# Patient Record
Sex: FEMALE | Race: WHITE | NOT HISPANIC OR LATINO | Employment: UNEMPLOYED | URBAN - METROPOLITAN AREA
[De-identification: names, ages, dates, MRNs, and addresses within clinical notes are randomized per-mention and may not be internally consistent; named-entity substitution may affect disease eponyms.]

---

## 2018-03-19 DIAGNOSIS — M79.672 LEFT FOOT PAIN: Primary | ICD-10-CM

## 2018-03-21 ENCOUNTER — HOSPITAL ENCOUNTER (OUTPATIENT)
Dept: RADIOLOGY | Facility: HOSPITAL | Age: 33
Discharge: HOME | End: 2018-03-21
Attending: ORTHOPAEDIC SURGERY
Payer: COMMERCIAL

## 2018-03-21 ENCOUNTER — OFFICE VISIT (OUTPATIENT)
Dept: ORTHOPEDICS | Facility: CLINIC | Age: 33
End: 2018-03-21
Attending: ORTHOPAEDIC SURGERY
Payer: COMMERCIAL

## 2018-03-21 VITALS
HEIGHT: 68 IN | HEART RATE: 114 BPM | WEIGHT: 210 LBS | BODY MASS INDEX: 31.83 KG/M2 | DIASTOLIC BLOOD PRESSURE: 87 MMHG | SYSTOLIC BLOOD PRESSURE: 138 MMHG

## 2018-03-21 DIAGNOSIS — M79.672 LEFT FOOT PAIN: ICD-10-CM

## 2018-03-21 DIAGNOSIS — M25.571 RIGHT ANKLE PAIN, UNSPECIFIED CHRONICITY: Primary | ICD-10-CM

## 2018-03-21 DIAGNOSIS — S92.354A CLOSED NONDISPLACED FRACTURE OF FIFTH METATARSAL BONE OF RIGHT FOOT, INITIAL ENCOUNTER: ICD-10-CM

## 2018-03-21 DIAGNOSIS — M25.571 RIGHT ANKLE PAIN, UNSPECIFIED CHRONICITY: ICD-10-CM

## 2018-03-21 DIAGNOSIS — M79.672 LEFT FOOT PAIN: Primary | ICD-10-CM

## 2018-03-21 PROCEDURE — 99203 OFFICE O/P NEW LOW 30 MIN: CPT | Mod: S$GLB,,, | Performed by: ORTHOPAEDIC SURGERY

## 2018-03-21 PROCEDURE — 73630 X-RAY EXAM OF FOOT: CPT | Mod: TC,PN,RT

## 2018-03-21 PROCEDURE — 73610 X-RAY EXAM OF ANKLE: CPT | Mod: 26,RT,, | Performed by: RADIOLOGY

## 2018-03-21 PROCEDURE — 99999 PR PBB SHADOW E&M-EST. PATIENT-LVL III: CPT | Mod: PBBFAC,,, | Performed by: ORTHOPAEDIC SURGERY

## 2018-03-21 PROCEDURE — 73630 X-RAY EXAM OF FOOT: CPT | Mod: 26,RT,, | Performed by: RADIOLOGY

## 2018-03-21 PROCEDURE — 73610 X-RAY EXAM OF ANKLE: CPT | Mod: TC,PN,RT

## 2018-03-21 PROCEDURE — 73630 X-RAY EXAM OF FOOT: CPT | Mod: TC,PN,LT

## 2018-03-23 NOTE — PROGRESS NOTES
"History reviewed. No pertinent past medical history.    History reviewed. No pertinent surgical history.    No current outpatient prescriptions on file.     No current facility-administered medications for this visit.        Review of patient's allergies indicates:   Allergen Reactions    Percocet [oxycodone-acetaminophen]        History reviewed. No pertinent family history.    Social History     Social History    Marital status:      Spouse name: N/A    Number of children: N/A    Years of education: N/A     Occupational History    Not on file.     Social History Main Topics    Smoking status: Never Smoker    Smokeless tobacco: Never Used    Alcohol use Not on file    Drug use: Unknown    Sexual activity: Not on file     Other Topics Concern    Not on file     Social History Narrative    No narrative on file       Chief Complaint:   Chief Complaint   Patient presents with    Right Foot - Injury    Right Ankle - Injury       Consulting Physician: Self, Aaareferral    History of present illness:    This is a 32 y.o. female who complains of right foot pain since rolling foot 3-17-18. Pain 7/10 and worse with use. Splinted.    Review of Systems:    Constitution: Denies chills, fever, and sweats.  HENT: Denies headaches or blurry vision.  Cardiovascular: Denies chest pain or irregular heart beat.  Respiratory: Denies cough or shortness of breath.  Gastrointestinal: Denies abdominal pain, nausea, or vomiting.  Musculoskeletal:  Denies muscle cramps.  Neurological: Denies dizziness or focal weakness.  Psychiatric/Behavioral: Normal mental status.  Hematologic/Lymphatic: Denies bleeding problem or easy bruising/bleeding.  Skin: Denies rash or suspicious lesions.    Examination:    Vital Signs:    Vitals:    03/21/18 1005   BP: 138/87   Pulse: (!) 114   Weight: 95.3 kg (210 lb)   Height: 5' 8" (1.727 m)   PainSc:   7   PainLoc: Foot       Body mass index is 31.93 kg/m².    This a well-developed, well " nourished patient in no acute distress.    Alert and oriented x 3 and cooperative to examination.       Physical Exam: Right Foot Exam     Gait:   weight bearing    Skin  Rash:   None  Scars:   None    Inspection   Deformity:   None  Erythema:   None  Bruising:   mild  Swelling:   mild  Masses:  None    Range of Motion  Not tested due to fracture    Muscle Strength  Not tested due to fracture    Other   Tenderness:  Diffuse 5th  Instability:  Not tested due to fracture  Sensation:   Normal    Vascular Exam   Capillary refill:     Normal          Imaging: X-rays ordered and reviewed today personally of the right foot and ankle reveal a non-displaced 5th MT avulsion fracture.        Assessment: Left foot pain    Closed nondisplaced fracture of fifth metatarsal bone of right foot, initial encounter        Plan:  Will boot and allow WB as tolerated. Back in 2 weeks with XR. Discussed possible non-healing.      DISCLAIMER: This note may have been dictated using voice recognition software and may contain grammatical errors.     NOTE: Consult report sent to referring provider via [employer].

## 2018-03-28 ENCOUNTER — TELEPHONE (OUTPATIENT)
Dept: ORTHOPEDICS | Facility: CLINIC | Age: 33
End: 2018-03-28

## 2018-03-28 NOTE — TELEPHONE ENCOUNTER
Spoke with pt and rescheduled appt to 4/4/2018  ----- Message from Flor Smith sent at 3/28/2018  9:45 AM CDT -----   Pt  Is calling to  Speak to the  Nurse about  Rescheduling ai ,   Wants to  Be fitted  In asap [phone]

## 2018-04-02 DIAGNOSIS — M79.671 RIGHT FOOT PAIN: Primary | ICD-10-CM

## 2018-04-04 ENCOUNTER — OFFICE VISIT (OUTPATIENT)
Dept: ORTHOPEDICS | Facility: CLINIC | Age: 33
End: 2018-04-04
Payer: COMMERCIAL

## 2018-04-04 ENCOUNTER — HOSPITAL ENCOUNTER (OUTPATIENT)
Dept: RADIOLOGY | Facility: HOSPITAL | Age: 33
Discharge: HOME | End: 2018-04-04
Attending: ORTHOPAEDIC SURGERY
Payer: COMMERCIAL

## 2018-04-04 VITALS
WEIGHT: 210.13 LBS | SYSTOLIC BLOOD PRESSURE: 121 MMHG | DIASTOLIC BLOOD PRESSURE: 75 MMHG | BODY MASS INDEX: 31.85 KG/M2 | HEIGHT: 68 IN | HEART RATE: 123 BPM

## 2018-04-04 DIAGNOSIS — M79.671 RIGHT FOOT PAIN: ICD-10-CM

## 2018-04-04 DIAGNOSIS — S92.354A CLOSED NONDISPLACED FRACTURE OF FIFTH METATARSAL BONE OF RIGHT FOOT, INITIAL ENCOUNTER: Primary | ICD-10-CM

## 2018-04-04 PROCEDURE — 73630 X-RAY EXAM OF FOOT: CPT | Mod: TC,PN,RT

## 2018-04-04 PROCEDURE — 99213 OFFICE O/P EST LOW 20 MIN: CPT | Mod: S$GLB,,, | Performed by: ORTHOPAEDIC SURGERY

## 2018-04-04 PROCEDURE — 73630 X-RAY EXAM OF FOOT: CPT | Mod: 26,RT,, | Performed by: RADIOLOGY

## 2018-04-04 PROCEDURE — 99999 PR PBB SHADOW E&M-EST. PATIENT-LVL III: CPT | Mod: PBBFAC,,, | Performed by: ORTHOPAEDIC SURGERY

## 2018-04-11 NOTE — PROGRESS NOTES
"History reviewed. No pertinent past medical history.    History reviewed. No pertinent surgical history.    No current outpatient prescriptions on file.     No current facility-administered medications for this visit.        Review of patient's allergies indicates:   Allergen Reactions    Percocet [oxycodone-acetaminophen]        History reviewed. No pertinent family history.    Social History     Social History    Marital status:      Spouse name: N/A    Number of children: N/A    Years of education: N/A     Occupational History    Not on file.     Social History Main Topics    Smoking status: Never Smoker    Smokeless tobacco: Never Used    Alcohol use Not on file    Drug use: Unknown    Sexual activity: Not on file     Other Topics Concern    Not on file     Social History Narrative    No narrative on file       Chief Complaint:   Chief Complaint   Patient presents with    Right Foot - Injury       Consulting Physician: No ref. provider found    History of present illness:    This is a 32 y.o. female who complains of right foot pain since rolling foot 3-17-18. Pain 4/10.    Review of Systems:    Constitution: Denies chills, fever, and sweats.  HENT: Denies headaches or blurry vision.  Cardiovascular: Denies chest pain or irregular heart beat.  Respiratory: Denies cough or shortness of breath.  Gastrointestinal: Denies abdominal pain, nausea, or vomiting.  Musculoskeletal:  Denies muscle cramps.  Neurological: Denies dizziness or focal weakness.  Psychiatric/Behavioral: Normal mental status.  Hematologic/Lymphatic: Denies bleeding problem or easy bruising/bleeding.  Skin: Denies rash or suspicious lesions.    Examination:    Vital Signs:    Vitals:    04/04/18 1559   BP: 121/75   Pulse: (!) 123   Weight: 95.3 kg (210 lb 1.6 oz)   Height: 5' 8" (1.727 m)   PainLoc: Foot       Body mass index is 31.95 kg/m².    This a well-developed, well nourished patient in no acute distress.    Alert and " oriented x 3 and cooperative to examination.       Physical Exam: Right Foot Exam     Gait:   weight bearing    Skin  Rash:   None  Scars:   None    Inspection   Deformity:   None  Erythema:   None  Bruising:   none  Swelling:   none  Masses:  None    Range of Motion  Normal    Muscle Strength  Normal    Other   Tenderness:  mild 5th  Instability:  None  Sensation:   Normal    Vascular Exam   Capillary refill:     Normal          Imaging: X-rays ordered and reviewed today personally of the right foot and ankle reveal a non-displaced 5th MT avulsion fracture.        Assessment: Closed nondisplaced fracture of fifth metatarsal bone of right foot, initial encounter        Plan:  Will continue boot and allow WB as tolerated. Back in 4 weeks with XR.      DISCLAIMER: This note may have been dictated using voice recognition software and may contain grammatical errors.     NOTE: Consult report sent to referring provider via [employer].

## 2018-05-03 DIAGNOSIS — M79.671 RIGHT FOOT PAIN: Primary | ICD-10-CM

## 2018-05-14 ENCOUNTER — HOSPITAL ENCOUNTER (OUTPATIENT)
Dept: RADIOLOGY | Facility: HOSPITAL | Age: 33
Discharge: HOME | End: 2018-05-14
Attending: ORTHOPAEDIC SURGERY
Payer: COMMERCIAL

## 2018-05-14 ENCOUNTER — OFFICE VISIT (OUTPATIENT)
Dept: ORTHOPEDICS | Facility: CLINIC | Age: 33
End: 2018-05-14
Attending: ORTHOPAEDIC SURGERY
Payer: COMMERCIAL

## 2018-05-14 VITALS
HEART RATE: 103 BPM | DIASTOLIC BLOOD PRESSURE: 85 MMHG | WEIGHT: 210.13 LBS | BODY MASS INDEX: 31.85 KG/M2 | HEIGHT: 68 IN | SYSTOLIC BLOOD PRESSURE: 118 MMHG

## 2018-05-14 DIAGNOSIS — M79.671 RIGHT FOOT PAIN: ICD-10-CM

## 2018-05-14 DIAGNOSIS — S92.354A CLOSED NONDISPLACED FRACTURE OF FIFTH METATARSAL BONE OF RIGHT FOOT, INITIAL ENCOUNTER: Primary | ICD-10-CM

## 2018-05-14 PROCEDURE — 99999 PR PBB SHADOW E&M-EST. PATIENT-LVL III: CPT | Mod: 25,PBBFAC,, | Performed by: ORTHOPAEDIC SURGERY

## 2018-05-14 PROCEDURE — 73630 X-RAY EXAM OF FOOT: CPT | Mod: TC,FY,RT

## 2018-05-14 PROCEDURE — 3008F BODY MASS INDEX DOCD: CPT | Mod: S$GLB,ICN,, | Performed by: ORTHOPAEDIC SURGERY

## 2018-05-14 PROCEDURE — 73630 X-RAY EXAM OF FOOT: CPT | Mod: 26,RT,, | Performed by: RADIOLOGY

## 2018-05-14 PROCEDURE — 99213 OFFICE O/P EST LOW 20 MIN: CPT | Mod: S$GLB,ICN,, | Performed by: ORTHOPAEDIC SURGERY

## 2018-05-15 NOTE — PROGRESS NOTES
"History reviewed. No pertinent past medical history.    History reviewed. No pertinent surgical history.    No current outpatient prescriptions on file.     No current facility-administered medications for this visit.        Review of patient's allergies indicates:   Allergen Reactions    Percocet [oxycodone-acetaminophen]        History reviewed. No pertinent family history.    Social History     Social History    Marital status:      Spouse name: N/A    Number of children: N/A    Years of education: N/A     Occupational History    Not on file.     Social History Main Topics    Smoking status: Never Smoker    Smokeless tobacco: Never Used    Alcohol use Not on file    Drug use: Unknown    Sexual activity: Not on file     Other Topics Concern    Not on file     Social History Narrative    No narrative on file       Chief Complaint:   Chief Complaint   Patient presents with    Right Foot - Injury       Consulting Physician: Josemanuel So MD    History of present illness:    This is a 33 y.o. female who complains of right foot pain since rolling foot 3-17-18. Pain 0-6/10.    Review of Systems:    Constitution: Denies chills, fever, and sweats.  HENT: Denies headaches or blurry vision.  Cardiovascular: Denies chest pain or irregular heart beat.  Respiratory: Denies cough or shortness of breath.  Gastrointestinal: Denies abdominal pain, nausea, or vomiting.  Musculoskeletal:  Denies muscle cramps.  Neurological: Denies dizziness or focal weakness.  Psychiatric/Behavioral: Normal mental status.  Hematologic/Lymphatic: Denies bleeding problem or easy bruising/bleeding.  Skin: Denies rash or suspicious lesions.    Examination:    Vital Signs:    Vitals:    05/14/18 1300   BP: 118/85   Pulse: 103   Weight: 95.3 kg (210 lb 1.6 oz)   Height: 5' 8" (1.727 m)   PainSc: 0-No pain   PainLoc: Foot       Body mass index is 31.95 kg/m².    This a well-developed, well nourished patient in no acute " distress.    Alert and oriented x 3 and cooperative to examination.       Physical Exam: Right Foot Exam     Gait:   normal    Skin  Rash:   None  Scars:   None    Inspection   Deformity:   None  Erythema:   None  Bruising:   none  Swelling:   none  Masses:  None    Range of Motion  Normal    Muscle Strength  Normal    Other   Tenderness:  mild 5th  Instability:  None  Sensation:   Normal    Vascular Exam   Capillary refill:     Normal          Imaging: X-rays ordered and reviewed today personally of the right foot and ankle reveal a non-displaced 5th MT avulsion fracture.        Assessment: Closed nondisplaced fracture of fifth metatarsal bone of right foot, initial encounter        Plan:  Will discontinue boot and allow WB as tolerated. Back as needed. No impact activities for another month.      DISCLAIMER: This note may have been dictated using voice recognition software and may contain grammatical errors.     NOTE: Consult report sent to referring provider via [employer] EMR.

## 2018-06-20 DIAGNOSIS — M79.671 RIGHT FOOT PAIN: Primary | ICD-10-CM

## 2018-06-26 ENCOUNTER — TELEPHONE (OUTPATIENT)
Dept: ORTHOPEDICS | Facility: CLINIC | Age: 33
End: 2018-06-26

## 2018-06-26 ENCOUNTER — OFFICE VISIT (OUTPATIENT)
Dept: ORTHOPEDICS | Facility: CLINIC | Age: 33
End: 2018-06-26
Payer: COMMERCIAL

## 2018-06-26 ENCOUNTER — HOSPITAL ENCOUNTER (OUTPATIENT)
Dept: RADIOLOGY | Facility: HOSPITAL | Age: 33
Discharge: HOME | End: 2018-06-26
Attending: ORTHOPAEDIC SURGERY
Payer: COMMERCIAL

## 2018-06-26 VITALS
BODY MASS INDEX: 31.85 KG/M2 | DIASTOLIC BLOOD PRESSURE: 77 MMHG | SYSTOLIC BLOOD PRESSURE: 116 MMHG | WEIGHT: 210.13 LBS | HEIGHT: 68 IN | HEART RATE: 83 BPM

## 2018-06-26 DIAGNOSIS — S92.354A CLOSED NONDISPLACED FRACTURE OF FIFTH METATARSAL BONE OF RIGHT FOOT, INITIAL ENCOUNTER: Primary | ICD-10-CM

## 2018-06-26 DIAGNOSIS — M79.671 RIGHT FOOT PAIN: ICD-10-CM

## 2018-06-26 PROCEDURE — 99213 OFFICE O/P EST LOW 20 MIN: CPT | Mod: S$GLB,,, | Performed by: ORTHOPAEDIC SURGERY

## 2018-06-26 PROCEDURE — 73630 X-RAY EXAM OF FOOT: CPT | Mod: TC,PN,RT

## 2018-06-26 PROCEDURE — 3008F BODY MASS INDEX DOCD: CPT | Mod: CPTII,S$GLB,, | Performed by: ORTHOPAEDIC SURGERY

## 2018-06-26 PROCEDURE — 99999 PR PBB SHADOW E&M-EST. PATIENT-LVL III: CPT | Mod: PBBFAC,,, | Performed by: ORTHOPAEDIC SURGERY

## 2018-06-26 PROCEDURE — 73630 X-RAY EXAM OF FOOT: CPT | Mod: 26,RT,, | Performed by: RADIOLOGY

## 2018-06-26 NOTE — TELEPHONE ENCOUNTER
----- Message from Cinthya Rendon sent at 6/26/2018  9:09 AM CDT -----  Contact: self  Patient has a 3.15 appt today and wants to know if Dr So can see her son at the same time     Son's Name is Eileen Bahena MRN [id number]    Please call to advise [phone]

## 2018-06-26 NOTE — TELEPHONE ENCOUNTER
Advised pt that we were able to schedule her son's appt today at 3:15. Pt verbalized understanding

## 2018-06-28 ENCOUNTER — PATIENT MESSAGE (OUTPATIENT)
Dept: BARIATRICS | Facility: CLINIC | Age: 33
End: 2018-06-28

## 2018-06-28 ENCOUNTER — TELEPHONE (OUTPATIENT)
Dept: BARIATRICS | Facility: CLINIC | Age: 33
End: 2018-06-28

## 2018-06-29 ENCOUNTER — OFFICE VISIT (OUTPATIENT)
Dept: BARIATRICS | Facility: CLINIC | Age: 33
End: 2018-06-29
Payer: COMMERCIAL

## 2018-06-29 VITALS
RESPIRATION RATE: 16 BRPM | HEART RATE: 104 BPM | BODY MASS INDEX: 34.87 KG/M2 | WEIGHT: 217 LBS | TEMPERATURE: 99 F | SYSTOLIC BLOOD PRESSURE: 123 MMHG | HEIGHT: 66 IN | DIASTOLIC BLOOD PRESSURE: 77 MMHG

## 2018-06-29 DIAGNOSIS — E11.9 TYPE 2 DIABETES MELLITUS WITHOUT COMPLICATION, WITH LONG-TERM CURRENT USE OF INSULIN: ICD-10-CM

## 2018-06-29 DIAGNOSIS — Z79.4 TYPE 2 DIABETES MELLITUS WITHOUT COMPLICATION, WITH LONG-TERM CURRENT USE OF INSULIN: ICD-10-CM

## 2018-06-29 DIAGNOSIS — R29.818 SUSPECTED SLEEP APNEA: ICD-10-CM

## 2018-06-29 DIAGNOSIS — E66.01 MORBID OBESITY: Primary | ICD-10-CM

## 2018-06-29 PROCEDURE — 99204 OFFICE O/P NEW MOD 45 MIN: CPT | Mod: S$GLB,,, | Performed by: SURGERY

## 2018-06-29 PROCEDURE — 99999 PR PBB SHADOW E&M-EST. PATIENT-LVL IV: CPT | Mod: PBBFAC,,, | Performed by: SURGERY

## 2018-06-29 PROCEDURE — 3008F BODY MASS INDEX DOCD: CPT | Mod: CPTII,S$GLB,, | Performed by: SURGERY

## 2018-06-29 RX ORDER — PANTOPRAZOLE SODIUM 20 MG/1
20 TABLET, DELAYED RELEASE ORAL DAILY PRN
COMMUNITY
End: 2021-06-28 | Stop reason: SDUPTHER

## 2018-06-29 RX ORDER — METFORMIN HYDROCHLORIDE 500 MG/1
1000 TABLET, EXTENDED RELEASE ORAL
COMMUNITY
End: 2018-09-26 | Stop reason: ALTCHOICE

## 2018-06-29 RX ORDER — BUPROPION HYDROCHLORIDE 300 MG/1
300 TABLET ORAL DAILY
COMMUNITY
End: 2019-03-07 | Stop reason: CLARIF

## 2018-06-30 NOTE — PROGRESS NOTES
Past Medical History:   Diagnosis Date    Depression     Diabetes mellitus     GERD (gastroesophageal reflux disease)     Suspected sleep apnea        Past Surgical History:   Procedure Laterality Date    CHOLECYSTECTOMY      TUBAL LIGATION      open       Current Outpatient Prescriptions   Medication Sig    buPROPion (WELLBUTRIN XL) 300 MG 24 hr tablet Take 300 mg by mouth once daily.    liraglutide (VICTOZA 3-PHOENIX SUBQ) Inject 1 g/day into the skin.    metFORMIN (GLUCOPHAGE-XR) 500 MG 24 hr tablet Take 500 mg by mouth daily with breakfast.    pantoprazole (PROTONIX) 20 MG tablet Take 20 mg by mouth once daily.     No current facility-administered medications for this visit.        Review of patient's allergies indicates:   Allergen Reactions    Percocet [oxycodone-acetaminophen]        Family History   Problem Relation Age of Onset    Diabetes Mother     Heart disease Mother     Obesity Mother     Sleep apnea Mother     No Known Problems Son     Heart disease Maternal Grandmother     Cancer Maternal Grandmother     Diabetes Maternal Grandmother     Hypertension Maternal Grandmother     Obesity Maternal Grandfather     No Known Problems Son     No Known Problems Son     No Known Problems Son        Social History     Social History    Marital status:      Spouse name: N/A    Number of children: N/A    Years of education: N/A     Occupational History    Not on file.     Social History Main Topics    Smoking status: Never Smoker    Smokeless tobacco: Never Used    Alcohol use No    Drug use: No    Sexual activity: Not on file     Other Topics Concern    Not on file     Social History Narrative    Lives at home with 4 kids and     She buys and cooks all the food       Chief Complaint:   Chief Complaint   Patient presents with    Right Foot - Pain       Consulting Physician: No ref. provider found    History of present illness:    This is a 33 y.o. female who complains of  "right foot pain since rolling foot 3-17-18. Pain 4/10.    Review of Systems:    Constitution: Denies chills, fever, and sweats.  HENT: Denies headaches or blurry vision.  Cardiovascular: Denies chest pain or irregular heart beat.  Respiratory: Denies cough or shortness of breath.  Gastrointestinal: Denies abdominal pain, nausea, or vomiting.  Musculoskeletal:  Denies muscle cramps.  Neurological: Denies dizziness or focal weakness.  Psychiatric/Behavioral: Normal mental status.  Hematologic/Lymphatic: Denies bleeding problem or easy bruising/bleeding.  Skin: Denies rash or suspicious lesions.    Examination:    Vital Signs:    Vitals:    06/26/18 1502   BP: 116/77   Pulse: 83   Weight: 95.3 kg (210 lb 1.6 oz)   Height: 5' 8" (1.727 m)   PainSc:   4   PainLoc: Foot       Body mass index is 31.95 kg/m².    This a well-developed, well nourished patient in no acute distress.    Alert and oriented x 3 and cooperative to examination.       Physical Exam: Right Foot Exam     Gait:   normal    Skin  Rash:   None  Scars:   None    Inspection   Deformity:   None  Erythema:   None  Bruising:   none  Swelling:   none  Masses:  None    Range of Motion  Normal    Muscle Strength  Normal    Other   Tenderness:  mild 5th  Instability:  None  Sensation:   Normal    Vascular Exam   Capillary refill:     Normal          Imaging: X-rays ordered and reviewed today personally of the right foot and ankle reveal a non-displaced 5th MT avulsion fracture.        Assessment: Closed nondisplaced fracture of fifth metatarsal bone of right foot, initial encounter        Plan:  Will allow WB as tolerated. Some midfoot pain. Will send to PT. Back as needed.     DISCLAIMER: This note may have been dictated using voice recognition software and may contain grammatical errors.     NOTE: Consult report sent to referring provider via [employer] E[employer].  "

## 2018-07-23 ENCOUNTER — PATIENT MESSAGE (OUTPATIENT)
Dept: BARIATRICS | Facility: CLINIC | Age: 33
End: 2018-07-23

## 2018-07-26 ENCOUNTER — OFFICE VISIT (OUTPATIENT)
Dept: SURGERY | Facility: CLINIC | Age: 33
End: 2018-07-26
Payer: COMMERCIAL

## 2018-07-26 VITALS
WEIGHT: 217.5 LBS | SYSTOLIC BLOOD PRESSURE: 124 MMHG | HEIGHT: 66 IN | DIASTOLIC BLOOD PRESSURE: 90 MMHG | HEART RATE: 94 BPM | RESPIRATION RATE: 16 BRPM | BODY MASS INDEX: 34.96 KG/M2 | TEMPERATURE: 98 F

## 2018-07-26 DIAGNOSIS — E66.01 MORBID OBESITY: Primary | ICD-10-CM

## 2018-07-26 DIAGNOSIS — Z79.4 TYPE 2 DIABETES MELLITUS WITHOUT COMPLICATION, WITH LONG-TERM CURRENT USE OF INSULIN: ICD-10-CM

## 2018-07-26 DIAGNOSIS — E11.9 TYPE 2 DIABETES MELLITUS WITHOUT COMPLICATION, WITH LONG-TERM CURRENT USE OF INSULIN: ICD-10-CM

## 2018-07-26 PROCEDURE — 99213 OFFICE O/P EST LOW 20 MIN: CPT | Mod: S$GLB,ICN,, | Performed by: SURGERY

## 2018-07-26 PROCEDURE — 99999 PR PBB SHADOW E&M-EST. PATIENT-LVL III: CPT | Mod: PBBFAC,,, | Performed by: SURGERY

## 2018-07-26 PROCEDURE — 3008F BODY MASS INDEX DOCD: CPT | Mod: S$GLB,ICN,, | Performed by: SURGERY

## 2018-07-26 NOTE — PROGRESS NOTES
Medically Supervised Weight Loss Documentation    Date of Visit: 07/26/2018    Patient: Allyson Bahena    Current Weight: 217 Current BMI: Body mass index is 35.11 kg/m².  Weight Change:0    Last Weight: 217    Beginning Weight: 217      Vitals:   Vitals:    07/26/18 1002   BP: (!) 124/90   Pulse: 94   Resp: 16   Temp: 98.1 °F (36.7 °C)       Comorbidities:   Past Medical History:   Diagnosis Date    Depression     Diabetes mellitus     GERD (gastroesophageal reflux disease)     Suspected sleep apnea        Medications:  Current Outpatient Prescriptions on File Prior to Visit   Medication Sig Dispense Refill    buPROPion (WELLBUTRIN XL) 300 MG 24 hr tablet Take 300 mg by mouth once daily.      liraglutide (VICTOZA 3-PHOENIX SUBQ) Inject 1 g/day into the skin.      metFORMIN (GLUCOPHAGE-XR) 500 MG 24 hr tablet Take 500 mg by mouth daily with breakfast.      pantoprazole (PROTONIX) 20 MG tablet Take 20 mg by mouth once daily.       No current facility-administered medications on file prior to visit.          Body comp:  Fat Percent:  1754 %  Fat Mass:  102 lb  FFM:  112.5 lb  TBW: 82.8 lb  BMR: 1754 kcal       Diet Education Discussed:    Breakfast:  Protein shake  Lunch:  Turkey chicken  Dinner:  Protein shake , went out to eat a few times    Exercise/Activity Discussed:    Moving more and walking more    Behavior or Diet Goals for this patient:    Doing well with diet, seems like portions are her biggest problem. She should continue following low carb dieting as the surgery will provide portion control.    Needs an exercise routine and should find a why to fit this into her lifestyle.    MSD 1/3  Labs  EKG  UGI   dietary consult  psych consult   Seminar     I will obtain the following clearances prior to surgery: none      : I met with the patient for 15 minutes and counseled her for over 50% of that time

## 2018-07-30 ENCOUNTER — CLINICAL SUPPORT (OUTPATIENT)
Dept: BARIATRICS | Facility: CLINIC | Age: 33
End: 2018-07-30
Payer: COMMERCIAL

## 2018-07-30 ENCOUNTER — TELEPHONE (OUTPATIENT)
Dept: BARIATRICS | Facility: CLINIC | Age: 33
End: 2018-07-30

## 2018-07-30 VITALS — HEIGHT: 66 IN | WEIGHT: 217.63 LBS | BODY MASS INDEX: 34.98 KG/M2

## 2018-07-30 DIAGNOSIS — E66.01 MORBID OBESITY: ICD-10-CM

## 2018-07-30 DIAGNOSIS — Z71.3 DIETARY COUNSELING: ICD-10-CM

## 2018-07-30 DIAGNOSIS — E11.9 TYPE 2 DIABETES MELLITUS WITHOUT COMPLICATION, WITH LONG-TERM CURRENT USE OF INSULIN: ICD-10-CM

## 2018-07-30 DIAGNOSIS — Z79.4 TYPE 2 DIABETES MELLITUS WITHOUT COMPLICATION, WITH LONG-TERM CURRENT USE OF INSULIN: ICD-10-CM

## 2018-07-30 PROCEDURE — 97802 MEDICAL NUTRITION INDIV IN: CPT | Mod: S$GLB,,, | Performed by: DIETITIAN, REGISTERED

## 2018-07-30 PROCEDURE — 99999 PR PBB SHADOW E&M-EST. PATIENT-LVL II: CPT | Mod: PBBFAC,,, | Performed by: DIETITIAN, REGISTERED

## 2018-07-30 NOTE — TELEPHONE ENCOUNTER
----- Message from Merari Jane sent at 7/30/2018  9:48 AM CDT -----  Contact: pt  Pt calling supposed to be at the hospital to do tests ans she thought it was supposed to be tomorrow,please call her back at..[phone] (home)

## 2018-07-30 NOTE — PROGRESS NOTES
"NUTRITIONAL CONSULT    Referring Physician: Dr. Tinajero   Reason for MNT Referral: Initial assessment for sleeve gastrectomy work-up    PAST MEDICAL HISTORY:   33 y.o. female presents with a BMI of There is no height or weight on file to calculate BMI..    Past attempts at weight loss include: Unsupervised: calorie counting, high protein, low carbohydrates;  Supervised:  Physician weight loss center;  Diet pills: phentermine;  Exercise attempts: walking or running, weight training, group classes     Weight history:   At current weight:  3 years  Obese for 5 years.  More than 35 pounds overweight for 10 years.  Started dieting at 27 years old.  Maximum weight reached: 235 pounds  Most weight lost was 20 pounds through diet pills and working out for 3 months.      Past Medical History:   Diagnosis Date    Depression     Diabetes mellitus     GERD (gastroesophageal reflux disease)     Suspected sleep apnea        CLINICAL DATA:  33 y.o.-year-old White female.  Height: 5'6"  Weight: 217 lbs  IBW: 144 lbs  BMI: 35.12  The patient's goal weight (50-70 % EBW): 160-177 lbs  Personal goal weight: 150 lbs    Goal for Bariatric Surgery: to improve health, to improve quality of life, to lose weight and to prevent future medical conditions    NUTRITION & HEALTH HISTORY:  Greatest challenge: dining out frequency, starchy CHO, portion control and high-fat diet    Current diet recall: Breakfast: protein shake - premier; Lunch: turkey sandwhich; Dinner: Hamburger    Current Diet:  Meal pattern: 3 meals   Protein supplements: premier daily   Snackin  / day  Vegetables: Likes only likes green beans . Eats once per week.  Fruits: Likes a variety. Eats daily.  Beverages: water, soda and diet soda  Dining out: Weekly. Mostly fast food, restaurants and take-out.  Cooking at home: Daily. Mostly baked, grilled, smothered and fried meat, fish, starchy CHO and vegetables.    Exercise:  Past exercise: Fair    Current exercise: just " joined cross fit   Restrictions to exercise:  Recently broke foot -  And now healed     Vitamins / Minerals / Herbs:   biotin    Food Allergies:    none    Social:  No work.  Lives with   Grocery shopping and food prep  self  Patient believes the household will be supportive after surgery.  Alcohol: Socially.  Smoking: None.    ASSESSMENT:  · Patient reports attempts at weight loss, only to regain lost weight.  · Patient demonstrated knowledge of healthy eating behaviors and exercise patterns; admits to not eating healthy and not exercising at this point.  · Patient states willingness to change lifestyle and make behavior modifications.  · Expect fair  compliance after surgery at this time.    Insurance requires medically supervised diet prior to consideration for bariatric surgery.    BARIATRIC DIET DISCUSSION:  Discussed diet after surgery and related to patients food record.  Reviewed diet progression before and after surgery.  Reinforced that surgery is not a magic bullet and importance of low fat foods and no snacking.  Stressed importance of exercise and its role in achieving weight loss goals.    RECOMMENDATIONS:  Patient is a potential candidate for bariatric surgery.        PLAN:  Resume work-up for surgery.  Continue to review Bariatric Nutrition Guidebook at home and call with any questions.  Work on Bariatric Nutrition Checklist.  Work on expanding variety of vegetables.  Work on gradually cutting back on starchy CHO in the diet.  5-6 meals per day.  Start including protein supplements in the diet plan daily.  Reduce frequency of dining out.  More grocery shopping and meal preparation at home.  Increase exercise.  Start shopping for bariatric vitamins & minerals.  Return to clinic.    SESSION TIME:  60 minutes

## 2018-08-09 ENCOUNTER — TELEPHONE (OUTPATIENT)
Dept: BARIATRICS | Facility: CLINIC | Age: 33
End: 2018-08-09

## 2018-08-09 ENCOUNTER — PATIENT MESSAGE (OUTPATIENT)
Dept: BARIATRICS | Facility: CLINIC | Age: 33
End: 2018-08-09

## 2018-08-16 ENCOUNTER — TELEPHONE (OUTPATIENT)
Dept: BARIATRICS | Facility: CLINIC | Age: 33
End: 2018-08-16

## 2018-08-16 NOTE — TELEPHONE ENCOUNTER
spoke to patient about keeping her appt in [facility]. pt understood. She also is going to speak to doctor about testing positive for cancer gene at her f/u

## 2018-08-20 ENCOUNTER — HOSPITAL ENCOUNTER (OUTPATIENT)
Dept: RADIOLOGY | Facility: HOSPITAL | Age: 33
Discharge: HOME | End: 2018-08-20
Attending: SURGERY
Payer: COMMERCIAL

## 2018-08-20 DIAGNOSIS — E66.01 MORBID OBESITY: ICD-10-CM

## 2018-08-20 PROCEDURE — 74246 X-RAY XM UPR GI TRC 2CNTRST: CPT | Mod: 26,,, | Performed by: RADIOLOGY

## 2018-08-20 PROCEDURE — 74246 X-RAY XM UPR GI TRC 2CNTRST: CPT | Mod: TC,FY

## 2018-08-21 ENCOUNTER — PATIENT MESSAGE (OUTPATIENT)
Dept: SURGERY | Facility: CLINIC | Age: 33
End: 2018-08-21

## 2018-08-23 ENCOUNTER — OFFICE VISIT (OUTPATIENT)
Dept: SURGERY | Facility: CLINIC | Age: 33
End: 2018-08-23
Payer: COMMERCIAL

## 2018-08-23 ENCOUNTER — PATIENT MESSAGE (OUTPATIENT)
Dept: BARIATRICS | Facility: CLINIC | Age: 33
End: 2018-08-23

## 2018-08-23 VITALS
BODY MASS INDEX: 34.96 KG/M2 | RESPIRATION RATE: 16 BRPM | TEMPERATURE: 98 F | SYSTOLIC BLOOD PRESSURE: 142 MMHG | WEIGHT: 217.5 LBS | HEIGHT: 66 IN | HEART RATE: 88 BPM | DIASTOLIC BLOOD PRESSURE: 85 MMHG

## 2018-08-23 DIAGNOSIS — E66.01 MORBID OBESITY: Primary | ICD-10-CM

## 2018-08-23 DIAGNOSIS — E11.9 TYPE 2 DIABETES MELLITUS WITHOUT COMPLICATION, WITH LONG-TERM CURRENT USE OF INSULIN: ICD-10-CM

## 2018-08-23 DIAGNOSIS — R29.818 SUSPECTED SLEEP APNEA: ICD-10-CM

## 2018-08-23 DIAGNOSIS — Z79.4 TYPE 2 DIABETES MELLITUS WITHOUT COMPLICATION, WITH LONG-TERM CURRENT USE OF INSULIN: ICD-10-CM

## 2018-08-23 PROCEDURE — 99999 PR PBB SHADOW E&M-EST. PATIENT-LVL III: CPT | Mod: PBBFAC,,, | Performed by: SURGERY

## 2018-08-23 PROCEDURE — 3008F BODY MASS INDEX DOCD: CPT | Mod: S$GLB,ICN,, | Performed by: SURGERY

## 2018-08-23 PROCEDURE — 3044F HG A1C LEVEL LT 7.0%: CPT | Mod: S$GLB,ICN,, | Performed by: SURGERY

## 2018-08-23 PROCEDURE — 99213 OFFICE O/P EST LOW 20 MIN: CPT | Mod: S$GLB,ICN,, | Performed by: SURGERY

## 2018-08-23 NOTE — PROGRESS NOTES
Medically Supervised Weight Loss Documentation    Date of Visit: 08/23/2018    Patient: Allyson Bahena    Current Weight: 217  Current BMI: Body mass index is 35.11 kg/m².  Weight Change: 0    Last Weight: 217    Beginning Weight:  217       Vitals:   Vitals:    08/23/18 1046   BP: (!) 142/85   Pulse: 88   Resp: 16   Temp: 98.1 °F (36.7 °C)       Comorbidities:   Past Medical History:   Diagnosis Date    Depression     Diabetes mellitus     GERD (gastroesophageal reflux disease)     Suspected sleep apnea        Medications:  Current Outpatient Medications on File Prior to Visit   Medication Sig Dispense Refill    buPROPion (WELLBUTRIN XL) 300 MG 24 hr tablet Take 300 mg by mouth once daily.      pantoprazole (PROTONIX) 20 MG tablet Take 20 mg by mouth once daily.      liraglutide (VICTOZA 3-PHOENIX SUBQ) Inject 1 g/day into the skin.      metFORMIN (GLUCOPHAGE-XR) 500 MG 24 hr tablet Take 500 mg by mouth daily with breakfast.       No current facility-administered medications on file prior to visit.          Body comp:  Fat Percent:  48.2 %  Fat Mass:  105 lb  FFM:  112.5 lb  TBW: 82.5 lb  BMR: 1764 kcal      Diet Education Discussed:    Breakfast:  shake  Lunch:  Rolled up turkey, shake  Dinner:  Chicken, pork chops, cucumbers  Snack: almonds    Exercise/Activity Discussed:    crossfit    Behavior or Diet Goals for this patient:    She is doing well with the diet.  I want her to work on portion control.  Keep doing crossfit. She is planning on hysterectomy and possible breast surgery coming up. We talked about avoiding carbs during this stressful event.    MSD 2/3  Labs- high triglycerides  EKG- pending  UGI - normal  dietary consult-done  psych consult - pending  Seminar     I will obtain the following clearances prior to surgery: none       : I met with the patient for 15 minutes and counseled her for over 50% of that time

## 2018-08-29 NOTE — PROGRESS NOTES
"Subjective:      Patient ID: Allyson Bahena is a 33 y.o. female.    Chief Complaint: Breast Cancer Screening      HPI: (PF, EPF - 1-3) (Detailed, Comp, - 4) new patient presents for breast cancer high risk screening, known BRCA2 mutation reported by [facility] 2018, [id number]. She was the initial person tested in this family by her GYN. She reports a possible "nodule" right breast with tenderness which "has been there for a while". Denies nipple discharge, skin changes. She reports her GYN has recommended removal of her ovaries and uterus secondary to heavy menstrual cycles. Her mother has not had genetic testing. She has 4 sons and one brother.     Outside mmg and right US at [facility] dated 17 with no abnormality reported       Menarche at 13   first at 19  LMP 2018  Nonsmoker  etoh rare      Review of Systems  Objective:   Physical Exam   Pulmonary/Chest: Right breast exhibits no inverted nipple, no mass, no nipple discharge and no skin change. Left breast exhibits no inverted nipple, no mass, no nipple discharge, no skin change and no tenderness.   Lymphadenopathy:     She has no cervical adenopathy.     She has no axillary adenopathy.        Right: No supraclavicular adenopathy present.        Left: No supraclavicular adenopathy present.     Assessment:       1. BRCA2 gene mutation positive    2. At high risk for breast cancer    3. Encounter for nonprocreative genetic counseling    4. Breast cancer screening, high risk patient    5. Family history of breast cancer    6. BRCA2 gene mutation positive in female        Plan:       Discussed hereditary breast cancer and cancer risks associated with BRCA2 mutations. Discussed medical management of high breast cancer risk according to current NCCN guidelines including increased screening with mmg and MRI along with CBE verses prophylactic mastectomy with or without reconstruction. She will be scheduled for mmg and breast " MRI. She will be referred to surgical oncology and plastic surgery to discuss prophylactic mastectomy. Advised BSO however age is recommended 40-45. She will discuss with her GYN, semiannual pelvic exam, CA-125 and transvaginal US can be done until BSO. Discussed if she has bilateral prophylactic mastectomy and then removal of her ovaries, she can take HRT .     Also recommended testing of her mother, brother, and sons once they are of adult ages.     Time in counseling today 40 min, total time 50 min    She was seen by Dr Cantu today and  Will be scheduled to see plastic surgery     mmg and MRI also will be scheduled

## 2018-08-30 ENCOUNTER — OFFICE VISIT (OUTPATIENT)
Dept: SURGERY | Facility: CLINIC | Age: 33
End: 2018-08-30
Payer: COMMERCIAL

## 2018-08-30 ENCOUNTER — OFFICE VISIT (OUTPATIENT)
Dept: SURGERY | Facility: CLINIC | Age: 33
End: 2018-08-30
Attending: SURGERY
Payer: COMMERCIAL

## 2018-08-30 VITALS
WEIGHT: 220.56 LBS | BODY MASS INDEX: 34.62 KG/M2 | SYSTOLIC BLOOD PRESSURE: 130 MMHG | HEART RATE: 116 BPM | TEMPERATURE: 98 F | DIASTOLIC BLOOD PRESSURE: 84 MMHG | HEIGHT: 67 IN

## 2018-08-30 VITALS
HEIGHT: 67 IN | WEIGHT: 220.44 LBS | DIASTOLIC BLOOD PRESSURE: 84 MMHG | SYSTOLIC BLOOD PRESSURE: 130 MMHG | BODY MASS INDEX: 34.6 KG/M2 | HEART RATE: 116 BPM

## 2018-08-30 DIAGNOSIS — Z15.01 BRCA2 GENE MUTATION POSITIVE: Primary | ICD-10-CM

## 2018-08-30 DIAGNOSIS — Z80.3 FAMILY HISTORY OF BREAST CANCER: ICD-10-CM

## 2018-08-30 DIAGNOSIS — Z91.89 AT HIGH RISK FOR BREAST CANCER: ICD-10-CM

## 2018-08-30 DIAGNOSIS — Z15.09 BRCA2 GENE MUTATION POSITIVE IN FEMALE: ICD-10-CM

## 2018-08-30 DIAGNOSIS — Z15.02 BRCA2 GENE MUTATION POSITIVE IN FEMALE: ICD-10-CM

## 2018-08-30 DIAGNOSIS — Z12.39 BREAST CANCER SCREENING, HIGH RISK PATIENT: ICD-10-CM

## 2018-08-30 DIAGNOSIS — Z71.83 ENCOUNTER FOR NONPROCREATIVE GENETIC COUNSELING: ICD-10-CM

## 2018-08-30 DIAGNOSIS — Z15.09 BRCA2 GENE MUTATION POSITIVE: Primary | ICD-10-CM

## 2018-08-30 DIAGNOSIS — Z15.01 BRCA2 GENE MUTATION POSITIVE IN FEMALE: ICD-10-CM

## 2018-08-30 PROCEDURE — 3008F BODY MASS INDEX DOCD: CPT | Mod: CPTII,S$GLB,, | Performed by: NURSE PRACTITIONER

## 2018-08-30 PROCEDURE — 99213 OFFICE O/P EST LOW 20 MIN: CPT | Mod: S$GLB,,, | Performed by: NURSE PRACTITIONER

## 2018-08-30 PROCEDURE — 99213 OFFICE O/P EST LOW 20 MIN: CPT | Mod: S$GLB,,, | Performed by: SURGERY

## 2018-08-30 PROCEDURE — 3008F BODY MASS INDEX DOCD: CPT | Mod: CPTII,S$GLB,, | Performed by: SURGERY

## 2018-08-30 NOTE — PROGRESS NOTES
HIGH RISK    Allyson Bahena is a 33 y.o. premenopausal female referred for evaluation of increased risk of breast cancer based on BRCA2, family history on mother's side.  BRCA testing was performed.    Other breast cancer risk factors include family hx on mother's side.     Patient is .  Age of first live birth was 19.      Social History:   Smoking:  denies  Drinking:  denies    The following portions of the patient's history were reviewed and updated as appropriate: She  has a past medical history of Depression, Diabetes mellitus, Genetic testing (2018), GERD (gastroesophageal reflux disease), and Suspected sleep apnea.  She does not have any pertinent problems on file.  She  has a past surgical history that includes Tubal ligation and Cholecystectomy.  Her family history includes Breast cancer (age of onset: 32) in her other; Breast cancer (age of onset: 48) in her other; Breast cancer (age of onset: 49) in her maternal grandmother; Breast cancer (age of onset: 50) in her other; Breast cancer (age of onset: 53) in her other; Cancer in her maternal grandmother; Cancer (age of onset: 34) in her maternal aunt; Diabetes in her maternal grandmother and mother; Heart disease in her maternal grandmother and mother; Hypertension in her maternal grandmother; No Known Problems in her son, son, son, and son; Obesity in her maternal grandfather and mother; Sleep apnea in her mother.  She  reports that  has never smoked. she has never used smokeless tobacco. She reports that she does not drink alcohol or use drugs.  She has a current medication list which includes the following prescription(s): bupropion, liraglutide, metformin, and pantoprazole.  She is allergic to percocet [oxycodone-acetaminophen]..    Review of Systems  Pertinent items are noted in HPI.      Objective:       Vitals    Vitals:    18 1042   BP: 130/84   Pulse: (!) 116      General appearance:  alert, appears stated age and  cooperative   HEENT NC/AT, PERRL, EOMs intact, oropharynx clear.   Neck Supple without adenopathy.   Lungs:  nonlabored breathing   Lymph Nodes:  Cervical, supraclavicular, and axillary nodes normal.   Right Breast:  normal without suspicious masses, skin or nipple changes or axillary nodes   Left Breast:  normal without suspicious masses, skin or nipple changes or axillary nodes       Imaging  Mammograms:  5/2017 MMG in [place] negative     Assessment:     This is a 33 y.o. female with an increased risk of breast and ovarian cancer based on BRCA2.      Given her BRCA mutation status, her lifetime risk of breast cancer is estimated to be 60-80% and her risk of ovarian cancer 15-30%.   We had a discussion regarding risk reduction strategies including active surveillance or prophylactic mastectomy for breast cancer risk and oral contraceptives or risk reducing salpingo-oophorectomy  to reduce risk of ovarian cancer.      Specifically with respect to breast cancer, we discussed timing of these potential interventions, as well as the procedure of bilateral prophylactic mastectomy and reconstructive options which in her case given her body habitus would likely include CARMEN vs implant.       Plan:   1.  Schedule screening baseline breast MRI  2.  Refer patient for a Plastic Surgery Consultation  3.  Refer patient for a Gynecologic Oncology Consultation

## 2018-09-05 ENCOUNTER — TELEPHONE (OUTPATIENT)
Dept: BARIATRICS | Facility: CLINIC | Age: 33
End: 2018-09-05

## 2018-09-05 ENCOUNTER — PATIENT MESSAGE (OUTPATIENT)
Dept: BARIATRICS | Facility: CLINIC | Age: 33
End: 2018-09-05

## 2018-09-10 ENCOUNTER — TELEPHONE (OUTPATIENT)
Dept: GYNECOLOGIC ONCOLOGY | Facility: CLINIC | Age: 33
End: 2018-09-10

## 2018-09-10 ENCOUNTER — PATIENT MESSAGE (OUTPATIENT)
Dept: SURGERY | Facility: CLINIC | Age: 33
End: 2018-09-10

## 2018-09-17 ENCOUNTER — TELEPHONE (OUTPATIENT)
Dept: GYNECOLOGIC ONCOLOGY | Facility: CLINIC | Age: 33
End: 2018-09-17

## 2018-09-20 ENCOUNTER — SURGICAL CONSULT (OUTPATIENT)
Dept: PLASTIC SURGERY | Facility: CLINIC | Age: 33
End: 2018-09-20
Payer: COMMERCIAL

## 2018-09-20 ENCOUNTER — HOSPITAL ENCOUNTER (OUTPATIENT)
Dept: RADIOLOGY | Facility: OTHER | Age: 33
Discharge: HOME | End: 2018-09-20
Attending: NURSE PRACTITIONER
Payer: COMMERCIAL

## 2018-09-20 VITALS
BODY MASS INDEX: 34.98 KG/M2 | SYSTOLIC BLOOD PRESSURE: 139 MMHG | HEART RATE: 121 BPM | WEIGHT: 222.88 LBS | HEIGHT: 67 IN | DIASTOLIC BLOOD PRESSURE: 93 MMHG

## 2018-09-20 DIAGNOSIS — Z15.01 BRCA2 GENE MUTATION POSITIVE: Primary | ICD-10-CM

## 2018-09-20 DIAGNOSIS — Z91.89 AT HIGH RISK FOR BREAST CANCER: ICD-10-CM

## 2018-09-20 DIAGNOSIS — Z15.01 BRCA2 GENE MUTATION POSITIVE: ICD-10-CM

## 2018-09-20 DIAGNOSIS — E11.9 TYPE 2 DIABETES MELLITUS WITHOUT COMPLICATION, WITH LONG-TERM CURRENT USE OF INSULIN: ICD-10-CM

## 2018-09-20 DIAGNOSIS — Z15.09 BRCA2 GENE MUTATION POSITIVE: ICD-10-CM

## 2018-09-20 DIAGNOSIS — Z15.09 BRCA2 GENE MUTATION POSITIVE: Primary | ICD-10-CM

## 2018-09-20 DIAGNOSIS — Z79.4 TYPE 2 DIABETES MELLITUS WITHOUT COMPLICATION, WITH LONG-TERM CURRENT USE OF INSULIN: ICD-10-CM

## 2018-09-20 PROCEDURE — 77059 MRI BREAST BILATERAL W W/O CONTRAST: CPT | Mod: 26,,, | Performed by: RADIOLOGY

## 2018-09-20 PROCEDURE — 77063 BREAST TOMOSYNTHESIS BI: CPT | Mod: 26,,, | Performed by: RADIOLOGY

## 2018-09-20 PROCEDURE — 3008F BODY MASS INDEX DOCD: CPT | Mod: CPTII,S$GLB,, | Performed by: PLASTIC SURGERY

## 2018-09-20 PROCEDURE — 77067 SCR MAMMO BI INCL CAD: CPT | Mod: TC

## 2018-09-20 PROCEDURE — 77059 MRI BREAST BILATERAL W W/O CONTRAST: CPT | Mod: TC

## 2018-09-20 PROCEDURE — A9577 INJ MULTIHANCE: HCPCS | Performed by: NURSE PRACTITIONER

## 2018-09-20 PROCEDURE — 77067 SCR MAMMO BI INCL CAD: CPT | Mod: 26,,, | Performed by: RADIOLOGY

## 2018-09-20 PROCEDURE — 99204 OFFICE O/P NEW MOD 45 MIN: CPT | Mod: S$GLB,,, | Performed by: PLASTIC SURGERY

## 2018-09-20 PROCEDURE — 25500020 PHARM REV CODE 255: Performed by: NURSE PRACTITIONER

## 2018-09-20 PROCEDURE — 3044F HG A1C LEVEL LT 7.0%: CPT | Mod: CPTII,S$GLB,, | Performed by: PLASTIC SURGERY

## 2018-09-20 RX ADMIN — GADOBENATE DIMEGLUMINE 20 ML: 529 INJECTION, SOLUTION INTRAVENOUS at 01:09

## 2018-09-20 NOTE — PROGRESS NOTES
REFERRAL FOR BREAST RECONSTRUCTION    CHIEF COMPLAINT  BRCA2+     Referring Provider: No ref. provider found  PCP: Primary Doctor No    HPI  Allyson Bahena is a 33 y.o. female with high risk for breast cancer, found positive for BRCA2 gene mutation, and has had several family members positive for breast cancer on her maternal side.  She has had 4 children by vaginal delivery and has had tubal ligation.  Her medical history is significant for hypertension and diabetes, treated with one medication.  She is otherwise healthy, non-smoker, BMI 35.  She is a G cup breast and would like to be somewhat smaller but remain proportionate.  She has undergone one prior breast biopsy as well as lap tubal ligation and lap cholecystectomy.    Oncologic Hx  Diagnosis: BRCA2    Previous Surgery:  Right breast biopsy (benign)    Second opinions none    PMH  Patient Active Problem List   Diagnosis    Morbid obesity    Obesity, Class II, BMI 35-39.9, with comorbidity    Diabetes mellitus    Suspected sleep apnea    BRCA2 gene mutation positive       PSH  Past Surgical History:   Procedure Laterality Date    CHOLECYSTECTOMY      TUBAL LIGATION      open       FH  Family History   Problem Relation Age of Onset    Diabetes Mother     Heart disease Mother     Obesity Mother     Sleep apnea Mother     No Known Problems Son     Heart disease Maternal Grandmother     Cancer Maternal Grandmother     Diabetes Maternal Grandmother     Hypertension Maternal Grandmother     Breast cancer Maternal Grandmother 49    Obesity Maternal Grandfather     No Known Problems Son     No Known Problems Son     No Known Problems Son     Breast cancer Other 32    Breast cancer Other 48    Breast cancer Other 53    Breast cancer Other 50    Cancer Maternal Aunt 34        colon cancer     Ovarian cancer Neg Hx        MEDICATIONS  Outpatient Medications Marked as Taking for the 9/20/18 encounter (Surgical Consult) with Kobe CHOWDHURY  "MD Dae   Medication Sig Dispense Refill    buPROPion (WELLBUTRIN XL) 300 MG 24 hr tablet Take 300 mg by mouth once daily.      metFORMIN (GLUCOPHAGE-XR) 500 MG 24 hr tablet Take 1,000 mg by mouth daily with breakfast.       pantoprazole (PROTONIX) 20 MG tablet Take 20 mg by mouth once daily.         ALLERGIES  Review of patient's allergies indicates:   Allergen Reactions    Percocet [oxycodone-acetaminophen]        SOCIAL HISTORY  Tobacco:   Social History     Tobacco Use   Smoking Status Never Smoker   Smokeless Tobacco Never Used     EtOH:   Social History     Substance and Sexual Activity   Alcohol Use No       ROS  Review of Systems - General ROS: negative for - chills, fatigue, fever, hot flashes, malaise or night sweats  Psychological ROS: negative for - mood swings or sleep disturbances  Hematological and Lymphatic ROS: negative for - bleeding problems, blood clots, blood transfusions, bruising or fatigue  Endocrine ROS: negative for - hair pattern changes, hot flashes, malaise/lethargy, palpitations, polydipsia/polyuria or temperature intolerance  Breast ROS: positive for findings described above, negative for - nipple changes or nipple discharge  Respiratory ROS: no cough, shortness of breath, or wheezing  Cardiovascular ROS: no chest pain or dyspnea on exertion  Gastrointestinal ROS: no abdominal pain, change in bowel habits, or black or bloody stools  Genito-Urinary ROS: no dysuria, trouble voiding, or hematuria  Musculoskeletal ROS: negative for - gait disturbance, joint pain, joint stiffness, joint swelling or muscle pain  Neurological ROS: no TIA or stroke symptoms  Dermatological ROS: negative for acne, dry skin, eczema and hair changes    PHYSICAL EXAM  BP (!) 139/93   Pulse (!) 121   Ht 5' 7" (1.702 m)   Wt 101.1 kg (222 lb 14.2 oz)   BMI 34.91 kg/m²     Constitutional: She is oriented to person, place, and time. She appears well-developed and well-nourished.   HENT: Normocephalic and " atraumatic.   Neck: Normal range of motion. Neck supple. No JVD present.   Cardiovascular: Normal rate, regular rhythm and normal heart sounds.    Pulmonary/Chest: Effort normal. No respiratory distress.   Musculoskeletal: Normal range of motion. She exhibits no edema or deformity.   Neurological: She is alert and oriented to person, place, and time. No sensory deficit. She exhibits normal muscle tone.   Skin: Skin is warm. No rash noted. No erythema.   Psychiatric: She has a normal mood and affect. Her behavior is normal.     SN-IMF: 22cm    Right breast  SN-N: 29.5 cm  IMF-N: 11 cm  Base width: 14 cm  Height: 11cm  Ptosis: grade 2  Masses absent  Scars: none  Adenopathy: axillary, supraclavicular absent    Left breast  SN-N: 29.5 cm  IMF-N: 11 cm  Base width: 14 cm  Height: 11cm  Ptosis: grade 2  Masses absent  Scars: none  Adenopathy: axillary, supraclavicular absent    Back  - fat pad 2 cm  - latissimus functional    Abdomen/Trunk/Thigh/Buttock  Abdomen: soft, nontender, nondistended, hernias absent, mild diastasis  Fat excess in hypogastrium present, laxity fair  Buttock/Thigh: moderate    ASSESSMENT  Encounter Diagnoses   Name Primary?    BRCA2 gene mutation positive Yes    Obesity, Class II, BMI 35-39.9, with comorbidity     Type 2 diabetes mellitus without complication, with long-term current use of insulin        Options for breast reconstruction were discussed as detailed below, including the option for no reconstruction, implant-based reconstruction, and autologous tissue reconstruction.  The expected outcomes, risks, benefits, and alternatives of each option were discussed.  I additionally discussed the options for timing of reconstruction including immediate, delayed and delayed-immediate.     Reconstruction at the time of mastectomy reconstruction has a predictably higher rate of some perioperative complications than if performed after healing from a mastectomy. These include skin necrosis,  infection, failure to clear the surgical margins discovered after mastectomy, unanticipated up-staging. These may be more critical management issues with device-based reconstruction, although flap loss may also be a factor.    She presents additional risk factors for immediate breast reconstruction:  1. Obesity  2. Breast hypertrophy  3. Poorly controlled DM    Options for breast reconstruction reviewed:  Two-stage expander and long-term implant vs direct-to-implant  Risks and limitations of this choice were discussed and written for these procedures.  A particular focus was placed upon possible limited aesthetic results in both shape and feel with this option.  Future need for surgery was also reviewed related to deflation and rupture of implant, distortion, capsular contracture and poor aesthetic outcome including visible wrinkling.    A staged technique was recommended. The first stage is performed immediately after completion of the mastectomy provided the sentinel lymph node biopsy is negative. An adjustable breast implant (expander) is placed deep to the pectoralis major muscle, and sometimes its lower portion covered with a human acellular dermal graft. After uneventful healing the expander is inflated over several months, and subsequently replaced with a long term implant.    Latissimus dorsi flap with or without implant  Details, risks and limitations of this choice were discussed.  Specific focus was placed upon the asymmetric scar across her entire back with possible puckering at each end, and possible long-term seroma, dehiscence, and back skin flap necrosis. Permanent functional restrictions were reviewed, as well as the potential for prolonged early disability of at least two months. A longer recovery of at least 3-6 months before unrestricted activity might be achieved were also discussed. The frequent need for an implant under the latissimus muscle to provide sufficient breast mound projection was  reviewed, along with potential problems this might create.    CARMEN, TRAM flap, Other  flaps (SGAP, PAP, Lateral thigh)  Details, risks and limitations of the use of free autologous tissue from the abdomen, thigh, and buttock were discussed.  Potential flap complications including fat necrosis, partial or complete flap loss, seroma, infection, bleeding, and need for further surgery.  For abdominal-based flaps, the risks of a functional deficit created by sacrifice of some or all of the rectus muscle, potential abdominal bulge or hernia that may not be correctable, abdominal wall numbness, umbilical necrosis and skin flap necrosis with resultant need for additional surgery were discussed.  Recovery time of at least two months and possible prolonged recovery of 3-6 months were likewise emphasized.    REVISION  Secondary revision surgery including autogenous fat grafting and nipple areolar reconstruction was reviewed.    Autogenous fat grafts might be necessary to improve skin thickness and enhance symmetric chest wall and breast mound symmetry and contour at a subsequent reconstructive procedure. Possible adverse outcomes, risks, and complications of fat grafting discussed include but are not limited to: Fat necrosis with a lumps, bleeding, infection, insufficient or excessive change in the size or shape, unevenness in the area grafted or donor sites, poor wound healing, inability to achieve desired cosmetic outcome, need for further interventions or surgery,iImplant puncture, venous thromboembolism    --------------------------------    After the above discussion of her options based on the history and physical examination, as well as her preferences, she is a reasonable candidate for implant-based or autologous immediate reconstruction with nipple sparing mastectomy.  She is predictably higher risk for complications due to obesity and diabetes although a reasonable candidate.  For implant based  reconstruction, I discussed the option for breast reduction/mastopexy PRIOR to mastectomy in attempt to properly position the nipple and skin envelope for an implant reconstruction.      PLAN  - Reasonable candidate for implant-based vs autologous recon  - Would consider staged approach for implant  - Patient will let us know her preference  - Photos ordered  ?  This encounter length was 45 minutes for  Encounter Diagnoses   Name Primary?    BRCA2 gene mutation positive Yes    Obesity, Class II, BMI 35-39.9, with comorbidity     Type 2 diabetes mellitus without complication, with long-term current use of insulin      Over 50% of the encounter length was spent in face to face counseling about the relevant issues pertaining to the diagnoses, management choices, and prognosis.    Electronically signed by:  Kobe Pearl  9/20/2018  1:19 PM

## 2018-09-20 NOTE — Clinical Note
Velia Gallardo-She's reasonable for all options- we might consider breast reduction/mastopexy first if she wants implants.  She'd be reasonable for flaps.  She will let us know.

## 2018-09-21 ENCOUNTER — PATIENT MESSAGE (OUTPATIENT)
Dept: PLASTIC SURGERY | Facility: CLINIC | Age: 33
End: 2018-09-21

## 2018-09-25 ENCOUNTER — TELEPHONE (OUTPATIENT)
Dept: BARIATRICS | Facility: CLINIC | Age: 33
End: 2018-09-25

## 2018-09-25 NOTE — TELEPHONE ENCOUNTER
----- Message from Chuy Hudson sent at 9/25/2018  1:48 PM CDT -----  Type: Needs Medical Advice    Who Called:  Patient    Best Call Back Number: [phone]  Additional Information: Patient calling.  States that she needs to reschedule her appointment from 1:00 on 9/26 to sometime late Wednesday or anytime Friday

## 2018-09-26 ENCOUNTER — OFFICE VISIT (OUTPATIENT)
Dept: BARIATRICS | Facility: CLINIC | Age: 33
End: 2018-09-26
Payer: COMMERCIAL

## 2018-09-26 ENCOUNTER — PATIENT MESSAGE (OUTPATIENT)
Dept: BARIATRICS | Facility: CLINIC | Age: 33
End: 2018-09-26

## 2018-09-26 VITALS
RESPIRATION RATE: 16 BRPM | SYSTOLIC BLOOD PRESSURE: 136 MMHG | BODY MASS INDEX: 35.52 KG/M2 | TEMPERATURE: 99 F | HEIGHT: 66 IN | WEIGHT: 221 LBS | DIASTOLIC BLOOD PRESSURE: 85 MMHG | HEART RATE: 82 BPM

## 2018-09-26 DIAGNOSIS — E66.01 MORBID OBESITY: Primary | ICD-10-CM

## 2018-09-26 DIAGNOSIS — E11.9 TYPE 2 DIABETES MELLITUS WITHOUT COMPLICATION, WITH LONG-TERM CURRENT USE OF INSULIN: ICD-10-CM

## 2018-09-26 DIAGNOSIS — Z79.4 TYPE 2 DIABETES MELLITUS WITHOUT COMPLICATION, WITH LONG-TERM CURRENT USE OF INSULIN: ICD-10-CM

## 2018-09-26 PROCEDURE — 3008F BODY MASS INDEX DOCD: CPT | Mod: CPTII,S$GLB,, | Performed by: SURGERY

## 2018-09-26 PROCEDURE — 3044F HG A1C LEVEL LT 7.0%: CPT | Mod: CPTII,S$GLB,, | Performed by: SURGERY

## 2018-09-26 PROCEDURE — 99213 OFFICE O/P EST LOW 20 MIN: CPT | Mod: S$GLB,,, | Performed by: SURGERY

## 2018-09-26 PROCEDURE — 99999 PR PBB SHADOW E&M-EST. PATIENT-LVL III: CPT | Mod: PBBFAC,,, | Performed by: SURGERY

## 2018-09-26 RX ORDER — METFORMIN HYDROCHLORIDE 500 MG/1
TABLET ORAL
Refills: 2 | COMMUNITY
Start: 2018-08-31 | End: 2019-03-07 | Stop reason: CLARIF

## 2018-09-26 RX ORDER — LISINOPRIL 10 MG/1
TABLET ORAL
Refills: 2 | COMMUNITY
Start: 2018-08-31 | End: 2019-03-07 | Stop reason: CLARIF

## 2018-09-26 NOTE — PROGRESS NOTES
Medically Supervised Weight Loss Documentation    Date of Visit: 09/26/2018    Patient: Allyson Bahena    Current Weight:  221  Current BMI: Body mass index is 35.67 kg/m².  Weight Change:  +4 pounds    Last Weight: 217    Beginning Weight: 217      Vitals:   Vitals:    09/26/18 1305   BP: 136/85   Pulse: 82   Resp: 16   Temp: 98.8 °F (37.1 °C)       Comorbidities:   Past Medical History:   Diagnosis Date    Depression     Diabetes mellitus     Genetic testing 06/20/2018    BRCA2 positive, reported by [facility] from outside provider    GERD (gastroesophageal reflux disease)     Suspected sleep apnea        Medications:  Current Outpatient Medications on File Prior to Visit   Medication Sig Dispense Refill    buPROPion (WELLBUTRIN XL) 300 MG 24 hr tablet Take 300 mg by mouth once daily.      lisinopril 10 MG tablet   2    metFORMIN (GLUCOPHAGE) 500 MG tablet   2    pantoprazole (PROTONIX) 20 MG tablet Take 20 mg by mouth once daily.      [DISCONTINUED] metFORMIN (GLUCOPHAGE-XR) 500 MG 24 hr tablet Take 1,000 mg by mouth daily with breakfast.        No current facility-administered medications on file prior to visit.          Body comp:  Fat Percent:  47.4 %  Fat Mass:  104.8 lb  FFM:  116.2 lb  TBW: 85 lb  TBW %:  38.5 %  BMR: 1678 kcal      Diet Education Discussed:    Fell off diet this month  She has been very stressed about what to do about her BRCA mutation    Exercise/Activity Discussed:    Stopped doing crossfit    Behavior or Diet Goals for this patient:    She fell off the diet plan, eating whatever she wants as she is very stressed out.  We talked about avoiding stress eating and getting back on track dieting.  She should also get back to cross fit although I think she is avoiding exercising because of her upcoming decision about mastectomy.  I will contact her surgeons as she is still interested in bariatric surgery but confused about when and if it should be done.    MSD 3/3  Labs- high  triglycerides, elevated hba1c and blood sugars  EKG- pending  UGI - normal  dietary consult-done  psych consult - pending  Seminar     I will obtain the following clearances prior to surgery: none    : I met with the patient for 15 minutes and counseled her for over 50% of that time

## 2018-10-04 ENCOUNTER — PATIENT MESSAGE (OUTPATIENT)
Dept: SURGERY | Facility: CLINIC | Age: 33
End: 2018-10-04

## 2018-11-01 ENCOUNTER — PATIENT MESSAGE (OUTPATIENT)
Dept: SURGERY | Facility: CLINIC | Age: 33
End: 2018-11-01

## 2018-11-01 ENCOUNTER — TELEPHONE (OUTPATIENT)
Dept: BARIATRICS | Facility: CLINIC | Age: 33
End: 2018-11-01

## 2018-11-02 DIAGNOSIS — G47.33 OBSTRUCTIVE SLEEP APNEA (ADULT) (PEDIATRIC): Primary | ICD-10-CM

## 2018-11-14 ENCOUNTER — PATIENT MESSAGE (OUTPATIENT)
Dept: BARIATRICS | Facility: CLINIC | Age: 33
End: 2018-11-14

## 2018-11-16 ENCOUNTER — TELEPHONE (OUTPATIENT)
Dept: BARIATRICS | Facility: CLINIC | Age: 33
End: 2018-11-16

## 2018-11-16 NOTE — TELEPHONE ENCOUNTER
spoke with pt about getting her psych appt. Pt will get psych done after she gets her breast sx out the way. I told patient I understood to call when ready.

## 2019-01-04 ENCOUNTER — TELEPHONE (OUTPATIENT)
Dept: SURGERY | Facility: CLINIC | Age: 34
End: 2019-01-04

## 2019-01-04 ENCOUNTER — HOSPITAL ENCOUNTER (OUTPATIENT)
Dept: RADIOLOGY | Facility: HOSPITAL | Age: 34
Discharge: HOME | End: 2019-01-04
Attending: NURSE PRACTITIONER
Payer: COMMERCIAL

## 2019-01-04 DIAGNOSIS — M54.50 LOW BACK PAIN: ICD-10-CM

## 2019-01-04 PROCEDURE — 72110 XR LUMBAR SPINE COMPLETE 5 VIEW: ICD-10-PCS | Mod: 26,,, | Performed by: RADIOLOGY

## 2019-01-04 PROCEDURE — 72110 X-RAY EXAM L-2 SPINE 4/>VWS: CPT | Mod: 26,,, | Performed by: RADIOLOGY

## 2019-01-04 PROCEDURE — 72110 X-RAY EXAM L-2 SPINE 4/>VWS: CPT | Mod: TC,FY

## 2019-01-04 NOTE — TELEPHONE ENCOUNTER
Spoke with Sharmaine from [facility]'s [place], pt presents with new right breast mass, pt BRCA 2+, has previously seen NP at [facility], requesting urgent appt as patient's  is leaving the country and she is unable to go to an appointment while he is gone because she has 4 small children, pt denies redness/warmth/pain/swelling to breast, appt scheduled and confirmed, all questions answered at this time

## 2019-01-04 NOTE — TELEPHONE ENCOUNTER
----- Message from Shanell Montiel sent at 1/4/2019  1:00 PM CST -----  Contact: [facility]'s [place]   Needs Advice    Reason for call: Caller states the patient has a new mass in her right breast, caller needs information on how to proceed and would like to speak to a nurse in this office         Communication Preference: Sue, [phone]    Additional Information: please contact the caller to consult her on this matter

## 2019-01-07 ENCOUNTER — TELEPHONE (OUTPATIENT)
Dept: SURGERY | Facility: CLINIC | Age: 34
End: 2019-01-07

## 2019-01-07 NOTE — TELEPHONE ENCOUNTER
----- Message from Jessica Jacobson sent at 1/7/2019  1:03 PM CST -----  Contact: Regina @ [facility]- Dr. Sarah Tapia has questions in regards to monitoring patients that are higher risk.  [phone]

## 2019-01-07 NOTE — TELEPHONE ENCOUNTER
Returned call to Regina with Dr. Branch's office regarding high risk for breast cancer screenings, advised alternating q 6 mo annual mmg and annual breast MRI with CBE is the plan of care for high risk patients, all questions answered at this time

## 2019-01-07 NOTE — PROGRESS NOTES
Patient ID: Allyson Bahena is a 33 y.o. female.    Chief Complaint: Breast Mass (Rigth Breast)        HPI:  Established patient of the Department of Breast Surgery, previously seen by SHYLA Pinon, and Dr. Paulina Cantu, and new to me, presents today for new right breast lump.      Breast History:    Known BRCA2 mutation reported by [facility] 6/20/18, [id number]  Has met with breast surgeon Dr. Cantu and plastic surgeon Dr. Pearl  Pt desires to proceed with prophylactic mastectomy but is unsure as to when, as she is apprehensive, will have it later this year most likely  Has not yet met with a GYN-ONC    Personal history of breast cancer:  no  Personal history of breast biopsy:  no prior biopsy  Personal history of breast surgery:  no    Interval Breast History    About 2 weeks ago pt noticed lump in lateral region of right breast.  Sometimes she can feel it really well and sometimes she cannot.  Does not feel it has gotten larger since first noticing.  Currently feels a little smaller than a quarter to patient.  Examined by NP at OB/GYN clinic regarding this.  Pain at site of lump comes and goes, mild.  Pain also goes to right under arm.  Breast pain at present only with movement.  Has tried ibuprofen without relief.    Patient denies enlarged lymph nodes, breast-related skin changes, nipple discharge and nipple retraction.  Patient denies palpable breast mass or breast pain other than abovementioned.    Breast Imaging    Last bilateral screening mammogram dated 9/20/18 report was reviewed, with report reading:  The breasts have scattered areas of fibroglandular density. There is no evidence of suspicious masses, microcalcifications or architectural distortion.   Impression:  No mammographic evidence of malignancy.   BI-RADS Category 1: Negative   Recommendation:  Routine screening mammogram in 1 year is recommended.   The patient's estimated lifetime risk of breast cancer (to age 85)  based on Tyrer-Cuzick - 7 risk assessment model is: Tyrer-Cuzick: 23.31 %.    Last bilat breast MRI 18 report reviewed with report reading:   Impression:  No suspicious finding in either breast.   BI-RADS Category:   Overall: 1 - Negative   Recommendation:  Annual screening mammogram schedule, next due 2019.   In addition to annual mammogram beginning at age 30, consider annual screening with bilateral breast MRI with contrast in patients with a lifetime risk of breast cancer greater than 20%.   The patient's estimated lifetime risk of breast cancer (to age 85) based on Tyrer-Cuzick - 7 risk assessment model is: Tyrer-Cuzick: 23.31 %.     GYN History:  Age of menarche:  12 y/o  Uterus and ovaries:  intact  LMP:  2-3 weeks ago, s/p tubal ligation  HRT usage:  never  , first live birth at 18 y/o  Personal history of ovarian cancer:  no    Other Oncologic History:  Personal history of other cancer not abovementioned:  no  Personal history of genetic testing:  yes, BRCA2+  Personal history of thoracic radiation between 12 and 33 y/o:  no    Social History:  Tobacco use:  denies  Alcohol use:  rarely    Family Oncologic History:    Ashkenazi Druze ancestry:  no    Family History   Problem Relation Age of Onset    Breast cancer Maternal Grandmother 49        bilateral, second at 59    Cancer Maternal Grandfather         melanoma    Breast cancer Other 32    Breast cancer Other 48    Breast cancer Other 53    Breast cancer Other 50    Cancer Maternal Aunt 34        colon cancer     Breast cancer Maternal Cousin         mom's maternal 1st cousin    Breast cancer Maternal Cousin         mom's maternal 1st cousin    Ovarian cancer Neg Hx      History of genetic testing in relatives:  no      Patient's Breast Cancer Risk Assessment Scores:  Taking into account the above information, the patient's breast cancer risk assessment scores were calculated today as follows:  Tyrer-Cuzick lifetime risk:   64.6%        Review of Systems - See HPI.  Objective:   Physical Exam   Pulmonary/Chest: She exhibits no mass, no edema and no deformity. Right breast exhibits skin change. Right breast exhibits no inverted nipple, no mass and no nipple discharge. Left breast exhibits mass. Left breast exhibits no inverted nipple, no nipple discharge and no skin change. Breasts are symmetrical. There is no breast swelling.   - Right breast with pt-palpated lump at 11:00, 9 CMFN, no mass appreciated by my exam, pt states she cannot feel it today either, no associated skin change on exam, marked for imaging.  Nearby bruise at 10:00, healing, small, pt states is secondary to recent prior CBE.    - Left breast with lump at 5:00, 1.5cm in size, 6 CMFN, marked for imaging.    - Breathing non-labored.       Lymphadenopathy:     She has no cervical adenopathy.     She has no axillary adenopathy.        Right: No supraclavicular adenopathy present.        Left: No supraclavicular adenopathy present.     Assessment:       1. Breast lump    2. Breast pain    3. Axillary pain, right    4. BRCA2 gene mutation positive    5. Family history of breast cancer          Plan:   Given last bilat mmg was 9/2018, ordered GRID breast US today with areas of palpable concern or pt concern marked for imaging (bilat lumps).  Areas (bilat lumps, right breast bruise, right axillary pain) also noted on order, and informed Radiology that they may add mmg if indicated.  If imaging normal, pt to RTC in 3 months with CBE.    Pt would like me to contact Dr. Parks (GYN ONC) to facilitate getting an appt scheduled for a discussion regarding BRCA2 mutation.  Will CC her on this chart and message her as well.    Advised pt that sibling and children each have 50% chance of having same genetic mutation.  Pt states will speak w/ brother regarding genetic counseling.    Pt would like to set up an appt with Dr. Merino (Med Onc) to discuss the possibility of  chemoprevention until she is ready for prophylactic mastectomy.  Will CC her on this chart and message nurse navigator Ashley to get appt scheduled.    Advised pt to notify me if bruising occurs secondary to today's CBE or otherwise or if current right breast bruise does not resolve.  Pt states she recently had blood work (at outside facility) that included checking her blood levels and is not yet aware of results.  Advised pt to f/u with PCP regarding any easy bruising.    Encouraged breast awareness, including monthly breast self-exams.  Advised patient to RTC with any interval changes or concerns.  Questions were encouraged and answered to patient's satisfaction, and patient verbalized understanding of information and agreement with the plan.

## 2019-01-09 ENCOUNTER — HOSPITAL ENCOUNTER (OUTPATIENT)
Dept: RADIOLOGY | Facility: HOSPITAL | Age: 34
Discharge: HOME | End: 2019-01-09
Attending: NURSE PRACTITIONER
Payer: COMMERCIAL

## 2019-01-09 ENCOUNTER — DOCUMENTATION ONLY (OUTPATIENT)
Dept: SURGERY | Facility: CLINIC | Age: 34
End: 2019-01-09

## 2019-01-09 ENCOUNTER — OFFICE VISIT (OUTPATIENT)
Dept: SURGERY | Facility: CLINIC | Age: 34
End: 2019-01-09
Payer: COMMERCIAL

## 2019-01-09 VITALS
SYSTOLIC BLOOD PRESSURE: 127 MMHG | WEIGHT: 222.44 LBS | HEART RATE: 83 BPM | HEIGHT: 68 IN | BODY MASS INDEX: 33.71 KG/M2 | TEMPERATURE: 98 F | DIASTOLIC BLOOD PRESSURE: 78 MMHG

## 2019-01-09 DIAGNOSIS — M79.621 AXILLARY PAIN, RIGHT: ICD-10-CM

## 2019-01-09 DIAGNOSIS — Z15.01 BRCA2 GENE MUTATION POSITIVE: ICD-10-CM

## 2019-01-09 DIAGNOSIS — N63.0 BREAST LUMP: ICD-10-CM

## 2019-01-09 DIAGNOSIS — N64.4 BREAST PAIN: ICD-10-CM

## 2019-01-09 DIAGNOSIS — Z15.09 BRCA2 GENE MUTATION POSITIVE: ICD-10-CM

## 2019-01-09 DIAGNOSIS — Z80.3 FAMILY HISTORY OF BREAST CANCER: ICD-10-CM

## 2019-01-09 DIAGNOSIS — N63.0 BREAST LUMP: Primary | ICD-10-CM

## 2019-01-09 PROCEDURE — 3008F BODY MASS INDEX DOCD: CPT | Mod: CPTII,S$GLB,, | Performed by: NURSE PRACTITIONER

## 2019-01-09 PROCEDURE — 99999 PR PBB SHADOW E&M-EST. PATIENT-LVL III: ICD-10-PCS | Mod: PBBFAC,,, | Performed by: NURSE PRACTITIONER

## 2019-01-09 PROCEDURE — 77065 MAMMO DIGITAL DIAGNOSTIC RIGHT WITH TOMOSYNTHESIS_CAD: ICD-10-PCS | Mod: 26,,, | Performed by: RADIOLOGY

## 2019-01-09 PROCEDURE — 77065 DX MAMMO INCL CAD UNI: CPT | Mod: TC,PO

## 2019-01-09 PROCEDURE — 77061 MAMMO DIGITAL DIAGNOSTIC RIGHT WITH TOMOSYNTHESIS_CAD: ICD-10-PCS | Mod: 26,,, | Performed by: RADIOLOGY

## 2019-01-09 PROCEDURE — 3008F PR BODY MASS INDEX (BMI) DOCUMENTED: ICD-10-PCS | Mod: CPTII,S$GLB,, | Performed by: NURSE PRACTITIONER

## 2019-01-09 PROCEDURE — 76642 ULTRASOUND BREAST LIMITED: CPT | Mod: TC,50,PO

## 2019-01-09 PROCEDURE — 76642 US BREAST BILATERAL LIMITED: ICD-10-PCS | Mod: 26,50,, | Performed by: RADIOLOGY

## 2019-01-09 PROCEDURE — 99213 PR OFFICE/OUTPT VISIT, EST, LEVL III, 20-29 MIN: ICD-10-PCS | Mod: S$GLB,,, | Performed by: NURSE PRACTITIONER

## 2019-01-09 PROCEDURE — 99213 OFFICE O/P EST LOW 20 MIN: CPT | Mod: S$GLB,,, | Performed by: NURSE PRACTITIONER

## 2019-01-09 PROCEDURE — 99999 PR PBB SHADOW E&M-EST. PATIENT-LVL III: CPT | Mod: PBBFAC,,, | Performed by: NURSE PRACTITIONER

## 2019-01-09 PROCEDURE — 77065 DX MAMMO INCL CAD UNI: CPT | Mod: 26,,, | Performed by: RADIOLOGY

## 2019-01-09 PROCEDURE — 77061 BREAST TOMOSYNTHESIS UNI: CPT | Mod: 26,,, | Performed by: RADIOLOGY

## 2019-01-09 PROCEDURE — 76642 ULTRASOUND BREAST LIMITED: CPT | Mod: 26,50,, | Performed by: RADIOLOGY

## 2019-01-09 RX ORDER — KETOCONAZOLE 20 MG/ML
SHAMPOO, SUSPENSION TOPICAL
Refills: 1 | COMMUNITY
Start: 2018-11-12 | End: 2019-03-07 | Stop reason: CLARIF

## 2019-01-09 RX ORDER — FLUOCINONIDE TOPICAL SOLUTION USP, 0.05% 0.5 MG/ML
SOLUTION TOPICAL
Refills: 0 | COMMUNITY
Start: 2018-11-12 | End: 2019-01-16

## 2019-01-09 NOTE — PROGRESS NOTES
Ana, please schedule the patient for follow-up:    CBE with me in 3 months  Also needs appt with Dr. Yamile Parks and Dr. Merino (messaged them and Ashley regarding this)    Pt has been notified of imaging results.    Thanks,  Donavan

## 2019-01-10 ENCOUNTER — TELEPHONE (OUTPATIENT)
Dept: SURGERY | Facility: CLINIC | Age: 34
End: 2019-01-10

## 2019-01-10 NOTE — TELEPHONE ENCOUNTER
Patient returned my call, scheduled her with Dr. Merino for Wednesday 1/16 at 8am. Reviewed the location of the Cancer Center, verbalized understanding of all information

## 2019-01-10 NOTE — TELEPHONE ENCOUNTER
Called patient to set up appointment with Dr. Merino. Left voicemail with my callback number requesting return call at earliest convenience

## 2019-01-16 ENCOUNTER — OFFICE VISIT (OUTPATIENT)
Dept: HEMATOLOGY/ONCOLOGY | Facility: CLINIC | Age: 34
End: 2019-01-16
Payer: COMMERCIAL

## 2019-01-16 ENCOUNTER — TELEPHONE (OUTPATIENT)
Dept: GYNECOLOGIC ONCOLOGY | Facility: CLINIC | Age: 34
End: 2019-01-16

## 2019-01-16 VITALS
SYSTOLIC BLOOD PRESSURE: 143 MMHG | DIASTOLIC BLOOD PRESSURE: 72 MMHG | RESPIRATION RATE: 20 BRPM | OXYGEN SATURATION: 100 % | HEIGHT: 68 IN | WEIGHT: 225.31 LBS | HEART RATE: 114 BPM | TEMPERATURE: 98 F | BODY MASS INDEX: 34.15 KG/M2

## 2019-01-16 DIAGNOSIS — Z15.09 BRCA2 GENE MUTATION POSITIVE: Primary | ICD-10-CM

## 2019-01-16 DIAGNOSIS — R29.818 SUSPECTED SLEEP APNEA: ICD-10-CM

## 2019-01-16 DIAGNOSIS — Z15.01 BRCA2 GENE MUTATION POSITIVE: Primary | ICD-10-CM

## 2019-01-16 DIAGNOSIS — R10.84 GENERALIZED ABDOMINAL PAIN: ICD-10-CM

## 2019-01-16 DIAGNOSIS — R10.9 RIGHT SIDED ABDOMINAL PAIN: ICD-10-CM

## 2019-01-16 PROCEDURE — 99245 OFF/OP CONSLTJ NEW/EST HI 55: CPT | Mod: S$GLB,,, | Performed by: INTERNAL MEDICINE

## 2019-01-16 PROCEDURE — 99999 PR PBB SHADOW E&M-EST. PATIENT-LVL IV: CPT | Mod: PBBFAC,,, | Performed by: INTERNAL MEDICINE

## 2019-01-16 PROCEDURE — 99999 PR PBB SHADOW E&M-EST. PATIENT-LVL IV: ICD-10-PCS | Mod: PBBFAC,,, | Performed by: INTERNAL MEDICINE

## 2019-01-16 PROCEDURE — 99245 PR OFFICE CONSULTATION,LEVEL V: ICD-10-PCS | Mod: S$GLB,,, | Performed by: INTERNAL MEDICINE

## 2019-01-16 NOTE — LETTER
January 16, 2019      Donavan Raymundo, DNP  [address]  [facility] [id number]           [facility]  [address]  [facility] [address]  Phone: [phone]          Patient: Allyson Bahena   MR Number: [id number]   YOB: 1985   Date of Visit: 1/16/2019       Dear Donavan Raymundo:    Thank you for referring Allyson Bahena to me for evaluation. Attached you will find relevant portions of my assessment and plan of care.    If you have questions, please do not hesitate to call me. I look forward to following Allyson Bahena along with you.    Sincerely,    Fransisca Merino MD    Enclosure  CC:  No Recipients    If you would like to receive this communication electronically, please contact [email or web address] or [phone] to request more information on [employer] access.    For providers and/or their staff who would like to refer a patient to [facility], please contact us through our one-stop-shop provider referral line, [facility], at [phone].    If you feel you have received this communication in error or would no longer like to receive these types of communications, please e-mail [email or web address]

## 2019-01-16 NOTE — Clinical Note
- needs her Dr. Parks appt moved to 9:30 or so - same day as this appt needs CT scan- please call to verify times work as has kids and comes from out of town- dermatology appt same day if possible- RTC 6 months

## 2019-01-16 NOTE — TELEPHONE ENCOUNTER
----- Message from Benita Hennessy MA sent at 1/16/2019  2:21 PM CST -----  Hey,    Can you check and see if this pt can be moved to 9:30 on the same day     ----- Message -----  From: Melissa Batista  Sent: 1/16/2019   2:06 PM  To: Charly Golden    Good afternoon, this patient saw Dr. Merino today and told her that she needed her apt time changed on 2/4 with Dr. Parks to about 930am. Can you assist me with this

## 2019-01-16 NOTE — PROGRESS NOTES
Subjective:       Patient ID: Allyson Bahena is a 33 y.o. female.    Chief Complaint: No chief complaint on file.    HPI     Presents for opinion regarding recently diagnosed BRCA2 mutation.  Her Ob Gyn locally had her tested based on her family history.  She has already met with Dr. Cantu regarding prophylactic mastectomy and has an upcoming appointment with Dr. Parks in Gyn Onc to discuss prophylactic BROOKLYN/BSO.    She wants to further discuss mutation, appropriate surveillance.    PMH:  Active Ambulatory Problems     Diagnosis Date Noted    Morbid obesity 2018    Obesity, Class II, BMI 35-39.9, with comorbidity 2018    Diabetes mellitus 2018    Suspected sleep apnea 2018    BRCA2 gene mutation positive 2018     Resolved Ambulatory Problems     Diagnosis Date Noted    No Resolved Ambulatory Problems     Past Medical History:   Diagnosis Date    BRCA2 positive     Depression     Diabetes mellitus     Genetic testing 2018    GERD (gastroesophageal reflux disease)     Suspected sleep apnea      Cholecystectomy for gallstones  Tubal ligation    FH:  Maternal grandmother - breast cancer, diagnosed at age 50 with 1st breast cancer,  from 2nd breast cancer that metastasized at age 61  Maternal aunt- diagnosed with colon cancer at age 35 yo- coelctomy, chemotherapy x 5 years,  from metastatic disease in liver  Maternal grand aunts all had breast cancer (4)  3 cousins with breast cancer  No male breast cancer  No prostate cancer  Maternal grandfather- melanoma  No pancreatic cancer  No ovarian cancer  Maternal aunt - lung cancer  Cousin- throat cancer    Mom- living at age 50, no health issues  Dad- unknown  1 brother- healthy    SH:  , 4 boys- ages 4 to almost 14  Stay at home mom   [place]  No tobacco  Very rare social EtOH    Gyn Hx:  , age 1st live birth was 19  Prior ocps before TL  Brief breastfeeding    Review of Systems    Constitutional: Negative for activity change, appetite change, chills, fatigue, fever and unexpected weight change.   HENT: Negative for congestion, dental problem, ear pain, hearing loss, postnasal drip, rhinorrhea, sinus pressure, sore throat, tinnitus and trouble swallowing.    Eyes: Negative for visual disturbance.   Respiratory: Negative for cough, chest tightness, shortness of breath and wheezing.    Cardiovascular: Negative for chest pain, palpitations and leg swelling.   Gastrointestinal: Positive for abdominal pain (right sided pain). Negative for abdominal distention, blood in stool, constipation, diarrhea, nausea and vomiting.   Genitourinary: Negative for decreased urine volume, difficulty urinating, dysuria, frequency, menstrual problem (reports heavy) and urgency.   Musculoskeletal: Negative for arthralgias, back pain, gait problem, joint swelling and neck pain.   Skin: Negative for pallor and rash.   Neurological: Negative for dizziness, weakness, light-headedness, numbness and headaches.   Hematological: Negative for adenopathy. Does not bruise/bleed easily.   Psychiatric/Behavioral: Negative for dysphoric mood and sleep disturbance. The patient is not nervous/anxious.        Objective:      Physical Exam   Constitutional: She is oriented to person, place, and time. She appears well-developed and well-nourished. No distress.   Presents alone   HENT:   Head: Normocephalic and atraumatic.   Right Ear: External ear normal.   Left Ear: External ear normal.   Nose: Nose normal.   Mouth/Throat: Oropharynx is clear and moist. No oropharyngeal exudate.   Eyes: Conjunctivae and EOM are normal. Pupils are equal, round, and reactive to light. Right eye exhibits no discharge. Left eye exhibits no discharge. No scleral icterus. Right pupil is round and reactive. Left pupil is round and reactive.   Neck: Normal range of motion. Neck supple. No JVD present. No tracheal deviation present. No thyromegaly present.    Cardiovascular: Normal rate, regular rhythm, normal heart sounds and intact distal pulses. Exam reveals no gallop and no friction rub.   No murmur heard.  Pulmonary/Chest: Effort normal and breath sounds normal. No respiratory distress. She has no wheezes. She has no rales. She exhibits no tenderness.   Abdominal: Soft. Bowel sounds are normal. She exhibits no distension and no mass. There is no hepatosplenomegaly. There is no tenderness. There is no rebound and no guarding.   No organomegaly   Musculoskeletal: Normal range of motion. She exhibits no edema, tenderness or deformity.   Lymphadenopathy:        Head (right side): No submandibular adenopathy present.        Head (left side): No submandibular adenopathy present.     She has no cervical adenopathy.        Right cervical: No superficial cervical, no deep cervical and no posterior cervical adenopathy present.       Left cervical: No superficial cervical, no deep cervical and no posterior cervical adenopathy present.        Right: No inguinal and no supraclavicular adenopathy present.        Left: No inguinal and no supraclavicular adenopathy present.   Neurological: She is alert and oriented to person, place, and time. She has normal strength and normal reflexes. No cranial nerve deficit or sensory deficit. Coordination normal.   Skin: Skin is warm and dry. No bruising, no lesion, no petechiae and no rash noted. She is not diaphoretic. No erythema. No pallor.   Psychiatric: She has a normal mood and affect. Her behavior is normal. Judgment and thought content normal. Her mood appears not anxious. She does not exhibit a depressed mood.   Nursing note and vitals reviewed.   Labs- reviewed   Assessment:       1. BRCA2 gene mutation positive    2. Right sided abdominal pain    3. Suspected sleep apnea        Plan:       1. This is a 33 y.o. female with an increased risk of breast and ovarian cancer based on + BRCA2 test result.    She is aware of cancer risks  and these were reviewed again with her today- lifetime risk of breast cancer is estimated to be 60-80%, lifetime risk of ovarian cancer 15-30%.     We also discussed risk of pancreatic cancer and melanoma.    We reviewed risk reduction options  For breast- surveillance with MRI alternating with mammogram versus prophylactic mastectomy  She has had appropriate imaging to date  For ovarian- she has an upcoming appointment for Dr. Parks. She is inclined to proceed with prophylactic BROOKLYN/BSO. Surveillance would include pelvic ultrasound and CA-125 and limitations here discussed.  For melanoma - we discussed whole body skin exam and will refer to dermatology  For pancreatic cancer- we discussed that there is not surveiillance proven to impact although we can offer pancreatic imaging q2 years  Will do now with pain concerns and pre diabetic concerns      We reviewed NCCN Guidelines for risk management for women with BRCA mutations and sheet provided  Breast cancer screening  Learning to be aware of changes in breasts beginning at age 18  Clinical breast exam every 6-12 months beginning at age 25  Annual breast MRI with contrast (or mammogram if MRI is unavailable) beginning at age 25   Annual breast MRI with contrast and mammogram at ages 30-75  Consider 3D mammography if available  Screening after age 75 should be considered on an individual basis  Consider participation in an imaging or screening clinical trial  Breast cancer risk reduction  Discussion of risk-reducing mastectomy  Consider medication to reduce breast cancer risk  Ovarian cancer risk management    Risk-reducing removal of ovaries and fallopian tubes between age 35 and 40 and upon completion of child bearing.  Delaying risk-reducing removal of ovaries and fallopian tubes until age 40-45 is reasonable for BRCA2 mutation carriers, because the average age of ovarian cancer onset is 8-10 years later than in BRCA1 mutation carriers.  Routine ovarian cancer  screening using transvaginal ultrasound and a CA-125 blood test is of uncertain benefit but may be performed at the doctors discretion starting at age 30-35.  Removal of Fallopian tubes only (salpingectomy) is not standard of care for ovarian cancer risk reduction.  There are ongoing clinical trials studying salpingectomy in women at high risk of ovarian cancer.  Pancreatic cancer and melanoma screening    No specific guidelines exist for pancreatic cancer and melanoma.  Screening for these cancer should be individualized based on cancers seen in the family.  Reproductive options    For patients of reproductive age, advise about options for prenatal diagnosis and assisted reproduction including pre-implantation genetic diagnosis.   Risk to relatives    Advise about possible inherited cancer risk to relatives, options for risk assessment, and management.   Recommend genetic counseling and consideration of genetic testing for at-risk relatives.         2. CT scan as above  3. Will need a sleep study  Distress Screening Results: Psychosocial Distress screening score of Distress Score: 5 noted and reviewed. No intervention indicated.

## 2019-01-17 ENCOUNTER — TELEPHONE (OUTPATIENT)
Dept: HEMATOLOGY/ONCOLOGY | Facility: CLINIC | Age: 34
End: 2019-01-17

## 2019-01-17 NOTE — TELEPHONE ENCOUNTER
Called and spoke with patient and confirmed her apts on 2/4 with Dr. Parks and donna. Also assisted with rescheduling her ct scan to the [place] office on wednesday 1/23

## 2019-01-17 NOTE — TELEPHONE ENCOUNTER
----- Message from Jocelyn Rose MA sent at 1/17/2019 10:12 AM CST -----  Pt has been scheduled. Thanks for you help!  ----- Message -----  From: Melissa Batista  Sent: 1/17/2019   9:37 AM  To: Jocelyn Rose MA    Yes ma'am, she is trying to come all on one day since she doesn't live around here  ----- Message -----  From: Jocelyn Rose MA  Sent: 1/17/2019   9:19 AM  To: Melissa Batista     Good morning Melissa,    Unfortunately Dr. Irizarry is only in clinic on Wednesdays and Fridays. There are other providers here that are in clinic on Mondays if she is ok with seeing someone else.   ----- Message -----  From: Melissa Batista  Sent: 1/17/2019   7:51 AM  To: Juan C Gallardo S Staff    Good morning, Dr. Merino is referring this patient to be seen in derm. For melanoma -discussed whole body skin exam. Is there anyway this patent can get an appointment on 2/4 along with her to her appointments ?

## 2019-01-24 ENCOUNTER — HOSPITAL ENCOUNTER (OUTPATIENT)
Dept: RADIOLOGY | Facility: HOSPITAL | Age: 34
Discharge: HOME | End: 2019-01-24
Attending: INTERNAL MEDICINE
Payer: COMMERCIAL

## 2019-01-24 DIAGNOSIS — R10.84 GENERALIZED ABDOMINAL PAIN: ICD-10-CM

## 2019-01-24 PROCEDURE — 74177 CT ABD & PELVIS W/CONTRAST: CPT | Mod: TC

## 2019-01-24 PROCEDURE — 74177 CT ABDOMEN PELVIS WITH CONTRAST: ICD-10-PCS | Mod: 26,,, | Performed by: RADIOLOGY

## 2019-01-24 PROCEDURE — 74177 CT ABD & PELVIS W/CONTRAST: CPT | Mod: 26,,, | Performed by: RADIOLOGY

## 2019-01-24 PROCEDURE — 25500020 PHARM REV CODE 255: Performed by: INTERNAL MEDICINE

## 2019-01-24 RX ADMIN — IOHEXOL 15 ML: 300 INJECTION, SOLUTION INTRAVENOUS at 07:01

## 2019-01-24 RX ADMIN — IOHEXOL 100 ML: 350 INJECTION, SOLUTION INTRAVENOUS at 11:01

## 2019-01-24 RX ADMIN — IOHEXOL 15 ML: 300 INJECTION, SOLUTION INTRAVENOUS at 08:01

## 2019-01-31 ENCOUNTER — TELEPHONE (OUTPATIENT)
Dept: SURGERY | Facility: CLINIC | Age: 34
End: 2019-01-31

## 2019-01-31 NOTE — TELEPHONE ENCOUNTER
----- Message from Donavan Raymundo DNP sent at 1/16/2019  1:49 PM CST -----  Please schedule pt for f/u CBE with me in March or April 2019.    Thanks,  Donavan

## 2019-01-31 NOTE — TELEPHONE ENCOUNTER
Contacted the patient regarding the message below to schedule appointment with Donavan Raymundo NP for breast exam.  The patient stated that she will need to check her husbands scheduled and contact our office back to schedule the appointment.  My name and number to the breast center given to the patient to contact our office back.  Donavan Raymundo NP notified of this matter.

## 2019-02-03 ENCOUNTER — PATIENT MESSAGE (OUTPATIENT)
Dept: SURGERY | Facility: CLINIC | Age: 34
End: 2019-02-03

## 2019-02-04 ENCOUNTER — PATIENT MESSAGE (OUTPATIENT)
Dept: DERMATOLOGY | Facility: CLINIC | Age: 34
End: 2019-02-04

## 2019-02-12 ENCOUNTER — PATIENT MESSAGE (OUTPATIENT)
Dept: HEMATOLOGY/ONCOLOGY | Facility: CLINIC | Age: 34
End: 2019-02-12

## 2019-02-13 ENCOUNTER — TELEPHONE (OUTPATIENT)
Dept: GYNECOLOGIC ONCOLOGY | Facility: CLINIC | Age: 34
End: 2019-02-13

## 2019-02-28 DIAGNOSIS — N92.1 MENOMETRORRHAGIA: ICD-10-CM

## 2019-02-28 DIAGNOSIS — Z01.818 PREOP TESTING: Primary | ICD-10-CM

## 2019-02-28 RX ORDER — SODIUM CHLORIDE, SODIUM LACTATE, POTASSIUM CHLORIDE, CALCIUM CHLORIDE 600; 310; 30; 20 MG/100ML; MG/100ML; MG/100ML; MG/100ML
INJECTION, SOLUTION INTRAVENOUS CONTINUOUS
Status: CANCELLED | OUTPATIENT
Start: 2019-02-28

## 2019-02-28 RX ORDER — CEFAZOLIN SODIUM 2 G/50ML
2 SOLUTION INTRAVENOUS
Status: CANCELLED | OUTPATIENT
Start: 2019-02-28

## 2019-02-28 RX ORDER — PHENAZOPYRIDINE HYDROCHLORIDE 100 MG/1
200 TABLET, FILM COATED ORAL
Status: CANCELLED | OUTPATIENT
Start: 2019-02-28 | End: 2019-02-28

## 2019-03-07 ENCOUNTER — ANESTHESIA EVENT (OUTPATIENT)
Dept: SURGERY | Facility: HOSPITAL | Age: 34
End: 2019-03-07
Payer: COMMERCIAL

## 2019-03-07 ENCOUNTER — HOSPITAL ENCOUNTER (OUTPATIENT)
Dept: PREADMISSION TESTING | Facility: HOSPITAL | Age: 34
Discharge: HOME | End: 2019-03-07
Attending: OBSTETRICS & GYNECOLOGY
Payer: COMMERCIAL

## 2019-03-07 VITALS — HEIGHT: 68 IN | BODY MASS INDEX: 34.25 KG/M2 | WEIGHT: 226 LBS

## 2019-03-07 PROCEDURE — 99900103 DSU ONLY-NO CHARGE-INITIAL HR (STAT)

## 2019-03-07 RX ORDER — METFORMIN HYDROCHLORIDE 1000 MG/1
1000 TABLET ORAL 2 TIMES DAILY
COMMUNITY
End: 2020-10-02 | Stop reason: CLARIF

## 2019-03-07 RX ORDER — DEXTROAMPHETAMINE SACCHARATE, AMPHETAMINE ASPARTATE MONOHYDRATE, DEXTROAMPHETAMINE SULFATE AND AMPHETAMINE SULFATE 2.5; 2.5; 2.5; 2.5 MG/1; MG/1; MG/1; MG/1
10 CAPSULE, EXTENDED RELEASE ORAL EVERY MORNING
COMMUNITY
End: 2019-07-02 | Stop reason: DRUGHIGH

## 2019-03-07 NOTE — ANESTHESIA PREPROCEDURE EVALUATION
03/07/2019  Allyson Bahena is a 33 y.o., female.    Pre-op Assessment    I have reviewed the Patient Summary Reports.    I have reviewed the Nursing Notes.   I have reviewed the Medications.     Review of Systems  Anesthesia Hx:  No problems with previous Anesthesia  Neg history of prior surgery. Denies Family Hx of Anesthesia complications.   Denies Personal Hx of Anesthesia complications.   Social:  Non-Smoker    Hematology/Oncology:  Hematology Normal   Oncology Normal     EENT/Dental:  EENT/Dental Normal            History of difficult intubation   Cardiovascular:  Cardiovascular Normal     Pulmonary:  Pulmonary Normal    Renal/:  Renal/ Normal     Hepatic/GI:   GERD, well controlled    Musculoskeletal:  Musculoskeletal Normal    Neurological:  Neurology Normal    Endocrine:   Diabetes, well controlled, type 2    Dermatological:  Skin Normal    Psych:   depression          Physical Exam  General:  Obesity    Airway/Jaw/Neck:  Airway Findings: Mouth Opening: < 3 cm Tongue: Large  Mallampati: III  TM Distance: < 4 cm        Eyes/Ears/Nose:  EYES/EARS/NOSE FINDINGS: Normal   Dental:  DENTAL FINDINGS: Normal   Chest/Lungs:  Chest/Lungs Clear    Heart/Vascular:  Heart Findings: Normal Heart murmur: negative Vascular Findings: Normal    Abdomen:  Abdomen Findings: Normal    Musculoskeletal:  Musculoskeletal Findings: Normal   Skin:  Skin Findings: Normal         Anesthesia Plan  Type of Anesthesia, risks & benefits discussed:  Anesthesia Type:  general  Patient's Preference:   Intra-op Monitoring Plan: standard ASA monitors  Intra-op Monitoring Plan Comments:   Post Op Pain Control Plan:   Post Op Pain Control Plan Comments:   Induction:   IV  Beta Blocker:  Patient is not currently on a Beta-Blocker (No further documentation required).       Informed Consent: Patient understands risks and agrees  with Anesthesia plan.  Questions answered. Anesthesia consent signed with patient.  ASA Score: 2     Day of Surgery Review of History & Physical:    H&P update referred to the provider.

## 2019-03-08 ENCOUNTER — PATIENT MESSAGE (OUTPATIENT)
Dept: HEMATOLOGY/ONCOLOGY | Facility: CLINIC | Age: 34
End: 2019-03-08

## 2019-03-09 ENCOUNTER — HOSPITAL ENCOUNTER (OUTPATIENT)
Dept: RADIOLOGY | Facility: HOSPITAL | Age: 34
Discharge: HOME | End: 2019-03-09
Attending: OBSTETRICS & GYNECOLOGY
Payer: COMMERCIAL

## 2019-03-09 ENCOUNTER — HOSPITAL ENCOUNTER (OUTPATIENT)
Dept: CARDIOLOGY | Facility: HOSPITAL | Age: 34
Discharge: HOME | End: 2019-03-09
Attending: OBSTETRICS & GYNECOLOGY
Payer: COMMERCIAL

## 2019-03-09 DIAGNOSIS — Z01.818 PREOP TESTING: ICD-10-CM

## 2019-03-09 PROCEDURE — 71046 XR CHEST PA AND LATERAL PRE-OP: ICD-10-PCS | Mod: 26,,, | Performed by: RADIOLOGY

## 2019-03-09 PROCEDURE — 93005 ELECTROCARDIOGRAM TRACING: CPT

## 2019-03-09 PROCEDURE — 93010 EKG 12-LEAD: ICD-10-PCS | Mod: ,,, | Performed by: INTERNAL MEDICINE

## 2019-03-09 PROCEDURE — 71046 X-RAY EXAM CHEST 2 VIEWS: CPT | Mod: TC,FY

## 2019-03-09 PROCEDURE — 71046 X-RAY EXAM CHEST 2 VIEWS: CPT | Mod: 26,,, | Performed by: RADIOLOGY

## 2019-03-09 PROCEDURE — 93010 ELECTROCARDIOGRAM REPORT: CPT | Mod: ,,, | Performed by: INTERNAL MEDICINE

## 2019-03-11 ENCOUNTER — ANESTHESIA (OUTPATIENT)
Dept: SURGERY | Facility: HOSPITAL | Age: 34
End: 2019-03-11
Payer: COMMERCIAL

## 2019-03-11 ENCOUNTER — HOSPITAL ENCOUNTER (OUTPATIENT)
Facility: HOSPITAL | Age: 34
Discharge: HOME | End: 2019-03-12
Attending: OBSTETRICS & GYNECOLOGY | Admitting: OBSTETRICS & GYNECOLOGY
Payer: COMMERCIAL

## 2019-03-11 DIAGNOSIS — N92.1 MENOMETRORRHAGIA: Primary | ICD-10-CM

## 2019-03-11 DIAGNOSIS — M79.604 BILATERAL LOWER EXTREMITY PAIN: ICD-10-CM

## 2019-03-11 DIAGNOSIS — Z15.01 BRCA2 GENE MUTATION POSITIVE: ICD-10-CM

## 2019-03-11 DIAGNOSIS — M79.605 BILATERAL LOWER EXTREMITY PAIN: ICD-10-CM

## 2019-03-11 DIAGNOSIS — Z15.09 BRCA2 GENE MUTATION POSITIVE: ICD-10-CM

## 2019-03-11 DIAGNOSIS — E66.01 MORBID OBESITY: ICD-10-CM

## 2019-03-11 DIAGNOSIS — E11.9 TYPE 2 DIABETES MELLITUS WITHOUT COMPLICATION, WITHOUT LONG-TERM CURRENT USE OF INSULIN: ICD-10-CM

## 2019-03-11 LAB
POCT GLUCOSE: 109 MG/DL (ref 70–110)
POCT GLUCOSE: 117 MG/DL (ref 70–110)
POCT GLUCOSE: 98 MG/DL (ref 70–110)

## 2019-03-11 PROCEDURE — 36000711: Performed by: OBSTETRICS & GYNECOLOGY

## 2019-03-11 PROCEDURE — 71000033 HC RECOVERY, INTIAL HOUR: Performed by: OBSTETRICS & GYNECOLOGY

## 2019-03-11 PROCEDURE — 37000008 HC ANESTHESIA 1ST 15 MINUTES: Performed by: OBSTETRICS & GYNECOLOGY

## 2019-03-11 PROCEDURE — 27201423 OPTIME MED/SURG SUP & DEVICES STERILE SUPPLY: Performed by: OBSTETRICS & GYNECOLOGY

## 2019-03-11 PROCEDURE — 25000003 PHARM REV CODE 250: Performed by: OBSTETRICS & GYNECOLOGY

## 2019-03-11 PROCEDURE — D9220A PRA ANESTHESIA: Mod: CRNA,,, | Performed by: NURSE ANESTHETIST, CERTIFIED REGISTERED

## 2019-03-11 PROCEDURE — 25000003 PHARM REV CODE 250: Performed by: NURSE ANESTHETIST, CERTIFIED REGISTERED

## 2019-03-11 PROCEDURE — D9220A PRA ANESTHESIA: ICD-10-PCS | Mod: CRNA,,, | Performed by: NURSE ANESTHETIST, CERTIFIED REGISTERED

## 2019-03-11 PROCEDURE — 63600175 PHARM REV CODE 636 W HCPCS: Performed by: OBSTETRICS & GYNECOLOGY

## 2019-03-11 PROCEDURE — D9220A PRA ANESTHESIA: ICD-10-PCS | Mod: ANES,,, | Performed by: ANESTHESIOLOGY

## 2019-03-11 PROCEDURE — C1752 CATH,HEMODIALYSIS,SHORT-TERM: HCPCS | Performed by: OBSTETRICS & GYNECOLOGY

## 2019-03-11 PROCEDURE — 36000710: Performed by: OBSTETRICS & GYNECOLOGY

## 2019-03-11 PROCEDURE — 63600175 PHARM REV CODE 636 W HCPCS: Performed by: ANESTHESIOLOGY

## 2019-03-11 PROCEDURE — 25000003 PHARM REV CODE 250

## 2019-03-11 PROCEDURE — 88307 TISSUE EXAM BY PATHOLOGIST: CPT | Mod: 26,,, | Performed by: PATHOLOGY

## 2019-03-11 PROCEDURE — 88307 TISSUE SPECIMEN TO PATHOLOGY - SURGERY: ICD-10-PCS | Mod: 26,,, | Performed by: PATHOLOGY

## 2019-03-11 PROCEDURE — D9220A PRA ANESTHESIA: Mod: ANES,,, | Performed by: ANESTHESIOLOGY

## 2019-03-11 PROCEDURE — 37000009 HC ANESTHESIA EA ADD 15 MINS: Performed by: OBSTETRICS & GYNECOLOGY

## 2019-03-11 PROCEDURE — S0028 INJECTION, FAMOTIDINE, 20 MG: HCPCS

## 2019-03-11 PROCEDURE — 94799 UNLISTED PULMONARY SVC/PX: CPT

## 2019-03-11 PROCEDURE — 88307 TISSUE EXAM BY PATHOLOGIST: CPT | Performed by: PATHOLOGY

## 2019-03-11 PROCEDURE — 63600175 PHARM REV CODE 636 W HCPCS: Performed by: NURSE ANESTHETIST, CERTIFIED REGISTERED

## 2019-03-11 PROCEDURE — 25000003 PHARM REV CODE 250: Performed by: ANESTHESIOLOGY

## 2019-03-11 RX ORDER — ONDANSETRON 4 MG/1
8 TABLET, ORALLY DISINTEGRATING ORAL EVERY 8 HOURS PRN
Status: DISCONTINUED | OUTPATIENT
Start: 2019-03-11 | End: 2019-03-12 | Stop reason: HOSPADM

## 2019-03-11 RX ORDER — HYDROMORPHONE HYDROCHLORIDE 2 MG/ML
1 INJECTION, SOLUTION INTRAMUSCULAR; INTRAVENOUS; SUBCUTANEOUS EVERY 4 HOURS PRN
Status: DISCONTINUED | OUTPATIENT
Start: 2019-03-11 | End: 2019-03-12 | Stop reason: HOSPADM

## 2019-03-11 RX ORDER — SODIUM CHLORIDE, SODIUM LACTATE, POTASSIUM CHLORIDE, CALCIUM CHLORIDE 600; 310; 30; 20 MG/100ML; MG/100ML; MG/100ML; MG/100ML
INJECTION, SOLUTION INTRAVENOUS CONTINUOUS
Status: DISCONTINUED | OUTPATIENT
Start: 2019-03-11 | End: 2019-03-11

## 2019-03-11 RX ORDER — CEFAZOLIN SODIUM 2 G/50ML
2 SOLUTION INTRAVENOUS
Status: COMPLETED | OUTPATIENT
Start: 2019-03-11 | End: 2019-03-11

## 2019-03-11 RX ORDER — HYDROCODONE BITARTRATE AND ACETAMINOPHEN 7.5; 325 MG/1; MG/1
1 TABLET ORAL EVERY 4 HOURS PRN
Status: DISCONTINUED | OUTPATIENT
Start: 2019-03-11 | End: 2019-03-12 | Stop reason: HOSPADM

## 2019-03-11 RX ORDER — IBUPROFEN 600 MG/1
600 TABLET ORAL EVERY 8 HOURS
Status: DISCONTINUED | OUTPATIENT
Start: 2019-03-12 | End: 2019-03-12 | Stop reason: HOSPADM

## 2019-03-11 RX ORDER — LIDOCAINE HYDROCHLORIDE 10 MG/ML
1 INJECTION, SOLUTION EPIDURAL; INFILTRATION; INTRACAUDAL; PERINEURAL ONCE
Status: DISCONTINUED | OUTPATIENT
Start: 2019-03-11 | End: 2019-03-11 | Stop reason: HOSPADM

## 2019-03-11 RX ORDER — DIPHENHYDRAMINE HCL 25 MG
25 CAPSULE ORAL EVERY 4 HOURS PRN
Status: DISCONTINUED | OUTPATIENT
Start: 2019-03-11 | End: 2019-03-12 | Stop reason: HOSPADM

## 2019-03-11 RX ORDER — HYDROCODONE BITARTRATE AND ACETAMINOPHEN 10; 325 MG/1; MG/1
1 TABLET ORAL 4 TIMES DAILY PRN
Status: DISCONTINUED | OUTPATIENT
Start: 2019-03-11 | End: 2019-03-12 | Stop reason: HOSPADM

## 2019-03-11 RX ORDER — MIDAZOLAM HYDROCHLORIDE 1 MG/ML
INJECTION, SOLUTION INTRAMUSCULAR; INTRAVENOUS
Status: DISCONTINUED | OUTPATIENT
Start: 2019-03-11 | End: 2019-03-11

## 2019-03-11 RX ORDER — CEFAZOLIN SODIUM 1 G/3ML
2 INJECTION, POWDER, FOR SOLUTION INTRAMUSCULAR; INTRAVENOUS
Status: DISCONTINUED | OUTPATIENT
Start: 2019-03-11 | End: 2019-03-11

## 2019-03-11 RX ORDER — GLYCOPYRROLATE 0.2 MG/ML
0.2 INJECTION INTRAMUSCULAR; INTRAVENOUS ONCE
Status: COMPLETED | OUTPATIENT
Start: 2019-03-11 | End: 2019-03-11

## 2019-03-11 RX ORDER — SODIUM CHLORIDE, SODIUM LACTATE, POTASSIUM CHLORIDE, CALCIUM CHLORIDE 600; 310; 30; 20 MG/100ML; MG/100ML; MG/100ML; MG/100ML
125 INJECTION, SOLUTION INTRAVENOUS CONTINUOUS
Status: DISCONTINUED | OUTPATIENT
Start: 2019-03-11 | End: 2019-03-11

## 2019-03-11 RX ORDER — METFORMIN HYDROCHLORIDE 500 MG/1
1000 TABLET ORAL 2 TIMES DAILY
Status: DISCONTINUED | OUTPATIENT
Start: 2019-03-11 | End: 2019-03-12 | Stop reason: HOSPADM

## 2019-03-11 RX ORDER — PHENAZOPYRIDINE HYDROCHLORIDE 100 MG/1
200 TABLET, FILM COATED ORAL
Status: COMPLETED | OUTPATIENT
Start: 2019-03-11 | End: 2019-03-11

## 2019-03-11 RX ORDER — SIMETHICONE 80 MG
80 TABLET,CHEWABLE ORAL EVERY 4 HOURS PRN
Status: DISCONTINUED | OUTPATIENT
Start: 2019-03-11 | End: 2019-03-12 | Stop reason: HOSPADM

## 2019-03-11 RX ORDER — PANTOPRAZOLE SODIUM 40 MG/1
40 TABLET, DELAYED RELEASE ORAL DAILY PRN
Status: DISCONTINUED | OUTPATIENT
Start: 2019-03-11 | End: 2019-03-12 | Stop reason: HOSPADM

## 2019-03-11 RX ORDER — MEPERIDINE HYDROCHLORIDE 50 MG/ML
INJECTION INTRAMUSCULAR; INTRAVENOUS; SUBCUTANEOUS
Status: DISCONTINUED | OUTPATIENT
Start: 2019-03-11 | End: 2019-03-11

## 2019-03-11 RX ORDER — MORPHINE SULFATE 2 MG/ML
2 INJECTION, SOLUTION INTRAMUSCULAR; INTRAVENOUS EVERY 5 MIN PRN
Status: DISCONTINUED | OUTPATIENT
Start: 2019-03-11 | End: 2019-03-11 | Stop reason: HOSPADM

## 2019-03-11 RX ORDER — PROPOFOL 10 MG/ML
VIAL (ML) INTRAVENOUS
Status: DISCONTINUED | OUTPATIENT
Start: 2019-03-11 | End: 2019-03-11

## 2019-03-11 RX ORDER — BUPIVACAINE HYDROCHLORIDE AND EPINEPHRINE 5; 5 MG/ML; UG/ML
INJECTION, SOLUTION EPIDURAL; INTRACAUDAL; PERINEURAL
Status: DISCONTINUED | OUTPATIENT
Start: 2019-03-11 | End: 2019-03-11 | Stop reason: HOSPADM

## 2019-03-11 RX ORDER — GLYCOPYRROLATE 0.2 MG/ML
INJECTION INTRAMUSCULAR; INTRAVENOUS
Status: COMPLETED
Start: 2019-03-11 | End: 2019-03-11

## 2019-03-11 RX ORDER — FAMOTIDINE 10 MG/ML
INJECTION INTRAVENOUS
Status: COMPLETED
Start: 2019-03-11 | End: 2019-03-11

## 2019-03-11 RX ORDER — ONDANSETRON 2 MG/ML
4 INJECTION INTRAMUSCULAR; INTRAVENOUS DAILY PRN
Status: DISCONTINUED | OUTPATIENT
Start: 2019-03-11 | End: 2019-03-11 | Stop reason: HOSPADM

## 2019-03-11 RX ORDER — KETOROLAC TROMETHAMINE 30 MG/ML
30 INJECTION, SOLUTION INTRAMUSCULAR; INTRAVENOUS EVERY 6 HOURS
Status: COMPLETED | OUTPATIENT
Start: 2019-03-11 | End: 2019-03-12

## 2019-03-11 RX ORDER — FAMOTIDINE 10 MG/ML
20 INJECTION INTRAVENOUS ONCE
Status: COMPLETED | OUTPATIENT
Start: 2019-03-11 | End: 2019-03-11

## 2019-03-11 RX ORDER — SODIUM CHLORIDE 0.9 % (FLUSH) 0.9 %
3 SYRINGE (ML) INJECTION EVERY 8 HOURS
Status: DISCONTINUED | OUTPATIENT
Start: 2019-03-11 | End: 2019-03-12 | Stop reason: HOSPADM

## 2019-03-11 RX ORDER — CEFAZOLIN SODIUM 2 G/50ML
2 SOLUTION INTRAVENOUS
Status: COMPLETED | OUTPATIENT
Start: 2019-03-11 | End: 2019-03-12

## 2019-03-11 RX ORDER — SUCCINYLCHOLINE CHLORIDE 20 MG/ML
INJECTION INTRAMUSCULAR; INTRAVENOUS
Status: DISCONTINUED | OUTPATIENT
Start: 2019-03-11 | End: 2019-03-11

## 2019-03-11 RX ORDER — CISATRACURIUM BESYLATE 2 MG/ML
INJECTION, SOLUTION INTRAVENOUS
Status: DISCONTINUED | OUTPATIENT
Start: 2019-03-11 | End: 2019-03-11

## 2019-03-11 RX ORDER — MORPHINE SULFATE 10 MG/ML
4 INJECTION INTRAMUSCULAR; INTRAVENOUS; SUBCUTANEOUS EVERY 4 HOURS PRN
Status: DISCONTINUED | OUTPATIENT
Start: 2019-03-11 | End: 2019-03-12 | Stop reason: HOSPADM

## 2019-03-11 RX ORDER — ACETAMINOPHEN 325 MG/1
650 TABLET ORAL EVERY 4 HOURS PRN
Status: DISCONTINUED | OUTPATIENT
Start: 2019-03-11 | End: 2019-03-12 | Stop reason: HOSPADM

## 2019-03-11 RX ORDER — DIPHENHYDRAMINE HYDROCHLORIDE 50 MG/ML
12.5 INJECTION INTRAMUSCULAR; INTRAVENOUS
Status: DISCONTINUED | OUTPATIENT
Start: 2019-03-11 | End: 2019-03-11 | Stop reason: HOSPADM

## 2019-03-11 RX ORDER — MORPHINE SULFATE 10 MG/ML
8 INJECTION INTRAMUSCULAR; INTRAVENOUS; SUBCUTANEOUS EVERY 4 HOURS PRN
Status: DISCONTINUED | OUTPATIENT
Start: 2019-03-11 | End: 2019-03-12 | Stop reason: HOSPADM

## 2019-03-11 RX ADMIN — GLYCOPYRROLATE 0.2 MG: 0.2 INJECTION INTRAMUSCULAR; INTRAVENOUS at 07:03

## 2019-03-11 RX ADMIN — MEPERIDINE HYDROCHLORIDE 50 MG: 50 INJECTION INTRAMUSCULAR; INTRAVENOUS; SUBCUTANEOUS at 07:03

## 2019-03-11 RX ADMIN — MORPHINE SULFATE 8 MG: 10 INJECTION, SOLUTION INTRAMUSCULAR; INTRAVENOUS at 10:03

## 2019-03-11 RX ADMIN — PROPOFOL 200 MG: 10 INJECTION, EMULSION INTRAVENOUS at 07:03

## 2019-03-11 RX ADMIN — KETOROLAC TROMETHAMINE 30 MG: 30 INJECTION, SOLUTION INTRAMUSCULAR at 11:03

## 2019-03-11 RX ADMIN — MORPHINE SULFATE 8 MG: 10 INJECTION, SOLUTION INTRAMUSCULAR; INTRAVENOUS at 02:03

## 2019-03-11 RX ADMIN — PROMETHAZINE HYDROCHLORIDE 6.25 MG: 25 INJECTION INTRAMUSCULAR; INTRAVENOUS at 10:03

## 2019-03-11 RX ADMIN — SUCCINYLCHOLINE CHLORIDE 100 MG: 20 INJECTION, SOLUTION INTRAMUSCULAR; INTRAVENOUS at 07:03

## 2019-03-11 RX ADMIN — MORPHINE SULFATE 2 MG: 2 INJECTION, SOLUTION INTRAMUSCULAR; INTRAVENOUS at 10:03

## 2019-03-11 RX ADMIN — FAMOTIDINE 20 MG: 10 INJECTION INTRAVENOUS at 07:03

## 2019-03-11 RX ADMIN — SODIUM CHLORIDE, POTASSIUM CHLORIDE, SODIUM LACTATE AND CALCIUM CHLORIDE: 600; 310; 30; 20 INJECTION, SOLUTION INTRAVENOUS at 08:03

## 2019-03-11 RX ADMIN — CEFAZOLIN SODIUM 2 G: 2 SOLUTION INTRAVENOUS at 08:03

## 2019-03-11 RX ADMIN — KETOROLAC TROMETHAMINE 30 MG: 30 INJECTION, SOLUTION INTRAMUSCULAR at 05:03

## 2019-03-11 RX ADMIN — ONDANSETRON 4 MG: 2 INJECTION INTRAMUSCULAR; INTRAVENOUS at 09:03

## 2019-03-11 RX ADMIN — CISATRACURIUM BESYLATE 6 MG: 2 INJECTION INTRAVENOUS at 07:03

## 2019-03-11 RX ADMIN — SODIUM CHLORIDE, POTASSIUM CHLORIDE, SODIUM LACTATE AND CALCIUM CHLORIDE: 600; 310; 30; 20 INJECTION, SOLUTION INTRAVENOUS at 07:03

## 2019-03-11 RX ADMIN — METFORMIN HYDROCHLORIDE 1000 MG: 500 TABLET ORAL at 09:03

## 2019-03-11 RX ADMIN — CEFAZOLIN SODIUM 2 G: 2 SOLUTION INTRAVENOUS at 05:03

## 2019-03-11 RX ADMIN — ONDANSETRON 8 MG: 4 TABLET, ORALLY DISINTEGRATING ORAL at 08:03

## 2019-03-11 RX ADMIN — MORPHINE SULFATE 2 MG: 2 INJECTION, SOLUTION INTRAMUSCULAR; INTRAVENOUS at 09:03

## 2019-03-11 RX ADMIN — PANTOPRAZOLE SODIUM 40 MG: 40 TABLET, DELAYED RELEASE ORAL at 09:03

## 2019-03-11 RX ADMIN — PROMETHAZINE HYDROCHLORIDE 12.5 MG: 25 INJECTION INTRAMUSCULAR; INTRAVENOUS at 04:03

## 2019-03-11 RX ADMIN — MIDAZOLAM HYDROCHLORIDE 2 MG: 1 INJECTION, SOLUTION INTRAMUSCULAR; INTRAVENOUS at 07:03

## 2019-03-11 RX ADMIN — HYDROCODONE BITARTRATE AND ACETAMINOPHEN 1 TABLET: 7.5; 325 TABLET ORAL at 08:03

## 2019-03-11 RX ADMIN — PHENAZOPYRIDINE 200 MG: 100 TABLET ORAL at 07:03

## 2019-03-11 RX ADMIN — SODIUM CHLORIDE, POTASSIUM CHLORIDE, SODIUM LACTATE AND CALCIUM CHLORIDE 125 ML/HR: 600; 310; 30; 20 INJECTION, SOLUTION INTRAVENOUS at 10:03

## 2019-03-11 NOTE — PLAN OF CARE
Patient awake and alert. VSS. Pain medication given. (SEE MAR). Hodge draining yellow urine. IV intact and infusing. Trochar sites clean dry and intact.  at bedside. Report called to DAREK Martínez. Post Partum nurse.

## 2019-03-11 NOTE — BRIEF OP NOTE
[facility] - Periop Services  Brief Operative Note    SUMMARY     Surgery Date: 3/11/2019     Surgeon(s) and Role:     * Dagoberto Smith MD - Primary    Assisting Surgeon: None    Pre-op Diagnosis:  BRCA1 positive [Z15.01, Z15.09]  Dysfunctional uterine bleeding [N93.8]    Post-op Diagnosis:  Post-Op Diagnosis Codes:     * BRCA1 positive [Z15.01, Z15.09]     * Dysfunctional uterine bleeding [N93.8]    Procedure(s) (LRB):  HYSTERECTOMY, TOTAL, LAPAROSCOPIC (N/A)  SALPINGO-OOPHORECTOMY, LAPAROSCOPIC (Bilateral)  CYSTOSCOPY (N/A)    Anesthesia: General    Description of Procedure: TLH/BSO/cystoscopy    Description of the findings of the procedure:  Normal pelvis.  No evidence of endometriosis.  Both ovaries appeared within normal limits.  Appendix edge was within normal limits.  Liver edge was normal. Patient had previous cholecystectomy.  Patient had previous tubal ligation.    Estimated Blood Loss: 50 mL         Specimens:   Specimen (12h ago, onward)    Start     Ordered    03/11/19 0849  Specimen to Pathology - Surgery  Once     Comments:  Pre-op Diagnosis: BRCA1 positive [Z15.01, Z15.09]Dysfunctional uterine bleeding [N93.8]Post-op Diagnosis: SAME Procedure(s):HYSTERECTOMY, TOTAL, LAPAROSCOPICSALPINGO-OOPHORECTOMY, LAPAROSCOPICCYSTOSCOPY Number of specimens: 1Name of specimens: 1. UTERUS AND BILATERAL FALLOPIAN TUBES AND OVARIES     Start Status   03/11/19 0849 Collected (03/11/19 0849)       03/11/19 0849

## 2019-03-11 NOTE — TRANSFER OF CARE
"Anesthesia Transfer of Care Note    Patient: Allyson Bahena    Procedure(s) Performed: Procedure(s) (LRB):  HYSTERECTOMY, TOTAL, LAPAROSCOPIC (N/A)  SALPINGO-OOPHORECTOMY, LAPAROSCOPIC (Bilateral)  CYSTOSCOPY (N/A)    Patient location: PACU    Anesthesia Type: general    Transport from OR: Transported from OR on room air with adequate spontaneous ventilation    Post pain: adequate analgesia    Post assessment: no apparent anesthetic complications and tolerated procedure well    Post vital signs: stable    Level of consciousness: awake, alert and oriented    Nausea/Vomiting: no nausea/vomiting    Complications: none    Transfer of care protocol was followed      Last vitals:   Visit Vitals  /87 (BP Location: Right arm, Patient Position: Lying)   Pulse 92   Temp 36.8 °C (98.2 °F) (Oral)   Resp 12   Ht 5' 8" (1.727 m)   Wt 104.3 kg (230 lb)   LMP 02/11/2019 (Exact Date)   SpO2 100%   Breastfeeding? No   BMI 34.97 kg/m²     "

## 2019-03-11 NOTE — ANESTHESIA POSTPROCEDURE EVALUATION
"Anesthesia Post Evaluation    Patient: Allyson Bahena    Procedure(s) Performed: Procedure(s) (LRB):  HYSTERECTOMY, TOTAL, LAPAROSCOPIC (N/A)  SALPINGO-OOPHORECTOMY, LAPAROSCOPIC (Bilateral)  CYSTOSCOPY (N/A)    Final Anesthesia Type: general  Patient location during evaluation: PACU  Patient participation: Yes- Able to Participate  Level of consciousness: awake and alert  Post-procedure vital signs: reviewed and stable  Pain management: adequate  Airway patency: patent  PONV status at discharge: No PONV  Anesthetic complications: no      Cardiovascular status: blood pressure returned to baseline  Respiratory status: unassisted  Hydration status: euvolemic  Follow-up not needed.        Visit Vitals  /85 (BP Location: Right arm, Patient Position: Lying)   Pulse (!) 112   Temp 36.3 °C (97.3 °F) (Oral)   Resp 18   Ht 5' 8" (1.727 m)   Wt 104.3 kg (230 lb)   LMP 02/11/2019 (Exact Date)   SpO2 100%   Breastfeeding? No   BMI 34.97 kg/m²       Pain/Yefri Score: Pain Rating Prior to Med Admin: 8 (3/11/2019 11:24 AM)  Pain Rating Post Med Admin: 3 (patient sleeping) (3/11/2019 11:15 AM)  Yefri Score: 10 (3/11/2019 10:15 AM)        "

## 2019-03-11 NOTE — OP NOTE
[facility] - Periop Services  Surgery Department  Operative Note    SUMMARY     Date of Procedure: 3/11/2019     Procedure: Procedure(s) (LRB):  HYSTERECTOMY, TOTAL, LAPAROSCOPIC (N/A)  SALPINGO-OOPHORECTOMY, LAPAROSCOPIC (Bilateral)  CYSTOSCOPY (N/A)     Surgeon(s) and Role:     * Dagoberto Smith MD - Primary    Assisting Surgeon: None    Pre-Operative Diagnosis: BRCA1 positive [Z15.01, Z15.09]  Dysfunctional uterine bleeding [N93.8]    Post-Operative Diagnosis: Post-Op Diagnosis Codes:     * BRCA1 positive [Z15.01, Z15.09]     * Dysfunctional uterine bleeding [N93.8]    Anesthesia: General    Technical Procedures Used: After ensuring informed consent, the patient was  taken to the operating room, where general anesthesia was initiated.  She  was placed in the adjustable Rajan stirrups, and her abdomen and perineum  were prepped and draped in the usual sterile fashion.  A Hodge catheter was  placed in the bladder.  The anterior lip of the cervix was grasped with a  single-toothed tenaculum.  The patient sounded to10 cm.  She was gently  dilated.  A #10 Fornesse with a 4.0 cup was introduced into the vagina.   Gloves were changed and attention was turned to the patient's abdomen.   Towel clamps were placed paraumbilically, and a Veress needle was  introduced.  The abdomen was insufflated to 15 mmHg. A 10 mm incision was  made.  A 10 mm trocar and sleeve were advanced. Intraabdominal placement  was assured with direct laparoscopic visualization.  Then 5 mm trocars were  placed in the right and left lower quadrant.  The round ligament was  grasped with the with the LigaSure device.  The anterior and posterior leaf  of the broad ligament were opened.  Next, the infindibulopelvic ligament was  grasped, cauterized, and transected.  The broad ligament was taken down.   The posterior leaf of the broad ligament was taken down.  The uterine arteries  were grasped, cauterized, and transected.  The  same procedure was performed  on the left side. Next, the adnexa were removed on the right and left side  with the use of the LigaSure device and placed into the posterior  cul-de-sac.  Next, after both uterine arteries were cauterized, the bladder  flap was taken down, and an anterior colpotomy incision was made.   Additional bites were made to cauterize the right and left uterine artery,  and then the posterior colpotomy incision was made.  The uterus was removed  vaginally.  Attention was then turned to the vagina.  The right and left  vaginal angles were identified, and a 0 Vicryl was placed on both sides and  held for later use.  The anterior and posterior vaginal mucosa were  reapproximated in an interrupted fashion with 0 pop offs, and then the  vaginal mucosa was imbricated with a 2-0 Vicryl.  The uterosacrals were  plicated to aid in the prevention of a future enterocele.  Next, 60 mL of  sterile water was placed inside the bladder.  The Hodge catheter was  removed, and cystoscopy was performed.  The above findings were noted.   Next, Hodge was replaced.  Gloves were changed. Attention was turned to the  patient's abdomen.  Copious irrigation was used in the abdomen.  All  surgical sites were inspected and noted to be hemostatic.  Next, all the  trocars were removed from the patient's abdomen. Positive-pressure  ventilation was used to help aid in the removal of the pneumoperitoneum.  A  0 Vicryl was used to reapproximate the fascia of the 10 mm trocar site in  the umbilicus, and then the skin was closed with the Dermabond adhesive  glue.  The patient was taken out of the adjustable Rajan stirrups and  placed in the supine position.  She was awakened from anesthesia and taken  to the recovery room with no complications.           Description of the Findings of the Procedure: Normal pelvis.  No evidence of endometriosis.  Both ovaries appeared within normal limits.  Appendix edge was within normal limits.   Liver edge was normal. Patient had previous cholecystectomy.  Patient had previous tubal ligation.   During cystoscopy:  Both ureters were visualized to be peristalsing and urine was extruding from the ureteral orifice.  There was no evidence of a bladder injury.    Significant Surgical Tasks Conducted by the Assistant(s), if Applicable: na    Complications: No    Estimated Blood Loss (EBL): 50 mL           Implants: * No implants in log *    Specimens:   Specimen (12h ago, onward)    Start     Ordered    03/11/19 0849  Specimen to Pathology - Surgery  Once     Comments:  Pre-op Diagnosis: BRCA1 positive [Z15.01, Z15.09]Dysfunctional uterine bleeding [N93.8]Post-op Diagnosis: SAME Procedure(s):HYSTERECTOMY, TOTAL, LAPAROSCOPICSALPINGO-OOPHORECTOMY, LAPAROSCOPICCYSTOSCOPY Number of specimens: 1Name of specimens: 1. UTERUS AND BILATERAL FALLOPIAN TUBES AND OVARIES     Start Status   03/11/19 0849 Collected (03/11/19 0849)       03/11/19 0849                  Condition: Good    Disposition: PACU - hemodynamically stable.    Attestation: I was present and scrubbed for the entire procedure.

## 2019-03-12 VITALS
BODY MASS INDEX: 34.86 KG/M2 | WEIGHT: 230 LBS | TEMPERATURE: 99 F | HEART RATE: 60 BPM | OXYGEN SATURATION: 90 % | DIASTOLIC BLOOD PRESSURE: 65 MMHG | HEIGHT: 68 IN | SYSTOLIC BLOOD PRESSURE: 100 MMHG | RESPIRATION RATE: 19 BRPM

## 2019-03-12 LAB
ANION GAP SERPL CALC-SCNC: 7 MMOL/L
BASOPHILS # BLD AUTO: 0.01 K/UL
BASOPHILS NFR BLD: 0.1 %
BUN SERPL-MCNC: 5 MG/DL
CALCIUM SERPL-MCNC: 8.3 MG/DL
CHLORIDE SERPL-SCNC: 105 MMOL/L
CO2 SERPL-SCNC: 25 MMOL/L
CREAT SERPL-MCNC: 0.9 MG/DL
DIFFERENTIAL METHOD: ABNORMAL
EOSINOPHIL # BLD AUTO: 0 K/UL
EOSINOPHIL NFR BLD: 0 %
ERYTHROCYTE [DISTWIDTH] IN BLOOD BY AUTOMATED COUNT: 13.2 %
EST. GFR  (AFRICAN AMERICAN): >60 ML/MIN/1.73 M^2
EST. GFR  (NON AFRICAN AMERICAN): >60 ML/MIN/1.73 M^2
GLUCOSE SERPL-MCNC: 106 MG/DL
HCT VFR BLD AUTO: 36 %
HGB BLD-MCNC: 11.8 G/DL
IMM GRANULOCYTES # BLD AUTO: 0.03 K/UL
IMM GRANULOCYTES NFR BLD AUTO: 0.4 %
LYMPHOCYTES # BLD AUTO: 0.5 K/UL
LYMPHOCYTES NFR BLD: 7.6 %
MCH RBC QN AUTO: 29.4 PG
MCHC RBC AUTO-ENTMCNC: 32.8 G/DL
MCV RBC AUTO: 90 FL
MONOCYTES # BLD AUTO: 0.4 K/UL
MONOCYTES NFR BLD: 5.2 %
NEUTROPHILS # BLD AUTO: 6 K/UL
NEUTROPHILS NFR BLD: 86.7 %
NRBC BLD-RTO: 0 /100 WBC
PLATELET # BLD AUTO: 237 K/UL
PMV BLD AUTO: 10.4 FL
POTASSIUM SERPL-SCNC: 3.5 MMOL/L
RBC # BLD AUTO: 4.02 M/UL
SODIUM SERPL-SCNC: 137 MMOL/L
WBC # BLD AUTO: 6.96 K/UL

## 2019-03-12 PROCEDURE — 85025 COMPLETE CBC W/AUTO DIFF WBC: CPT

## 2019-03-12 PROCEDURE — 63600175 PHARM REV CODE 636 W HCPCS: Performed by: OBSTETRICS & GYNECOLOGY

## 2019-03-12 PROCEDURE — 25000242 PHARM REV CODE 250 ALT 637 W/ HCPCS: Performed by: OBSTETRICS & GYNECOLOGY

## 2019-03-12 PROCEDURE — 80048 BASIC METABOLIC PNL TOTAL CA: CPT

## 2019-03-12 PROCEDURE — 36415 COLL VENOUS BLD VENIPUNCTURE: CPT

## 2019-03-12 PROCEDURE — 94760 N-INVAS EAR/PLS OXIMETRY 1: CPT

## 2019-03-12 PROCEDURE — 94640 AIRWAY INHALATION TREATMENT: CPT

## 2019-03-12 PROCEDURE — 25000003 PHARM REV CODE 250: Performed by: OBSTETRICS & GYNECOLOGY

## 2019-03-12 RX ORDER — IBUPROFEN 600 MG/1
600 TABLET ORAL EVERY 8 HOURS
Qty: 30 TABLET | Refills: 1 | Status: SHIPPED | OUTPATIENT
Start: 2019-03-12 | End: 2019-07-18

## 2019-03-12 RX ORDER — ALBUTEROL SULFATE 0.83 MG/ML
2.5 SOLUTION RESPIRATORY (INHALATION) ONCE
Status: COMPLETED | OUTPATIENT
Start: 2019-03-12 | End: 2019-03-12

## 2019-03-12 RX ORDER — HYDROCODONE BITARTRATE AND ACETAMINOPHEN 10; 325 MG/1; MG/1
1 TABLET ORAL EVERY 8 HOURS PRN
Qty: 30 TABLET | Refills: 0 | Status: SHIPPED | OUTPATIENT
Start: 2019-03-12 | End: 2019-07-02

## 2019-03-12 RX ADMIN — ALBUTEROL SULFATE 2.5 MG: 2.5 SOLUTION RESPIRATORY (INHALATION) at 03:03

## 2019-03-12 RX ADMIN — KETOROLAC TROMETHAMINE 30 MG: 30 INJECTION, SOLUTION INTRAMUSCULAR at 06:03

## 2019-03-12 RX ADMIN — MORPHINE SULFATE 8 MG: 10 INJECTION, SOLUTION INTRAMUSCULAR; INTRAVENOUS at 04:03

## 2019-03-12 RX ADMIN — HYDROCODONE BITARTRATE AND ACETAMINOPHEN 1 TABLET: 10; 325 TABLET ORAL at 08:03

## 2019-03-12 RX ADMIN — SIMETHICONE CHEW TAB 80 MG 80 MG: 80 TABLET ORAL at 08:03

## 2019-03-12 RX ADMIN — IBUPROFEN 600 MG: 600 TABLET ORAL at 02:03

## 2019-03-12 RX ADMIN — HYDROCODONE BITARTRATE AND ACETAMINOPHEN 1 TABLET: 10; 325 TABLET ORAL at 05:03

## 2019-03-12 RX ADMIN — SIMETHICONE CHEW TAB 80 MG 80 MG: 80 TABLET ORAL at 01:03

## 2019-03-12 RX ADMIN — CEFAZOLIN SODIUM 2 G: 2 SOLUTION INTRAVENOUS at 01:03

## 2019-03-12 RX ADMIN — ONDANSETRON 8 MG: 4 TABLET, ORALLY DISINTEGRATING ORAL at 08:03

## 2019-03-12 RX ADMIN — HYDROCODONE BITARTRATE AND ACETAMINOPHEN 1 TABLET: 10; 325 TABLET ORAL at 12:03

## 2019-03-12 RX ADMIN — METFORMIN HYDROCHLORIDE 1000 MG: 500 TABLET ORAL at 08:03

## 2019-03-12 NOTE — PLAN OF CARE
03/12/19 1357   Final Note   Assessment Type Final Discharge Note   Anticipated Discharge Disposition Home   What phone number can be called within the next 1-3 days to see how you are doing after discharge? [phone]   Hospital Follow Up  Appt(s) scheduled? Yes   Discharge plans and expectations educations in teach back method with documentation complete? Yes   Verbal & written follow up appointment given to patient. States needs a morning appointment. Appointment changed & provided to patient. Demonstrated understanding by verbal feedback. States her  will be at home to help her out. Denies any discharge needs at this time.

## 2019-03-12 NOTE — PROGRESS NOTES
"POD #1 s/p T LH BSO    Subjective:  Patient complains of bilateral lower extremity pain Positive flatus    Objective: BP (!) 115/55 (BP Location: Right arm, Patient Position: Lying)   Pulse 95   Temp 99.7 °F (37.6 °C) (Oral)   Resp 16   Ht 5' 8" (1.727 m)   Wt 104.3 kg (230 lb)   LMP 02/11/2019 (Exact Date) Comment: ucg ordered  SpO2 96%   Breastfeeding? No   BMI 34.97 kg/m²      H/H:   Lab Results   Component Value Date    WBC 6.96 03/12/2019    HGB 11.8 (L) 03/12/2019    HCT 36.0 (L) 03/12/2019    MCV 90 03/12/2019     03/12/2019       Chest:  Bilateral lower extremities with rales appreciated.    CV: Regular rate and rhythm  Abdomen: Non-tender, non-distended, soft, positive bowel sounds  Incision: Bandaged  Extremities:  Bilateral lower extremity tenderness. No swelling.      Assessment: S/P T LH/BSO.    Plan:  Encouraged IS.  Will get bilateral lower extremity Dopplers.  Will plan discharge this afternoon.  "

## 2019-03-12 NOTE — NURSING
1508- Dr Morel notified that BLE doppler was neg. And that pt has a temp of 100.5. States to order breathing tx for pt and recheck temp at 5pm if pt still has a fever she will have to stay over night.    1509 - resp called and notified of breathing tx    1515 - pt notified of new plan of care

## 2019-03-12 NOTE — NURSING
Pt given discharge instructions. Pt verbalizes understanding. Pt's  was given Rx hardcopies earlier and pt states he has them at the pharmacy. Pt mother present for discharge. Pt getting dressed and will call when she is ready for discharge.

## 2019-03-12 NOTE — DISCHARGE SUMMARY
[facility] - Post Partum  Obstetrics & Gynecology  Discharge Summary    Patient Name: Allyson Bahena  MRN: [id number]  Admission Date: 3/11/2019  Hospital Length of Stay: 0 days  Discharge Date and Time:  03/12/2019 1:17 PM  Attending Physician: Dagoberto Smith MD   Discharging Provider: Dagoberto Smith MD  Primary Care Provider: DOM Juarez    HPI:  33-year-old patient who is BRCA 2 positive presented for definitive treatment with total laparoscopic hysterectomy with bilateral salpingo-oophorectomy.  Patient is to have bilateral prophylactic mastectomy in the summertime.    Hospital Course:  The procedure went well and without incident.  Postop day 1 the patient did complain of some bilateral lower extremity pain.    Procedure(s) (LRB):  HYSTERECTOMY, TOTAL, LAPAROSCOPIC (N/A)  SALPINGO-OOPHORECTOMY, LAPAROSCOPIC (Bilateral)  CYSTOSCOPY (N/A)     Consults:     Significant Diagnostic Studies:   Labs:   BMP:   Recent Labs   Lab 03/12/19  0614         K 3.5      CO2 25   BUN 5*   CREATININE 0.9   CALCIUM 8.3*   , CBC   Recent Labs   Lab 03/12/19  0614   WBC 6.96   HGB 11.8*   HCT 36.0*       and All labs within the past 24 hours have been reviewed  Specimen (12h ago, onward)    None          Pending Diagnostic Studies:     None        Final Active Diagnoses:    Diagnosis Date Noted POA    PRINCIPAL PROBLEM:  Menometrorrhagia [N92.1] 03/11/2019 Yes    BRCA2 gene mutation positive [Z15.01, Z15.09] 08/30/2018 Yes    Morbid obesity [E66.01] 06/29/2018 Yes    Diabetes mellitus [E11.9] 06/29/2018 Yes      Problems Resolved During this Admission:        Discharged Condition: good    Disposition: good    Follow Up: 2 weeks    Patient Instructions:      Other restrictions (specify):   Order Comments: Pelvic rest: no sex tampons or douching     Notify your health care provider if you experience any of the following:  temperature >100.4     Notify your  health care provider if you experience any of the following:  persistent nausea and vomiting or diarrhea     Notify your health care provider if you experience any of the following:  severe persistent headache     Notify your health care provider if you experience any of the following:  persistent dizziness, light-headedness, or visual disturbances     Notify your health care provider if you experience any of the following:   Order Comments: 1.  Unusual vaginal discharge.  2.  Pain or burning with urination  3.  Swelling of face, hands, legs,etc that occurs suddenly     Medications:  Reconciled Home Medications:      Medication List      START taking these medications    HYDROcodone-acetaminophen  mg per tablet  Commonly known as:  NORCO  Take 1 tablet by mouth every 8 (eight) hours as needed.     ibuprofen 600 MG tablet  Commonly known as:  ADVIL,MOTRIN  Take 1 tablet (600 mg total) by mouth every 8 (eight) hours.        CONTINUE taking these medications    dextroamphetamine-amphetamine 10 MG 24 hr capsule  Commonly known as:  ADDERALL XR  Take 10 mg by mouth every morning.     metFORMIN 1000 MG tablet  Commonly known as:  GLUCOPHAGE  Take 1,000 mg by mouth 2 (two) times daily.     pantoprazole 20 MG tablet  Commonly known as:  PROTONIX  Take 20 mg by mouth daily as needed.            Dagoberto Smith MD  Obstetrics & Gynecology  [facility] - Post Partum

## 2019-03-12 NOTE — DISCHARGE INSTRUCTIONS
No heavy lifting over 20 lbs.   No sex, tampons, enemas or douching for 6 weeks.  Follow up with MD in 2 weeks.  Frequent bedrest.  Monitor your bleeding.   Notify your doctor if you have any of the following symptoms: Fever > 100.4, heavy vaginal bleeding, excessive pain, or any concerns  Continue to take your medications as prescribed.  You can buy OTC stool softeners (Colace) for prevention of chronic constipation.  Increase your fluid intake. Increase vegetables and fruit into your diet.  You may shower. No tub bath.  Keep area clean and dry.

## 2019-03-12 NOTE — NURSING
1705- spoke with Dr Smith notifed her pt's temp is now 99.3. She states she is ok with pt discharging and wants her to follow up sooner on Friday.

## 2019-03-15 LAB — POCT GLUCOSE: 114 MG/DL (ref 70–110)

## 2019-03-20 ENCOUNTER — PATIENT MESSAGE (OUTPATIENT)
Dept: HEMATOLOGY/ONCOLOGY | Facility: CLINIC | Age: 34
End: 2019-03-20

## 2019-05-23 ENCOUNTER — HOSPITAL ENCOUNTER (OUTPATIENT)
Dept: RADIOLOGY | Facility: HOSPITAL | Age: 34
Discharge: HOME | End: 2019-05-23
Attending: OBSTETRICS & GYNECOLOGY
Payer: COMMERCIAL

## 2019-05-23 DIAGNOSIS — R05.9 COUGH: ICD-10-CM

## 2019-05-23 DIAGNOSIS — R05.9 COUGH: Primary | ICD-10-CM

## 2019-05-23 PROCEDURE — 71046 X-RAY EXAM CHEST 2 VIEWS: CPT | Mod: 26,,, | Performed by: RADIOLOGY

## 2019-05-23 PROCEDURE — 71046 XR CHEST PA AND LATERAL: ICD-10-PCS | Mod: 26,,, | Performed by: RADIOLOGY

## 2019-05-23 PROCEDURE — 71046 X-RAY EXAM CHEST 2 VIEWS: CPT | Mod: TC,FY

## 2019-06-19 ENCOUNTER — PATIENT MESSAGE (OUTPATIENT)
Dept: HEMATOLOGY/ONCOLOGY | Facility: CLINIC | Age: 34
End: 2019-06-19

## 2019-06-20 ENCOUNTER — HOSPITAL ENCOUNTER (OUTPATIENT)
Dept: CARDIOLOGY | Facility: HOSPITAL | Age: 34
Discharge: HOME | End: 2019-06-20
Attending: SURGERY
Payer: COMMERCIAL

## 2019-06-20 ENCOUNTER — PATIENT MESSAGE (OUTPATIENT)
Dept: SURGERY | Facility: CLINIC | Age: 34
End: 2019-06-20

## 2019-06-20 DIAGNOSIS — E66.01 MORBID OBESITY: ICD-10-CM

## 2019-06-20 DIAGNOSIS — R00.2 PALPITATION: Primary | ICD-10-CM

## 2019-06-20 PROCEDURE — 93005 ELECTROCARDIOGRAM TRACING: CPT

## 2019-06-28 DIAGNOSIS — R07.9 CHEST PAIN: Primary | ICD-10-CM

## 2019-07-01 NOTE — PROGRESS NOTES
"     Cardiology Clinic Note  Reason for Visit: chest pain    HPI:     Allyson Bahena is a 34 y.o. F with DM, who was referred for chest pain.    She reports that on 6/19, she awoke with high BP (160 systolic), dizziness, and palpitations. She presented to [facility] in [place]. An EKG was performed and normal. Labs show normal TFTs, CBC, CMP. Since then, she reports feeling "off." She denies any palpitations or ischemic symptoms in particular, just that something is different. She does have hot flashes since a hysterectomy in 3/2019. She is not on hormones due to being a BRCA carrier. She has been on BP meds since this episode. BP controlled today. She has been on adderrall x6 months, but she reports being off the meds at the time of symptoms. She is on venlafaxine since hysterectomy for hot flashes.    She denies ischemic symptoms with exertion on days outside of the day of presentation. She reports bruising on her upper chest on the morning of 6/19. She also reports chest pains on occasion.    Medical: DM (on medication x2y, oral hypoglycemic alone, A1c 5.2 on 8/10/18), GERD, possible WHIT  Surgical: carmen, hysterectomy, salpingo-oophorectomy, tubal ligation  Family: heart disease, sleep apnea, DM  Social: never smoked, occasional alcohol use    ROS:    Constitution: Negative for fever or chills.  HENT: Negative for  headaches.  Eyes: Negative for blurred vision.   Cardiovascular: See above  Pulmonary: Negative for SOB. Negative for cough.   Gastrointestinal: Negative for nausea/vomiting.   : Negative for dysuria.   Skin: Negative for rashes.  Neurological: Negative for focal weakness.  Psychological: Negative for depression.  PMH:     Past Medical History:   Diagnosis Date    BRCA2 positive     Depression     Diabetes mellitus     Genetic testing 06/20/2018    BRCA2 positive, reported by [facility] from outside provider    GERD (gastroesophageal reflux disease)     Suspected sleep apnea      Past " Surgical History:   Procedure Laterality Date    CHOLECYSTECTOMY      CYSTOSCOPY N/A 3/11/2019    [facility]    HYSTERECTOMY, TOTAL, LAPAROSCOPIC N/A 3/11/2019    [facility] at [facility] OR    SALPINGO-OOPHORECTOMY, LAPAROSCOPIC Bilateral 3/11/2019    [facility] by [facility] at [facility] OR    TUBAL LIGATION      open     Allergies:     Review of patient's allergies indicates:   Allergen Reactions    Percocet [oxycodone-acetaminophen]      Facial flushing      Medications:     Current Outpatient Medications on File Prior to Visit   Medication Sig Dispense Refill    dextroamphetamine-amphetamine (ADDERALL XR) 10 MG 24 hr capsule Take 10 mg by mouth every morning.      HYDROcodone-acetaminophen (NORCO)  mg per tablet Take 1 tablet by mouth every 8 (eight) hours as needed. 30 tablet 0    ibuprofen (ADVIL,MOTRIN) 600 MG tablet Take 1 tablet (600 mg total) by mouth every 8 (eight) hours. 30 tablet 1    metFORMIN (GLUCOPHAGE) 1000 MG tablet Take 1,000 mg by mouth 2 (two) times daily.      pantoprazole (PROTONIX) 20 MG tablet Take 20 mg by mouth daily as needed.        No current facility-administered medications on file prior to visit.      Social History:     Social History     Tobacco Use    Smoking status: Never Smoker    Smokeless tobacco: Never Used   Substance Use Topics    Alcohol use: Yes     Comment: Occasional     Family History:     Family History   Problem Relation Age of Onset    Diabetes Mother     Heart disease Mother     Obesity Mother     Sleep apnea Mother     No Known Problems Father     No Known Problems Son     Heart disease Maternal Grandmother     Diabetes Maternal Grandmother     Hypertension Maternal Grandmother     Breast cancer Maternal Grandmother 49        bilateral, second at 59    Obesity Maternal Grandfather     Cancer Maternal Grandfather         melanoma    No Known Problems Son     No Known  "Problems Son     No Known Problems Son     Breast cancer Other 32    Breast cancer Other 48    Breast cancer Other 53    Breast cancer Other 50    Cancer Maternal Aunt 34        colon cancer met liver    Breast cancer Maternal Cousin         mom's maternal 1st cousin    Breast cancer Maternal Cousin         mom's maternal 1st cousin    Sleep apnea Brother     No Known Problems Maternal Uncle     No Known Problems Paternal Aunt     No Known Problems Paternal Uncle     No Known Problems Paternal Grandmother     Heart attack Paternal Grandfather     Ovarian cancer Neg Hx      Physical Exam:   /87 (BP Location: Left arm, Patient Position: Sitting, BP Method: Large (Automatic))   Pulse 107   Ht 5' 8" (1.727 m)   Wt 101.4 kg (223 lb 8.7 oz)   SpO2 99%   BMI 33.99 kg/m²      Constitutional: No apparent distress, conversant  HEENT: Sclera anicteric, extraocular movements intact  Neck: No jugular venous distension, no carotid bruits  CV: Regular rate and rhythm, no murmurs rubs or gallops, normal S1/S2  Pulm: Clear to auscultation bilaterally  GI: Abdomen soft, no palpable masses  Extremities: No lower extremity edema, warm with palpable pulses  Skin: No ecchymosis, erythema, or ulcers  Psych: Alert and oriented to person place location, appropriate affect  Neuro: No focal deficits    Labs:     Blood Tests:  Lab Results   Component Value Date     06/20/2019    K 3.8 06/20/2019     06/20/2019    CO2 23 06/20/2019    BUN 11 06/20/2019    CREATININE 0.8 06/20/2019    GLU 93 06/20/2019    HGBA1C 5.2 08/20/2018    MG 1.8 08/20/2018    AST 25 06/20/2019    ALT 27 06/20/2019    ALBUMIN 3.9 06/20/2019    PROT 7.2 06/20/2019    BILITOT 0.4 06/20/2019    WBC 7.67 06/20/2019    HGB 14.4 06/20/2019    HCT 43.9 06/20/2019    MCV 88 06/20/2019     06/20/2019    TSH 2.508 06/20/2019       Lab Results   Component Value Date    CHOL 160 08/20/2018    HDL 33 (L) 08/20/2018    TRIG 159 (H) " 2018       Lab Results   Component Value Date    LDLCALC 95.2 2018       Urine Tests:  Lab Results   Component Value Date    COLORU Yellow 2019    APPEARANCEUA Clear 2019    PHUR 6.0 2019    SPECGRAV <=1.005 2019    PROTEINUA Negative 2019    GLUCUA Negative 2019    KETONESU Negative 2019    BILIRUBINUA Negative 2019    OCCULTUA Negative 2019    NITRITE Negative 2019    UROBILINOGEN Negative 2019    LEUKOCYTESUR Trace (A) 2019       Imaging:     Echocardiogram  None    Stress testing  None    Cath Lab  None    Other  None    EK19  Normal sinus rhythm  Early repolarization    Assessment:     1. Essential hypertension    2. Chest pain, unspecified type        Plan:     Chest pain, unspecified type  Palpitations  History is not consistent with cardiac etiology of symptoms. Most likely sinus tachycardia due to underlying disorder, such as anxiety, hormonal fluctuation, etc.  Recent labs with normal H/H, TSH, Cr, LFTs.   Pending echo, stress test, and holter monitor at OSH  Will review and determine if additional evaluation is necessary    Essential hypertension  Controlled  Continue lisinopril 20mg daily, HCTZ 12.5mg daily    Signed:  Rojelio Matute MD  Cardiology     2019 2:00 PM    Follow-up:     Future Appointments   Date Time Provider Department Center   2019  1:00 PM Gary Matute III, MD [facility] CARDIO [facility]   2019  8:00 AM Gilma Malone NP [facility] HEM ONC [facility]

## 2019-07-02 ENCOUNTER — PATIENT MESSAGE (OUTPATIENT)
Dept: HEMATOLOGY/ONCOLOGY | Facility: CLINIC | Age: 34
End: 2019-07-02

## 2019-07-02 ENCOUNTER — OFFICE VISIT (OUTPATIENT)
Dept: HEMATOLOGY/ONCOLOGY | Facility: CLINIC | Age: 34
End: 2019-07-02
Payer: COMMERCIAL

## 2019-07-02 ENCOUNTER — OFFICE VISIT (OUTPATIENT)
Dept: CARDIOLOGY | Facility: CLINIC | Age: 34
End: 2019-07-02
Payer: COMMERCIAL

## 2019-07-02 VITALS
SYSTOLIC BLOOD PRESSURE: 120 MMHG | OXYGEN SATURATION: 100 % | HEIGHT: 68 IN | RESPIRATION RATE: 18 BRPM | WEIGHT: 223.31 LBS | TEMPERATURE: 99 F | BODY MASS INDEX: 33.84 KG/M2 | HEART RATE: 95 BPM | DIASTOLIC BLOOD PRESSURE: 78 MMHG

## 2019-07-02 VITALS
WEIGHT: 223.56 LBS | HEART RATE: 107 BPM | DIASTOLIC BLOOD PRESSURE: 87 MMHG | HEIGHT: 68 IN | BODY MASS INDEX: 33.88 KG/M2 | SYSTOLIC BLOOD PRESSURE: 126 MMHG | OXYGEN SATURATION: 99 %

## 2019-07-02 DIAGNOSIS — R07.9 CHEST PAIN, UNSPECIFIED: Primary | ICD-10-CM

## 2019-07-02 DIAGNOSIS — R07.9 CHEST PAIN, UNSPECIFIED TYPE: ICD-10-CM

## 2019-07-02 DIAGNOSIS — Z15.01 BRCA2 GENE MUTATION POSITIVE: Primary | ICD-10-CM

## 2019-07-02 DIAGNOSIS — I10 ESSENTIAL HYPERTENSION: ICD-10-CM

## 2019-07-02 DIAGNOSIS — Z91.89 AT HIGH RISK FOR BREAST CANCER: ICD-10-CM

## 2019-07-02 DIAGNOSIS — R00.2 PALPITATIONS: ICD-10-CM

## 2019-07-02 DIAGNOSIS — Z15.09 BRCA2 GENE MUTATION POSITIVE: Primary | ICD-10-CM

## 2019-07-02 PROCEDURE — 99999 PR PBB SHADOW E&M-EST. PATIENT-LVL V: CPT | Mod: PBBFAC,,, | Performed by: NURSE PRACTITIONER

## 2019-07-02 PROCEDURE — 3074F SYST BP LT 130 MM HG: CPT | Mod: CPTII,S$GLB,, | Performed by: INTERNAL MEDICINE

## 2019-07-02 PROCEDURE — 3079F DIAST BP 80-89 MM HG: CPT | Mod: CPTII,S$GLB,, | Performed by: INTERNAL MEDICINE

## 2019-07-02 PROCEDURE — 99203 PR OFFICE/OUTPT VISIT, NEW, LEVL III, 30-44 MIN: ICD-10-PCS | Mod: S$GLB,,, | Performed by: INTERNAL MEDICINE

## 2019-07-02 PROCEDURE — 99999 PR PBB SHADOW E&M-EST. PATIENT-LVL III: ICD-10-PCS | Mod: PBBFAC,,, | Performed by: INTERNAL MEDICINE

## 2019-07-02 PROCEDURE — 3008F PR BODY MASS INDEX (BMI) DOCUMENTED: ICD-10-PCS | Mod: CPTII,S$GLB,, | Performed by: INTERNAL MEDICINE

## 2019-07-02 PROCEDURE — 3008F BODY MASS INDEX DOCD: CPT | Mod: CPTII,S$GLB,, | Performed by: INTERNAL MEDICINE

## 2019-07-02 PROCEDURE — 99999 PR PBB SHADOW E&M-EST. PATIENT-LVL V: ICD-10-PCS | Mod: PBBFAC,,, | Performed by: NURSE PRACTITIONER

## 2019-07-02 PROCEDURE — 99999 PR PBB SHADOW E&M-EST. PATIENT-LVL III: CPT | Mod: PBBFAC,,, | Performed by: INTERNAL MEDICINE

## 2019-07-02 PROCEDURE — 99213 PR OFFICE/OUTPT VISIT, EST, LEVL III, 20-29 MIN: ICD-10-PCS | Mod: S$GLB,,, | Performed by: NURSE PRACTITIONER

## 2019-07-02 PROCEDURE — 99203 OFFICE O/P NEW LOW 30 MIN: CPT | Mod: S$GLB,,, | Performed by: INTERNAL MEDICINE

## 2019-07-02 PROCEDURE — 99213 OFFICE O/P EST LOW 20 MIN: CPT | Mod: S$GLB,,, | Performed by: NURSE PRACTITIONER

## 2019-07-02 PROCEDURE — 3079F PR MOST RECENT DIASTOLIC BLOOD PRESSURE 80-89 MM HG: ICD-10-PCS | Mod: CPTII,S$GLB,, | Performed by: INTERNAL MEDICINE

## 2019-07-02 PROCEDURE — 3074F PR MOST RECENT SYSTOLIC BLOOD PRESSURE < 130 MM HG: ICD-10-PCS | Mod: CPTII,S$GLB,, | Performed by: INTERNAL MEDICINE

## 2019-07-02 RX ORDER — DEXTROAMPHETAMINE SACCHARATE, AMPHETAMINE ASPARTATE MONOHYDRATE, DEXTROAMPHETAMINE SULFATE AND AMPHETAMINE SULFATE 5; 5; 5; 5 MG/1; MG/1; MG/1; MG/1
CAPSULE, EXTENDED RELEASE ORAL EVERY MORNING
Refills: 0 | COMMUNITY
Start: 2019-06-20 | End: 2021-07-01

## 2019-07-02 RX ORDER — ESTRADIOL 0.1 MG/G
CREAM VAGINAL
Refills: 4 | COMMUNITY
Start: 2019-04-18 | End: 2019-07-18

## 2019-07-02 RX ORDER — HYDROCODONE BITARTRATE AND ACETAMINOPHEN 7.5; 325 MG/15ML; MG/15ML
SOLUTION ORAL
Refills: 0 | COMMUNITY
Start: 2019-05-16 | End: 2019-07-18

## 2019-07-02 RX ORDER — HYDROCHLOROTHIAZIDE 12.5 MG/1
CAPSULE ORAL
Refills: 0 | COMMUNITY
Start: 2019-06-20 | End: 2019-07-18

## 2019-07-02 RX ORDER — VENLAFAXINE HYDROCHLORIDE 37.5 MG/1
CAPSULE, EXTENDED RELEASE ORAL
Refills: 0 | COMMUNITY
Start: 2019-03-28 | End: 2019-07-02 | Stop reason: DRUGHIGH

## 2019-07-02 RX ORDER — FLUTICASONE PROPIONATE AND SALMETEROL 50; 500 UG/1; UG/1
POWDER RESPIRATORY (INHALATION)
Refills: 1 | COMMUNITY
Start: 2019-05-16 | End: 2020-10-02 | Stop reason: CLARIF

## 2019-07-02 RX ORDER — LISINOPRIL 20 MG/1
TABLET ORAL
Refills: 0 | COMMUNITY
Start: 2019-06-20 | End: 2021-07-01

## 2019-07-02 RX ORDER — VENLAFAXINE HYDROCHLORIDE 75 MG/1
CAPSULE, EXTENDED RELEASE ORAL
Refills: 10 | COMMUNITY
Start: 2019-07-01 | End: 2020-01-30

## 2019-07-02 NOTE — LETTER
July 2, 2019      Dagoberto Smith MD  [address] [place] [address]           [facility] - Cardiology  [address]  [place] [address]  Phone: [phone]          Patient: Allyson Bahena   MR Number: [id number]   YOB: 1985   Date of Visit: 7/2/2019       Dear Dr. Dagoberto Smith:    Thank you for referring Allyson Bahena to me for evaluation. Attached you will find relevant portions of my assessment and plan of care.    If you have questions, please do not hesitate to call me. I look forward to following Allyson Bahena along with you.    Sincerely,    Gary Matute III, MD    Enclosure  CC:  No Recipients    If you would like to receive this communication electronically, please contact [email or web address] or [phone] to request more information on [employer] Link access.    For providers and/or their staff who would like to refer a patient to [facility], please contact us through our one-stop-shop provider referral line, [facility], [place], at [phone].    If you feel you have received this communication in error or would no longer like to receive these types of communications, please e-mail [email or web address]

## 2019-07-02 NOTE — Clinical Note
Follow up in 6 months with Dr. Merino. Will need MRI of both breasts in Sept. Needs an appointment with [facility] ASAP for menopause side effects - referral placed. Referral placed for dermatology - can't do appointment until after Aug 7.

## 2019-07-02 NOTE — PROGRESS NOTES
Subjective:       Patient ID: Allyson Bahena is a 34 y.o. female.    Chief Complaint: BRCA 2 Gene mutation    HPI Ms. Bahena presents today for her 6 month follow up following diagnosis of BRCA2 mutation. She had a prophylactic BROOKLYN/BSO on March 11,2019. She is here for continued appropriate surveillance. She states she has not been feeling well for the last 2 weeks. She was seen in cardiology today and has several tests scheduled. She is having tachycardia and hypertension, she has been feeling dizzy and not feeling well.     She was started on Effexor which has helped with mood swings. She is having terrible hot flashes and vaginal dryness. Complains of right breast pain. She needs a follow up with Dr. Cantu in breast clinic to discuss bilateral mastectomies and reconstruction. She will also be referred to Dr. Gamboa for her hot flashes and vaginal dryness. I will refer her to dermatology for two suspicious spoks on her left leg.           Review of Systems   Constitutional: Positive for fatigue. Negative for activity change, appetite change, chills, fever and unexpected weight change.   HENT: Negative for congestion, dental problem, ear pain, hearing loss, postnasal drip, rhinorrhea, sinus pressure, sore throat, tinnitus and trouble swallowing.    Eyes: Negative for visual disturbance.   Respiratory: Positive for shortness of breath. Negative for cough, chest tightness and wheezing.    Cardiovascular: Positive for chest pain (pressure on her chest). Negative for palpitations and leg swelling.   Gastrointestinal: Negative for abdominal distention, abdominal pain, blood in stool, constipation, diarrhea, nausea and vomiting.   Genitourinary: Negative for decreased urine volume, difficulty urinating, dysuria, frequency, menstrual problem (reports heavy) and urgency.        Vaginal Dryness    Musculoskeletal: Negative for arthralgias, back pain, gait problem, joint swelling and neck pain.   Skin: Negative  for pallor and rash.   Neurological: Positive for dizziness, light-headedness and headaches. Negative for weakness and numbness.        Hot flashes   Hematological: Negative for adenopathy. Does not bruise/bleed easily.   Psychiatric/Behavioral: Negative for dysphoric mood and sleep disturbance. The patient is not nervous/anxious.        Objective:      Physical Exam   Constitutional: She is oriented to person, place, and time. She appears well-developed and well-nourished. No distress.   Presents with nieces    HENT:   Head: Normocephalic and atraumatic.   Right Ear: External ear normal.   Left Ear: External ear normal.   Nose: Nose normal.   Mouth/Throat: Oropharynx is clear and moist. No oropharyngeal exudate.   Eyes: Pupils are equal, round, and reactive to light. Conjunctivae and EOM are normal. Right eye exhibits no discharge. Left eye exhibits no discharge. No scleral icterus. Right pupil is round and reactive. Left pupil is round and reactive.   Neck: Normal range of motion. Neck supple. No JVD present. No tracheal deviation present. No thyromegaly present.   Cardiovascular: Normal rate, regular rhythm, normal heart sounds and intact distal pulses. Exam reveals no gallop and no friction rub.   No murmur heard.  Pulmonary/Chest: Effort normal and breath sounds normal. No respiratory distress. She has no wheezes. She has no rales. She exhibits no tenderness.       Abdominal: Soft. Bowel sounds are normal. She exhibits no distension and no mass. There is no hepatosplenomegaly. There is no tenderness. There is no rebound and no guarding.   No organomegaly   Musculoskeletal: Normal range of motion. She exhibits no edema, tenderness or deformity.   Lymphadenopathy:        Head (right side): No submandibular adenopathy present.        Head (left side): No submandibular adenopathy present.     She has no cervical adenopathy.        Right cervical: No superficial cervical adenopathy present.       Left cervical: No  superficial cervical adenopathy present.     She has no axillary adenopathy.        Right: No inguinal and no supraclavicular adenopathy present.        Left: No inguinal and no supraclavicular adenopathy present.   Neurological: She is alert and oriented to person, place, and time. She has normal strength and normal reflexes. No cranial nerve deficit or sensory deficit. Coordination normal.   Skin: Skin is warm and dry. No bruising, no lesion, no petechiae and no rash noted. She is not diaphoretic. No erythema. No pallor.   Psychiatric: She has a normal mood and affect. Her behavior is normal. Judgment and thought content normal. Her mood appears not anxious. She does not exhibit a depressed mood.   Nursing note and vitals reviewed.     Assessment:       1. BRCA2 gene mutation positive        Plan:       1. This is a 34 y.o. female with an increased risk of breast and ovarian cancer based on + BRCA2 test result.    She is aware of cancer risks and these were reviewed again with her today- lifetime risk of breast cancer is estimated to be 60-80%, lifetime risk of ovarian cancer 15-30%. (On March 11, 2019 she had a BROOKLYN/BSO).       Her last mammogram was Jan 9,2019 - she is due for an MRI of the breast in September 2019.   She has previously had a long discussion with Dr. Merino - see Dr. Merino's note from 1/16/19.     Sent a message to MARCI Raymundo in breast clinic about follow up for bilateral mastectomies and reconstruction.  Referral made to dermatology and to Dr. Yao for menopause.      Her surveillance for each cancer associated with BRCA2 mutation are listed below:  For breast- surveillance with MRI alternating with mammogram versus prophylactic mastectomy. NCCN Guidelines: Annual breast MRI with contrast and mammogram at ages 30-75  For melanoma - we discussed whole body skin exam and will refer to dermatology  For pancreatic cancer- we discussed that there is not surveiillance proven to impact although we can  offer pancreatic imaging q2 years.        Gilma Malone NP  Oncology     Patient dicussed with supervising physician, Dr. Merino.

## 2019-07-03 ENCOUNTER — TELEPHONE (OUTPATIENT)
Dept: SURGERY | Facility: CLINIC | Age: 34
End: 2019-07-03

## 2019-07-03 ENCOUNTER — PATIENT MESSAGE (OUTPATIENT)
Dept: SURGERY | Facility: CLINIC | Age: 34
End: 2019-07-03

## 2019-07-03 ENCOUNTER — TELEPHONE (OUTPATIENT)
Dept: OBSTETRICS AND GYNECOLOGY | Facility: CLINIC | Age: 34
End: 2019-07-03

## 2019-07-03 DIAGNOSIS — Z80.3 FAMILY HISTORY OF MALIGNANT NEOPLASM OF BREAST: ICD-10-CM

## 2019-07-03 DIAGNOSIS — Z15.09 BRCA2 GENE MUTATION POSITIVE: Primary | ICD-10-CM

## 2019-07-03 DIAGNOSIS — Z15.01 BRCA2 GENE MUTATION POSITIVE: Primary | ICD-10-CM

## 2019-07-03 NOTE — TELEPHONE ENCOUNTER
----- Message from Gilma Malone NP sent at 7/2/2019  3:09 PM CDT -----  Hey,    This patient was seen by Med Onc today and she needs to follow up with you in breast clinic. She has been putting this on hold and is now ready to meet with you and then with Dr. Cantu. She would like to try to see you and Dr. Cantu on the same day if possible as she drives a bit to get here. She would like to discuss surgery and reconstruction at this time.    Her  is offshore until Aug 7 and will leave again around September 7th - if possible can she be scheduled during that month that he is home?    Please let me know if there is anything else we can do. She is due for her breast MRI in September 2019.    Thanks    ---    Called pt regarding above.  No answer.  VM-box was full, so I was unable to leave a VM.

## 2019-07-03 NOTE — TELEPHONE ENCOUNTER
----- Message from Melissa Batista sent at 7/3/2019  9:08 AM CDT -----  Good morning, this patient was seen in our clinic yesterday and Gilma Malone Np has asked that she see Dr Garzon as soon as possible for menopause side effects

## 2019-07-03 NOTE — TELEPHONE ENCOUNTER
RN Navigator spoke to patient in regards to scheduling an appt with Dr. Garzon and the [facility]. RN confirmed appt for 7/18/19 at 2:30pm. DAREK Iraheta

## 2019-07-05 ENCOUNTER — PATIENT MESSAGE (OUTPATIENT)
Dept: HEMATOLOGY/ONCOLOGY | Facility: CLINIC | Age: 34
End: 2019-07-05

## 2019-07-08 ENCOUNTER — TELEPHONE (OUTPATIENT)
Dept: OBSTETRICS AND GYNECOLOGY | Facility: CLINIC | Age: 34
End: 2019-07-08

## 2019-07-08 NOTE — TELEPHONE ENCOUNTER
..RN Navigator left a voicemail for patient in regards to scheduling an appt with Dr. Garzon and the [facility]. RN provided contact information requesting return call, DAREK Iraheta

## 2019-07-10 ENCOUNTER — PATIENT MESSAGE (OUTPATIENT)
Dept: SURGERY | Facility: CLINIC | Age: 34
End: 2019-07-10

## 2019-07-15 ENCOUNTER — TELEPHONE (OUTPATIENT)
Dept: OBSTETRICS AND GYNECOLOGY | Facility: CLINIC | Age: 34
End: 2019-07-15

## 2019-07-15 NOTE — TELEPHONE ENCOUNTER
----- Message from Anne Marie German sent at 7/15/2019  4:51 PM CDT -----  Contact: self  Pt is calling to see if she can get different appt options because she does not have a  for 2 days in a row but if not she will try to make it. Pt can be reached at [phone].

## 2019-07-16 ENCOUNTER — TELEPHONE (OUTPATIENT)
Dept: SURGERY | Facility: CLINIC | Age: 34
End: 2019-07-16

## 2019-07-16 NOTE — TELEPHONE ENCOUNTER
Returned call again to  regarding trying to schedule and apt to see  as recommended by  to discuss prophylactic mastectomy. Please call Velvet jack @ [phone] as I would be happy to schedule this apt .

## 2019-07-18 ENCOUNTER — LAB VISIT (OUTPATIENT)
Dept: LAB | Facility: OTHER | Age: 34
End: 2019-07-18
Attending: OBSTETRICS & GYNECOLOGY
Payer: COMMERCIAL

## 2019-07-18 ENCOUNTER — OFFICE VISIT (OUTPATIENT)
Dept: OBSTETRICS AND GYNECOLOGY | Facility: CLINIC | Age: 34
End: 2019-07-18
Attending: OBSTETRICS & GYNECOLOGY
Payer: COMMERCIAL

## 2019-07-18 ENCOUNTER — TELEPHONE (OUTPATIENT)
Dept: SURGERY | Facility: CLINIC | Age: 34
End: 2019-07-18

## 2019-07-18 VITALS
HEIGHT: 68 IN | BODY MASS INDEX: 33.98 KG/M2 | WEIGHT: 224.19 LBS | DIASTOLIC BLOOD PRESSURE: 82 MMHG | SYSTOLIC BLOOD PRESSURE: 122 MMHG

## 2019-07-18 DIAGNOSIS — N95.1 MENOPAUSAL SYMPTOM: ICD-10-CM

## 2019-07-18 DIAGNOSIS — I10 ESSENTIAL HYPERTENSION: ICD-10-CM

## 2019-07-18 DIAGNOSIS — Z15.01 BRCA2 GENE MUTATION POSITIVE IN FEMALE: ICD-10-CM

## 2019-07-18 DIAGNOSIS — N95.2 VAGINAL ATROPHY: ICD-10-CM

## 2019-07-18 DIAGNOSIS — E11.9 DIABETES MELLITUS WITHOUT COMPLICATION: ICD-10-CM

## 2019-07-18 DIAGNOSIS — Z15.02 BRCA2 GENE MUTATION POSITIVE IN FEMALE: ICD-10-CM

## 2019-07-18 DIAGNOSIS — Z15.09 BRCA2 GENE MUTATION POSITIVE IN FEMALE: ICD-10-CM

## 2019-07-18 DIAGNOSIS — N95.1 MENOPAUSAL SYMPTOM: Primary | ICD-10-CM

## 2019-07-18 PROCEDURE — 3008F BODY MASS INDEX DOCD: CPT | Mod: CPTII,S$GLB,, | Performed by: OBSTETRICS & GYNECOLOGY

## 2019-07-18 PROCEDURE — 84402 ASSAY OF FREE TESTOSTERONE: CPT

## 2019-07-18 PROCEDURE — 82670 ASSAY OF TOTAL ESTRADIOL: CPT

## 2019-07-18 PROCEDURE — 3008F PR BODY MASS INDEX (BMI) DOCUMENTED: ICD-10-PCS | Mod: CPTII,S$GLB,, | Performed by: OBSTETRICS & GYNECOLOGY

## 2019-07-18 PROCEDURE — 3044F HG A1C LEVEL LT 7.0%: CPT | Mod: CPTII,S$GLB,, | Performed by: OBSTETRICS & GYNECOLOGY

## 2019-07-18 PROCEDURE — 3044F PR MOST RECENT HEMOGLOBIN A1C LEVEL <7.0%: ICD-10-PCS | Mod: CPTII,S$GLB,, | Performed by: OBSTETRICS & GYNECOLOGY

## 2019-07-18 PROCEDURE — 99204 PR OFFICE/OUTPT VISIT, NEW, LEVL IV, 45-59 MIN: ICD-10-PCS | Mod: S$GLB,,, | Performed by: OBSTETRICS & GYNECOLOGY

## 2019-07-18 PROCEDURE — 3079F DIAST BP 80-89 MM HG: CPT | Mod: CPTII,S$GLB,, | Performed by: OBSTETRICS & GYNECOLOGY

## 2019-07-18 PROCEDURE — 3074F PR MOST RECENT SYSTOLIC BLOOD PRESSURE < 130 MM HG: ICD-10-PCS | Mod: CPTII,S$GLB,, | Performed by: OBSTETRICS & GYNECOLOGY

## 2019-07-18 PROCEDURE — 36415 COLL VENOUS BLD VENIPUNCTURE: CPT

## 2019-07-18 PROCEDURE — 3079F PR MOST RECENT DIASTOLIC BLOOD PRESSURE 80-89 MM HG: ICD-10-PCS | Mod: CPTII,S$GLB,, | Performed by: OBSTETRICS & GYNECOLOGY

## 2019-07-18 PROCEDURE — 99204 OFFICE O/P NEW MOD 45 MIN: CPT | Mod: S$GLB,,, | Performed by: OBSTETRICS & GYNECOLOGY

## 2019-07-18 PROCEDURE — 3074F SYST BP LT 130 MM HG: CPT | Mod: CPTII,S$GLB,, | Performed by: OBSTETRICS & GYNECOLOGY

## 2019-07-18 RX ORDER — ESTRADIOL 2 MG/1
2 TABLET ORAL DAILY
Qty: 30 TABLET | Refills: 11 | Status: SHIPPED | OUTPATIENT
Start: 2019-07-18 | End: 2020-10-02

## 2019-07-18 RX ORDER — GUAIFENESIN AND PSEUDOEPHEDRINE HYDROCHLORIDE 600; 60 MG/1; MG/1
TABLET, EXTENDED RELEASE ORAL
Refills: 0 | COMMUNITY
Start: 2019-07-09 | End: 2020-10-02 | Stop reason: CLARIF

## 2019-07-18 NOTE — TELEPHONE ENCOUNTER
Returned call to  regarding message needing and apt with  to discuss Prophylactic Mastectomy as recommended by . Patient was scheduled to see  on 08/22/19 @ 10:45am . Patient was informed apt slip has been mailed out.

## 2019-07-19 LAB — ESTRADIOL SERPL-MCNC: <10 PG/ML

## 2019-07-21 NOTE — PROGRESS NOTES
SUBJECTIVE:   34 y.o. female  presents today for hormone consult. Patient's last menstrual period was 2019 (exact date)..  She she complains of hot flashes, mood swings, hair thinning, and vaginal dryness.    She is a BRCA 2 positive patient who underwent TAHBSO in 2019.  She has been taking Effexor but this is not helping her hot flashes.  She reports at nighttime she pours with sweat   She also reports fatigue  She also has a family history significant for colon cancer and has been getting a colonoscopy every 5 years since age 24    Past Medical History:   Diagnosis Date    BRCA2 positive     Depression     Diabetes mellitus     Genetic testing 2018    BRCA2 positive, reported by [facility] from outside provider    GERD (gastroesophageal reflux disease)     Suspected sleep apnea      Past Surgical History:   Procedure Laterality Date    CHOLECYSTECTOMY      CYSTOSCOPY N/A 3/11/2019    [facility]    HYSTERECTOMY      HYSTERECTOMY, TOTAL, LAPAROSCOPIC N/A 3/11/2019    Performed by [facility] at [facility]    OOPHORECTOMY      SALPINGO-OOPHORECTOMY, LAPAROSCOPIC Bilateral 3/11/2019    Performed by [facility]re[facility] Smith [facility] at [facility]    TUBAL LIGATION      open     Social History     Socioeconomic History    Marital status:      Spouse name: Not on file    Number of children: Not on file    Years of education: Not on file    Highest education level: Not on file   Occupational History    Not on file   Social Needs    Financial resource strain: Not on file    Food insecurity:     Worry: Not on file     Inability: Not on file    Transportation needs:     Medical: Not on file     Non-medical: Not on file   Tobacco Use    Smoking status: Never Smoker    Smokeless tobacco: Never Used   Substance and Sexual Activity    Alcohol use: Yes     Comment: Occasional    Drug use: No    Sexual activity: Yes     Partners: Male      Birth control/protection: None, See Surgical Hx   Lifestyle    Physical activity:     Days per week: Not on file     Minutes per session: Not on file    Stress: Not on file   Relationships    Social connections:     Talks on phone: Not on file     Gets together: Not on file     Attends Zoroastrian service: Not on file     Active member of club or organization: Not on file     Attends meetings of clubs or organizations: Not on file     Relationship status: Not on file   Other Topics Concern    Not on file   Social History Narrative    Lives at home with 4 kids and     She buys and cooks all the food     Family History   Problem Relation Age of Onset    Diabetes Mother     Heart disease Mother     Obesity Mother     Sleep apnea Mother     No Known Problems Father     No Known Problems Son     Heart disease Maternal Grandmother     Diabetes Maternal Grandmother     Hypertension Maternal Grandmother     Breast cancer Maternal Grandmother 49        bilateral, second at 59    Obesity Maternal Grandfather     Cancer Maternal Grandfather         melanoma    No Known Problems Son     No Known Problems Son     No Known Problems Son     Breast cancer Other 32    Breast cancer Other 48    Breast cancer Other 53    Breast cancer Other 50    Cancer Maternal Aunt 34        colon cancer met liver    Breast cancer Maternal Cousin         mom's maternal 1st cousin    Breast cancer Maternal Cousin         mom's maternal 1st cousin    Sleep apnea Brother     No Known Problems Maternal Uncle     No Known Problems Paternal Aunt     No Known Problems Paternal Uncle     No Known Problems Paternal Grandmother     Heart attack Paternal Grandfather     Ovarian cancer Neg Hx      OB History    Para Term  AB Living   5 4 4   1     SAB TAB Ectopic Multiple Live Births   1              # Outcome Date GA Lbr Salas/2nd Weight Sex Delivery Anes PTL Lv   5 SAB            4 Term            3 Term             2 Term            1 Term                    Current Outpatient Medications   Medication Sig Dispense Refill    ADVAIR DISKUS 500-50 mcg/dose DsDv diskus inhaler   1    dextroamphetamine-amphetamine (ADDERALL XR) 20 MG 24 hr capsule   0    estradiol (ESTRACE) 2 MG tablet Take 1 tablet (2 mg total) by mouth once daily. 30 tablet 11    lisinopril (PRINIVIL,ZESTRIL) 20 MG tablet   0    metFORMIN (GLUCOPHAGE) 1000 MG tablet Take 1,000 mg by mouth 2 (two) times daily.      MUCINEX D  mg per tablet   0    pantoprazole (PROTONIX) 20 MG tablet Take 20 mg by mouth daily as needed.       prasterone, dhea, (INTRAROSA) 6.5 mg Inst Place 6.5 mg vaginally every evening. 30 each 11    venlafaxine (EFFEXOR-XR) 75 MG 24 hr capsule   10     No current facility-administered medications for this visit.      Allergies: Percocet [oxycodone-acetaminophen]     The ASCVD Risk score (Scars[place] MIMA Jr., et al., 2013) failed to calculate for the following reasons:    The 2013 ASCVD risk score is only valid for ages 40 to 79      ROS:  Constitutional: no weight loss, weight gain, fever, +fatigue  Eyes:  No vision changes, glasses/contacts  ENT/Mouth: No ulcers, sinus problems, ears ringing, headache  Cardiovascular: No inability to lie flat, chest pain, exercise intolerance, swelling, heart palpitations  Respiratory: No wheezing, coughing blood, shortness of breath, or cough  Gastrointestinal: No diarrhea, bloody stool, nausea/vomiting, constipation, gas, hemorrhoids  Genitourinary: No blood in urine, painful urination, urgency of urination, frequency of urination, incomplete emptying, incontinence, abnormal bleeding, painful periods, heavy periods, vaginal discharge, vaginal odor, painful intercourse, sexual problems, bleeding after intercourse.  Musculoskeletal: No muscle weakness  Skin/Breast: No painful breasts, nipple discharge, masses, rash, ulcers  Neurological: No passing out, seizures, numbness, headache  Endocrine: No  diabetes, hypothyroid, hyperthyroid, +hot flashes, +hair loss, abnormal hair growth, acne  Psychiatric: No depression, crying  Hematologic: No bruises, bleeding, swollen lymph nodes, anemia.      Physical Exam  deferred    ASSESSMENT:   BRCA 2 positive  Menopausal symptoms  Vaginal dryness  Diabetes mellitus  Hypertension    PLAN:  Face-to-face time 35 min majority spent in counseling and sometimes spent in arranging follow-up  Counseled patient on risks benefits and possible side effects of estrogen hormone replacement therapy.  Discussed that she had surgical menopause at a young age an estrogen does offer some cardio protection and bone protection as well as treatment for menopausal symptoms.   Patient has appointment with Dr. Cantu on August 22nd to discuss prophylactic bilateral mastectomy in light of BRCA 2 positive  Counseled patient on risks benefits and possible side effects of intra Ivett  Counseled her that she may benefit from testosterone in the future.  Baseline labs today  She is supposed to have cardiology workup in the future

## 2019-07-23 LAB — TESTOST FREE SERPL-MCNC: 0.7 PG/ML

## 2019-08-22 ENCOUNTER — OFFICE VISIT (OUTPATIENT)
Dept: SURGERY | Facility: CLINIC | Age: 34
End: 2019-08-22
Attending: SURGERY
Payer: COMMERCIAL

## 2019-08-22 ENCOUNTER — HOSPITAL ENCOUNTER (OUTPATIENT)
Dept: RADIOLOGY | Facility: OTHER | Age: 34
Discharge: HOME | End: 2019-08-22
Attending: SURGERY
Payer: COMMERCIAL

## 2019-08-22 ENCOUNTER — OFFICE VISIT (OUTPATIENT)
Dept: PLASTIC SURGERY | Facility: CLINIC | Age: 34
End: 2019-08-22
Payer: COMMERCIAL

## 2019-08-22 ENCOUNTER — OFFICE VISIT (OUTPATIENT)
Dept: OBSTETRICS AND GYNECOLOGY | Facility: CLINIC | Age: 34
End: 2019-08-22
Attending: OBSTETRICS & GYNECOLOGY
Payer: COMMERCIAL

## 2019-08-22 VITALS
HEIGHT: 68 IN | WEIGHT: 224.19 LBS | HEART RATE: 117 BPM | BODY MASS INDEX: 33.98 KG/M2 | SYSTOLIC BLOOD PRESSURE: 124 MMHG | DIASTOLIC BLOOD PRESSURE: 82 MMHG

## 2019-08-22 VITALS
WEIGHT: 223.75 LBS | DIASTOLIC BLOOD PRESSURE: 78 MMHG | SYSTOLIC BLOOD PRESSURE: 131 MMHG | HEIGHT: 68 IN | HEART RATE: 101 BPM | BODY MASS INDEX: 33.91 KG/M2

## 2019-08-22 VITALS
DIASTOLIC BLOOD PRESSURE: 68 MMHG | SYSTOLIC BLOOD PRESSURE: 110 MMHG | BODY MASS INDEX: 33.91 KG/M2 | WEIGHT: 223.75 LBS | HEIGHT: 68 IN

## 2019-08-22 DIAGNOSIS — Z15.01 BRCA2 GENE MUTATION POSITIVE: ICD-10-CM

## 2019-08-22 DIAGNOSIS — Z15.01 BRCA2 GENE MUTATION POSITIVE: Primary | ICD-10-CM

## 2019-08-22 DIAGNOSIS — Z15.09 BRCA2 GENE MUTATION POSITIVE: ICD-10-CM

## 2019-08-22 DIAGNOSIS — N63.10 BREAST MASS, RIGHT: ICD-10-CM

## 2019-08-22 DIAGNOSIS — N95.1 MENOPAUSAL SYMPTOMS: ICD-10-CM

## 2019-08-22 DIAGNOSIS — Z51.89 AFTERCARE: ICD-10-CM

## 2019-08-22 DIAGNOSIS — N63.10 BREAST MASS, RIGHT: Primary | ICD-10-CM

## 2019-08-22 DIAGNOSIS — Z15.09 BRCA2 GENE MUTATION POSITIVE: Primary | ICD-10-CM

## 2019-08-22 PROCEDURE — 99214 PR OFFICE/OUTPT VISIT, EST, LEVL IV, 30-39 MIN: ICD-10-PCS | Mod: S$GLB,,, | Performed by: SURGERY

## 2019-08-22 PROCEDURE — 3008F PR BODY MASS INDEX (BMI) DOCUMENTED: ICD-10-PCS | Mod: CPTII,S$GLB,, | Performed by: SURGERY

## 2019-08-22 PROCEDURE — 3074F SYST BP LT 130 MM HG: CPT | Mod: CPTII,S$GLB,, | Performed by: SURGERY

## 2019-08-22 PROCEDURE — 96372 PR INJECTION,THERAP/PROPH/DIAG2ST, IM OR SUBCUT: ICD-10-PCS | Mod: S$GLB,,, | Performed by: OBSTETRICS & GYNECOLOGY

## 2019-08-22 PROCEDURE — 3078F PR MOST RECENT DIASTOLIC BLOOD PRESSURE < 80 MM HG: ICD-10-PCS | Mod: CPTII,S$GLB,, | Performed by: PLASTIC SURGERY

## 2019-08-22 PROCEDURE — 3078F DIAST BP <80 MM HG: CPT | Mod: CPTII,S$GLB,, | Performed by: OBSTETRICS & GYNECOLOGY

## 2019-08-22 PROCEDURE — 3008F BODY MASS INDEX DOCD: CPT | Mod: CPTII,S$GLB,, | Performed by: OBSTETRICS & GYNECOLOGY

## 2019-08-22 PROCEDURE — 76642 ULTRASOUND BREAST LIMITED: CPT | Mod: 26,RT,, | Performed by: RADIOLOGY

## 2019-08-22 PROCEDURE — 3075F PR MOST RECENT SYSTOLIC BLOOD PRESS GE 130-139MM HG: ICD-10-PCS | Mod: CPTII,S$GLB,, | Performed by: PLASTIC SURGERY

## 2019-08-22 PROCEDURE — 76642 ULTRASOUND BREAST LIMITED: CPT | Mod: TC,RT

## 2019-08-22 PROCEDURE — 99214 OFFICE O/P EST MOD 30 MIN: CPT | Mod: S$GLB,,, | Performed by: SURGERY

## 2019-08-22 PROCEDURE — 99214 OFFICE O/P EST MOD 30 MIN: CPT | Mod: 25,S$GLB,, | Performed by: OBSTETRICS & GYNECOLOGY

## 2019-08-22 PROCEDURE — 3079F PR MOST RECENT DIASTOLIC BLOOD PRESSURE 80-89 MM HG: ICD-10-PCS | Mod: CPTII,S$GLB,, | Performed by: SURGERY

## 2019-08-22 PROCEDURE — 99214 PR OFFICE/OUTPT VISIT, EST, LEVL IV, 30-39 MIN: ICD-10-PCS | Mod: S$GLB,,, | Performed by: PLASTIC SURGERY

## 2019-08-22 PROCEDURE — 3078F PR MOST RECENT DIASTOLIC BLOOD PRESSURE < 80 MM HG: ICD-10-PCS | Mod: CPTII,S$GLB,, | Performed by: OBSTETRICS & GYNECOLOGY

## 2019-08-22 PROCEDURE — 3074F PR MOST RECENT SYSTOLIC BLOOD PRESSURE < 130 MM HG: ICD-10-PCS | Mod: CPTII,S$GLB,, | Performed by: OBSTETRICS & GYNECOLOGY

## 2019-08-22 PROCEDURE — 96372 THER/PROPH/DIAG INJ SC/IM: CPT | Mod: S$GLB,,, | Performed by: OBSTETRICS & GYNECOLOGY

## 2019-08-22 PROCEDURE — 3008F BODY MASS INDEX DOCD: CPT | Mod: CPTII,S$GLB,, | Performed by: SURGERY

## 2019-08-22 PROCEDURE — 3075F SYST BP GE 130 - 139MM HG: CPT | Mod: CPTII,S$GLB,, | Performed by: PLASTIC SURGERY

## 2019-08-22 PROCEDURE — 3079F DIAST BP 80-89 MM HG: CPT | Mod: CPTII,S$GLB,, | Performed by: SURGERY

## 2019-08-22 PROCEDURE — 3078F DIAST BP <80 MM HG: CPT | Mod: CPTII,S$GLB,, | Performed by: PLASTIC SURGERY

## 2019-08-22 PROCEDURE — 3008F PR BODY MASS INDEX (BMI) DOCUMENTED: ICD-10-PCS | Mod: CPTII,S$GLB,, | Performed by: OBSTETRICS & GYNECOLOGY

## 2019-08-22 PROCEDURE — 3074F PR MOST RECENT SYSTOLIC BLOOD PRESSURE < 130 MM HG: ICD-10-PCS | Mod: CPTII,S$GLB,, | Performed by: SURGERY

## 2019-08-22 PROCEDURE — 99214 PR OFFICE/OUTPT VISIT, EST, LEVL IV, 30-39 MIN: ICD-10-PCS | Mod: 25,S$GLB,, | Performed by: OBSTETRICS & GYNECOLOGY

## 2019-08-22 PROCEDURE — 76642 US BREAST RIGHT LIMITED: ICD-10-PCS | Mod: 26,RT,, | Performed by: RADIOLOGY

## 2019-08-22 PROCEDURE — 99214 OFFICE O/P EST MOD 30 MIN: CPT | Mod: S$GLB,,, | Performed by: PLASTIC SURGERY

## 2019-08-22 PROCEDURE — 3074F SYST BP LT 130 MM HG: CPT | Mod: CPTII,S$GLB,, | Performed by: OBSTETRICS & GYNECOLOGY

## 2019-08-22 PROCEDURE — 3008F PR BODY MASS INDEX (BMI) DOCUMENTED: ICD-10-PCS | Mod: CPTII,S$GLB,, | Performed by: PLASTIC SURGERY

## 2019-08-22 PROCEDURE — 3008F BODY MASS INDEX DOCD: CPT | Mod: CPTII,S$GLB,, | Performed by: PLASTIC SURGERY

## 2019-08-22 RX ORDER — TESTOSTERONE CYPIONATE 200 MG/ML
100 INJECTION, SOLUTION INTRAMUSCULAR ONCE
Status: COMPLETED | OUTPATIENT
Start: 2019-08-22 | End: 2019-08-22

## 2019-08-22 RX ORDER — LIDOCAINE HYDROCHLORIDE 10 MG/ML
1 INJECTION, SOLUTION EPIDURAL; INFILTRATION; INTRACAUDAL; PERINEURAL ONCE
Status: CANCELLED | OUTPATIENT
Start: 2019-08-22 | End: 2019-08-22

## 2019-08-22 RX ADMIN — TESTOSTERONE CYPIONATE 100 MG: 200 INJECTION, SOLUTION INTRAMUSCULAR at 11:08

## 2019-08-22 NOTE — PROGRESS NOTES
HIGH RISK    Allyson Bahena is a 34 y.o. premenopausal female referred for evaluation of increased risk of breast cancer based on BRCA2, family history on mother's side.  BRCA testing was performed.    Other breast cancer risk factors include family hx on mother's side.     Patient is .  Age of first live birth was 19.      INTERVAL:  Had hysterectomy/oopherectomy.  On hormones with Dr. Garzon.  Desires risk reducing mastectomy at some point.      Social History:   Smoking:  denies  Drinking:  denies    The following portions of the patient's history were reviewed and updated as appropriate: She  has a past medical history of BRCA2 positive, Depression, Diabetes mellitus, Genetic testing (2018), GERD (gastroesophageal reflux disease), and Suspected sleep apnea.  She does not have any pertinent problems on file.  She  has a past surgical history that includes Tubal ligation; Cholecystectomy; Laparoscopic total hysterectomy (N/A, 3/11/2019); Laparoscopic salpingo-oophorectomy (Bilateral, 3/11/2019); Cystoscopy (N/A, 3/11/2019); Hysterectomy; and Oophorectomy.  Her family history includes Breast cancer in her maternal cousin and maternal cousin; Breast cancer (age of onset: 32) in her other; Breast cancer (age of onset: 48) in her other; Breast cancer (age of onset: 49) in her maternal grandmother; Breast cancer (age of onset: 50) in her other; Breast cancer (age of onset: 53) in her other; Cancer in her maternal grandfather; Cancer (age of onset: 34) in her maternal aunt; Diabetes in her maternal grandmother and mother; Heart attack in her paternal grandfather; Heart disease in her maternal grandmother and mother; Hypertension in her maternal grandmother; No Known Problems in her father, maternal uncle, paternal aunt, paternal grandmother, paternal uncle, son, son, son, and son; Obesity in her maternal grandfather and mother; Sleep apnea in her brother and mother.  She  reports that she has never  smoked. She has never used smokeless tobacco. She reports that she drinks alcohol. She reports that she does not use drugs.  She has a current medication list which includes the following prescription(s): advair diskus, dextroamphetamine-amphetamine, estradiol, lisinopril, metformin, pantoprazole, prasterone (dhea), venlafaxine, and mucinex d.  She is allergic to percocet [oxycodone-acetaminophen]..    Review of Systems  Pertinent items are noted in HPI.      Objective:       Vitals    Vitals:    08/22/19 1049   BP: 124/82   Pulse: (!) 117      General appearance:  alert, appears stated age and cooperative   HEENT NC/AT, PERRL, EOMs intact, oropharynx clear.   Neck Supple without adenopathy.   Lungs:  nonlabored breathing   Lymph Nodes:  Cervical, supraclavicular, and axillary nodes normal.   Right Breast:  normal without suspicious masses, skin or nipple changes or axillary nodes Bruise 1 OC  breast.   Left Breast:  normal without suspicious masses, skin or nipple changes or axillary nodes.        Imaging  Reviewed.     Assessment:     This is a 34 y.o. female with an increased risk of breast and ovarian cancer based on BRCA2.       Plan:   1.  Schedule breast MRI. May need antiemetic.  2.  Refer patient for a Plastic Surgery Consultation. Discussed staging with reduction  Then nipple sparing mastectomy.

## 2019-08-22 NOTE — PROGRESS NOTES
SUBJECTIVE:   34 y.o. female   presents for follow-up of menopausal symptoms. Patient's last menstrual period was 2019 (exact date)..  She she reports that she is still having hot flashes no real improvement.  She started the estradiol about 1 month ago.    She does report fatigue  She is seeing Dr. Cantu  and the plastic surgeon today to discuss prophylactic mastectomy  She has questions about her lab work  She is using intravaginal DHEA and reports that the vaginal dryness has improved    Past Medical History:   Diagnosis Date    BRCA2 positive     Depression     Diabetes mellitus     Genetic testing 2018    BRCA2 positive, reported by [facility] from outside provider    GERD (gastroesophageal reflux disease)     Suspected sleep apnea      Past Surgical History:   Procedure Laterality Date    CHOLECYSTECTOMY      CYSTOSCOPY N/A 3/11/2019    [facility]    HYSTERECTOMY      HYSTERECTOMY, TOTAL, LAPAROSCOPIC N/A 3/11/2019    [facility]    OOPHORECTOMY      SALPINGO-OOPHORECTOMY, LAPAROSCOPIC Bilateral 3/11/2019    [facility]    TUBAL LIGATION      open     Social History     Socioeconomic History    Marital status:      Spouse name: Not on file    Number of children: Not on file    Years of education: Not on file    Highest education level: Not on file   Occupational History    Not on file   Social Needs    Financial resource strain: Not on file    Food insecurity:     Worry: Not on file     Inability: Not on file    Transportation needs:     Medical: Not on file     Non-medical: Not on file   Tobacco Use    Smoking status: Never Smoker    Smokeless tobacco: Never Used   Substance and Sexual Activity    Alcohol use: Yes     Comment: Occasional    Drug use: No    Sexual activity: Yes     Partners: Male     Birth control/protection: None, See Surgical Hx    Lifestyle    Physical activity:     Days per week: Not on file     Minutes per session: Not on file    Stress: Not on file   Relationships    Social connections:     Talks on phone: Not on file     Gets together: Not on file     Attends Religion service: Not on file     Active member of club or organization: Not on file     Attends meetings of clubs or organizations: Not on file     Relationship status: Not on file   Other Topics Concern    Not on file   Social History Narrative    Lives at home with 4 kids and     She buys and cooks all the food     Family History   Problem Relation Age of Onset    Diabetes Mother     Heart disease Mother     Obesity Mother     Sleep apnea Mother     No Known Problems Father     No Known Problems Son     Heart disease Maternal Grandmother     Diabetes Maternal Grandmother     Hypertension Maternal Grandmother     Breast cancer Maternal Grandmother 49        bilateral, second at 59    Obesity Maternal Grandfather     Cancer Maternal Grandfather         melanoma    No Known Problems Son     No Known Problems Son     No Known Problems Son     Breast cancer Other 32    Breast cancer Other 48    Breast cancer Other 53    Breast cancer Other 50    Cancer Maternal Aunt 34        colon cancer met liver    Breast cancer Maternal Cousin         mom's maternal 1st cousin    Breast cancer Maternal Cousin         mom's maternal 1st cousin    Sleep apnea Brother     No Known Problems Maternal Uncle     No Known Problems Paternal Aunt     No Known Problems Paternal Uncle     No Known Problems Paternal Grandmother     Heart attack Paternal Grandfather     Ovarian cancer Neg Hx      OB History    Para Term  AB Living   5 4 4   1     SAB TAB Ectopic Multiple Live Births   1              # Outcome Date GA Lbr Salas/2nd Weight Sex Delivery Anes PTL Lv   5 SAB            4 Term            3 Term            2 Term            1 Term                     Current Outpatient Medications   Medication Sig Dispense Refill    ADVAIR DISKUS 500-50 mcg/dose DsDv diskus inhaler   1    dextroamphetamine-amphetamine (ADDERALL XR) 20 MG 24 hr capsule   0    estradiol (ESTRACE) 2 MG tablet Take 1 tablet (2 mg total) by mouth once daily. 30 tablet 11    lisinopril (PRINIVIL,ZESTRIL) 20 MG tablet   0    metFORMIN (GLUCOPHAGE) 1000 MG tablet Take 1,000 mg by mouth 2 (two) times daily.      MUCINEX D  mg per tablet   0    pantoprazole (PROTONIX) 20 MG tablet Take 20 mg by mouth daily as needed.       prasterone, dhea, (INTRAROSA) 6.5 mg Inst Place 6.5 mg vaginally every evening. 30 each 11    venlafaxine (EFFEXOR-XR) 75 MG 24 hr capsule   10     No current facility-administered medications for this visit.      Allergies: Percocet [oxycodone-acetaminophen]     The ASCVD Risk score (Clifton MIMA Jr., et al., 2013) failed to calculate for the following reasons:    The 2013 ASCVD risk score is only valid for ages 40 to 79      ROS:  Constitutional: no weight loss, weight gain, fever, fatigue  Eyes:  No vision changes, glasses/contacts  ENT/Mouth: No ulcers, sinus problems, ears ringing, headache  Cardiovascular: No inability to lie flat, chest pain, exercise intolerance, swelling, heart palpitations  Respiratory: No wheezing, coughing blood, shortness of breath, or cough  Gastrointestinal: No diarrhea, bloody stool, nausea/vomiting, constipation, gas, hemorrhoids  Genitourinary: No blood in urine, painful urination, urgency of urination, frequency of urination, incomplete emptying, incontinence, abnormal bleeding, painful periods, heavy periods, vaginal discharge, vaginal odor, painful intercourse, sexual problems, bleeding after intercourse.  Musculoskeletal: No muscle weakness  Skin/Breast: No painful breasts, nipple discharge, masses, rash, ulcers  Neurological: No passing out, seizures, numbness, headache  Endocrine: No diabetes, hypothyroid, hyperthyroid,+ hot  flashes, hair loss, abnormal hair growth, acne  Psychiatric: No depression, crying  Hematologic: No bruises, bleeding, swollen lymph nodes, anemia.    Baseline labs 07/18/2019  Estradiol less than 10  Free testosterone 0.7    Physical Exam  deferred    ASSESSMENT:   BRCA 2+  Menopausal symptoms  Vaginal dryness-improved      PLAN:  Face-to-face time 25 min maturity spent counseling and arranging follow-up  Continue estradiol-risks  Benefits and possible side effects discussed recommend testosterone injection today 100 mg  Counseled her that she will likely have to stop the estradiol prior-to surgery she voiced understanding  Continue intravaginal DHEA

## 2019-08-22 NOTE — PROGRESS NOTES
REFERRAL FOR BREAST RECONSTRUCTION    CHIEF COMPLAINT  BRCA2  Referring Provider: Paulina Cantu MD  PCP: DOM Juarez    HPI  From my original consult:  Allyson Bahena is a 33 y.o. female with high risk for breast cancer, found positive for BRCA2 gene mutation, and has had several family members positive for breast cancer on her maternal side.  She has had 4 children by vaginal delivery and has had tubal ligation.  Her medical history is significant for hypertension and diabetes, treated with one medication.  She is otherwise healthy, non-smoker, BMI 35.  She is a G cup breast and would like to be somewhat smaller but remain proportionate.  She has undergone one prior breast biopsy as well as lap tubal ligation and lap cholecystectomy.    Today:  Today she returns to discuss surgery again as she feels ready to proceed.  We discussed the option of doing a small reduction first (she wants to be roughly the same size of only slightly smaller) followed by staged CARMEN flap to reduce the skin envelope and better position the NAC.  Will go forward with MRA imaging and plan for initial breast reduction.  The other option is immediate CARMEN flap and secondary mastopexy.  She prefers to start with the breast reduction.     Oncologic Hx  Diagnosis: BRCA2     Previous Surgery:  Right breast biopsy (benign)     1/9/19 Mammo Result:  Mammo Digital Diagnostic Right w/ Tito  US Breast Bilateral Limited     History:  Patient is 33 y.o. and is seen for right breast lump noted by patient.  Asymmetric left breast tissue on physical exam.  Right axillary pain.  Recent normal bilateral screening mammogram and MRI.  BRCA2+     Films Compared:  Compared to: 09/20/2018 Mammo Digital Screening Bilat with Tomosynthesis CAD and 09/20/2018 MRI Breast Bilateral W WO Contrast     Findings:  Computer-aided detection was utilized in the interpretation of this examination. This procedure was performed using tomosynthesis.        The breasts have scattered areas of fibroglandular density. There is no evidence of suspicious masses, microcalcifications or architectural distortion.     Limited breast ultrasound was performed at areas of patient palpated lump on right and right axillary pain, asymmetric tissue on left noted by clinician.   Ultrasound evaluation demonstrates no suspicious abnormality.      Impression:  There is no mammographic or sonographic evidence of malignancy. Of note, the decision to biopsy any palpable finding should be based on clinical assessment.      BI-RADS Category 1: Negative    PMH  Patient Active Problem List   Diagnosis    Morbid obesity    Obesity, Class II, BMI 35-39.9, with comorbidity    Diabetes mellitus    Suspected sleep apnea    BRCA2 gene mutation positive    Right sided abdominal pain    Menometrorrhagia    Essential hypertension    Chest pain    Family history of malignant neoplasm of breast       PSH  Past Surgical History:   Procedure Laterality Date    CHOLECYSTECTOMY      CYSTOSCOPY N/A 3/11/2019    [facility]    HYSTERECTOMY      HYSTERECTOMY, TOTAL, LAPAROSCOPIC N/A 3/11/2019    [facility]    OOPHORECTOMY      SALPINGO-OOPHORECTOMY, LAPAROSCOPIC Bilateral 3/11/2019    [facility]    TUBAL LIGATION      open       FH  Family History   Problem Relation Age of Onset    Diabetes Mother     Heart disease Mother     Obesity Mother     Sleep apnea Mother     No Known Problems Father     No Known Problems Son     Heart disease Maternal Grandmother     Diabetes Maternal Grandmother     Hypertension Maternal Grandmother     Breast cancer Maternal Grandmother 49        bilateral, second at 59    Obesity Maternal Grandfather     Cancer Maternal Grandfather         melanoma    No Known Problems Son     No Known Problems Son     No Known Problems Son     Breast cancer  Other 32    Breast cancer Other 48    Breast cancer Other 53    Breast cancer Other 50    Cancer Maternal Aunt 34        colon cancer met liver    Breast cancer Maternal Cousin         mom's maternal 1st cousin    Breast cancer Maternal Cousin         mom's maternal 1st cousin    Sleep apnea Brother     No Known Problems Maternal Uncle     No Known Problems Paternal Aunt     No Known Problems Paternal Uncle     No Known Problems Paternal Grandmother     Heart attack Paternal Grandfather     Ovarian cancer Neg Hx        MEDICATIONS  Outpatient Medications Marked as Taking for the 8/22/19 encounter (Office Visit) with Kobe Pearl MD   Medication Sig Dispense Refill    ADVAIR DISKUS 500-50 mcg/dose DsDv diskus inhaler   1    dextroamphetamine-amphetamine (ADDERALL XR) 20 MG 24 hr capsule   0    estradiol (ESTRACE) 2 MG tablet Take 1 tablet (2 mg total) by mouth once daily. 30 tablet 11    lisinopril (PRINIVIL,ZESTRIL) 20 MG tablet   0    metFORMIN (GLUCOPHAGE) 1000 MG tablet Take 1,000 mg by mouth 2 (two) times daily.      MUCINEX D  mg per tablet   0    pantoprazole (PROTONIX) 20 MG tablet Take 20 mg by mouth daily as needed.       prasterone, dhea, (INTRAROSA) 6.5 mg Inst Place 6.5 mg vaginally every evening. 30 each 11    venlafaxine (EFFEXOR-XR) 75 MG 24 hr capsule   10     Current Facility-Administered Medications for the 8/22/19 encounter (Office Visit) with Kobe Pearl MD   Medication Dose Route Frequency Provider Last Rate Last Dose    [COMPLETED] testosterone cypionate injection 100 mg  100 mg Intramuscular Once She Garzon MD   100 mg at 08/22/19 1154       ALLERGIES  Review of patient's allergies indicates:   Allergen Reactions    Percocet [oxycodone-acetaminophen]      Facial flushing        SOCIAL HISTORY  Tobacco:   Social History     Tobacco Use   Smoking Status Never Smoker   Smokeless Tobacco Never Used     EtOH:   Social History     Substance and  "Sexual Activity   Alcohol Use Yes    Comment: Occasional       ROS  Review of Systems - General ROS: negative for - chills, fatigue, fever, hot flashes, malaise or night sweats  Psychological ROS: negative for - mood swings or sleep disturbances  Hematological and Lymphatic ROS: negative for - bleeding problems, blood clots, blood transfusions, bruising or fatigue  Endocrine ROS: negative for - hair pattern changes, hot flashes, malaise/lethargy, palpitations, polydipsia/polyuria or temperature intolerance  Breast ROS: positive for findings described above, negative for - nipple changes or nipple discharge  Respiratory ROS: no cough, shortness of breath, or wheezing  Cardiovascular ROS: no chest pain or dyspnea on exertion  Gastrointestinal ROS: no abdominal pain, change in bowel habits, or black or bloody stools  Genito-Urinary ROS: no dysuria, trouble voiding, or hematuria  Musculoskeletal ROS: negative for - gait disturbance, joint pain, joint stiffness, joint swelling or muscle pain  Neurological ROS: no TIA or stroke symptoms  Dermatological ROS: negative for acne, dry skin, eczema and hair changes    PHYSICAL EXAM  /78   Pulse 101   Ht 5' 8" (1.727 m)   Wt 101.5 kg (223 lb 12.3 oz)   LMP 02/11/2019 (Exact Date) Comment: ucg ordered  BMI 34.02 kg/m²     Constitutional: She is oriented to person, place, and time. She appears well-developed and well-nourished.   HENT: Normocephalic and atraumatic.   Neck: Normal range of motion. Neck supple. No JVD present.   Cardiovascular: Normal rate, regular rhythm and normal heart sounds.    Pulmonary/Chest: Effort normal. No respiratory distress.   Musculoskeletal: Normal range of motion. She exhibits no edema or deformity.   Neurological: She is alert and oriented to person, place, and time. No sensory deficit. She exhibits normal muscle tone.   Breast: unchanged from prior  Skin: Skin is warm. No rash noted. No erythema.   Psychiatric: She has a normal mood and " affect. Her behavior is normal.     ASSESSMENT  Encounter Diagnoses   Name Primary?    BRCA2 gene mutation positive Yes    Aftercare      Desires to move forward with staged breast reduction for nipple repositioning and to reduce the skin envelope in preparation for reconstruction.  Will book accordingly; plan d/w with Dr. Cantu who has seen her concurrently.    INFORMED CONSENT    The procedure was fully discussed with the patient. Outpatient or hospital ambulatory surgery disposition under general anesthesia were explained.  The location of her scars was demonstrated. Her idealized bra size after surgery was solicited for surgical planning, although I explained her bra size after surgery could not be guaranteed.    Nonsurgical and surgical treatment options were reviewed.  Possible risks of breast reduction include but are not limited to:  -bleeding  -infection  -heavy and prolonged or permanent scarring, possible keloid formation  -nipple necrosis, complete or partial  -nipple inversion or flattening  -breast lumps, hardness  -breasts different size, shape  -loss of nipple sensation  -hypersensitivity of nipple-areolar complex  -wound separation or delayed wound healing  -venous thromboembolism  -complications from anesthesia  -difficulty with future mammograms  -emotional problems or negative impact on relationships from altered breast size  -desired breast size not attained: breasts too small or too large  -difficult bra fitting  -poor cosmetic outcome  -puckering of incisions  -irregular shape, nipple location  -need for further surgery  -inability to breast feed  -problems with anesthesia  -blood clot, pulmonary embolus  -heart attack, stroke, death    Activity, possible work, and medicinal restrictions before and after breast reduction were reviewed. Estimated recovery and return to normal activities was estimated. Frequency of office visits after surgery and possible disability were also reviewed. Her  questions about the above were solicited, she was encouraged to call back in the event of any further questions, and she was encouraged to share her plans for breast reduction and any concerns with her significant others.    The importance for a normal mammogram within one year of the surgery date for women over 40 years old, with a family history of breast cancer, or a prior abnormal breast exam was explained. She understands her weight should be optimized prior to surgery.    PLAN  - MRA abdomen/pelvis  - Breast reduction booked  - Photos scheduled  - Last mammo 1/19  ?  This encounter length was 30 minutes for  Encounter Diagnoses   Name Primary?    BRCA2 gene mutation positive Yes    Aftercare      Over 50% of the encounter length was spent in face to face counseling about the relevant issues pertaining to the diagnoses, management choices, and prognosis.    Electronically signed by:  Kobe Pearl  8/22/2019  4:29 PM

## 2019-08-22 NOTE — LETTER
August 22, 2019      Paulina Cantu MD  [address]  [facility] [place] [address]           [id number]  [address] [address]  Phone: [phone]  Fax: [phone]          Patient: Allyson Bahena   MR Number: [id number]   YOB: 1985   Date of Visit: 8/22/2019       Dear Dr. Paulina Cantu:    Thank you for referring Allyson Bahena to me for evaluation. Attached you will find relevant portions of my assessment and plan of care.    If you have questions, please do not hesitate to call me. I look forward to following Allyson Bahena along with you.    Sincerely,    Kobe Pearl MD    Enclosure  CC:  No Recipients    If you would like to receive this communication electronically, please contact [email or web address] or [phone] to request more information on [employer] Link access.    For providers and/or their staff who would like to refer a patient to [facility], please contact us through our one-stop-shop provider referral line, [facility], at [phone].    If you feel you have received this communication in error or would no longer like to receive these types of communications, please e-mail [email or web address]

## 2019-08-23 ENCOUNTER — PATIENT MESSAGE (OUTPATIENT)
Dept: SURGERY | Facility: CLINIC | Age: 34
End: 2019-08-23

## 2019-08-27 ENCOUNTER — DOCUMENTATION ONLY (OUTPATIENT)
Dept: SURGERY | Facility: CLINIC | Age: 34
End: 2019-08-27

## 2019-08-27 ENCOUNTER — PATIENT MESSAGE (OUTPATIENT)
Dept: SURGERY | Facility: CLINIC | Age: 34
End: 2019-08-27

## 2019-08-27 NOTE — PROGRESS NOTES
Spoke with [facility] staff with pt's permission.  Provided them with the information regarding pt's specific genetic testing results, and was advised that [facility] DOES classify pt's BRCA2 variant (BRCA2 sequencing [id number]) as pathogenic.  Was also advised that retesting for this variant is not necessary, though pt could consider more updated/comprehensive testing at this time.  Notified pt and Dr. Cantu.

## 2019-08-28 ENCOUNTER — TELEPHONE (OUTPATIENT)
Dept: SURGERY | Facility: CLINIC | Age: 34
End: 2019-08-28

## 2019-08-28 NOTE — TELEPHONE ENCOUNTER
Contacted the patient regarding scheduling appointment for genetic testing with Donavan Raymundo NP.  Offered the patient an appointment for 9/17/19, but the patient declined.  My direct number given to the patient to contact our office back to schedule the appointment.

## 2019-09-04 ENCOUNTER — PATIENT MESSAGE (OUTPATIENT)
Dept: SURGERY | Facility: CLINIC | Age: 34
End: 2019-09-04

## 2019-09-10 ENCOUNTER — PATIENT MESSAGE (OUTPATIENT)
Dept: PLASTIC SURGERY | Facility: CLINIC | Age: 34
End: 2019-09-10

## 2019-09-12 ENCOUNTER — HOSPITAL ENCOUNTER (OUTPATIENT)
Dept: RADIOLOGY | Facility: HOSPITAL | Age: 34
Discharge: HOME | End: 2019-09-12
Attending: NURSE PRACTITIONER
Payer: COMMERCIAL

## 2019-09-12 ENCOUNTER — PATIENT MESSAGE (OUTPATIENT)
Dept: PLASTIC SURGERY | Facility: CLINIC | Age: 34
End: 2019-09-12

## 2019-09-12 DIAGNOSIS — R05.9 COUGH: Primary | ICD-10-CM

## 2019-09-12 DIAGNOSIS — R05.9 COUGH: ICD-10-CM

## 2019-09-12 PROCEDURE — 71046 X-RAY EXAM CHEST 2 VIEWS: CPT | Mod: TC,FY

## 2019-09-12 PROCEDURE — 71046 XR CHEST PA AND LATERAL: ICD-10-PCS | Mod: 26,,, | Performed by: RADIOLOGY

## 2019-09-12 PROCEDURE — 71046 X-RAY EXAM CHEST 2 VIEWS: CPT | Mod: 26,,, | Performed by: RADIOLOGY

## 2019-09-13 DIAGNOSIS — Z87.81 HISTORY OF FRACTURE: ICD-10-CM

## 2019-09-13 DIAGNOSIS — Z87.81: ICD-10-CM

## 2019-09-13 DIAGNOSIS — Z82.62 FAMILY HISTORY OF OSTEOPOROSIS: Primary | ICD-10-CM

## 2019-09-13 DIAGNOSIS — N95.1 MENOPAUSAL STATE: ICD-10-CM

## 2019-09-17 ENCOUNTER — PATIENT MESSAGE (OUTPATIENT)
Dept: SURGERY | Facility: CLINIC | Age: 34
End: 2019-09-17

## 2019-09-17 DIAGNOSIS — Z15.09 BRCA2 GENE MUTATION POSITIVE: Primary | ICD-10-CM

## 2019-09-17 DIAGNOSIS — Z12.31 ENCOUNTER FOR SCREENING MAMMOGRAM FOR HIGH-RISK PATIENT: ICD-10-CM

## 2019-09-17 DIAGNOSIS — Z15.01 BRCA2 GENE MUTATION POSITIVE: Primary | ICD-10-CM

## 2019-09-24 ENCOUNTER — PATIENT MESSAGE (OUTPATIENT)
Dept: SURGERY | Facility: CLINIC | Age: 34
End: 2019-09-24

## 2019-09-24 ENCOUNTER — PATIENT MESSAGE (OUTPATIENT)
Dept: OBSTETRICS AND GYNECOLOGY | Facility: CLINIC | Age: 34
End: 2019-09-24

## 2019-09-24 ENCOUNTER — PATIENT MESSAGE (OUTPATIENT)
Dept: PLASTIC SURGERY | Facility: CLINIC | Age: 34
End: 2019-09-24

## 2019-09-26 ENCOUNTER — PATIENT MESSAGE (OUTPATIENT)
Dept: OBSTETRICS AND GYNECOLOGY | Facility: CLINIC | Age: 34
End: 2019-09-26

## 2019-09-26 ENCOUNTER — HOSPITAL ENCOUNTER (OUTPATIENT)
Dept: RADIOLOGY | Facility: OTHER | Age: 34
Discharge: HOME | End: 2019-09-26
Attending: SURGERY
Payer: COMMERCIAL

## 2019-09-26 ENCOUNTER — CLINICAL SUPPORT (OUTPATIENT)
Dept: OBSTETRICS AND GYNECOLOGY | Facility: CLINIC | Age: 34
End: 2019-09-26
Payer: COMMERCIAL

## 2019-09-26 VITALS — BODY MASS INDEX: 33.91 KG/M2 | HEIGHT: 68 IN | WEIGHT: 223.75 LBS

## 2019-09-26 DIAGNOSIS — Z15.09 BRCA2 GENE MUTATION POSITIVE: ICD-10-CM

## 2019-09-26 DIAGNOSIS — N95.1 MENOPAUSAL SYMPTOMS: Primary | ICD-10-CM

## 2019-09-26 DIAGNOSIS — Z12.31 ENCOUNTER FOR SCREENING MAMMOGRAM FOR HIGH-RISK PATIENT: ICD-10-CM

## 2019-09-26 DIAGNOSIS — Z15.01 BRCA2 GENE MUTATION POSITIVE: ICD-10-CM

## 2019-09-26 PROCEDURE — 77067 SCR MAMMO BI INCL CAD: CPT | Mod: TC

## 2019-09-26 PROCEDURE — 77063 MAMMO DIGITAL SCREENING BILAT WITH TOMOSYNTHESIS_CAD: ICD-10-PCS | Mod: 26,,, | Performed by: RADIOLOGY

## 2019-09-26 PROCEDURE — 96372 THER/PROPH/DIAG INJ SC/IM: CPT | Mod: S$GLB,,, | Performed by: OBSTETRICS & GYNECOLOGY

## 2019-09-26 PROCEDURE — 77067 SCR MAMMO BI INCL CAD: CPT | Mod: 26,,, | Performed by: RADIOLOGY

## 2019-09-26 PROCEDURE — 77063 BREAST TOMOSYNTHESIS BI: CPT | Mod: 26,,, | Performed by: RADIOLOGY

## 2019-09-26 PROCEDURE — 96372 PR INJECTION,THERAP/PROPH/DIAG2ST, IM OR SUBCUT: ICD-10-PCS | Mod: S$GLB,,, | Performed by: OBSTETRICS & GYNECOLOGY

## 2019-09-26 PROCEDURE — 77067 MAMMO DIGITAL SCREENING BILAT WITH TOMOSYNTHESIS_CAD: ICD-10-PCS | Mod: 26,,, | Performed by: RADIOLOGY

## 2019-09-26 RX ORDER — TESTOSTERONE CYPIONATE 200 MG/ML
100 INJECTION, SOLUTION INTRAMUSCULAR
Status: COMPLETED | OUTPATIENT
Start: 2019-09-26 | End: 2019-09-26

## 2019-09-26 RX ADMIN — TESTOSTERONE CYPIONATE 100 MG: 200 INJECTION, SOLUTION INTRAMUSCULAR at 11:09

## 2019-09-26 NOTE — PROGRESS NOTES
Patient arrives today for dose #2 Testosterone. No complaints today. Notes severity of hot flashes has decreased from a 10/10 to an 8/10, frequency unchanged. Hyperhidrosis noted, pt unsure if this is a s/e of the Venlafaxine dose she is taking. RN will confer with Dr. Garzon regarding her recommendations.     Testosterone 100mg administered IM to LEFT upper outer quadrant of gluteus using aseptic technique and 22 gauge 1.5 inch needle.     Site secured with Band-Aid, needle tip remains intact, patient tolerated well without pain. Patient observed for 15 minutes post injection.      Patient's next injection scheduled prior to clinic departure. DAREK Iraheta

## 2019-10-07 ENCOUNTER — PATIENT MESSAGE (OUTPATIENT)
Dept: PLASTIC SURGERY | Facility: CLINIC | Age: 34
End: 2019-10-07

## 2019-10-07 ENCOUNTER — TELEPHONE (OUTPATIENT)
Dept: SURGERY | Facility: CLINIC | Age: 34
End: 2019-10-07

## 2019-10-07 NOTE — TELEPHONE ENCOUNTER
Spoke with pt to let her know she can be plaeed on wait list to be seen when Donavan' schedule opens up after the transfer  ----- Message from Sherrell Arias sent at 10/7/2019 11:26 AM CDT -----  Contact: self [phone]  Pt states can she be worked in on the 10/24/19 states because she will be here to get an injection please call back to discuss

## 2019-10-21 ENCOUNTER — LAB VISIT (OUTPATIENT)
Dept: LAB | Facility: HOSPITAL | Age: 34
End: 2019-10-21
Attending: OBSTETRICS & GYNECOLOGY
Payer: COMMERCIAL

## 2019-10-21 ENCOUNTER — PATIENT MESSAGE (OUTPATIENT)
Dept: SURGERY | Facility: CLINIC | Age: 34
End: 2019-10-21

## 2019-10-21 ENCOUNTER — PATIENT MESSAGE (OUTPATIENT)
Dept: OBSTETRICS AND GYNECOLOGY | Facility: CLINIC | Age: 34
End: 2019-10-21

## 2019-10-21 DIAGNOSIS — N95.1 MENOPAUSAL SYMPTOMS: ICD-10-CM

## 2019-10-21 LAB — ESTRADIOL SERPL-MCNC: 28 PG/ML

## 2019-10-21 PROCEDURE — 82670 ASSAY OF TOTAL ESTRADIOL: CPT

## 2019-10-21 PROCEDURE — 84402 ASSAY OF FREE TESTOSTERONE: CPT

## 2019-10-22 ENCOUNTER — PATIENT MESSAGE (OUTPATIENT)
Dept: SURGERY | Facility: CLINIC | Age: 34
End: 2019-10-22

## 2019-10-24 ENCOUNTER — CLINICAL SUPPORT (OUTPATIENT)
Dept: OBSTETRICS AND GYNECOLOGY | Facility: CLINIC | Age: 34
End: 2019-10-24
Payer: COMMERCIAL

## 2019-10-24 DIAGNOSIS — Z15.01 BRCA2 GENE MUTATION POSITIVE: ICD-10-CM

## 2019-10-24 DIAGNOSIS — Z15.09 BRCA2 GENE MUTATION POSITIVE: ICD-10-CM

## 2019-10-24 DIAGNOSIS — N95.1 MENOPAUSAL SYMPTOMS: Primary | ICD-10-CM

## 2019-10-24 LAB — TESTOST FREE SERPL-MCNC: 1.8 PG/ML

## 2019-10-24 PROCEDURE — 96372 THER/PROPH/DIAG INJ SC/IM: CPT | Mod: S$GLB,,, | Performed by: OBSTETRICS & GYNECOLOGY

## 2019-10-24 PROCEDURE — 96372 PR INJECTION,THERAP/PROPH/DIAG2ST, IM OR SUBCUT: ICD-10-PCS | Mod: S$GLB,,, | Performed by: OBSTETRICS & GYNECOLOGY

## 2019-10-24 RX ORDER — ESTRADIOL VALERATE 20 MG/ML
20 INJECTION INTRAMUSCULAR
Status: COMPLETED | OUTPATIENT
Start: 2019-10-24 | End: 2019-10-24

## 2019-10-24 RX ORDER — FLUOXETINE 10 MG/1
10 CAPSULE ORAL DAILY
COMMUNITY
End: 2020-01-30 | Stop reason: DRUGHIGH

## 2019-10-24 RX ORDER — TESTOSTERONE CYPIONATE 200 MG/ML
100 INJECTION, SOLUTION INTRAMUSCULAR
Status: COMPLETED | OUTPATIENT
Start: 2019-10-24 | End: 2019-10-24

## 2019-10-24 RX ADMIN — ESTRADIOL VALERATE 20 MG: 20 INJECTION INTRAMUSCULAR at 11:10

## 2019-10-24 RX ADMIN — TESTOSTERONE CYPIONATE 100 MG: 200 INJECTION, SOLUTION INTRAMUSCULAR at 11:10

## 2019-10-24 NOTE — PROGRESS NOTES
Patient notes her PCP has discontinued her Effexor 75mg XR. Placing her on Prozac 10mg orally daily. Effexor discontinued secondary to hyperhydrosis. Patient has been taking Prozac for 2 weeks.    Patient notes improvement in her energy and libido. No side effects from hormone therapy. Hot flashes persistent on Estrace 2 mg daily.     Dr. Garzon notified with orders received to continue 100 mg Testosterone and add 20mg Delestrogen to her injection today. Continue oral Estrace.     Testosterone 100mg and Delestrogen 20mg administered IM to RIGHT upper outer quadrant of gluteus using aseptic technique and 22 gauge 1.5 inch needle.     Site secured with Band-Aid, needle tip remains intact, patient tolerated well without pain. Patient observed for 15 minutes post injection.      Patient's next injection scheduled prior to clinic departure. Labs after next injection. DAREK Iraheta

## 2019-11-19 ENCOUNTER — PATIENT MESSAGE (OUTPATIENT)
Dept: OBSTETRICS AND GYNECOLOGY | Facility: CLINIC | Age: 34
End: 2019-11-19

## 2019-11-25 ENCOUNTER — CLINICAL SUPPORT (OUTPATIENT)
Dept: OBSTETRICS AND GYNECOLOGY | Facility: CLINIC | Age: 34
End: 2019-11-25
Payer: COMMERCIAL

## 2019-11-25 DIAGNOSIS — N95.1 MENOPAUSAL SYMPTOMS: Primary | ICD-10-CM

## 2019-11-25 PROCEDURE — 96372 PR INJECTION,THERAP/PROPH/DIAG2ST, IM OR SUBCUT: ICD-10-PCS | Mod: S$GLB,,, | Performed by: OBSTETRICS & GYNECOLOGY

## 2019-11-25 PROCEDURE — 96372 THER/PROPH/DIAG INJ SC/IM: CPT | Mod: S$GLB,,, | Performed by: OBSTETRICS & GYNECOLOGY

## 2019-11-25 RX ORDER — ESTRADIOL VALERATE 20 MG/ML
20 INJECTION INTRAMUSCULAR
Status: COMPLETED | OUTPATIENT
Start: 2019-11-25 | End: 2019-11-25

## 2019-11-25 RX ORDER — TESTOSTERONE CYPIONATE 200 MG/ML
100 INJECTION, SOLUTION INTRAMUSCULAR
Status: COMPLETED | OUTPATIENT
Start: 2019-11-25 | End: 2019-11-25

## 2019-11-25 RX ADMIN — ESTRADIOL VALERATE 20 MG: 20 INJECTION INTRAMUSCULAR at 03:11

## 2019-11-25 RX ADMIN — TESTOSTERONE CYPIONATE 100 MG: 200 INJECTION, SOLUTION INTRAMUSCULAR at 03:11

## 2019-11-26 NOTE — PROGRESS NOTES
Patient arrives today for her monthly hormone injection. No complaints today, notes relief in her hot flashes, improved energy and libido. Doing well on Intrarosa.    Labs scheduled for 3 weeks from injection.     Testosterone 100mg and Delestrogen 20mg administered IM to LEFT upper outer quadrant of gluteus using aseptic technique and 22 gauge 1.5 inch needle.     Site secured with Band-Aid, needle tip remains intact, patient tolerated well without pain. Patient observed for 15 minutes post injection.      Patient's next injection scheduled prior to clinic departure. DAREK Iraheta

## 2019-12-23 ENCOUNTER — CLINICAL SUPPORT (OUTPATIENT)
Dept: OBSTETRICS AND GYNECOLOGY | Facility: CLINIC | Age: 34
End: 2019-12-23
Payer: COMMERCIAL

## 2019-12-23 ENCOUNTER — HOSPITAL ENCOUNTER (OUTPATIENT)
Dept: RADIOLOGY | Facility: OTHER | Age: 34
Discharge: HOME | End: 2019-12-23
Attending: SURGERY
Payer: COMMERCIAL

## 2019-12-23 DIAGNOSIS — N95.1 MENOPAUSAL SYMPTOMS: Primary | ICD-10-CM

## 2019-12-23 DIAGNOSIS — Z01.818 PRE-PROCEDURAL EXAMINATION: ICD-10-CM

## 2019-12-23 PROCEDURE — 96372 PR INJECTION,THERAP/PROPH/DIAG2ST, IM OR SUBCUT: ICD-10-PCS | Mod: S$GLB,,, | Performed by: OBSTETRICS & GYNECOLOGY

## 2019-12-23 PROCEDURE — 74174: ICD-10-PCS | Mod: 26,,, | Performed by: RADIOLOGY

## 2019-12-23 PROCEDURE — 96372 THER/PROPH/DIAG INJ SC/IM: CPT | Mod: S$GLB,,, | Performed by: OBSTETRICS & GYNECOLOGY

## 2019-12-23 PROCEDURE — 25500020 PHARM REV CODE 255: Performed by: SURGERY

## 2019-12-23 PROCEDURE — 74174 CTA ABD&PLVS W/CONTRAST: CPT | Mod: TC

## 2019-12-23 PROCEDURE — 74174 CTA ABD&PLVS W/CONTRAST: CPT | Mod: 26,,, | Performed by: RADIOLOGY

## 2019-12-23 RX ORDER — TESTOSTERONE CYPIONATE 200 MG/ML
100 INJECTION, SOLUTION INTRAMUSCULAR
Status: COMPLETED | OUTPATIENT
Start: 2019-12-23 | End: 2019-12-23

## 2019-12-23 RX ORDER — ESTRADIOL VALERATE 20 MG/ML
20 INJECTION INTRAMUSCULAR
Status: COMPLETED | OUTPATIENT
Start: 2019-12-23 | End: 2019-12-23

## 2019-12-23 RX ADMIN — ESTRADIOL VALERATE 20 MG: 20 INJECTION INTRAMUSCULAR at 10:12

## 2019-12-23 RX ADMIN — IOHEXOL 100 ML: 350 INJECTION, SOLUTION INTRAVENOUS at 12:12

## 2019-12-23 RX ADMIN — TESTOSTERONE CYPIONATE 100 MG: 200 INJECTION, SOLUTION INTRAMUSCULAR at 10:12

## 2019-12-23 NOTE — PROGRESS NOTES
Here for  hormone therapy injection, no complaints at this time, Injection given as ordered, tolerated well, no report of pain prior to or after injection. Return to clinic as scheduled.     Site -RB    Testosterone  100 mg  Delestrogen 20 mg    Clinic Supplied Medication

## 2019-12-26 ENCOUNTER — PATIENT MESSAGE (OUTPATIENT)
Dept: OBSTETRICS AND GYNECOLOGY | Facility: CLINIC | Age: 34
End: 2019-12-26

## 2019-12-30 ENCOUNTER — PATIENT MESSAGE (OUTPATIENT)
Dept: OBSTETRICS AND GYNECOLOGY | Facility: CLINIC | Age: 34
End: 2019-12-30

## 2019-12-30 DIAGNOSIS — N95.1 MENOPAUSAL SYMPTOMS: Primary | ICD-10-CM

## 2019-12-31 RX ORDER — SPIRONOLACTONE 50 MG/1
50 TABLET, FILM COATED ORAL NIGHTLY
Qty: 30 TABLET | Refills: 11 | Status: SHIPPED | OUTPATIENT
Start: 2019-12-31 | End: 2020-01-30

## 2019-12-31 NOTE — TELEPHONE ENCOUNTER
Patient notes acne and facial hair secondary to her testosterone therapy. Labs ordered for 3 weeks prior to next clinic visit. MD notified with orders received Rx for Spironolactone 50mg orally nightly, disp qty 30, 11 refills. Rx read back and repeated. Rx phoned to [facility] pharmacy. Patient notified of Rx and lab orders for scheduling preferably 01/13/2020 at 3 week post injection timeframe. Awaiting confirmation of lab location, date and time. DAREK Iraheta.

## 2020-01-15 ENCOUNTER — LAB VISIT (OUTPATIENT)
Dept: LAB | Facility: HOSPITAL | Age: 35
End: 2020-01-15
Attending: OBSTETRICS & GYNECOLOGY
Payer: COMMERCIAL

## 2020-01-15 ENCOUNTER — PATIENT MESSAGE (OUTPATIENT)
Dept: OBSTETRICS AND GYNECOLOGY | Facility: CLINIC | Age: 35
End: 2020-01-15

## 2020-01-15 DIAGNOSIS — N95.1 MENOPAUSAL SYMPTOMS: ICD-10-CM

## 2020-01-15 PROCEDURE — 84402 ASSAY OF FREE TESTOSTERONE: CPT

## 2020-01-15 PROCEDURE — 82670 ASSAY OF TOTAL ESTRADIOL: CPT

## 2020-01-16 LAB — ESTRADIOL SERPL-MCNC: 42 PG/ML

## 2020-01-17 ENCOUNTER — PATIENT MESSAGE (OUTPATIENT)
Dept: HEMATOLOGY/ONCOLOGY | Facility: CLINIC | Age: 35
End: 2020-01-17

## 2020-01-17 ENCOUNTER — PATIENT MESSAGE (OUTPATIENT)
Dept: OBSTETRICS AND GYNECOLOGY | Facility: CLINIC | Age: 35
End: 2020-01-17

## 2020-01-18 LAB — TESTOST FREE SERPL-MCNC: 1.7 PG/ML

## 2020-01-20 ENCOUNTER — TELEPHONE (OUTPATIENT)
Dept: OBSTETRICS AND GYNECOLOGY | Facility: CLINIC | Age: 35
End: 2020-01-20

## 2020-01-20 NOTE — TELEPHONE ENCOUNTER
----- Message from Eda Escamilla sent at 1/20/2020 12:05 PM CST -----  Contact: JEWELS RAY [[id number]]  Type:  Patient Returning Call    Who Called:     Who Left Message for Patient: abigail     Does the patient know what this is regarding?: missed call     Best Call Back Number:   [phone]  Additional Information:  n/a

## 2020-01-20 NOTE — TELEPHONE ENCOUNTER
RN Navigator left a voicemail requesting return call at patient't convenience. Contact information provided. DAREK Iraheta.

## 2020-01-21 NOTE — PROGRESS NOTES
Hereditary and High-Risk Clinic  Department of Hematology and Oncology  [facility]  [facility] [address], [place]  [address]    01/30/2020         Patient ID: Allyson Bahena is a 34 y.o. female.    Chief Complaint: Genetic Evaluation      Referring Provider:  Aaareferral Self       Subjective:      History of Present Illness (HPI):  Established patient presents today for a genetic evaluation as it pertains to hereditary cancer risk.      Pedigree            Review of Systems - See HPI.  Patient's Distress Score today was 5/10 today (with 10 being the worst).  Patient attributes this to currently housing three additional children in addition to her own four as well as.  Patient denies experiencing suicidal or homicidal ideations (SI/HI).  Instructed patient to contact 911 immediately with any SI/HI.  Offered patient a referral to Social Work and Hematology/Oncology Psychology, and patient declined.   Objective:     Physical Exam   Constitutional: She appears well developed and well nourished. No distress.   Pulmonary/Chest: Effort normal.   Neurological: She is alert.   Psychiatric: She has a normal mood and affect. Her speech is normal and behavior is normal. Thought content normal.     Personal Genetic Testing Results                      Assessment:       1. Encounter for nonprocreative genetic counseling    2. Family history of breast cancer        Counseling:     Based on the information provided to me by patient today, she meets criteria for genetic testing for Breast and Ovarian Cancer Susceptibility Genes based on the National Comprehensive Cancer Network (NCCN) Guidelines for Genetic/Familial High-Risk Assessment: Breast, Ovarian, and Pancreatic, Version 1.2020, and she also meets clinical recommendations for genetic testing based on the fact that her [facility] BRCA1/2 testing revealed a pathogenic BRCA2 variant.  In my practice I typically  utilize [facility] and [facility] for hereditary cancer syndrome-related germline testing for reliability purposes.     Multigene/panel/comprehensive genetic testing consists of testing multiple genes known to be related to hereditary cancer syndromes.  A key role of tumor suppressor genes is to suppress a cancer.  When a tumor suppressor gene has a clinically significant mutation, it affects the functioning of the gene, and the individual may be more likely to develop cancer in certain organs.      Only some cancers are hereditary.  When a gene mutation is not identified, it does not completely rule out the possibility of hereditary cancers but does make them less likely.  Additionally, it is possible to see familial clustering of related cancer amongst family members, and these are sometimes caused by carcinogens and/or lifestyle factors that may be shared amongst family members.       There are significant limitations of interpreting a negative genetic testing result in a patient unaffected with cancer prior to testing appropriate affected relative(s).    Possible results of genetic testing include positive, negative, and variant of uncertain significance (VUS) (or an unclear result).  If positive, specific cancer risks vary depending upon the tumor suppressor gene in which there is a mutation.  In some cases, depending upon the result and the clinical history, modified management may be recommended, including measures for risk reduction and/or surveillance, though modified management is not always an option.  A VUS indicates that the amino acids within a gene are lining up in a way different from usual, but there is not presently enough data for the laboratory to make a determination as to whether the variant is benign or pathogenic; VUSs are not typically acted upon clinically.      If patient tests positive for a mutation inherited in an autosomal-dominant manner, her first-degree relatives would each have a 50%  chance of having the same mutation, and other blood-relatives would also be at a lesser risk of having the same mutation.       The Genetic Information Nondiscrimination Act (DAMION) prohibits health insurance agencies--in most but not all instances--and employers with 16 or more employees from discriminating against an individual based on genetic test results; however, DAMION does not protect individuals with regard to other types of policies (including but not limited to life insurance, disability, long-term care insurance,  benefits or insurance, and  Health Services benefits).     Genetic testing tends to be costly, and there is a potential for the patient to incur out-of-pocket costs.       An outside laboratory, of which there are several, would perform the testing after a blood sample is collected at [facility] laboratory.     Offered patient testing today, versus deferring testing at this time, versus declining testing altogether.  Patient desires to proceed with testing today and has provided informed consent to do so.  Patient can undergo a very broad, 84-gene panel through [facility] or a smaller but still broad panel through [facility] or [facility]; risks and benefits of the various panels were discussed, and patient elected the lab listed in the Plan below.     Requested that [facility] expedite the processing of patient's test.  Results are expected in a little over a week.    Post-test genetic counseling will be conducted, either via telephone or in clinic, depending upon patient preference, once genetic testing results are available; patient would pay an out-of-pocket fee for a telephone counseling session, or we would bill her insurance company for a follow-up office visit.    Patient's BRCA1/2 testing through [facility] (results above) indicate a pathogenic BRCA2 variant.  If her updated testing through [facility] reveals a pathogenic BRCA2 mutation, associated increased cancer risks include those of the  breasts, ovaries, fallopian tubes, primary peritoneum, pancreatic, and melanoma of the skin and eyes.  In addition to the cancers listed above, with the exception of the female reproductive-related cancers, males with a BRCA2 pathogenic mutation also have an increased risk for prostate cancer.  Patient has a bilateral risk-reducing mastectomy planned, with the first phase scheduled for 02/19/2020.  Each of her first-degree relatives has a 50% chance of having the same mutation as patient if she is truly positive.  Inheriting two BRCA2 mutations leads to Fanconi Anemia, and manifestations of this were discussed, with patient stating that her children are healthy. Individuals with a BRCA2 mutation or possibly with a BRCA2 mutation and considering conceiving a child should meet with a reproductive genetic counselor regarding the BRCA2 mutation.  Patient is to continue following up with all healthcare providers as they have indicated to her and should ensure that they are each aware of her personal and family histories, especially of cancers, so that medical management including cancer screenings can be based in part off of these histories.      Patient's primary care provider (PCP) is Sue Sutherland.    Patient's gynecologist (GYN) is Dagoberto Smith.    Patient does not have a dermatologist or an ophthalmologist, and I will place referrals to these specialists once we receive her updated genetic testing results.    Patient routinely undergoes colonoscopies.    Patient's relatives should speak with a healthcare provider regarding undergoing genetic counseling/testing.       Questions were encouraged and answered to patient's satisfaction, and she verbalized understanding of information and agreement with the plan.  Plan:       A blood sample was collected today for the Multi-Cancer Panel through [facility], with results expected in approximately three weeks, at which point post-test genetic counseling is to be conducted  via T[facility].      A total of approximately 40 minutes was spent face-to-face with this patient, of which >50% was spent counseling the patient and/or coordinating her care.        Donavan Bhat DNP, APRN, FNP-BC,  Adult Oncology-Certified Nurse Practitioner (AOCNP)  Hereditary and High-Risk Clinic  Department of Hematology and Oncology  [facility], 3rd Floor  [facility] [address], [place]  [address]  Office phone   [phone]    Date:  01/30/2020

## 2020-01-21 NOTE — TELEPHONE ENCOUNTER
RN Navigator phoned patient to inquire of any updates since her last injection visit. Patient notes barrier in attempting to schedule her surgery while her  is [place]-shore. Her  travels off-shore every other month for a month's period of time, which is challenging for her as a mother of 4. She prefers stage 1 of surgery 2/4/20-3/4/20 if there are potential openings. 2/19/20 offered but only gives  one week post-op to support her. Patient mentions social stressors as she is in the adoption process re: her niece Saniya. She mentions stress eating and 15lb wt gain since stopping the Effexor secondary to hyperhidrosis. She mentions [id number] daily is not helping her mood swings, notes no advantage in taking this daily medication. Testosterone has given her some energy, but she is considering stopping this medication secondary to excessive facial hair growth. Patient is taking Aldactone 50mg qhs. Patient is no longer taking Metformin 1gram bid, mentions sugars are under control. Requests TFT's as fatigue, brittle hair and wt gain have ensued. RN will update Dr. Garzon for patient's appt preparations 1/30/20. DAREK Iraheta.

## 2020-01-29 ENCOUNTER — HOSPITAL ENCOUNTER (OUTPATIENT)
Dept: CARDIOLOGY | Facility: HOSPITAL | Age: 35
Discharge: HOME | End: 2020-01-29
Attending: NURSE PRACTITIONER
Payer: COMMERCIAL

## 2020-01-29 DIAGNOSIS — R00.2 PALPITATION: ICD-10-CM

## 2020-01-29 PROCEDURE — 93005 ELECTROCARDIOGRAM TRACING: CPT

## 2020-01-30 ENCOUNTER — PATIENT MESSAGE (OUTPATIENT)
Dept: OBSTETRICS AND GYNECOLOGY | Facility: CLINIC | Age: 35
End: 2020-01-30

## 2020-01-30 ENCOUNTER — LAB VISIT (OUTPATIENT)
Dept: LAB | Facility: HOSPITAL | Age: 35
End: 2020-01-30
Payer: COMMERCIAL

## 2020-01-30 ENCOUNTER — CLINICAL SUPPORT (OUTPATIENT)
Dept: OBSTETRICS AND GYNECOLOGY | Facility: CLINIC | Age: 35
End: 2020-01-30
Payer: COMMERCIAL

## 2020-01-30 ENCOUNTER — INITIAL CONSULT (OUTPATIENT)
Dept: HEMATOLOGY/ONCOLOGY | Facility: CLINIC | Age: 35
End: 2020-01-30
Payer: COMMERCIAL

## 2020-01-30 DIAGNOSIS — Z80.3 FAMILY HISTORY OF BREAST CANCER: ICD-10-CM

## 2020-01-30 DIAGNOSIS — N95.1 MENOPAUSAL SYMPTOMS: Primary | ICD-10-CM

## 2020-01-30 DIAGNOSIS — N95.1 MENOPAUSAL SYMPTOMS: ICD-10-CM

## 2020-01-30 DIAGNOSIS — Z71.83 ENCOUNTER FOR NONPROCREATIVE GENETIC COUNSELING: Primary | ICD-10-CM

## 2020-01-30 PROCEDURE — 99999 PR PBB SHADOW E&M-EST. PATIENT-LVL II: CPT | Mod: PBBFAC,,, | Performed by: NURSE PRACTITIONER

## 2020-01-30 PROCEDURE — 36415 COLL VENOUS BLD VENIPUNCTURE: CPT

## 2020-01-30 PROCEDURE — 96372 THER/PROPH/DIAG INJ SC/IM: CPT | Mod: S$GLB,,, | Performed by: OBSTETRICS & GYNECOLOGY

## 2020-01-30 PROCEDURE — 99999 PR PBB SHADOW E&M-EST. PATIENT-LVL II: ICD-10-PCS | Mod: PBBFAC,,, | Performed by: NURSE PRACTITIONER

## 2020-01-30 PROCEDURE — 99215 OFFICE O/P EST HI 40 MIN: CPT | Mod: S$GLB,,, | Performed by: NURSE PRACTITIONER

## 2020-01-30 PROCEDURE — 96372 PR INJECTION,THERAP/PROPH/DIAG2ST, IM OR SUBCUT: ICD-10-PCS | Mod: S$GLB,,, | Performed by: OBSTETRICS & GYNECOLOGY

## 2020-01-30 PROCEDURE — 99215 PR OFFICE/OUTPT VISIT, EST, LEVL V, 40-54 MIN: ICD-10-PCS | Mod: S$GLB,,, | Performed by: NURSE PRACTITIONER

## 2020-01-30 RX ORDER — SPIRONOLACTONE 50 MG/1
50 TABLET, FILM COATED ORAL 2 TIMES DAILY
Qty: 60 TABLET | Refills: 11 | Status: SHIPPED | OUTPATIENT
Start: 2020-01-30 | End: 2020-10-02 | Stop reason: CLARIF

## 2020-01-30 RX ORDER — FLUOXETINE HYDROCHLORIDE 20 MG/1
20 CAPSULE ORAL DAILY
COMMUNITY
Start: 2020-01-27 | End: 2021-07-01

## 2020-01-30 RX ADMIN — TESTOSTERONE CYPIONATE 100 MG: 200 INJECTION, SOLUTION INTRAMUSCULAR at 12:01

## 2020-01-30 NOTE — TELEPHONE ENCOUNTER
RN conferred with Dr. Garzon regarding patient's plan of care. Patient reports she is scheduled for her breast reduction and breast lift (stage 1 of surgery) with Dr. Carrasco are the [facility] 02/19/2020. Patient notes her PCP has increased her Prozac to 20mg daily as 10mg was no effective for patient. Patient notes persistent hair growth systemically to stomach, buttocks and face despite Aldactone 50mg daily. MD orders received to HOLD Estrace 2mg and Delestrogen injections prior to surgery. Continue Testosterone 100mg IM today. Increase Rx for Aldactone to 50mg PO BID x 30 days, 11 refills. Consult [facility] Psychology support - Dr. Kei Perkins given patient's current struggle with body image, depression and personal stressors. Patient needs assistance with adapting positive coping behaviors and pre-operative evaluation in preparation for bilateral mastectomy. Patient has a total of 7 children to care for and  works off-shore every other month. Patient's PCP discontinued Effexor secondary to hyperhidrosis and started Prozac 10mg. Increasing to 20mg Prozac. Patient has not started new dose as of yet. Patient expresses uncertainty regarding her feelings mentioning a feeling of numbness as she just needs to take care of her kids and fears cancer occurring in her breasts as her aunt was dx'd early around age 35. RN encouraged patient seek support from our team. RN provided emotional support in clinic. RN will follow-up with patient within one week. Dr. Garzon will see patient one month post-op approximately to restart ERT and re-evaluate current symptoms. Appt scheduled for 3/19/20 at 11am. Patient will be due for a testosterone injection the week of Feb. 27. Patient mentions she will coordinate this appt with her post-op follow-up with Dr. Carrasco. Twenty-five minutes spent in counseling, reviewing lab results and coordinating follow-up. Rx phoned to [facility]'s pharmacy for Aldactone 50mg  PO BID.DAREK Iraheta.

## 2020-01-31 RX ORDER — TESTOSTERONE CYPIONATE 200 MG/ML
100 INJECTION, SOLUTION INTRAMUSCULAR
Status: COMPLETED | OUTPATIENT
Start: 2020-01-30 | End: 2020-01-30

## 2020-01-31 NOTE — PROGRESS NOTES
Patient arrives today for her monthly hormone injection. Complains of persistent hair growth despite 50mg Aldactone once daily. RN will confer with MD. ERT on hold for surgery.     Testosterone 100mg administered IM to RIGHT upper outer quadrant of gluteus using aseptic technique and 22 gauge 1.5 inch needle.     Site secured with Band-Aid, needle tip remains intact, patient tolerated well without pain. Patient observed for 15 minutes post injection.

## 2020-02-03 ENCOUNTER — PATIENT MESSAGE (OUTPATIENT)
Dept: SURGERY | Facility: CLINIC | Age: 35
End: 2020-02-03

## 2020-02-10 ENCOUNTER — DOCUMENTATION ONLY (OUTPATIENT)
Dept: HEMATOLOGY/ONCOLOGY | Facility: CLINIC | Age: 35
End: 2020-02-10

## 2020-02-10 ENCOUNTER — PATIENT MESSAGE (OUTPATIENT)
Dept: HEMATOLOGY/ONCOLOGY | Facility: CLINIC | Age: 35
End: 2020-02-10

## 2020-02-10 NOTE — PROGRESS NOTES
"Tammi Bunn [with [facility]] 2/6/2020 3:03 PM  ..."I will note it on the order the patient is pending surgery. Regards, Tammi"  "

## 2020-02-11 ENCOUNTER — PATIENT MESSAGE (OUTPATIENT)
Dept: HEMATOLOGY/ONCOLOGY | Facility: CLINIC | Age: 35
End: 2020-02-11

## 2020-02-12 NOTE — PROGRESS NOTES
Hereditary and High-Risk Clinic  Department of Hematology and Oncology  [facility]  [facility] [address], [place]  [address]    02/17/2020         Patient ID: Allyson Bahena is a 34 y.o. female.    Chief Complaint: Genetic Evaluation      Referring Provider:  No ref. provider found       Subjective:      History of Present Illness (HPI):  Established patient presents today for BRCA2 mutation-positive post-test genetics discussion.     Social history:  Patient has never been a smoker.    Pedigree        Review of Systems - See HPI.  Distress Score = 3/10.  Objective:     Physical Exam   Constitutional: She appears well developed and well nourished. No distress.   Pulmonary/Chest: Effort normal.   Neurological: She is alert.   Psychiatric: She has a normal mood and affect. Her speech is normal and behavior is normal. Thought content normal.     Personal Genetic Testing Results    The patient's Comprehensive BRCA1/2 Analysis/BRCAssure Comprehensive Test through [facility] collected on 06/20/2018 revealed a pathogenic BRCA2 variant--specifically, [id number] [id number], heterozygous.    The patient's Multi-Cancer Panel through [employer] [facility] collected on 01/30/2020 revealed a clinically significant BRCA2 gene mutation--specifically, [id number] (p.[id number]), heterozygous.      Assessment:       1. BRCA2 gene mutation positive    2. Encounter for nonprocreative genetic counseling    3. Family history of colon cancer    4. Family history of melanoma    5. Family history of breast cancer         Counseling:      Pathogenic BRCA2 Mutation:    This patient's the Multi-Cancer Panel through [facility] collected on 01/30/2020 revealed a clinically significant BRCA2 gene mutation--specifically, [id number] (p.[id number]), heterozygous.      Pathogenic BRCA2 Mutation:  Implications for Patient    The patient's associated increased cancer risks include the  following:   Breast (41%-90%, with numerous studies reporting 52%-69%, risk; also, 26% risk of a contralateral breast cancer within 20 years of first);   Ovarian, primary peritoneal, fallopian tube;   Prostate ([facility] only);   Pancreatic; and   Melanoma of the skin and eyes.     The following consultations are recommended:    Breast surgeon- For evaluation and management of BRCA2 mutation-associated increased risk of breast cancer, including but not limited to risk-reducing prophylactic mastectomy and increased screenings for breast cancer; patient is seeing breast surgeon Dr. Paulina Cantu later today and has her first surgery with plastic surgeon Dr. Ron Carrasco scheduled for two days from now;   Medical Oncology- For evaluation and management of BRCA2 mutation-associated increased risk of breast cancer, including but not limited to chemoprevention with breast cancer risk-reduction agent; patient has already been seen by oncologist Dr. Fransisca Merino, prior to patient's hysterectomy and oophorectomies, and I will message Dr. Merino today to determine follow-up plan;  · Gynecologic Oncology- For evaluation and management of BRCA2 mutation-associated increased risk of ovarian/primary peritoneal/fallopian tube cancer; I will message the gynecologist oncologist team to determine if a consultation with them for patient is recommended in this setting;   Gastroenterology- For evaluation and management of BRCA2 mutation-associated increased risk of pancreatic cancer;   Dermatology- For evaluation and management of BRCA2 mutation-associated increased risk of melanoma;   Ophthalmology-  For evaluation and management of BRCA2 mutation-associated increased risk of melanoma.     Placed referrals as indicated to the above providers as indicated.    If this patient is ever diagnosed with cancer in the future, she should ensure that her cancer care team is aware of her genetic mutation, as it may impact treatment decisions.      Pathogenic BRCA2 Mutation:  Family Member Implications    Each of the patient's first-degree relatives has a 50% chance of having the same mutation as this patient, and her more distantly related relatives also have a lesser chance of having the same mutation.  Patient's blood-relatives should each speak with a genetics provider regarding undergoing genetic counseling/testing.     Of note, patient has no contact with any of her paternal relatives and does not wish to make such contact.  Based on the information provided to me by patient, I suspect that patient's BRCA2 mutation was inherited from her mother.  Patient stated that her mother is likely willing to test.    I am available for the abovementioned genetic testing of this patient's relatives (this patient has my contact information), or they can visit [email or web address] to locate a genetic counselor near them.    Inheriting two BRCA2 mutations leads to Fanconi Anemia, implications of which were discussed with the patient.      Additional Information:    Provided patient today with a copy of her [facility] results packet.     Patient is to continue following up with all healthcare providers as they have indicated to her and should ensure that they are each aware of her personal and family histories, especially of cancers, genetic mutation, and colon polyps, so that medical management including cancer screenings can be based in part off of these histories.      PCP- Sue Sutherland NP;  GYN- Dr. Dagoberto Smith;  Colonoscopies- Patient states she routinely undergoes colonoscopies every five years;  Breast surgeon- Dr. Paulina Cantu;  Plastic surgeon- Dr. Ron Carrasco in [place].     Based on patient's family history of colon polyps and colon cancer, I recommend consideration of consultation with a colorectal cancer high- for colorectal cancer risk evaluation and management.  Patient declined, opting to continue following up instead with the provider who  currently orders her colonoscopies.  Advised patient to notify me if she changes her mind.    Patient should update me with any changes to her personal or family history of cancers and colon polyps and with any relative's genetic testing results and check in with me annually to determine if updated genetic testing is indicated for her.    Questions were encouraged and answered to patient's satisfaction, and she verbalized understanding of information and agreement with the plan.        Reference:  National Comprehensive Cancer Network (NCCN) Guidelines Version 1.2020: Genetic/Familial High-Risk Assessment: Breast, Ovarian, and Pancreatic.  Plan:       Referrals/consults:   Breast surgeon- For evaluation and management of BRCA2 mutation-associated increased risk of breast cancer, including but not limited to risk-reducing prophylactic mastectomy and increased screenings for breast cancer; patient is seeing breast surgeon Dr. Paulina Cantu later today and has her first surgery with plastic surgeon Dr. Ron Carrasco scheduled on 02/19/2020;   Medical Oncology- For evaluation and management of BRCA2 mutation-associated increased risk of breast cancer, including but not limited to chemoprevention with breast cancer risk-reduction agent; patient has already been seen by oncologist Dr. Fransisca Merino, prior to patient's hysterectomy and oophorectomies, and I will message Dr. Merino today to determine follow-up plan;  · Gynecologic Oncology- For evaluation and management of BRCA2 mutation-associated increased risk of ovarian/primary peritoneal/fallopian tube cancer; I will message the gynecologist oncologist team to determine if a consultation with them for patient is recommended in this setting;   Gastroenterology- For evaluation and management of BRCA2 mutation-associated increased risk of pancreatic cancer;   Dermatology- For evaluation and management of BRCA2 mutation-associated increased risk of  melanoma;   Ophthalmology-  For evaluation and management of BRCA2 mutation-associated increased risk of melanoma.     Patient is to continue following up with all healthcare providers as they have indicated to her and should ensure that they are each aware of her personal and family histories, especially of cancers, genetic mutation, and colon polyps, so that medical management including cancer screenings can be based in part off of these histories.      Patient should update me with any changes to her personal or family history of cancers and colon polyps and with any relative's genetic testing results and check in with me annually to determine if updated genetic testing is indicated for her.      A total of approximately 25 minutes was spent face-to-face with this patient, of which >50% was spent counseling the patient and/or coordinating her care.        Donavan Bhat, MONSERRAT, APRN, FNP-BC,  Adult Oncology-Certified Nurse Practitioner (AOCNP)  Hereditary and High-Risk Clinic  Department of Hematology and Oncology  [facility], 3rd Floor  [facility] [address], [place]  [address]  Office phone   [phone]    Date:  02/17/2020

## 2020-02-15 ENCOUNTER — PATIENT MESSAGE (OUTPATIENT)
Dept: SURGERY | Facility: CLINIC | Age: 35
End: 2020-02-15

## 2020-02-17 ENCOUNTER — OFFICE VISIT (OUTPATIENT)
Dept: SURGERY | Facility: CLINIC | Age: 35
End: 2020-02-17
Payer: COMMERCIAL

## 2020-02-17 ENCOUNTER — HOSPITAL ENCOUNTER (OUTPATIENT)
Dept: RADIOLOGY | Facility: HOSPITAL | Age: 35
Discharge: HOME | End: 2020-02-17
Attending: SURGERY
Payer: COMMERCIAL

## 2020-02-17 ENCOUNTER — OFFICE VISIT (OUTPATIENT)
Dept: HEMATOLOGY/ONCOLOGY | Facility: CLINIC | Age: 35
End: 2020-02-17
Payer: COMMERCIAL

## 2020-02-17 VITALS
HEART RATE: 115 BPM | HEIGHT: 68 IN | SYSTOLIC BLOOD PRESSURE: 139 MMHG | BODY MASS INDEX: 33.91 KG/M2 | DIASTOLIC BLOOD PRESSURE: 85 MMHG | WEIGHT: 223.75 LBS

## 2020-02-17 DIAGNOSIS — Z15.09 BRCA2 GENE MUTATION POSITIVE: Primary | ICD-10-CM

## 2020-02-17 DIAGNOSIS — Z71.83 ENCOUNTER FOR NONPROCREATIVE GENETIC COUNSELING: ICD-10-CM

## 2020-02-17 DIAGNOSIS — Z80.3 FAMILY HISTORY OF BREAST CANCER: ICD-10-CM

## 2020-02-17 DIAGNOSIS — Z80.0 FAMILY HISTORY OF COLON CANCER: ICD-10-CM

## 2020-02-17 DIAGNOSIS — N63.10 BREAST MASS, RIGHT: ICD-10-CM

## 2020-02-17 DIAGNOSIS — Z80.8 FAMILY HISTORY OF MELANOMA: ICD-10-CM

## 2020-02-17 DIAGNOSIS — Z15.01 BRCA2 GENE MUTATION POSITIVE: Primary | ICD-10-CM

## 2020-02-17 DIAGNOSIS — N63.10 BREAST MASS, RIGHT: Primary | ICD-10-CM

## 2020-02-17 PROCEDURE — 76641 ULTRASOUND BREAST COMPLETE: CPT | Mod: 26,,, | Performed by: RADIOLOGY

## 2020-02-17 PROCEDURE — 3008F PR BODY MASS INDEX (BMI) DOCUMENTED: ICD-10-PCS | Mod: CPTII,S$GLB,, | Performed by: SURGERY

## 2020-02-17 PROCEDURE — 3074F SYST BP LT 130 MM HG: CPT | Mod: CPTII,S$GLB,, | Performed by: NURSE PRACTITIONER

## 2020-02-17 PROCEDURE — 76641 ULTRASOUND BREAST COMPLETE: CPT | Mod: TC,50,PO

## 2020-02-17 PROCEDURE — 3075F PR MOST RECENT SYSTOLIC BLOOD PRESS GE 130-139MM HG: ICD-10-PCS | Mod: CPTII,S$GLB,, | Performed by: SURGERY

## 2020-02-17 PROCEDURE — 99214 OFFICE O/P EST MOD 30 MIN: CPT | Mod: S$GLB,,, | Performed by: NURSE PRACTITIONER

## 2020-02-17 PROCEDURE — 3008F BODY MASS INDEX DOCD: CPT | Mod: CPTII,S$GLB,, | Performed by: SURGERY

## 2020-02-17 PROCEDURE — 99999 PR PBB SHADOW E&M-EST. PATIENT-LVL III: CPT | Mod: PBBFAC,,, | Performed by: NURSE PRACTITIONER

## 2020-02-17 PROCEDURE — 99999 PR PBB SHADOW E&M-EST. PATIENT-LVL III: ICD-10-PCS | Mod: PBBFAC,,, | Performed by: SURGERY

## 2020-02-17 PROCEDURE — 3074F PR MOST RECENT SYSTOLIC BLOOD PRESSURE < 130 MM HG: ICD-10-PCS | Mod: CPTII,S$GLB,, | Performed by: NURSE PRACTITIONER

## 2020-02-17 PROCEDURE — 99214 PR OFFICE/OUTPT VISIT, EST, LEVL IV, 30-39 MIN: ICD-10-PCS | Mod: S$GLB,,, | Performed by: NURSE PRACTITIONER

## 2020-02-17 PROCEDURE — 3079F DIAST BP 80-89 MM HG: CPT | Mod: CPTII,S$GLB,, | Performed by: SURGERY

## 2020-02-17 PROCEDURE — 99999 PR PBB SHADOW E&M-EST. PATIENT-LVL III: CPT | Mod: PBBFAC,,, | Performed by: SURGERY

## 2020-02-17 PROCEDURE — 99213 OFFICE O/P EST LOW 20 MIN: CPT | Mod: S$GLB,,, | Performed by: SURGERY

## 2020-02-17 PROCEDURE — 99213 PR OFFICE/OUTPT VISIT, EST, LEVL III, 20-29 MIN: ICD-10-PCS | Mod: S$GLB,,, | Performed by: SURGERY

## 2020-02-17 PROCEDURE — 3079F PR MOST RECENT DIASTOLIC BLOOD PRESSURE 80-89 MM HG: ICD-10-PCS | Mod: CPTII,S$GLB,, | Performed by: SURGERY

## 2020-02-17 PROCEDURE — 99999 PR PBB SHADOW E&M-EST. PATIENT-LVL III: ICD-10-PCS | Mod: PBBFAC,,, | Performed by: NURSE PRACTITIONER

## 2020-02-17 PROCEDURE — 3075F SYST BP GE 130 - 139MM HG: CPT | Mod: CPTII,S$GLB,, | Performed by: SURGERY

## 2020-02-17 PROCEDURE — 76641 US BREAST BILATERAL COMPLETE: ICD-10-PCS | Mod: 26,,, | Performed by: RADIOLOGY

## 2020-02-17 PROCEDURE — 3078F PR MOST RECENT DIASTOLIC BLOOD PRESSURE < 80 MM HG: ICD-10-PCS | Mod: CPTII,S$GLB,, | Performed by: NURSE PRACTITIONER

## 2020-02-17 PROCEDURE — 3078F DIAST BP <80 MM HG: CPT | Mod: CPTII,S$GLB,, | Performed by: NURSE PRACTITIONER

## 2020-02-17 NOTE — PROGRESS NOTES
HIGH RISK    Allyson Bahena is a 34 y.o. premenopausal female referred for evaluation of increased risk of breast cancer based on BRCA2, family history on mother's side.  BRCA testing was performed.    Other breast cancer risk factors include family hx on mother's side.     Patient is .  Age of first live birth was 19.      INTERVAL:  Had hysterectomy/oopherectomy.  On hormones with Dr. Garzon. Planning staged reduction this Thursday.  Hopes for risk reducing mastecotmy this summer.    Repeat testing with [facility] confirms BRCA2 positive.    Social History:   Smoking:  denies  Drinking:  denies    The following portions of the patient's history were reviewed and updated as appropriate: She  has a past medical history of BRCA2 positive, Depression, Diabetes mellitus, Genetic testing (2018), GERD (gastroesophageal reflux disease), and Suspected sleep apnea.  She does not have any pertinent problems on file.  She  has a past surgical history that includes Tubal ligation; Cholecystectomy; Laparoscopic total hysterectomy (N/A, 3/11/2019); Laparoscopic salpingo-oophorectomy (Bilateral, 3/11/2019); Cystoscopy (N/A, 3/11/2019); Hysterectomy; and Oophorectomy.  Her family history includes Breast cancer in her maternal cousin and maternal cousin; Breast cancer (age of onset: 32) in her other; Breast cancer (age of onset: 48) in her other; Breast cancer (age of onset: 49) in her maternal grandmother; Breast cancer (age of onset: 50) in her other; Breast cancer (age of onset: 53) in her other; Cancer in her maternal grandfather; Cancer (age of onset: 34) in her maternal aunt; Diabetes in her maternal grandmother and mother; Heart attack in her paternal grandfather; Heart disease in her maternal grandmother and mother; Hypertension in her maternal grandmother; No Known Problems in her father, maternal uncle, paternal aunt, paternal grandmother, paternal uncle, son, son, son, and son; Obesity in her maternal  grandfather and mother; Sleep apnea in her brother and mother.  She  reports that she has never smoked. She has never used smokeless tobacco. She reports that she drinks alcohol. She reports that she does not use drugs.  She has a current medication list which includes the following prescription(s): advair diskus, dextroamphetamine-amphetamine, estradiol, fluoxetine, lisinopril, metformin, mucinex d, pantoprazole, prasterone (dhea), and spironolactone.  She is allergic to percocet [oxycodone-acetaminophen]..    Review of Systems  Pertinent items are noted in HPI.      Objective:       Vitals    There were no vitals filed for this visit.   General appearance:  alert, appears stated age and cooperative   HEENT NC/AT, PERRL, EOMs intact, oropharynx clear.   Neck Supple without adenopathy.   Lungs:  nonlabored breathing   Lymph Nodes:  Cervical, supraclavicular, and axillary nodes normal.   Right Breast:  normal without suspicious masses, skin or nipple changes or axillary nodes   Left Breast:  normal without suspicious masses, skin or nipple changes or axillary nodes.        Imaging  Reviewed.     Assessment:     This is a 34 y.o. female with an increased risk of breast and ovarian cancer based on BRCA2.       Plan:   1. Does not want MRI given she was sick with last one. Will do whole breast screenign ultrasound today.  2. Discussed staging with reduction  Then nipple sparing mastectomy.

## 2020-02-17 NOTE — Clinical Note
Dr. Merino- Patient believes she is due for follow-up with you, and I believe she would also like to re-discuss the possibility of chemoprevention with you as she is now s/p hysterectomy/oophorectomy. She is BRCA2+, and post-test genetic counseling is done. I copied Lyndsey on this message.Sue Zacarias, Dr. Smith, & Dr. Carrasco- HEATHER. BRCA2+, and post-test counseling has been completed.Derm staff & Ophth staff- Please schedule patient for appts to establish care.Thanks,Donavan Raymundo

## 2020-02-18 ENCOUNTER — PATIENT MESSAGE (OUTPATIENT)
Dept: DERMATOLOGY | Facility: CLINIC | Age: 35
End: 2020-02-18

## 2020-02-18 ENCOUNTER — PATIENT MESSAGE (OUTPATIENT)
Dept: HEMATOLOGY/ONCOLOGY | Facility: CLINIC | Age: 35
End: 2020-02-18

## 2020-02-18 ENCOUNTER — DOCUMENTATION ONLY (OUTPATIENT)
Dept: HEMATOLOGY/ONCOLOGY | Facility: CLINIC | Age: 35
End: 2020-02-18

## 2020-02-18 DIAGNOSIS — Z15.01 BRCA2 GENE MUTATION POSITIVE: Primary | ICD-10-CM

## 2020-02-18 DIAGNOSIS — Z15.09 BRCA2 GENE MUTATION POSITIVE: Primary | ICD-10-CM

## 2020-02-18 PROBLEM — R07.9 CHEST PAIN: Status: RESOLVED | Noted: 2019-07-02 | Resolved: 2020-02-18

## 2020-02-18 PROBLEM — R10.9 RIGHT SIDED ABDOMINAL PAIN: Status: RESOLVED | Noted: 2019-01-16 | Resolved: 2020-02-18

## 2020-02-18 NOTE — Clinical Note
Dr. Patel- BRCA2 mutation-positive patient, s/p hysterectomy and BSO. I placed a referral to Gyn Onc for consultation. CA-125s and TVUSs would still be indicated in this setting as the chance for ovarian/peritoneal cancer has not been reduced to 0%, correct? Thanks, Donavan

## 2020-02-20 ENCOUNTER — PATIENT MESSAGE (OUTPATIENT)
Dept: HEMATOLOGY/ONCOLOGY | Facility: CLINIC | Age: 35
End: 2020-02-20

## 2020-02-20 ENCOUNTER — DOCUMENTATION ONLY (OUTPATIENT)
Dept: HEMATOLOGY/ONCOLOGY | Facility: CLINIC | Age: 35
End: 2020-02-20

## 2020-02-20 NOTE — PROGRESS NOTES
MD Donavan Cartwright DNP             In general, we don't do continued imaging with TVUS.  Could probably make argument for CA-125    Previous Messages      ----- Message -----   From: Donavan Raymundo DNP   Sent: 2/18/2020   9:38 AM CST   To: MD Dr. Jorge Cartwright- BRCA2 mutation-positive patient, s/p hysterectomy and BSO. I placed a referral to Gyn Onc for consultation. CA-125s and TVUSs would still be indicated in this setting as the chance for ovarian/peritoneal cancer has not been reduced to 0%, correct? Thanks, Donavan         ---    Referral to Gyn Onc already in place.  Messaged patient regarding the above.

## 2020-02-21 ENCOUNTER — PATIENT MESSAGE (OUTPATIENT)
Dept: HEMATOLOGY/ONCOLOGY | Facility: CLINIC | Age: 35
End: 2020-02-21

## 2020-02-21 ENCOUNTER — TELEPHONE (OUTPATIENT)
Dept: ENDOSCOPY | Facility: HOSPITAL | Age: 35
End: 2020-02-21

## 2020-02-21 NOTE — TELEPHONE ENCOUNTER
Spoke with patient in regards to appointment. She just had surgery and could not schedule right now. Will call back in a few weeks.

## 2020-02-21 NOTE — PROGRESS NOTES
Subjective:       Patient ID: Allyson Bahena is a 34 y.o. female.    Chief Complaint: BRCA 2 Gene mutation    HPI Ms. Bahena presents today for her 6 month follow up following diagnosis of BRCA2 mutation. She had a prophylactic BROOKLYN/BSO on March 11,2019. She is here for continued appropriate surveillance.   She followed up with Dr. Cantu and had on 2/20/2020 a breast reduction and lift in preparation for her bilateral mastectomy in 4-6 months. She will also be referred to Dr. Gamboa for her hot flashes and vaginal dryness; she is scheduled on 3/19/2020 for this and has been following with her.        Review of Systems   Constitutional: Positive for fatigue (surgery x 1 week ago). Negative for activity change, appetite change, chills, fever and unexpected weight change.   HENT: Negative for congestion, dental problem, ear pain, hearing loss, postnasal drip, rhinorrhea, sinus pressure, sore throat, tinnitus and trouble swallowing.    Eyes: Negative for visual disturbance.   Respiratory: Negative for cough, chest tightness, shortness of breath and wheezing.    Cardiovascular: Negative for chest pain (chest soreness from surgery), palpitations and leg swelling.   Gastrointestinal: Negative for abdominal distention, abdominal pain, blood in stool, constipation, diarrhea, nausea and vomiting.   Endocrine: Positive for heat intolerance.   Genitourinary: Negative for decreased urine volume, difficulty urinating, dysuria, frequency, menstrual problem (reports heavy) and urgency.        Vaginal Dryness    Musculoskeletal: Negative for arthralgias, back pain, gait problem, joint swelling and neck pain.   Skin: Negative for pallor and rash.   Neurological: Positive for dizziness, light-headedness and headaches. Negative for weakness and numbness.        Hot flashes   Hematological: Negative for adenopathy. Does not bruise/bleed easily.   Psychiatric/Behavioral: Negative for dysphoric mood and sleep disturbance. The  patient is not nervous/anxious.        Objective:      Physical Exam   Constitutional: She is oriented to person, place, and time. She appears well-developed and well-nourished. No distress.   Presents alone   and children in the waiting room   HENT:   Head: Normocephalic and atraumatic.   Right Ear: External ear normal.   Left Ear: External ear normal.   Nose: Nose normal.   Mouth/Throat: Oropharynx is clear and moist. No oropharyngeal exudate.   Eyes: Pupils are equal, round, and reactive to light. Conjunctivae and EOM are normal. Right eye exhibits no discharge. Left eye exhibits no discharge. No scleral icterus. Right pupil is round and reactive. Left pupil is round and reactive.   Neck: Normal range of motion. Neck supple.   Cardiovascular: Normal rate, regular rhythm, normal heart sounds and intact distal pulses. Exam reveals no gallop and no friction rub.   No murmur heard.  Pulmonary/Chest: Effort normal. No respiratory distress. She has wheezes (Right lower lobe).       Abdominal: Soft. She exhibits no distension. There is no hepatosplenomegaly.   No organomegaly   Musculoskeletal: Normal range of motion. She exhibits no edema.   Lymphadenopathy:     She has no cervical adenopathy.   Neurological: She is alert and oriented to person, place, and time. She has normal strength and normal reflexes.   Skin: Skin is warm and dry. No bruising, no lesion, no petechiae and no rash noted. She is not diaphoretic. No erythema. No pallor.   Psychiatric: She has a normal mood and affect. Her behavior is normal. Judgment and thought content normal. Her mood appears not anxious. She does not exhibit a depressed mood.   Nursing note and vitals reviewed.     Assessment:       1. BRCA2 gene mutation positive    2. Type 2 diabetes mellitus without complication, without long-term current use of insulin    3. Essential hypertension        Plan:       1. This is a 34 y.o. female with an increased risk of breast and ovarian  cancer based on + BRCA2 test result.    She is aware of cancer risks and these were reviewed again with her today- lifetime risk of breast cancer is estimated to be 60-80%, lifetime risk of ovarian cancer 15-30%. (On March 11, 2019 she had a BROOKLYN/BSO). In the process of getting bilateral mastectomies. She had a breast reduction and lift on 2/20/2020 with Dr. Cantu. She was seen for a follow up today to have her sutures removed. She will have the skin and nipple sparing mammogram in 4-6 months. I advised her that if she does not proceed with the mastectomy she will require a 6 month follow up with us, but if she proceeds with the surgery she will not need to be seen by oncology. She will also need to have a mammogram or / and an MRI in 6 months if she does not proceed with surgery. Her last imaging was a breast US in Feb 2020 that was negative for sonographic evidence of malignancy      2. Monitored glucose today; continue current medications and follow up with endocrinology   3. Monitored today; continue current medication and follow up with PCP     Her last mammogram was September 2019. Breast US as above in 2/2020. Will require further screening imaging if she does not proceed with mastectomies.   She has previously had a long discussion with Dr. Merino - see Dr. Merino's note from 1/16/19.          Her surveillance for each cancer associated with BRCA2 mutation are listed below:  For breast- surveillance with MRI alternating with mammogram versus prophylactic mastectomy (should be done in the next 4-6 months). NCCN Guidelines: Annual breast MRI with contrast and mammogram at ages 30-75  For melanoma - we discussed whole body skin exam and will continue to follow with dermatology  For pancreatic cancer- we discussed that there is not surveiillance proven to impact although we can offer pancreatic imaging q2 years.        Gilma Malone NP  Oncology     Patient dicussed with supervising physician,   Alice

## 2020-02-27 ENCOUNTER — TELEPHONE (OUTPATIENT)
Dept: HEMATOLOGY/ONCOLOGY | Facility: CLINIC | Age: 35
End: 2020-02-27

## 2020-02-27 ENCOUNTER — OFFICE VISIT (OUTPATIENT)
Dept: HEMATOLOGY/ONCOLOGY | Facility: CLINIC | Age: 35
End: 2020-02-27
Payer: COMMERCIAL

## 2020-02-27 ENCOUNTER — LAB VISIT (OUTPATIENT)
Dept: LAB | Facility: HOSPITAL | Age: 35
End: 2020-02-27
Payer: COMMERCIAL

## 2020-02-27 VITALS
OXYGEN SATURATION: 97 % | HEART RATE: 86 BPM | TEMPERATURE: 99 F | DIASTOLIC BLOOD PRESSURE: 70 MMHG | HEIGHT: 68 IN | BODY MASS INDEX: 34.02 KG/M2 | RESPIRATION RATE: 18 BRPM | SYSTOLIC BLOOD PRESSURE: 126 MMHG

## 2020-02-27 DIAGNOSIS — Z15.09 BRCA2 GENE MUTATION POSITIVE: ICD-10-CM

## 2020-02-27 DIAGNOSIS — Z15.01 BRCA2 GENE MUTATION POSITIVE: Primary | ICD-10-CM

## 2020-02-27 DIAGNOSIS — Z15.01 BRCA2 GENE MUTATION POSITIVE: ICD-10-CM

## 2020-02-27 DIAGNOSIS — Z15.09 BRCA2 GENE MUTATION POSITIVE: Primary | ICD-10-CM

## 2020-02-27 DIAGNOSIS — E11.9 TYPE 2 DIABETES MELLITUS WITHOUT COMPLICATION, WITHOUT LONG-TERM CURRENT USE OF INSULIN: ICD-10-CM

## 2020-02-27 DIAGNOSIS — I10 ESSENTIAL HYPERTENSION: ICD-10-CM

## 2020-02-27 LAB
ALBUMIN SERPL BCP-MCNC: 3.5 G/DL (ref 3.5–5.2)
ALP SERPL-CCNC: 91 U/L (ref 55–135)
ALT SERPL W/O P-5'-P-CCNC: 22 U/L (ref 10–44)
ANION GAP SERPL CALC-SCNC: 8 MMOL/L (ref 8–16)
AST SERPL-CCNC: 22 U/L (ref 10–40)
BILIRUB SERPL-MCNC: 0.4 MG/DL (ref 0.1–1)
BUN SERPL-MCNC: 6 MG/DL (ref 6–20)
CALCIUM SERPL-MCNC: 9.4 MG/DL (ref 8.7–10.5)
CHLORIDE SERPL-SCNC: 105 MMOL/L (ref 95–110)
CO2 SERPL-SCNC: 26 MMOL/L (ref 23–29)
CREAT SERPL-MCNC: 0.8 MG/DL (ref 0.5–1.4)
ERYTHROCYTE [DISTWIDTH] IN BLOOD BY AUTOMATED COUNT: 12.4 % (ref 11.5–14.5)
EST. GFR  (AFRICAN AMERICAN): >60 ML/MIN/1.73 M^2
EST. GFR  (NON AFRICAN AMERICAN): >60 ML/MIN/1.73 M^2
GLUCOSE SERPL-MCNC: 87 MG/DL (ref 70–110)
HCT VFR BLD AUTO: 41.2 % (ref 37–48.5)
HGB BLD-MCNC: 13.3 G/DL (ref 12–16)
IMM GRANULOCYTES # BLD AUTO: 0.08 K/UL (ref 0–0.04)
MCH RBC QN AUTO: 30.2 PG (ref 27–31)
MCHC RBC AUTO-ENTMCNC: 32.3 G/DL (ref 32–36)
MCV RBC AUTO: 93 FL (ref 82–98)
NEUTROPHILS # BLD AUTO: 6 K/UL (ref 1.8–7.7)
PLATELET # BLD AUTO: 402 K/UL (ref 150–350)
PMV BLD AUTO: 9.7 FL (ref 9.2–12.9)
POTASSIUM SERPL-SCNC: 4.3 MMOL/L (ref 3.5–5.1)
PROT SERPL-MCNC: 7.4 G/DL (ref 6–8.4)
RBC # BLD AUTO: 4.41 M/UL (ref 4–5.4)
SODIUM SERPL-SCNC: 139 MMOL/L (ref 136–145)
WBC # BLD AUTO: 8.76 K/UL (ref 3.9–12.7)

## 2020-02-27 PROCEDURE — 99999 PR PBB SHADOW E&M-EST. PATIENT-LVL IV: ICD-10-PCS | Mod: PBBFAC,,, | Performed by: NURSE PRACTITIONER

## 2020-02-27 PROCEDURE — 36415 COLL VENOUS BLD VENIPUNCTURE: CPT

## 2020-02-27 PROCEDURE — 99214 OFFICE O/P EST MOD 30 MIN: CPT | Mod: S$GLB,,, | Performed by: NURSE PRACTITIONER

## 2020-02-27 PROCEDURE — 85027 COMPLETE CBC AUTOMATED: CPT

## 2020-02-27 PROCEDURE — 99214 PR OFFICE/OUTPT VISIT, EST, LEVL IV, 30-39 MIN: ICD-10-PCS | Mod: S$GLB,,, | Performed by: NURSE PRACTITIONER

## 2020-02-27 PROCEDURE — 99999 PR PBB SHADOW E&M-EST. PATIENT-LVL IV: CPT | Mod: PBBFAC,,, | Performed by: NURSE PRACTITIONER

## 2020-02-27 PROCEDURE — 80053 COMPREHEN METABOLIC PANEL: CPT

## 2020-02-27 RX ORDER — PROMETHAZINE HYDROCHLORIDE 12.5 MG/1
TABLET ORAL
COMMUNITY
Start: 2020-02-20 | End: 2020-10-02 | Stop reason: CLARIF

## 2020-02-27 NOTE — TELEPHONE ENCOUNTER
Message fwd to NP.    ----- Message from Jenifer Howard MA sent at 2/27/2020  9:48 AM CST -----  Contact: Self/[phone]  Pt states she is here and on her way to have her labs done  Early before 1030 and wants to know if she can also see the Doctor early as well right after labs. Requesting call back.

## 2020-02-28 ENCOUNTER — PATIENT MESSAGE (OUTPATIENT)
Dept: HEMATOLOGY/ONCOLOGY | Facility: CLINIC | Age: 35
End: 2020-02-28

## 2020-03-06 ENCOUNTER — TELEPHONE (OUTPATIENT)
Dept: ENDOSCOPY | Facility: HOSPITAL | Age: 35
End: 2020-03-06

## 2020-03-16 ENCOUNTER — PATIENT MESSAGE (OUTPATIENT)
Dept: OBSTETRICS AND GYNECOLOGY | Facility: CLINIC | Age: 35
End: 2020-03-16

## 2020-03-17 ENCOUNTER — TELEPHONE (OUTPATIENT)
Dept: OBSTETRICS AND GYNECOLOGY | Facility: CLINIC | Age: 35
End: 2020-03-17

## 2020-03-17 NOTE — TELEPHONE ENCOUNTER
RN lvm explaining to patient that out of an abundance of caution for her safety and health given the COVID-19 progression within the community, her injection and appt with Dr. Garzon 03/19/2020 at the [facility] will be re-scheduled. RN would like to discuss options for Telemedicine with patient, follow-up on hormone therapy and post-op recoveryt. RN will retry patient later on today and send portal message..  DAREK Iraheta

## 2020-03-18 ENCOUNTER — TELEPHONE (OUTPATIENT)
Dept: OBSTETRICS AND GYNECOLOGY | Facility: CLINIC | Age: 35
End: 2020-03-18

## 2020-03-18 NOTE — TELEPHONE ENCOUNTER
Patient returned call. Patient resumed her Estrace 2mg orally daily last Wednesday, 03/11/2020. Patient reports hot flashes despite oral ERT. Patient recovering well from her recent surgery, delayed healing to left side of her breast, current oversight via virtual visits with Dr. Ron Carrasco. Patient notes her 5 children are at home secondary to COVID-19 precautions. Patient denies anxiety, depression, poor self-image, difficulty managing her stress and responsibilities in the home. Patient desires to resume Testosterone and review Dr. Garzon's recommendations for HRT. Pt had facial hair growth and some acne on Testosterone therapy. Patient cannot recall if increasing the dosage of her Aldactone improved these side effects. Virtual visit scheduled for tomorrow, 03/19/20 at 11am. Pt confirmed. DAREK Iraheta.

## 2020-03-18 NOTE — TELEPHONE ENCOUNTER
----- Message from Samantha Hortonfield sent at 3/18/2020 12:29 PM CDT -----  Contact: JEWELS RAY  Type:  Patient Returning Call    Who Called: JEWELS RAY    Who Left Message for Patient: Jocelyn Pretty    Does the patient know what this is regarding?: Yes    Best Call Back Number: [phone]    Additional Information:

## 2020-03-18 NOTE — TELEPHONE ENCOUNTER
----- Message from Ada Posey MA sent at 3/18/2020 12:31 PM CDT -----  Contact: JEWELS RAY      ----- Message -----  From: Samantha Ahumada  Sent: 3/18/2020  12:29 PM CDT  To: Ryann TOLEDO Staff    Type:  Patient Returning Call    Who Called: JEWELS RAY    Who Left Message for Patient: Jocelyn Pretty    Does the patient know what this is regarding?: Yes    Best Call Back Number: [phone]    Additional Information:

## 2020-03-19 ENCOUNTER — OFFICE VISIT (OUTPATIENT)
Dept: OBSTETRICS AND GYNECOLOGY | Facility: CLINIC | Age: 35
End: 2020-03-19
Attending: OBSTETRICS & GYNECOLOGY
Payer: COMMERCIAL

## 2020-03-19 DIAGNOSIS — E89.40 SURGICAL MENOPAUSE ON HORMONE REPLACEMENT THERAPY: ICD-10-CM

## 2020-03-19 DIAGNOSIS — Z15.01 BRCA2 GENE MUTATION POSITIVE IN FEMALE: Primary | ICD-10-CM

## 2020-03-19 DIAGNOSIS — Z79.890 SURGICAL MENOPAUSE ON HORMONE REPLACEMENT THERAPY: ICD-10-CM

## 2020-03-19 DIAGNOSIS — Z15.09 BRCA2 GENE MUTATION POSITIVE IN FEMALE: Primary | ICD-10-CM

## 2020-03-19 DIAGNOSIS — Z15.02 BRCA2 GENE MUTATION POSITIVE IN FEMALE: Primary | ICD-10-CM

## 2020-03-19 DIAGNOSIS — N89.8 VAGINAL DRYNESS: ICD-10-CM

## 2020-03-19 PROCEDURE — 99213 PR OFFICE/OUTPT VISIT, EST, LEVL III, 20-29 MIN: ICD-10-PCS | Mod: 95,,, | Performed by: OBSTETRICS & GYNECOLOGY

## 2020-03-19 PROCEDURE — 99213 OFFICE O/P EST LOW 20 MIN: CPT | Mod: 95,,, | Performed by: OBSTETRICS & GYNECOLOGY

## 2020-03-19 NOTE — PROGRESS NOTES
The patient location is: home  The chief complaint leading to consultation is: hot flashes  Visit type: Virtual visit with synchronous audio and video  Total time spent with patient: 20 minutes  Each patient to whom he or she provides medical services by telemedicine is:  (1) informed of the relationship between the physician and patient and the respective role of any other health care provider with respect to management of the patient; and (2) notified that he or she may decline to receive medical services by telemedicine and may withdraw from such care at any time.    Notes:  Patient is a 34-year-old  5 para 4 with known BRCA 2 mutation.  She has undergone total laparoscopic hysterectomy and BSO.  She has been having hot flashes and night sweats which estradiol 2 mg orally and testosterone injections were helping to improve.  She had also been using intravaginal DHEA and reports the vaginal dryness had improved    She recently underwent a lift and breast reduction with Dr. Carrasco on -she has had some issues with healing but overall is recovering well.  She is scheduled to have a bilateral prophylactic mastectomy in 4 months  She was off of her oral estradiol secondary to her surgery and just started back on this medication 1 week ago.  She reports that she is having hot flashes.  She would like to get back on the testosterone injections as well but due to COVID 19-she lives.  In [place] and will not be coming to [place] at this time. She did experience some facial hair and some hair on her stomach but overall this is improved with the spironolactone.  She does report having some anxiety but overall coping well and relates difficulty sleeping to her recent anesthesia  She is using the enterocele nightly and she finds that this has helped her vaginal dryness    -physical exam is deferred    Assessment  BRCA 2 positive  Status post/-TAHBSO surgical menopause  Menopausal  symptoms    Plan  Reassurance given that she should continue the Estrace 2 mg p.o. Daily- she was off it for.  1 month and it has just been back on reassured her that that can take time for her symptoms to respond to the oral medication.  If she does not have improvement over the next 2 weeks will consider having her come in to get an estradiol injection and testosterone injection depending on what is going on with COVID 19  Continue Intrarosa  Face to face time 15 minutes  All of her questions were answered

## 2020-03-27 ENCOUNTER — DOCUMENTATION ONLY (OUTPATIENT)
Dept: HEMATOLOGY/ONCOLOGY | Facility: CLINIC | Age: 35
End: 2020-03-27

## 2020-03-27 NOTE — PROGRESS NOTES
Was advised by Layne Celeste, [facility] genetic counselor, that the p-dots reported by other lab ([facility]) and [facility], BRCA2 [id number] and BRCA2 p.[id number]*25, respectively, represent the exact same variant.

## 2020-03-31 ENCOUNTER — TELEPHONE (OUTPATIENT)
Dept: DERMATOLOGY | Facility: CLINIC | Age: 35
End: 2020-03-31

## 2020-03-31 NOTE — TELEPHONE ENCOUNTER
Spoke to pt in regards to rescheduling appt due to the ongoing COVID-19 outbreak. Pt is aware and will call back at a later date until everything dies down per pt.        JAIME

## 2020-04-09 LAB
GENETIC COUNSELING?: YES
GENSO SPECIMEN TYPE: NORMAL
MISCELLANEOUS GENETIC TEST NAME: NORMAL
PARTENTAL OR SIBLING TESTING?: NO
REFERENCE LAB: NORMAL
TEST RESULT: NORMAL

## 2020-07-29 ENCOUNTER — TELEPHONE (OUTPATIENT)
Dept: OBSTETRICS AND GYNECOLOGY | Facility: CLINIC | Age: 35
End: 2020-07-29

## 2020-07-29 NOTE — TELEPHONE ENCOUNTER
RN Navigator phoned patient to touch-base regarding any s/s, concerns, areas for support at this time. Return contact information provided. DAREK Iraheta.

## 2020-08-10 ENCOUNTER — PATIENT MESSAGE (OUTPATIENT)
Dept: HEMATOLOGY/ONCOLOGY | Facility: CLINIC | Age: 35
End: 2020-08-10

## 2020-08-11 ENCOUNTER — OFFICE VISIT (OUTPATIENT)
Dept: HEMATOLOGY/ONCOLOGY | Facility: CLINIC | Age: 35
End: 2020-08-11
Payer: COMMERCIAL

## 2020-08-11 VITALS
SYSTOLIC BLOOD PRESSURE: 133 MMHG | BODY MASS INDEX: 33.91 KG/M2 | DIASTOLIC BLOOD PRESSURE: 88 MMHG | HEIGHT: 68 IN | WEIGHT: 223.75 LBS | HEART RATE: 78 BPM

## 2020-08-11 DIAGNOSIS — Z01.818 PREOP TESTING: ICD-10-CM

## 2020-08-11 DIAGNOSIS — Z15.09 BRCA2 GENE MUTATION POSITIVE: Primary | ICD-10-CM

## 2020-08-11 DIAGNOSIS — Z15.01 BRCA2 GENE MUTATION POSITIVE: Primary | ICD-10-CM

## 2020-08-11 PROCEDURE — 3075F SYST BP GE 130 - 139MM HG: CPT | Mod: CPTII,S$GLB,, | Performed by: SURGERY

## 2020-08-11 PROCEDURE — 99213 PR OFFICE/OUTPT VISIT, EST, LEVL III, 20-29 MIN: ICD-10-PCS | Mod: S$GLB,,, | Performed by: SURGERY

## 2020-08-11 PROCEDURE — 3008F PR BODY MASS INDEX (BMI) DOCUMENTED: ICD-10-PCS | Mod: CPTII,S$GLB,, | Performed by: SURGERY

## 2020-08-11 PROCEDURE — 99999 PR PBB SHADOW E&M-EST. PATIENT-LVL III: ICD-10-PCS | Mod: PBBFAC,,, | Performed by: SURGERY

## 2020-08-11 PROCEDURE — 3075F PR MOST RECENT SYSTOLIC BLOOD PRESS GE 130-139MM HG: ICD-10-PCS | Mod: CPTII,S$GLB,, | Performed by: SURGERY

## 2020-08-11 PROCEDURE — 3079F DIAST BP 80-89 MM HG: CPT | Mod: CPTII,S$GLB,, | Performed by: SURGERY

## 2020-08-11 PROCEDURE — 99999 PR PBB SHADOW E&M-EST. PATIENT-LVL III: CPT | Mod: PBBFAC,,, | Performed by: SURGERY

## 2020-08-11 PROCEDURE — 99213 OFFICE O/P EST LOW 20 MIN: CPT | Mod: S$GLB,,, | Performed by: SURGERY

## 2020-08-11 PROCEDURE — 3079F PR MOST RECENT DIASTOLIC BLOOD PRESSURE 80-89 MM HG: ICD-10-PCS | Mod: CPTII,S$GLB,, | Performed by: SURGERY

## 2020-08-11 PROCEDURE — 3008F BODY MASS INDEX DOCD: CPT | Mod: CPTII,S$GLB,, | Performed by: SURGERY

## 2020-08-11 NOTE — PROGRESS NOTES
HIGH RISK    Allyson Bahena is a 35 y.o. premenopausal female referred for evaluation of increased risk of breast cancer based on BRCA2, family history on mother's side.  BRCA testing was performed.    Other breast cancer risk factors include family hx on mother's side.     Patient is .  Age of first live birth was 19.      INTERVAL:  Had hysterectomy/oopherectomy.  On hormones with Dr. Garzon. Planning staged reduction this Thursday.  Hopes for risk reducing mastecotmy this summer.    INTERVAL: underwent lift/reduction with Dr. Carrasco.  Doing well. Plan for October bilateral nipple sparing mastectomy.    Repeat testing with [facility] confirms BRCA2 positive.    Social History:   Smoking:  denies  Drinking:  denies    The following portions of the patient's history were reviewed and updated as appropriate: She  has a past medical history of BRCA2 positive, Depression, Diabetes mellitus, Genetic testing (2018), GERD (gastroesophageal reflux disease), and Suspected sleep apnea.  She does not have any pertinent problems on file.  She  has a past surgical history that includes Tubal ligation; Cholecystectomy; Laparoscopic total hysterectomy (N/A, 3/11/2019); Laparoscopic salpingo-oophorectomy (Bilateral, 3/11/2019); Cystoscopy (N/A, 3/11/2019); Hysterectomy; and Oophorectomy.  Her family history includes Breast cancer in her maternal cousin and maternal cousin; Breast cancer (age of onset: 49) in her maternal grandmother; Colon cancer (age of onset: 35) in her maternal aunt; Colon polyps in her maternal aunt and mother; Diabetes in her maternal grandmother and mother; Heart attack in her paternal grandfather; Heart disease in her maternal grandmother and mother; Hypertension in her maternal grandmother; Melanoma (age of onset: 50) in her maternal grandfather; No Known Problems in her father, maternal uncle, paternal aunt, paternal grandmother, paternal uncle, son, son, son, and son; Obesity in her  maternal grandfather and mother; Sleep apnea in her brother and mother.  She  reports that she has never smoked. She has never used smokeless tobacco. She reports current alcohol use. She reports that she does not use drugs.  She has a current medication list which includes the following prescription(s): advair diskus, dextroamphetamine-amphetamine, fluoxetine, pantoprazole, prasterone (dhea), spironolactone, estradiol, lisinopril, metformin, mucinex d, and promethazine.  She is allergic to percocet [oxycodone-acetaminophen]..    Review of Systems  Pertinent items are noted in HPI.      Objective:       Vitals    Vitals:    08/11/20 0903   BP: 133/88   Pulse: 78      General appearance:  alert, appears stated age and cooperative   HEENT NC/AT, PERRL, EOMs intact, oropharynx clear.   Neck Supple without adenopathy.   Lungs:  nonlabored breathing   Lymph Nodes:  Cervical, supraclavicular, and axillary nodes normal.   Right Breast:  normal without suspicious masses, skin or nipple changes or axillary nodes   Left Breast:  normal without suspicious masses, skin or nipple changes or axillary nodes.    2 areas of fat necrosis 6OC bilaterally s/p reduction.    Imaging  Due for [facility] 9/2020     Assessment:     This is a 35 y.o. female with an increased risk of breast and ovarian cancer based on BRCA2.       Plan:   1. Does not want MRI given she was sick with last one. [facility] 9/2020.  2. Bilateral nipple sparing mastectomy in October 2020.

## 2020-08-17 NOTE — PROGRESS NOTES
Subjective:       Patient ID: Allyson Bahena is a 35 y.o. female.    Chief Complaint: BRCA 2 Gene mutation    HPI Ms. Bahena presents today for her 6 month follow up following diagnosis of BRCA2 mutation. She had a prophylactic BROOKLYN/BSO on March 11,2019. She is here for continued appropriate surveillance.   She followed up with Dr. Cantu and had on 2/20/2020 a breast reduction and lift in preparation for her bilateral mastectomy in October.   She follows with Dr. Gamboa for her hot flashes and vaginal dryness.      Eating and drinking well. Taking care of 7 children.   Notes corina fatigue. States bowels are intermittent between constipation and diarrhea.       Review of Systems   Constitutional: Positive for fatigue. Negative for activity change, appetite change, chills, fever and unexpected weight change.   HENT: Negative for congestion, dental problem, ear pain, hearing loss, postnasal drip, rhinorrhea, sinus pressure, sore throat, tinnitus and trouble swallowing.    Eyes: Negative for visual disturbance.   Respiratory: Negative for cough, chest tightness, shortness of breath and wheezing.    Cardiovascular: Negative for chest pain, palpitations and leg swelling.   Gastrointestinal: Negative for abdominal distention, abdominal pain, blood in stool, constipation, diarrhea, nausea and vomiting.        Alternates between constipation and diarrhea   Endocrine: Positive for heat intolerance.   Genitourinary: Negative for decreased urine volume, difficulty urinating, dysuria, frequency, menstrual problem (reports heavy) and urgency.        Vaginal Dryness    Musculoskeletal: Negative for arthralgias, back pain, gait problem, joint swelling and neck pain.   Skin: Negative for pallor and rash.   Neurological: Negative for dizziness, weakness, light-headedness, numbness and headaches.        Hot flashes   Hematological: Negative for adenopathy. Does not bruise/bleed easily.   Psychiatric/Behavioral: Negative for  dysphoric mood and sleep disturbance. The patient is not nervous/anxious.        Objective:      Physical Exam  Vitals signs and nursing note reviewed.   Constitutional:       General: She is not in acute distress.     Appearance: She is well-developed. She is not diaphoretic.      Comments: Presents alone     HENT:      Head: Normocephalic and atraumatic.      Right Ear: External ear normal.      Left Ear: External ear normal.      Nose: Nose normal.      Mouth/Throat:      Pharynx: No oropharyngeal exudate.   Eyes:      General: No scleral icterus.        Right eye: No discharge.         Left eye: No discharge.      Conjunctiva/sclera: Conjunctivae normal.      Pupils: Pupils are equal, round, and reactive to light.      Right eye: Pupil is round and reactive.      Left eye: Pupil is round and reactive.   Neck:      Musculoskeletal: Normal range of motion and neck supple.   Cardiovascular:      Rate and Rhythm: Normal rate and regular rhythm.      Heart sounds: Normal heart sounds. No murmur. No friction rub. No gallop.    Pulmonary:      Effort: Pulmonary effort is normal. No respiratory distress.      Breath sounds: No wheezing.   Chest:       Abdominal:      General: There is no distension.      Palpations: Abdomen is soft.      Comments: No organomegaly   Musculoskeletal: Normal range of motion.   Lymphadenopathy:      Cervical: No cervical adenopathy.   Skin:     General: Skin is warm and dry.      Coloration: Skin is not pale.      Findings: No bruising, erythema, lesion, petechiae or rash.   Neurological:      Mental Status: She is alert and oriented to person, place, and time.      Deep Tendon Reflexes: Reflexes are normal and symmetric.   Psychiatric:         Mood and Affect: Mood is not anxious or depressed.         Behavior: Behavior normal.         Thought Content: Thought content normal.         Judgment: Judgment normal.        Assessment:       1. BRCA2 gene mutation positive    2. Essential  hypertension    3. Morbid obesity        Plan:       1. This is a 34 y.o. female with an increased risk of breast and ovarian cancer based on + BRCA2 test result.    She is aware of cancer risks and these were reviewed again with her today- lifetime risk of breast cancer is estimated to be 60-80%, lifetime risk of ovarian cancer 15-30%. (On March 11, 2019 she had a BROOKLYN/BSO). In the process of getting bilateral mastectomies. She had a breast reduction and lift on 2/20/2020 with Dr. Cantu. She was seen for a follow up today to have her sutures removed. She will have the skin and nipple sparing mammogram in October.  She will have a mammogram is due in September, this is scheduled.     2. Monitored today; continue current medication and follow up with PCP   3. Diet and exercise     Her last mammogram was September 2019. Breast US as above in 2/2020. Will require further screening imaging if she does not proceed with mastectomies.   She has previously had a long discussion with Dr. Merino - see Dr. Merino's note from 1/16/19.          Her surveillance for each cancer associated with BRCA2 mutation are listed below:  For breast- surveillance with MRI alternating with mammogram versus prophylactic mastectomy (scheduled in October). NCCN Guidelines: Annual breast MRI with contrast and mammogram at ages 30-75  For melanoma - we discussed whole body skin exam and will continue to follow with dermatology  For pancreatic cancer- we discussed that there is not surveillance proven to impact although we can offer pancreatic imaging q2 years.        Gilma Malone NP  Oncology     Patient dicussed with supervising physician, Dr. Merino.

## 2020-08-20 ENCOUNTER — TELEPHONE (OUTPATIENT)
Dept: SURGERY | Facility: CLINIC | Age: 35
End: 2020-08-20

## 2020-08-20 DIAGNOSIS — Z12.31 ENCOUNTER FOR SCREENING MAMMOGRAM FOR HIGH-RISK PATIENT: Primary | ICD-10-CM

## 2020-08-20 NOTE — TELEPHONE ENCOUNTER
Spoke to patient and made appt for Mammogram.  Provided patient with instructions on hospital visitation during COVID.

## 2020-08-24 ENCOUNTER — TELEPHONE (OUTPATIENT)
Dept: HEMATOLOGY/ONCOLOGY | Facility: CLINIC | Age: 35
End: 2020-08-24

## 2020-08-25 ENCOUNTER — OFFICE VISIT (OUTPATIENT)
Dept: HEMATOLOGY/ONCOLOGY | Facility: CLINIC | Age: 35
End: 2020-08-25
Payer: COMMERCIAL

## 2020-08-25 ENCOUNTER — LAB VISIT (OUTPATIENT)
Dept: LAB | Facility: HOSPITAL | Age: 35
End: 2020-08-25
Attending: INTERNAL MEDICINE
Payer: COMMERCIAL

## 2020-08-25 VITALS
TEMPERATURE: 98 F | OXYGEN SATURATION: 97 % | HEART RATE: 74 BPM | WEIGHT: 236.75 LBS | BODY MASS INDEX: 35.88 KG/M2 | RESPIRATION RATE: 18 BRPM | SYSTOLIC BLOOD PRESSURE: 124 MMHG | DIASTOLIC BLOOD PRESSURE: 87 MMHG | HEIGHT: 68 IN

## 2020-08-25 DIAGNOSIS — Z15.09 BRCA2 GENE MUTATION POSITIVE: ICD-10-CM

## 2020-08-25 DIAGNOSIS — I10 ESSENTIAL HYPERTENSION: ICD-10-CM

## 2020-08-25 DIAGNOSIS — E66.01 MORBID OBESITY: ICD-10-CM

## 2020-08-25 DIAGNOSIS — Z15.09 BRCA2 GENE MUTATION POSITIVE: Primary | ICD-10-CM

## 2020-08-25 DIAGNOSIS — Z15.01 BRCA2 GENE MUTATION POSITIVE: ICD-10-CM

## 2020-08-25 DIAGNOSIS — Z15.01 BRCA2 GENE MUTATION POSITIVE: Primary | ICD-10-CM

## 2020-08-25 DIAGNOSIS — R10.84 GENERALIZED ABDOMINAL PAIN: ICD-10-CM

## 2020-08-25 DIAGNOSIS — R10.9 ABDOMINAL PAIN, UNSPECIFIED ABDOMINAL LOCATION: ICD-10-CM

## 2020-08-25 LAB
ALBUMIN SERPL BCP-MCNC: 3.8 G/DL (ref 3.5–5.2)
ALP SERPL-CCNC: 96 U/L (ref 55–135)
ALT SERPL W/O P-5'-P-CCNC: 30 U/L (ref 10–44)
ANION GAP SERPL CALC-SCNC: 8 MMOL/L (ref 8–16)
AST SERPL-CCNC: 27 U/L (ref 10–40)
BILIRUB SERPL-MCNC: 0.5 MG/DL (ref 0.1–1)
BUN SERPL-MCNC: 14 MG/DL (ref 6–20)
CALCIUM SERPL-MCNC: 9.5 MG/DL (ref 8.7–10.5)
CHLORIDE SERPL-SCNC: 106 MMOL/L (ref 95–110)
CO2 SERPL-SCNC: 25 MMOL/L (ref 23–29)
CREAT SERPL-MCNC: 0.8 MG/DL (ref 0.5–1.4)
ERYTHROCYTE [DISTWIDTH] IN BLOOD BY AUTOMATED COUNT: 12.6 % (ref 11.5–14.5)
EST. GFR  (AFRICAN AMERICAN): >60 ML/MIN/1.73 M^2
EST. GFR  (NON AFRICAN AMERICAN): >60 ML/MIN/1.73 M^2
GLUCOSE SERPL-MCNC: 96 MG/DL (ref 70–110)
HCT VFR BLD AUTO: 42.6 % (ref 37–48.5)
HGB BLD-MCNC: 13.8 G/DL (ref 12–16)
IMM GRANULOCYTES # BLD AUTO: 0.02 K/UL (ref 0–0.04)
MCH RBC QN AUTO: 29.6 PG (ref 27–31)
MCHC RBC AUTO-ENTMCNC: 32.4 G/DL (ref 32–36)
MCV RBC AUTO: 91 FL (ref 82–98)
NEUTROPHILS # BLD AUTO: 4 K/UL (ref 1.8–7.7)
PLATELET # BLD AUTO: 323 K/UL (ref 150–350)
PMV BLD AUTO: 9.9 FL (ref 9.2–12.9)
POTASSIUM SERPL-SCNC: 4.1 MMOL/L (ref 3.5–5.1)
PROT SERPL-MCNC: 7.2 G/DL (ref 6–8.4)
RBC # BLD AUTO: 4.66 M/UL (ref 4–5.4)
SODIUM SERPL-SCNC: 139 MMOL/L (ref 136–145)
WBC # BLD AUTO: 6.36 K/UL (ref 3.9–12.7)

## 2020-08-25 PROCEDURE — 36415 COLL VENOUS BLD VENIPUNCTURE: CPT

## 2020-08-25 PROCEDURE — 99214 OFFICE O/P EST MOD 30 MIN: CPT | Mod: S$GLB,,, | Performed by: NURSE PRACTITIONER

## 2020-08-25 PROCEDURE — 99999 PR PBB SHADOW E&M-EST. PATIENT-LVL IV: ICD-10-PCS | Mod: PBBFAC,,, | Performed by: NURSE PRACTITIONER

## 2020-08-25 PROCEDURE — 80053 COMPREHEN METABOLIC PANEL: CPT

## 2020-08-25 PROCEDURE — 99214 PR OFFICE/OUTPT VISIT, EST, LEVL IV, 30-39 MIN: ICD-10-PCS | Mod: S$GLB,,, | Performed by: NURSE PRACTITIONER

## 2020-08-25 PROCEDURE — 85027 COMPLETE CBC AUTOMATED: CPT

## 2020-08-25 PROCEDURE — 99999 PR PBB SHADOW E&M-EST. PATIENT-LVL IV: CPT | Mod: PBBFAC,,, | Performed by: NURSE PRACTITIONER

## 2020-09-16 DIAGNOSIS — Z15.09 BRCA2 GENE MUTATION POSITIVE: Primary | ICD-10-CM

## 2020-09-16 DIAGNOSIS — Z15.01 BRCA2 GENE MUTATION POSITIVE: Primary | ICD-10-CM

## 2020-09-16 RX ORDER — SODIUM CHLORIDE 9 MG/ML
INJECTION, SOLUTION INTRAVENOUS CONTINUOUS
Status: CANCELLED | OUTPATIENT
Start: 2020-09-16

## 2020-09-23 ENCOUNTER — PATIENT MESSAGE (OUTPATIENT)
Dept: HEMATOLOGY/ONCOLOGY | Facility: CLINIC | Age: 35
End: 2020-09-23

## 2020-09-28 ENCOUNTER — HOSPITAL ENCOUNTER (OUTPATIENT)
Dept: RADIOLOGY | Facility: HOSPITAL | Age: 35
Discharge: HOME | End: 2020-09-28
Attending: SURGERY
Payer: COMMERCIAL

## 2020-09-28 ENCOUNTER — PATIENT MESSAGE (OUTPATIENT)
Dept: SURGERY | Facility: OTHER | Age: 35
End: 2020-09-28

## 2020-09-28 DIAGNOSIS — Z12.31 ENCOUNTER FOR SCREENING MAMMOGRAM FOR HIGH-RISK PATIENT: ICD-10-CM

## 2020-09-28 PROCEDURE — 77063 BREAST TOMOSYNTHESIS BI: CPT | Mod: 26,,, | Performed by: RADIOLOGY

## 2020-09-28 PROCEDURE — 77067 MAMMO DIGITAL SCREENING BILAT WITH TOMOSYNTHESIS_CAD: ICD-10-PCS | Mod: 26,,, | Performed by: RADIOLOGY

## 2020-09-28 PROCEDURE — 77067 SCR MAMMO BI INCL CAD: CPT | Mod: 26,,, | Performed by: RADIOLOGY

## 2020-09-28 PROCEDURE — 77067 SCR MAMMO BI INCL CAD: CPT | Mod: TC

## 2020-09-28 PROCEDURE — 77063 MAMMO DIGITAL SCREENING BILAT WITH TOMOSYNTHESIS_CAD: ICD-10-PCS | Mod: 26,,, | Performed by: RADIOLOGY

## 2020-10-02 ENCOUNTER — HOSPITAL ENCOUNTER (OUTPATIENT)
Dept: PREADMISSION TESTING | Facility: OTHER | Age: 35
Discharge: HOME | DRG: 585 | End: 2020-10-02
Attending: SURGERY
Payer: COMMERCIAL

## 2020-10-02 ENCOUNTER — ANESTHESIA EVENT (OUTPATIENT)
Dept: SURGERY | Facility: OTHER | Age: 35
DRG: 585 | End: 2020-10-02
Payer: COMMERCIAL

## 2020-10-02 VITALS
SYSTOLIC BLOOD PRESSURE: 116 MMHG | TEMPERATURE: 98 F | DIASTOLIC BLOOD PRESSURE: 76 MMHG | HEART RATE: 118 BPM | BODY MASS INDEX: 34.4 KG/M2 | HEIGHT: 68 IN | OXYGEN SATURATION: 99 % | WEIGHT: 227 LBS

## 2020-10-02 DIAGNOSIS — Z01.818 PREOP TESTING: ICD-10-CM

## 2020-10-02 DIAGNOSIS — Z15.09 BRCA2 GENE MUTATION POSITIVE: ICD-10-CM

## 2020-10-02 DIAGNOSIS — Z15.01 BRCA2 GENE MUTATION POSITIVE: ICD-10-CM

## 2020-10-02 LAB
ALBUMIN SERPL BCP-MCNC: 4.1 G/DL (ref 3.5–5.2)
ALP SERPL-CCNC: 104 U/L (ref 55–135)
ALT SERPL W/O P-5'-P-CCNC: 29 U/L (ref 10–44)
ANION GAP SERPL CALC-SCNC: 8 MMOL/L (ref 8–16)
AST SERPL-CCNC: 22 U/L (ref 10–40)
BASOPHILS # BLD AUTO: 0.06 K/UL (ref 0–0.2)
BASOPHILS NFR BLD: 0.9 % (ref 0–1.9)
BILIRUB SERPL-MCNC: 0.5 MG/DL (ref 0.1–1)
BUN SERPL-MCNC: 11 MG/DL (ref 6–20)
CALCIUM SERPL-MCNC: 9.7 MG/DL (ref 8.7–10.5)
CHLORIDE SERPL-SCNC: 104 MMOL/L (ref 95–110)
CO2 SERPL-SCNC: 28 MMOL/L (ref 23–29)
CREAT SERPL-MCNC: 1 MG/DL (ref 0.5–1.4)
DIFFERENTIAL METHOD: NORMAL
EOSINOPHIL # BLD AUTO: 0.2 K/UL (ref 0–0.5)
EOSINOPHIL NFR BLD: 2.9 % (ref 0–8)
ERYTHROCYTE [DISTWIDTH] IN BLOOD BY AUTOMATED COUNT: 12 % (ref 11.5–14.5)
EST. GFR  (AFRICAN AMERICAN): >60 ML/MIN/1.73 M^2
EST. GFR  (NON AFRICAN AMERICAN): >60 ML/MIN/1.73 M^2
GLUCOSE SERPL-MCNC: 121 MG/DL (ref 70–110)
HCT VFR BLD AUTO: 43.4 % (ref 37–48.5)
HGB BLD-MCNC: 14.3 G/DL (ref 12–16)
IMM GRANULOCYTES # BLD AUTO: 0.01 K/UL (ref 0–0.04)
IMM GRANULOCYTES NFR BLD AUTO: 0.2 % (ref 0–0.5)
LYMPHOCYTES # BLD AUTO: 2 K/UL (ref 1–4.8)
LYMPHOCYTES NFR BLD: 30.7 % (ref 18–48)
MCH RBC QN AUTO: 29.5 PG (ref 27–31)
MCHC RBC AUTO-ENTMCNC: 32.9 G/DL (ref 32–36)
MCV RBC AUTO: 90 FL (ref 82–98)
MONOCYTES # BLD AUTO: 0.4 K/UL (ref 0.3–1)
MONOCYTES NFR BLD: 6.6 % (ref 4–15)
NEUTROPHILS # BLD AUTO: 3.9 K/UL (ref 1.8–7.7)
NEUTROPHILS NFR BLD: 58.7 % (ref 38–73)
NRBC BLD-RTO: 0 /100 WBC
PLATELET # BLD AUTO: 331 K/UL (ref 150–350)
PMV BLD AUTO: 10.6 FL (ref 9.2–12.9)
POTASSIUM SERPL-SCNC: 4 MMOL/L (ref 3.5–5.1)
PROT SERPL-MCNC: 7.3 G/DL (ref 6–8.4)
RBC # BLD AUTO: 4.84 M/UL (ref 4–5.4)
SODIUM SERPL-SCNC: 140 MMOL/L (ref 136–145)
WBC # BLD AUTO: 6.62 K/UL (ref 3.9–12.7)

## 2020-10-02 PROCEDURE — 80053 COMPREHEN METABOLIC PANEL: CPT

## 2020-10-02 PROCEDURE — U0003 INFECTIOUS AGENT DETECTION BY NUCLEIC ACID (DNA OR RNA); SEVERE ACUTE RESPIRATORY SYNDROME CORONAVIRUS 2 (SARS-COV-2) (CORONAVIRUS DISEASE [COVID-19]), AMPLIFIED PROBE TECHNIQUE, MAKING USE OF HIGH THROUGHPUT TECHNOLOGIES AS DESCRIBED BY CMS-2020-01-R: HCPCS

## 2020-10-02 PROCEDURE — 85025 COMPLETE CBC W/AUTO DIFF WBC: CPT

## 2020-10-02 PROCEDURE — 36415 COLL VENOUS BLD VENIPUNCTURE: CPT

## 2020-10-02 RX ORDER — SODIUM CHLORIDE, SODIUM LACTATE, POTASSIUM CHLORIDE, CALCIUM CHLORIDE 600; 310; 30; 20 MG/100ML; MG/100ML; MG/100ML; MG/100ML
INJECTION, SOLUTION INTRAVENOUS CONTINUOUS
Status: CANCELLED | OUTPATIENT
Start: 2020-10-02

## 2020-10-02 RX ORDER — CELECOXIB 200 MG/1
400 CAPSULE ORAL
Status: CANCELLED | OUTPATIENT
Start: 2020-10-02 | End: 2020-10-02

## 2020-10-02 RX ORDER — FAMOTIDINE 20 MG/1
20 TABLET, FILM COATED ORAL
Status: CANCELLED | OUTPATIENT
Start: 2020-10-02 | End: 2020-10-02

## 2020-10-02 RX ORDER — ACETAMINOPHEN 500 MG
1000 TABLET ORAL
Status: CANCELLED | OUTPATIENT
Start: 2020-10-02 | End: 2020-10-02

## 2020-10-02 NOTE — ANESTHESIA PREPROCEDURE EVALUATION
10/02/2020  Allyson Bahena is a 35 y.o., female.    Anesthesia Evaluation    I have reviewed the Patient Summary Reports.    I have reviewed the Nursing Notes. I have reviewed the NPO Status.   I have reviewed the Medications.     Review of Systems  Anesthesia Hx:  No problems with previous Anesthesia  Denies Family Hx of Anesthesia complications.  Personal Hx of Anesthesia complications, Post-Operative Nausea/Vomiting, with every anesthetic, despite treatment   Social:  Non-Smoker    Hematology/Oncology:  Hematology Normal   Oncology Normal     EENT/Dental:EENT/Dental Normal   Cardiovascular:  Cardiovascular Normal Exercise tolerance: good     Pulmonary:   Denies Asthma.    Renal/:  Renal/ Normal     Hepatic/GI:   GERD    Musculoskeletal:  Musculoskeletal Normal    Neurological:  Neurology Normal    Endocrine:   Diabetes Pre-diabetic   Dermatological:  Skin Normal    Psych:   depression          Physical Exam  General:  Obesity    Airway/Jaw/Neck:  Airway Findings: Mouth Opening: Normal Tongue: Normal  General Airway Assessment: Adult  Mallampati: I  TM Distance: Normal, at least 6 cm  Jaw/Neck Findings:  Neck ROM: Normal ROM      Dental:  Dental Findings: In tact             Anesthesia Plan  Type of Anesthesia, risks & benefits discussed:  Anesthesia Type:  general  Patient's Preference:   Intra-op Monitoring Plan: standard ASA monitors  Intra-op Monitoring Plan Comments:   Post Op Pain Control Plan: per primary service following discharge from PACU and multimodal analgesia  Post Op Pain Control Plan Comments:   Induction:   IV  Beta Blocker:         Informed Consent: Patient understands risks and agrees with Anesthesia plan.  Questions answered. Anesthesia consent signed with patient.  ASA Score: 2     Day of Surgery Review of History & Physical:    H&P update referred to the surgeon.          Ready For Surgery From Anesthesia Perspective.

## 2020-10-02 NOTE — DISCHARGE INSTRUCTIONS
Information to Prepare you for your Surgery    PRE-ADMIT TESTING -  [phone]    [address]  [facility]          Your surgery has been scheduled at [facility]. We are pleased to have the opportunity to serve you. For Further Information please call [phone].    On the day of surgery please report to the Information Desk on the 1st floor.    · CONTACT YOUR PHYSICIAN'S OFFICE THE DAY PRIOR TO YOUR SURGERY TO OBTAIN YOUR ARRIVAL TIME.     · The evening before surgery do not eat anything after 9 p.m. ( this includes hard candy, chewing gum and mints).  You may only have GATORADE, POWERADE AND WATER  from 9 p.m. until you leave your home.   DO NOT DRINK ANY LIQUIDS ON THE WAY TO THE HOSPITAL.      SPECIAL MEDICATION INSTRUCTIONS: TAKE medications checked off by the Anesthesiologist on your Medication List.    Angiogram Patients: Take medications as instructed by your physician, including aspirin.     Surgery Patients:    If you take ASPIRIN - Your PHYSICIAN/SURGEON will need to inform you IF/OR when you need to stop taking aspirin prior to your surgery.     Do Not take any medications containing IBUPROFEN.  Do Not Wear any make-up or dark nail polish   (especially eye make-up) to surgery. If you come to surgery with makeup on you will be required to remove the makeup or nail polish.    Do not shave your surgical area at least 5 days prior to your surgery. The surgical prep will be performed at the hospital according to Infection Control regulations.    Leave all valuables at home.   Do Not wear any jewelry or watches, including any metal in body piercings. Jewelry must be removed prior to coming to the hospital.  There is a possibility that rings that are unable to be removed may be cut off if they are on the surgical extremity.    Contact Lens must be removed before surgery. Either do not wear the contact lens or bring a case and solution for  storage.  Please bring a container for eyeglasses or dentures as required.  Bring any paperwork your physician has provided, such as consent forms,  history and physicals, doctor's orders, etc.   Bring comfortable clothes that are loose fitting to wear upon discharge. Take into consideration the type of surgery being performed.  Maintain your diet as advised per your physician the day prior to surgery.      Adequate rest the night before surgery is advised.   Park in the Parking lot behind the hospital or in the [facility] across the street from the parking lot. Parking is complimentary.  If you will be discharged the same day as your procedure, please arrange for a responsible adult to drive you home or to accompany you if traveling by taxi.   YOU WILL NOT BE PERMITTED TO DRIVE OR TO LEAVE THE HOSPITAL ALONE AFTER SURGERY.   If you are being discharged the same day, it is strongly recommended that you arrange for someone to remain with you for the first 24 hrs following your surgery.    The Surgeon will speak to your family/visitor after your surgery regarding the outcome of your surgery and post op care.  The Surgeon may speak to you after your surgery, but there is a possibility you may not remember the details.  Please check with your family members regarding the conversation with the Surgeon.    We strongly recommend whoever is bringing you home be present for discharge instructions.  This will ensure a thorough understanding for your post op home care.    ALL CHILDREN MUST ALWAYS BE ACCOMPANIED BY AN ADULT.    Visitors-Refer to current Visitor policy handouts.    Thank you for your cooperation.  The Staff of [facility].                Bathing Instructions with Hibiclens     Shower the evening before and morning of your procedure with Hibiclens:   Wash your face with water and your regular face wash/soap   Apply Hibiclens directly on your skin or on a wet washcloth and wash  gently. When showering: Move away from the shower stream when applying Hibiclens to avoid rinsing off too soon.   Rinse thoroughly with warm water   Do not dilute Hibiclens         Dry off as usual, do not use any deodorant, powder, body lotions, perfume, after shave or cologne.

## 2020-10-03 LAB — SARS-COV-2 RNA RESP QL NAA+PROBE: NOT DETECTED

## 2020-10-05 ENCOUNTER — HOSPITAL ENCOUNTER (INPATIENT)
Facility: OTHER | Age: 35
LOS: 3 days | Discharge: HOME | DRG: 585 | End: 2020-10-08
Attending: SURGERY | Admitting: SURGERY
Payer: COMMERCIAL

## 2020-10-05 ENCOUNTER — ANESTHESIA (OUTPATIENT)
Dept: SURGERY | Facility: OTHER | Age: 35
DRG: 585 | End: 2020-10-05
Payer: COMMERCIAL

## 2020-10-05 DIAGNOSIS — Z15.09 BRCA2 POSITIVE: Primary | ICD-10-CM

## 2020-10-05 DIAGNOSIS — Z15.01 BRCA2 GENE MUTATION POSITIVE: ICD-10-CM

## 2020-10-05 DIAGNOSIS — Z15.09 BRCA2 GENE MUTATION POSITIVE: ICD-10-CM

## 2020-10-05 DIAGNOSIS — Z15.01 BRCA2 POSITIVE: Primary | ICD-10-CM

## 2020-10-05 LAB — POCT GLUCOSE: 88 MG/DL (ref 70–110)

## 2020-10-05 PROCEDURE — 27800903 OPTIME MED/SURG SUP & DEVICES OTHER IMPLANTS: Performed by: SURGERY

## 2020-10-05 PROCEDURE — 99900035 HC TECH TIME PER 15 MIN (STAT)

## 2020-10-05 PROCEDURE — 63600175 PHARM REV CODE 636 W HCPCS: Performed by: NURSE ANESTHETIST, CERTIFIED REGISTERED

## 2020-10-05 PROCEDURE — 27201423 OPTIME MED/SURG SUP & DEVICES STERILE SUPPLY: Performed by: SURGERY

## 2020-10-05 PROCEDURE — 37000009 HC ANESTHESIA EA ADD 15 MINS: Performed by: SURGERY

## 2020-10-05 PROCEDURE — 88307 TISSUE EXAM BY PATHOLOGIST: CPT | Performed by: PATHOLOGY

## 2020-10-05 PROCEDURE — P9045 ALBUMIN (HUMAN), 5%, 250 ML: HCPCS | Mod: JG | Performed by: NURSE ANESTHETIST, CERTIFIED REGISTERED

## 2020-10-05 PROCEDURE — 94799 UNLISTED PULMONARY SVC/PX: CPT

## 2020-10-05 PROCEDURE — 25000003 PHARM REV CODE 250: Performed by: SURGERY

## 2020-10-05 PROCEDURE — 19303 MAST SIMPLE COMPLETE: CPT | Mod: 50,,, | Performed by: SURGERY

## 2020-10-05 PROCEDURE — 88307 PR  SURG PATH,LEVEL V: ICD-10-PCS | Mod: 26,,, | Performed by: PATHOLOGY

## 2020-10-05 PROCEDURE — 88307 TISSUE EXAM BY PATHOLOGIST: CPT | Mod: 26,,, | Performed by: PATHOLOGY

## 2020-10-05 PROCEDURE — 63600175 PHARM REV CODE 636 W HCPCS: Performed by: SURGERY

## 2020-10-05 PROCEDURE — 94761 N-INVAS EAR/PLS OXIMETRY MLT: CPT

## 2020-10-05 PROCEDURE — 36000709 HC OR TIME LEV III EA ADD 15 MIN: Performed by: SURGERY

## 2020-10-05 PROCEDURE — 25000003 PHARM REV CODE 250: Performed by: ANESTHESIOLOGY

## 2020-10-05 PROCEDURE — 27000221 HC OXYGEN, UP TO 24 HOURS

## 2020-10-05 PROCEDURE — 63600175 PHARM REV CODE 636 W HCPCS: Performed by: ANESTHESIOLOGY

## 2020-10-05 PROCEDURE — 25000003 PHARM REV CODE 250: Performed by: NURSE ANESTHETIST, CERTIFIED REGISTERED

## 2020-10-05 PROCEDURE — C1729 CATH, DRAINAGE: HCPCS | Performed by: SURGERY

## 2020-10-05 PROCEDURE — 36000708 HC OR TIME LEV III 1ST 15 MIN: Performed by: SURGERY

## 2020-10-05 PROCEDURE — 20000000 HC ICU ROOM

## 2020-10-05 PROCEDURE — 82962 GLUCOSE BLOOD TEST: CPT | Performed by: SURGERY

## 2020-10-05 PROCEDURE — 19303 PR MASTECTOMY, SIMPLE, COMPLETE: ICD-10-PCS | Mod: 50,,, | Performed by: SURGERY

## 2020-10-05 PROCEDURE — 37000008 HC ANESTHESIA 1ST 15 MINUTES: Performed by: SURGERY

## 2020-10-05 PROCEDURE — C9290 INJ, BUPIVACAINE LIPOSOME: HCPCS | Performed by: SURGERY

## 2020-10-05 PROCEDURE — S0020 INJECTION, BUPIVICAINE HYDRO: HCPCS | Performed by: SURGERY

## 2020-10-05 DEVICE — CONNECTOR NERVE 2X15MM: Type: IMPLANTABLE DEVICE | Site: BREAST | Status: FUNCTIONAL

## 2020-10-05 DEVICE — COUPLER MICROVAS ANSTMS 3.0MM: Type: IMPLANTABLE DEVICE | Site: BREAST | Status: FUNCTIONAL

## 2020-10-05 DEVICE — GRAFT AVANCE NERVE 1-2MMX70MM: Type: IMPLANTABLE DEVICE | Site: BREAST | Status: FUNCTIONAL

## 2020-10-05 DEVICE — CONNECTOR NRV AXOGUARD 3X15MM: Type: IMPLANTABLE DEVICE | Site: BREAST | Status: FUNCTIONAL

## 2020-10-05 RX ORDER — ONDANSETRON 2 MG/ML
4 INJECTION INTRAMUSCULAR; INTRAVENOUS EVERY 6 HOURS PRN
Status: DISCONTINUED | OUTPATIENT
Start: 2020-10-05 | End: 2020-10-08 | Stop reason: HOSPADM

## 2020-10-05 RX ORDER — MEPERIDINE HYDROCHLORIDE 25 MG/ML
12.5 INJECTION INTRAMUSCULAR; INTRAVENOUS; SUBCUTANEOUS ONCE AS NEEDED
Status: DISCONTINUED | OUTPATIENT
Start: 2020-10-05 | End: 2020-10-06

## 2020-10-05 RX ORDER — CEFAZOLIN SODIUM 2 G/50ML
2 SOLUTION INTRAVENOUS
Status: COMPLETED | OUTPATIENT
Start: 2020-10-05 | End: 2020-10-05

## 2020-10-05 RX ORDER — ONDANSETRON 2 MG/ML
4 INJECTION INTRAMUSCULAR; INTRAVENOUS DAILY PRN
Status: DISCONTINUED | OUTPATIENT
Start: 2020-10-05 | End: 2020-10-08 | Stop reason: HOSPADM

## 2020-10-05 RX ORDER — HYDROCODONE BITARTRATE AND ACETAMINOPHEN 10; 325 MG/1; MG/1
1 TABLET ORAL EVERY 4 HOURS PRN
Status: DISCONTINUED | OUTPATIENT
Start: 2020-10-05 | End: 2020-10-08 | Stop reason: HOSPADM

## 2020-10-05 RX ORDER — SODIUM CHLORIDE 0.9 % (FLUSH) 0.9 %
3 SYRINGE (ML) INJECTION
Status: DISCONTINUED | OUTPATIENT
Start: 2020-10-05 | End: 2020-10-05

## 2020-10-05 RX ORDER — HEPARIN SODIUM 10000 [USP'U]/ML
INJECTION, SOLUTION INTRAVENOUS; SUBCUTANEOUS
Status: DISCONTINUED | OUTPATIENT
Start: 2020-10-05 | End: 2020-10-05 | Stop reason: HOSPADM

## 2020-10-05 RX ORDER — CEPHALEXIN 500 MG/1
500 CAPSULE ORAL EVERY 6 HOURS
Status: DISCONTINUED | OUTPATIENT
Start: 2020-10-06 | End: 2020-10-08 | Stop reason: HOSPADM

## 2020-10-05 RX ORDER — HEPARIN SODIUM 5000 [USP'U]/ML
INJECTION, SOLUTION INTRAVENOUS; SUBCUTANEOUS
Status: DISCONTINUED | OUTPATIENT
Start: 2020-10-05 | End: 2020-10-05

## 2020-10-05 RX ORDER — INDOCYANINE GREEN AND WATER 25 MG
KIT INJECTION
Status: DISCONTINUED | OUTPATIENT
Start: 2020-10-05 | End: 2020-10-05

## 2020-10-05 RX ORDER — CEFAZOLIN SODIUM 1 G/3ML
2 INJECTION, POWDER, FOR SOLUTION INTRAMUSCULAR; INTRAVENOUS
Status: COMPLETED | OUTPATIENT
Start: 2020-10-05 | End: 2020-10-06

## 2020-10-05 RX ORDER — DIPHENHYDRAMINE HCL 25 MG
25 CAPSULE ORAL EVERY 4 HOURS PRN
Status: DISCONTINUED | OUTPATIENT
Start: 2020-10-05 | End: 2020-10-08 | Stop reason: HOSPADM

## 2020-10-05 RX ORDER — LIDOCAINE HYDROCHLORIDE 10 MG/ML
INJECTION, SOLUTION INTRAVENOUS
Status: DISCONTINUED | OUTPATIENT
Start: 2020-10-05 | End: 2020-10-05

## 2020-10-05 RX ORDER — ROCURONIUM BROMIDE 10 MG/ML
INJECTION, SOLUTION INTRAVENOUS
Status: DISCONTINUED | OUTPATIENT
Start: 2020-10-05 | End: 2020-10-05

## 2020-10-05 RX ORDER — HYDROMORPHONE HYDROCHLORIDE 1 MG/ML
1 INJECTION, SOLUTION INTRAMUSCULAR; INTRAVENOUS; SUBCUTANEOUS EVERY 4 HOURS PRN
Status: DISCONTINUED | OUTPATIENT
Start: 2020-10-05 | End: 2020-10-08 | Stop reason: HOSPADM

## 2020-10-05 RX ORDER — MIDAZOLAM HYDROCHLORIDE 1 MG/ML
INJECTION INTRAMUSCULAR; INTRAVENOUS
Status: DISCONTINUED | OUTPATIENT
Start: 2020-10-05 | End: 2020-10-05

## 2020-10-05 RX ORDER — DOCUSATE SODIUM 100 MG/1
100 CAPSULE, LIQUID FILLED ORAL EVERY 12 HOURS
Status: DISCONTINUED | OUTPATIENT
Start: 2020-10-05 | End: 2020-10-08 | Stop reason: HOSPADM

## 2020-10-05 RX ORDER — DEXAMETHASONE SODIUM PHOSPHATE 4 MG/ML
INJECTION, SOLUTION INTRA-ARTICULAR; INTRALESIONAL; INTRAMUSCULAR; INTRAVENOUS; SOFT TISSUE
Status: DISCONTINUED | OUTPATIENT
Start: 2020-10-05 | End: 2020-10-05

## 2020-10-05 RX ORDER — PROPOFOL 10 MG/ML
VIAL (ML) INTRAVENOUS
Status: DISCONTINUED | OUTPATIENT
Start: 2020-10-05 | End: 2020-10-05

## 2020-10-05 RX ORDER — ALBUMIN HUMAN 50 G/1000ML
SOLUTION INTRAVENOUS CONTINUOUS PRN
Status: DISCONTINUED | OUTPATIENT
Start: 2020-10-05 | End: 2020-10-05

## 2020-10-05 RX ORDER — ACETAMINOPHEN 500 MG
1000 TABLET ORAL
Status: COMPLETED | OUTPATIENT
Start: 2020-10-05 | End: 2020-10-05

## 2020-10-05 RX ORDER — SODIUM CHLORIDE, SODIUM LACTATE, POTASSIUM CHLORIDE, CALCIUM CHLORIDE 600; 310; 30; 20 MG/100ML; MG/100ML; MG/100ML; MG/100ML
INJECTION, SOLUTION INTRAVENOUS CONTINUOUS
Status: DISCONTINUED | OUTPATIENT
Start: 2020-10-05 | End: 2020-10-06

## 2020-10-05 RX ORDER — FENTANYL CITRATE 50 UG/ML
INJECTION, SOLUTION INTRAMUSCULAR; INTRAVENOUS
Status: DISCONTINUED | OUTPATIENT
Start: 2020-10-05 | End: 2020-10-05

## 2020-10-05 RX ORDER — CELECOXIB 200 MG/1
400 CAPSULE ORAL
Status: COMPLETED | OUTPATIENT
Start: 2020-10-05 | End: 2020-10-05

## 2020-10-05 RX ORDER — FAMOTIDINE 20 MG/1
20 TABLET, FILM COATED ORAL
Status: COMPLETED | OUTPATIENT
Start: 2020-10-05 | End: 2020-10-05

## 2020-10-05 RX ORDER — SODIUM CHLORIDE 9 MG/ML
INJECTION, SOLUTION INTRAVENOUS CONTINUOUS
Status: DISCONTINUED | OUTPATIENT
Start: 2020-10-05 | End: 2020-10-05

## 2020-10-05 RX ORDER — CEFAZOLIN SODIUM 2 G/50ML
2 SOLUTION INTRAVENOUS
Status: DISCONTINUED | OUTPATIENT
Start: 2020-10-05 | End: 2020-10-05 | Stop reason: SDUPTHER

## 2020-10-05 RX ORDER — ONDANSETRON 2 MG/ML
INJECTION INTRAMUSCULAR; INTRAVENOUS
Status: DISCONTINUED | OUTPATIENT
Start: 2020-10-05 | End: 2020-10-05

## 2020-10-05 RX ORDER — ONDANSETRON 8 MG/1
8 TABLET, ORALLY DISINTEGRATING ORAL EVERY 6 HOURS PRN
Status: DISCONTINUED | OUTPATIENT
Start: 2020-10-05 | End: 2020-10-08 | Stop reason: HOSPADM

## 2020-10-05 RX ORDER — SODIUM CHLORIDE, SODIUM LACTATE, POTASSIUM CHLORIDE, CALCIUM CHLORIDE 600; 310; 30; 20 MG/100ML; MG/100ML; MG/100ML; MG/100ML
INJECTION, SOLUTION INTRAVENOUS CONTINUOUS
Status: DISCONTINUED | OUTPATIENT
Start: 2020-10-05 | End: 2020-10-05

## 2020-10-05 RX ORDER — PHENYLEPHRINE HYDROCHLORIDE 10 MG/ML
INJECTION INTRAVENOUS
Status: DISCONTINUED | OUTPATIENT
Start: 2020-10-05 | End: 2020-10-05

## 2020-10-05 RX ORDER — PAPAVERINE HYDROCHLORIDE 30 MG/ML
INJECTION INTRAMUSCULAR; INTRAVENOUS
Status: DISCONTINUED | OUTPATIENT
Start: 2020-10-05 | End: 2020-10-05 | Stop reason: HOSPADM

## 2020-10-05 RX ORDER — NEOSTIGMINE METHYLSULFATE 1 MG/ML
INJECTION, SOLUTION INTRAVENOUS
Status: DISCONTINUED | OUTPATIENT
Start: 2020-10-05 | End: 2020-10-05

## 2020-10-05 RX ORDER — BUPIVACAINE HYDROCHLORIDE 5 MG/ML
INJECTION, SOLUTION EPIDURAL; INTRACAUDAL
Status: DISCONTINUED | OUTPATIENT
Start: 2020-10-05 | End: 2020-10-05 | Stop reason: HOSPADM

## 2020-10-05 RX ORDER — HYDROMORPHONE HYDROCHLORIDE 2 MG/ML
0.4 INJECTION, SOLUTION INTRAMUSCULAR; INTRAVENOUS; SUBCUTANEOUS EVERY 5 MIN PRN
Status: DISCONTINUED | OUTPATIENT
Start: 2020-10-05 | End: 2020-10-06

## 2020-10-05 RX ORDER — HEPARIN SODIUM 5000 [USP'U]/ML
5000 INJECTION, SOLUTION INTRAVENOUS; SUBCUTANEOUS EVERY 8 HOURS
Status: DISCONTINUED | OUTPATIENT
Start: 2020-10-05 | End: 2020-10-08 | Stop reason: HOSPADM

## 2020-10-05 RX ORDER — PROMETHAZINE HYDROCHLORIDE 25 MG/ML
INJECTION, SOLUTION INTRAMUSCULAR; INTRAVENOUS
Status: DISCONTINUED | OUTPATIENT
Start: 2020-10-05 | End: 2020-10-05

## 2020-10-05 RX ORDER — VECURONIUM BROMIDE FOR INJECTION 1 MG/ML
INJECTION, POWDER, LYOPHILIZED, FOR SOLUTION INTRAVENOUS
Status: DISCONTINUED | OUTPATIENT
Start: 2020-10-05 | End: 2020-10-05

## 2020-10-05 RX ADMIN — SODIUM CHLORIDE, SODIUM LACTATE, POTASSIUM CHLORIDE, AND CALCIUM CHLORIDE: .6; .31; .03; .02 INJECTION, SOLUTION INTRAVENOUS at 05:10

## 2020-10-05 RX ADMIN — VECURONIUM BROMIDE FOR INJECTION 2 MG: 1 INJECTION, POWDER, LYOPHILIZED, FOR SOLUTION INTRAVENOUS at 12:10

## 2020-10-05 RX ADMIN — ALBUMIN (HUMAN): 2.5 SOLUTION INTRAVENOUS at 07:10

## 2020-10-05 RX ADMIN — PROPOFOL 20 MG: 10 INJECTION, EMULSION INTRAVENOUS at 02:10

## 2020-10-05 RX ADMIN — PROPOFOL 20 MG: 10 INJECTION, EMULSION INTRAVENOUS at 10:10

## 2020-10-05 RX ADMIN — CEFAZOLIN 2 G: 330 INJECTION, POWDER, FOR SOLUTION INTRAMUSCULAR; INTRAVENOUS at 10:10

## 2020-10-05 RX ADMIN — VECURONIUM BROMIDE FOR INJECTION 2 MG: 1 INJECTION, POWDER, LYOPHILIZED, FOR SOLUTION INTRAVENOUS at 10:10

## 2020-10-05 RX ADMIN — PROMETHAZINE HYDROCHLORIDE 6.25 MG: 25 INJECTION INTRAMUSCULAR; INTRAVENOUS at 10:10

## 2020-10-05 RX ADMIN — HYDROCODONE BITARTRATE AND ACETAMINOPHEN 1 TABLET: 10; 325 TABLET ORAL at 08:10

## 2020-10-05 RX ADMIN — INDOCYANINE GREEN 10 MG: KIT INTRAVENOUS at 02:10

## 2020-10-05 RX ADMIN — ROCURONIUM BROMIDE 30 MG: 10 SOLUTION INTRAVENOUS at 08:10

## 2020-10-05 RX ADMIN — LIDOCAINE HYDROCHLORIDE 100 MG: 10 INJECTION, SOLUTION INTRAVENOUS at 07:10

## 2020-10-05 RX ADMIN — CEFAZOLIN SODIUM 2 G: 2 SOLUTION INTRAVENOUS at 10:10

## 2020-10-05 RX ADMIN — HYDROMORPHONE HYDROCHLORIDE 1 MG: 1 INJECTION, SOLUTION INTRAMUSCULAR; INTRAVENOUS; SUBCUTANEOUS at 05:10

## 2020-10-05 RX ADMIN — CARBOXYMETHYLCELLULOSE SODIUM 3 DROP: 2.5 SOLUTION/ DROPS OPHTHALMIC at 07:10

## 2020-10-05 RX ADMIN — ONDANSETRON HYDROCHLORIDE 4 MG: 2 INJECTION INTRAMUSCULAR; INTRAVENOUS at 03:10

## 2020-10-05 RX ADMIN — HYDROMORPHONE HYDROCHLORIDE 1 MG: 1 INJECTION, SOLUTION INTRAMUSCULAR; INTRAVENOUS; SUBCUTANEOUS at 10:10

## 2020-10-05 RX ADMIN — ALBUMIN (HUMAN): 2.5 SOLUTION INTRAVENOUS at 08:10

## 2020-10-05 RX ADMIN — DOCUSATE SODIUM 100 MG: 100 CAPSULE, LIQUID FILLED ORAL at 08:10

## 2020-10-05 RX ADMIN — SODIUM CHLORIDE, SODIUM LACTATE, POTASSIUM CHLORIDE, AND CALCIUM CHLORIDE: 600; 310; 30; 20 INJECTION, SOLUTION INTRAVENOUS at 06:10

## 2020-10-05 RX ADMIN — ACETAMINOPHEN 1000 MG: 500 TABLET, FILM COATED ORAL at 05:10

## 2020-10-05 RX ADMIN — GLYCOPYRROLATE 0.2 MG: 0.2 INJECTION, SOLUTION INTRAMUSCULAR; INTRAVITREAL at 08:10

## 2020-10-05 RX ADMIN — PHENYLEPHRINE HYDROCHLORIDE 100 MCG: 10 INJECTION INTRAVENOUS at 09:10

## 2020-10-05 RX ADMIN — CEFAZOLIN SODIUM 2 G: 2 SOLUTION INTRAVENOUS at 03:10

## 2020-10-05 RX ADMIN — PHENYLEPHRINE HYDROCHLORIDE 100 MCG: 10 INJECTION INTRAVENOUS at 12:10

## 2020-10-05 RX ADMIN — MIDAZOLAM HYDROCHLORIDE 2 MG: 1 INJECTION, SOLUTION INTRAMUSCULAR; INTRAVENOUS at 01:10

## 2020-10-05 RX ADMIN — VECURONIUM BROMIDE FOR INJECTION 2 MG: 1 INJECTION, POWDER, LYOPHILIZED, FOR SOLUTION INTRAVENOUS at 01:10

## 2020-10-05 RX ADMIN — CEFAZOLIN SODIUM 2 G: 2 SOLUTION INTRAVENOUS at 07:10

## 2020-10-05 RX ADMIN — VECURONIUM BROMIDE FOR INJECTION 1 MG: 1 INJECTION, POWDER, LYOPHILIZED, FOR SOLUTION INTRAVENOUS at 02:10

## 2020-10-05 RX ADMIN — PROMETHAZINE HYDROCHLORIDE 12.5 MG: 25 INJECTION INTRAMUSCULAR; INTRAVENOUS at 03:10

## 2020-10-05 RX ADMIN — FENTANYL CITRATE 100 MCG: 50 INJECTION, SOLUTION INTRAMUSCULAR; INTRAVENOUS at 07:10

## 2020-10-05 RX ADMIN — GLYCOPYRROLATE 0.8 MG: 0.2 INJECTION, SOLUTION INTRAMUSCULAR; INTRAVITREAL at 03:10

## 2020-10-05 RX ADMIN — PHENYLEPHRINE HYDROCHLORIDE 200 MCG: 10 INJECTION INTRAVENOUS at 11:10

## 2020-10-05 RX ADMIN — HEPARIN SODIUM 5000 UNITS: 5000 INJECTION INTRAVENOUS; SUBCUTANEOUS at 10:10

## 2020-10-05 RX ADMIN — PROPOFOL 200 MG: 10 INJECTION, EMULSION INTRAVENOUS at 07:10

## 2020-10-05 RX ADMIN — PHENYLEPHRINE HYDROCHLORIDE 100 MCG: 10 INJECTION INTRAVENOUS at 01:10

## 2020-10-05 RX ADMIN — PHENYLEPHRINE HYDROCHLORIDE 100 MCG: 10 INJECTION INTRAVENOUS at 10:10

## 2020-10-05 RX ADMIN — PROPOFOL 30 MG: 10 INJECTION, EMULSION INTRAVENOUS at 01:10

## 2020-10-05 RX ADMIN — FAMOTIDINE 20 MG: 20 TABLET, FILM COATED ORAL at 05:10

## 2020-10-05 RX ADMIN — DEXAMETHASONE SODIUM PHOSPHATE 4 MG: 4 INJECTION, SOLUTION INTRAMUSCULAR; INTRAVENOUS at 03:10

## 2020-10-05 RX ADMIN — VECURONIUM BROMIDE FOR INJECTION 2 MG: 1 INJECTION, POWDER, LYOPHILIZED, FOR SOLUTION INTRAVENOUS at 09:10

## 2020-10-05 RX ADMIN — HEPARIN SODIUM 5000 UNITS: 5000 INJECTION, SOLUTION INTRAVENOUS; SUBCUTANEOUS at 12:10

## 2020-10-05 RX ADMIN — ROCURONIUM BROMIDE 50 MG: 10 SOLUTION INTRAVENOUS at 07:10

## 2020-10-05 RX ADMIN — CELECOXIB 400 MG: 200 CAPSULE ORAL at 05:10

## 2020-10-05 RX ADMIN — ROCURONIUM BROMIDE 20 MG: 10 SOLUTION INTRAVENOUS at 07:10

## 2020-10-05 RX ADMIN — SODIUM CHLORIDE, SODIUM LACTATE, POTASSIUM CHLORIDE, AND CALCIUM CHLORIDE: 600; 310; 30; 20 INJECTION, SOLUTION INTRAVENOUS at 10:10

## 2020-10-05 RX ADMIN — MIDAZOLAM HYDROCHLORIDE 2 MG: 1 INJECTION, SOLUTION INTRAMUSCULAR; INTRAVENOUS at 07:10

## 2020-10-05 RX ADMIN — PROPOFOL 40 MG: 10 INJECTION, EMULSION INTRAVENOUS at 11:10

## 2020-10-05 RX ADMIN — NEOSTIGMINE METHYLSULFATE 5 MG: 1 INJECTION INTRAVENOUS at 03:10

## 2020-10-05 RX ADMIN — SODIUM CHLORIDE, SODIUM LACTATE, POTASSIUM CHLORIDE, AND CALCIUM CHLORIDE: 600; 310; 30; 20 INJECTION, SOLUTION INTRAVENOUS at 02:10

## 2020-10-05 RX ADMIN — ONDANSETRON 4 MG: 2 INJECTION INTRAMUSCULAR; INTRAVENOUS at 05:10

## 2020-10-05 NOTE — OP NOTE
[facility]  Operative Note    SUMMARY     Surgery Date: 10/5/2020     Surgeons:     Ron Carrasco MD Surgeon  Kamran Khoobehi, MD Assistant surgeon        Pre-op Diagnosis:  Genetic susceptibility to malignant neoplasm of breast [Z15.01]    Post-op Diagnosis:  Post-Op Diagnosis Codes:     * Genetic susceptibility to malignant neoplasm of breast [Z15.01]     * BRCA2 positive [Z15.01, Z15.09]     * At high risk for breast cancer [Z91.89]    Procedure:    1.) Left breast reconstruction with CARMEN flap  2.) Right breast reconstruction with CARMEN flap  3.) Left breast advanced nerve reconstruction with cadaveric nerve graft   4.) Right breast advanced nerve reconstruction with cadaveric nerve graft  5.) Spy visualization of right breast  6.) Spy visualization to left breast    Anesthesia: General    Drains: 2 15 round drains to the abdomen and 1 15 round drain to each breast    Complications: None    Condition: Stable    Operative procedure in detail: After risks and benefits were thoroughly discussed with the patient and the patient expressed verbal understanding, informed consent was obtained.  The patient was subsequently taken to the Operating Room, placed supine on the operating room table.  After appropriate general anesthesia was provided, the patient's breast and abdomen were prepped and draped in standard surgical fashion.      Dr. Cantu began the mastectomy portion of the case while Dr. Kamran Khoobehi and I began elevating the savannah-abdominal flaps.  Superior and inferior incisions were then made with a skin knife and Bovie cautery.  The flaps were then mobilized from lateral to medial with the Bovie cautery until we encountered the lateral edges of the rectus muscle.  We then switched to bipolar cautery.  We came across 4 medial row perforators on the left side and 2 lateral row perforators on the right side.   dissection was performed with the bipolar cautery and fascial  incisions were then made with a skin knife and intramuscular dissection was then performed with the bipolar cautery.  Dissection was carried down to the underlying pedicle.  Pedicle dissection was then performed until we had adequate length to the pedicle.  A sensory nerve was identified and preserved for future nerve reconstruction.  The opposite savannah-abdominal flap was then dissected out in the previous fashion.      Once Dr. Cantu was finished with the mastectomy portion of the case, internal mammary artery and vein exposure and intercostal nerve exposure was performed.  The pectoralis muscle was mobilized from medial to lateral.  Two ribs were identified and the superior and inferior costal cartilages were then scored and [place] elevator was then used to elevate the perichondrium off of the inferior rib.  Rongeur was then used to remove a portion of this rib.  Intercostal muscles and perichondrium was then removed with bipolar cautery.  The internal mammary artery and vein was then exposed.  It was dissected superiorly until we came across the intercostal nerve.    Intercostal nerve was dissected free and identified.  Next, the [place] was then used to elevate the perichondrium off of the superior rib.  Costal cartilage was then used to remove this portion of the rib and adequate exposure was then performed to the internal mammary artery and vein and intercostal nerve. Likewise exposure was performed on the opposite side.      Next, the left hemiabdominal flap was then transected and weighed 1278 and was brought into the right mastectomy defect.  The volume of the right breast was 1334.  A venous  was then used to anastomose the antegrade internal mammary vein to the flap vein using a 3.0 venous .  The flap artery was then hooked up to the antegrade internal mammary artery in an end-to-end fashion with a 9-0 running nylon suture.  Next, an Axogen cadaveric nerve graft was then selected with the nerve  protector and was sewn to the sensory nerve of the flap with a 9-0 nylon suture.  The other end of the nerve graft was then sewn to the recipient intercostal nerve again with a 9-0 nylon suture.  The protector was used on both ends and was secured with 9-0 nylon suture.  Next, the superficial vein was then hooked up to the retrograde IMV with a 3.0 mm .  At the conclusion of the anastomosis, there was good flow throughout the flap.      Attention was directed towards the opposite side.  The opposite savannah-abdominal flap was then transected and brought into the left breast wound.  The flap weighed 1114 grams and the left breast weighed 1352 grams.  A venous  was then used to anastomose the antegrade internal mammary vein to the flap vein using a 3.0 venous .  The flap artery was then hooked up to the antegrade internal mammary artery in an end-to-end fashion with a 9-0 running nylon suture.  Next, an Axogen cadaveric nerve graft was then selected with the nerve protector and was sewn to the sensory nerve of the flap with a 9-0 nylon suture.  The other end of the nerve graft was then sewn to the recipient intercostal nerve again with a 9-0 nylon suture.  The protector was used on both ends and was secured with 9-0 nylon suture.  Next, the superficial vein was then hooked up to the retrograde IMV with a 2.5 mm .  At the conclusion of the anastomosis, there was good flow throughout the flap.      The flaps were then deepithelialized and SPY visualization was used to look at the perfusion of the flaps.  There was good flow throughout the flaps.  The flaps were then placed into the breast wound and was shaped and secured with 2-0 Vicryl suture.  A 15 round drain was placed to each breast and remaining closure was performed with a 2-0 Quill suture.  4-0 vicryl was used for reinforcement sutures.  Doppler signal was located at the  and this was marked with a 4-0 Prolene suture.       Next, attention was directed towards the abdomen.  The abdominal fascial incisions were then closed with 0 Quill suture in two layers.  The upper abdominal flap was then mobilized towards the xiphoid with Bovie cautery.  The diastasis was then repaired in two layers with a #2 Quill suture.  Next, Exparel was used in the diastasis repair as well as the fascial incisions and the lower abdominal incision.  The upper abdominal flap was then secured to the lower abdominal incision with a 2-0 Vicryl suture.  The skin was then tailor-tacked with skin staples.  The new area for the umbilicus was then marked.  Closure of the abdomen was then performed with 0 Quill suture in two layers incorporating Diogo's and the subcutaneous tissue.  A 2-0 Quill was then used to close the skin.  The new area for the umbilicus was then incised and the umbilicus was then inset with 3-0 Monocryl deep dermal sutures incorporating the fascia and a 2-0 Quill subcuticular stitch.  Dermabond tape was applied to the lower abdominal incision.  Two 15 round drains were placed in the abdomen.  Postoperative girdle and bra was placed.  The sponge, needle and instrument counts were correct at the end of case.  The patient tolerated the procedure well and was transferred to Recovery Room in stable condition.             Description of the findings of the procedure: As above    Estimated Blood Loss: 150 mL         Specimens:   Specimen (12h ago, onward)    None

## 2020-10-05 NOTE — OR NURSING
RIGHT MASTECTOMY: 1334g  RIGHT CARMEN FLAP: 1278g  RIGHT COUPLERS: 3.0mm x 2  4 medial perforators     LEFT MASTECTOMY: 1352g  LEFT CARMEN: 1114g  LEFT COUPLERS: 3.0mm and 2.5mm  2 lateral perforators

## 2020-10-05 NOTE — ANESTHESIA POSTPROCEDURE EVALUATION
Anesthesia Post Evaluation    Patient: Allyson Bahena    Procedure(s) Performed: Procedure(s) (LRB):  MASTECTOMY, BILATERAL (Bilateral)  RECONSTRUCTION, BREAST, USING CARMEN SKIN FLAP (Bilateral)    Final Anesthesia Type: general    Patient location during evaluation: ICU  Patient participation: Yes- Able to Participate  Level of consciousness: awake and alert  Post-procedure vital signs: reviewed and stable  Pain management: adequate  Airway patency: patent    PONV status at discharge: No PONV  Anesthetic complications: no      Cardiovascular status: blood pressure returned to baseline  Respiratory status: oral airway and nasal cannula  Hydration status: euvolemic  Follow-up not needed.          Vitals Value Taken Time   /85 10/05/20 0531   Temp 36.6 °C (97.8 °F) 10/05/20 0531   Pulse 80 10/05/20 0531   Resp 16 10/05/20 0531   SpO2 99 % 10/05/20 0531         No case tracking events are documented in the log.      Pain/Yefri Score: Pain Rating Prior to Med Admin: 0 (10/5/2020  5:44 AM)

## 2020-10-05 NOTE — INTERVAL H&P NOTE
The patient has been examined and the H&P has been reviewed:    I concur with the findings and no changes have occurred since H&P was written.    Surgery risks, benefits and alternative options discussed and understood by patient/family.          Active Hospital Problems    Diagnosis  POA    BRCA2 positive [Z15.01, Z15.09]  Yes      Resolved Hospital Problems   No resolved problems to display.

## 2020-10-05 NOTE — OP NOTE
[facility]  Operative Note    SUMMARY     Surgery Date: 10/5/2020     Surgeons:     Kamran Khoobehi, MD Surgeon  Ron Carrasco MD Assistant surgeon          Pre-op Diagnosis:  Genetic susceptibility to malignant neoplasm of breast [Z15.01]    Post-op Diagnosis:  Post-Op Diagnosis Codes:     * Genetic susceptibility to malignant neoplasm of breast [Z15.01]     * BRCA2 positive [Z15.01, Z15.09]     * At high risk for breast cancer [Z91.89]    Procedure:    1.) Left breast reconstruction with CARMEN flap  2.) Right breast reconstruction with CARMEN flap  3.) Left breast advanced nerve reconstruction with cadaveric nerve graft   4.) Right breast advanced nerve reconstruction with cadaveric nerve graft  5.) Spy visualization of right breast  6.) Spy visualization to left breast    Anesthesia: General    Drains: 4 VICTORIA Drains    Complications: None     Condition: Stable     Operative procedure in detail: After risks and benefits were thoroughly discussed with the patient and the patient expressed verbal understanding, informed consent was obtained.  The patient was subsequently taken to the Operating Room, placed supine on the operating room table.  After appropriate general anesthesia was provided, the patient's breast and abdomen were prepped and draped in standard surgical fashion.      Dr. Cantu began the mastectomy portion of the case while Dr. Ron Carrasco and SHAD began elevating the savannah-abdominal flaps.  Superior and inferior incisions were then made with a skin knife and Bovie cautery.  The flaps were then mobilized from lateral to medial with the Bovie cautery until we encountered the lateral edges of the rectus muscle.  We then switched to bipolar cautery.  We came across 2 lateral perforators on the left side and 4 medial perforators on the right side.   dissection was performed with the bipolar cautery and fascial incisions were then made with a skin knife and intramuscular dissection  was then performed with the bipolar cautery.  Dissection was carried down to the underlying pedicle.  Pedicle dissection was then performed until we had adequate length to the pedicle.  A sensory nerve was identified and preserved for future nerve reconstruction.  The opposite savannah-abdominal flap was then dissected out in the previous fashion.      Once Dr. Cantu was finished with the mastectomy portion of the case, internal mammary artery and vein exposure and intercostal nerve exposure was performed.  The pectoralis muscle was mobilized from medial to lateral.  Two ribs were identified and the superior and inferior costal cartilages were then scored and Berwyn elevator was then used to elevate the perichondrium off of the inferior rib.  Rongeur was then used to remove a portion of this rib.  Intercostal muscles and perichondrium was then removed with bipolar cautery.  The internal mammary artery and vein was then exposed.  It was dissected superiorly until we came across the intercostal nerve.    Intercostal nerve was dissected free and identified.  Next, the Berwyn was then used to elevate the perichondrium off of the superior rib.  Costal cartilage was then used to remove this portion of the rib and adequate exposure was then performed to the internal mammary artery and vein and intercostal nerve. Likewise exposure was performed on the opposite side.      Next, the left hemiabdominal flap was then transected and weighed 1278 g and was brought into the right mastectomy defect.  The volume of the right breast was 1334 g.  A venous  was then used to anastomose the antegrade internal mammary vein to the flap vein using a 3.0 venous .  The flap artery was then hooked up to the antegrade internal mammary artery in an end-to-end fashion with a 9-0 running nylon suture.  Next, an Axogen cadaveric nerve graft was then selected with the nerve protector and was sewn to the sensory nerve of the flap with a 9-0  nylon suture.  The other end of the nerve graft was then sewn to the recipient intercostal nerve again with a 9-0 nylon suture.  The protector was used on both ends and was secured with 9-0 nylon suture.  Next, the superficial vein was then hooked up to the retrograde IMV with a 3.0 mm .  At the conclusion of the anastomosis, there was good flow throughout the flap.      Attention was directed towards the opposite side.  The opposite savannah-abdominal flap was then transected and brought into the left breast wound.  The flap weighed 1114 grams and the left breast weighed 1352 grams.  A venous  was then used to anastomose the antegrade internal mammary vein to the flap vein using a 3.0 venous .  The flap artery was then hooked up to the antegrade internal mammary artery in an end-to-end fashion with a 9-0 running nylon suture.  Next, an Axogen cadaveric nerve graft was then selected with the nerve protector and was sewn to the sensory nerve of the flap with a 9-0 nylon suture.  The other end of the nerve graft was then sewn to the recipient intercostal nerve again with a 9-0 nylon suture.  The protector was used on both ends and was secured with 9-0 nylon suture.  Next, the superficial vein was then hooked up to the retrograde IMV with a 2.5 mm .  At the conclusion of the anastomosis, there was good flow throughout the flap.      The flaps were then deepithelialized and SPY visualization was used to look at the perfusion of the flaps.  There was good flow throughout the flaps.  The flaps were then placed into the breast wound and was shaped and secured with 2-0 Vicryl suture.  A 15 round drain was placed to each breast and remaining closure was performed with a 2-0 Quill suture.  4-0 vicryl was used for reinforcement sutures.  Doppler signal was located at the  and this was marked with a 4-0 Prolene suture.      Next, attention was directed towards the abdomen.  The abdominal  fascial incisions were then closed with 0 Quill suture in two layers.  The upper abdominal flap was then mobilized towards the xiphoid with Bovie cautery.  The diastasis was then repaired in two layers with a #2 Quill suture.  Next, Exparel was used in the diastasis repair as well as the fascial incisions and the lower abdominal incision.  The upper abdominal flap was then secured to the lower abdominal incision with a 2-0 Vicryl suture.  The skin was then tailor-tacked with skin staples.  The new area for the umbilicus was then marked.  Closure of the abdomen was then performed with 0 Quill suture in two layers incorporating Diogo's and the subcutaneous tissue.  A 2-0 Quill was then used to close the skin.  The new area for the umbilicus was then incised and the umbilicus was then inset with 3-0 Monocryl deep dermal sutures incorporating the fascia and a 2-0 Quill subcuticular stitch.  Dermabond tape was applied to the lower abdominal incision.  Two 15 round drains were placed in the abdomen.  Postoperative girdle and bra was placed.  The sponge, needle and instrument counts were correct at the end of case.  The patient tolerated the procedure well and was transferred to Recovery Room in stable condition.          Description of the findings of the procedure: As Above    Estimated Blood Loss: 150 mL         Specimens:   Specimen (12h ago, onward)    None

## 2020-10-06 LAB
BASOPHILS # BLD AUTO: 0.04 K/UL (ref 0–0.2)
BASOPHILS NFR BLD: 0.4 % (ref 0–1.9)
DIFFERENTIAL METHOD: ABNORMAL
EOSINOPHIL # BLD AUTO: 0 K/UL (ref 0–0.5)
EOSINOPHIL NFR BLD: 0.4 % (ref 0–8)
ERYTHROCYTE [DISTWIDTH] IN BLOOD BY AUTOMATED COUNT: 12 % (ref 11.5–14.5)
HCT VFR BLD AUTO: 33.5 % (ref 37–48.5)
HGB BLD-MCNC: 11.1 G/DL (ref 12–16)
IMM GRANULOCYTES # BLD AUTO: 0.02 K/UL (ref 0–0.04)
IMM GRANULOCYTES NFR BLD AUTO: 0.2 % (ref 0–0.5)
LYMPHOCYTES # BLD AUTO: 0.7 K/UL (ref 1–4.8)
LYMPHOCYTES NFR BLD: 7.2 % (ref 18–48)
MCH RBC QN AUTO: 30.6 PG (ref 27–31)
MCHC RBC AUTO-ENTMCNC: 33.1 G/DL (ref 32–36)
MCV RBC AUTO: 92 FL (ref 82–98)
MONOCYTES # BLD AUTO: 0.8 K/UL (ref 0.3–1)
MONOCYTES NFR BLD: 7.9 % (ref 4–15)
NEUTROPHILS # BLD AUTO: 8.7 K/UL (ref 1.8–7.7)
NEUTROPHILS NFR BLD: 83.9 % (ref 38–73)
NRBC BLD-RTO: 0 /100 WBC
PLATELET # BLD AUTO: 253 K/UL (ref 150–350)
PMV BLD AUTO: 10.5 FL (ref 9.2–12.9)
POCT GLUCOSE: 163 MG/DL (ref 70–110)
RBC # BLD AUTO: 3.63 M/UL (ref 4–5.4)
WBC # BLD AUTO: 10.34 K/UL (ref 3.9–12.7)

## 2020-10-06 PROCEDURE — 85025 COMPLETE CBC W/AUTO DIFF WBC: CPT

## 2020-10-06 PROCEDURE — 25000003 PHARM REV CODE 250: Performed by: SURGERY

## 2020-10-06 PROCEDURE — 11000001 HC ACUTE MED/SURG PRIVATE ROOM

## 2020-10-06 PROCEDURE — 63600175 PHARM REV CODE 636 W HCPCS: Performed by: SURGERY

## 2020-10-06 PROCEDURE — 94761 N-INVAS EAR/PLS OXIMETRY MLT: CPT

## 2020-10-06 PROCEDURE — 27000221 HC OXYGEN, UP TO 24 HOURS

## 2020-10-06 PROCEDURE — 94799 UNLISTED PULMONARY SVC/PX: CPT

## 2020-10-06 PROCEDURE — 36415 COLL VENOUS BLD VENIPUNCTURE: CPT

## 2020-10-06 RX ORDER — PANTOPRAZOLE SODIUM 40 MG/1
40 TABLET, DELAYED RELEASE ORAL DAILY
Status: DISCONTINUED | OUTPATIENT
Start: 2020-10-06 | End: 2020-10-08 | Stop reason: HOSPADM

## 2020-10-06 RX ADMIN — HYDROCODONE BITARTRATE AND ACETAMINOPHEN 1 TABLET: 10; 325 TABLET ORAL at 05:10

## 2020-10-06 RX ADMIN — HYDROCODONE BITARTRATE AND ACETAMINOPHEN 1 TABLET: 10; 325 TABLET ORAL at 01:10

## 2020-10-06 RX ADMIN — DOCUSATE SODIUM 100 MG: 100 CAPSULE, LIQUID FILLED ORAL at 08:10

## 2020-10-06 RX ADMIN — HYDROCODONE BITARTRATE AND ACETAMINOPHEN 1 TABLET: 10; 325 TABLET ORAL at 12:10

## 2020-10-06 RX ADMIN — HEPARIN SODIUM 5000 UNITS: 5000 INJECTION INTRAVENOUS; SUBCUTANEOUS at 05:10

## 2020-10-06 RX ADMIN — HYDROMORPHONE HYDROCHLORIDE 1 MG: 1 INJECTION, SOLUTION INTRAMUSCULAR; INTRAVENOUS; SUBCUTANEOUS at 09:10

## 2020-10-06 RX ADMIN — CEFAZOLIN 2 G: 330 INJECTION, POWDER, FOR SOLUTION INTRAMUSCULAR; INTRAVENOUS at 06:10

## 2020-10-06 RX ADMIN — ONDANSETRON 4 MG: 2 INJECTION INTRAMUSCULAR; INTRAVENOUS at 02:10

## 2020-10-06 RX ADMIN — HYDROMORPHONE HYDROCHLORIDE 1 MG: 1 INJECTION, SOLUTION INTRAMUSCULAR; INTRAVENOUS; SUBCUTANEOUS at 02:10

## 2020-10-06 RX ADMIN — HYDROMORPHONE HYDROCHLORIDE 1 MG: 1 INJECTION, SOLUTION INTRAMUSCULAR; INTRAVENOUS; SUBCUTANEOUS at 06:10

## 2020-10-06 RX ADMIN — HYDROCODONE BITARTRATE AND ACETAMINOPHEN 1 TABLET: 10; 325 TABLET ORAL at 11:10

## 2020-10-06 RX ADMIN — CEPHALEXIN 500 MG: 500 CAPSULE ORAL at 11:10

## 2020-10-06 RX ADMIN — HEPARIN SODIUM 5000 UNITS: 5000 INJECTION INTRAVENOUS; SUBCUTANEOUS at 09:10

## 2020-10-06 RX ADMIN — SODIUM CHLORIDE, SODIUM LACTATE, POTASSIUM CHLORIDE, AND CALCIUM CHLORIDE: .6; .31; .03; .02 INJECTION, SOLUTION INTRAVENOUS at 02:10

## 2020-10-06 RX ADMIN — CEPHALEXIN 500 MG: 500 CAPSULE ORAL at 05:10

## 2020-10-06 RX ADMIN — PANTOPRAZOLE SODIUM 40 MG: 40 TABLET, DELAYED RELEASE ORAL at 09:10

## 2020-10-06 RX ADMIN — HEPARIN SODIUM 5000 UNITS: 5000 INJECTION INTRAVENOUS; SUBCUTANEOUS at 02:10

## 2020-10-06 RX ADMIN — DOCUSATE SODIUM 100 MG: 100 CAPSULE, LIQUID FILLED ORAL at 09:10

## 2020-10-06 NOTE — PLAN OF CARE
During routine rounds, assessed pt for spiritual distress, and assured pt aware  of spiritual care available.  While providing reflective listening and compassionate presence, pt concerns were explored.  Prayer/wishes for peace and rapid healing were shared.  No distress or particular spiritual care needs were reported nor presented at this time.  Pt reported relational and emotional support.  Pt reported gratitude for the spiritual wellness check.   Follow-up was offered upon request, and will also be offered at staff's discretion, should pt's conditions change, and/or if hospital admission continues significantly.

## 2020-10-06 NOTE — PLAN OF CARE
Received to room from ICU, RN w/ family at side. HTN - VSS on O2/2L/NC & afebrile.   Pain an Issue from Transfer p IV PRN Pain med Q 4 Pain from 10/0-10 now a 5/0-10  Flaps checks c extertnal doppler Q 4 hrs and Drain care Q6 hrs  Ambulating w BRP x assist.    AOx4    Oriented to room, POC, & all equipment. Diet maintained   Fall precautions implemented & call light in reach. Resting comfortably in low bed, in NAD.

## 2020-10-06 NOTE — PROGRESS NOTES
"POD 1    No acute changes    Blood pressure (!) 152/64, pulse 80, temperature 98.9 °F (37.2 °C), temperature source Oral, resp. rate 15, height 5' 8" (1.727 m), weight 104 kg (229 lb 4.5 oz), last menstrual period 02/11/2019, SpO2 96 %, not currently breastfeeding.      Doing well    Flaps with good color and signal    Abd ok    A/P s/p bilateral CARMEN flaps    1.) Transfer to floor  2.) Flap checks Q 4  "

## 2020-10-06 NOTE — NURSING TRANSFER
Nursing Transfer Note      10/6/2020     Transfer to 378    Transfer via bed    Transfer with nurse and transport    Chart send with patient: {YES (DEF)/NO:2  Notified:  at bedside    Patient reassessed at: 10/6/2020 at 1545    Upon arrival to floor: flap assessed with Lisa OSWALD

## 2020-10-06 NOTE — PLAN OF CARE
Pt received resting in bed, AAO x4, NAD. Pt complaining of pain in her shoulders. Flap check preformed with off going staff. Flaps pink, warm, soft and with strong pulses dopplered. POC reviewed with pt. Pain management plan developed with pt, pt in agreement to using PO medication primarily and using IV medication for break through pain. Post op bra and girdle remain in place. Pain medications administered as needed, see EMAR for details. Pt remains stable throughout night. Safety measures remain in place.

## 2020-10-06 NOTE — PROGRESS NOTES
VICTORIA drain care education provided to patient's mother as she will be helping patient after surgery. Verbal instructions and demonstration provided. She verbalizes understanding and denies questions and concerns at this time.

## 2020-10-06 NOTE — PLAN OF CARE
Patient on oxygen with documented flow. Will attempt to wean per O2 order protocol.  Pt doing IS @ 1250  L. Will cont to encourage deep breathing and coughing. No apparent respiratory distress noted and will cont to monitor

## 2020-10-06 NOTE — PLAN OF CARE
LMSW met with the patient at the bedside.     Patient is alert and oriented with no communication barriers.     Prior to admission patient is independent. Patient is requesting a RW. Patient denies the use of DME in the home or HH.     Patients PCP is correct on the face sheet. Patient choice pharmacy is bedside delivery.      Patient denies having written advance directives.      Patients family will transport the patient home at discharge.     CM team will continue to follow.          10/06/20 6599   Discharge Assessment   Assessment Type Discharge Planning Assessment   Confirmed/corrected address and phone number on facesheet? Yes   Assessment information obtained from? Patient;Caregiver   Communicated expected length of stay with patient/caregiver no   Prior to hospitilization cognitive status: Alert/Oriented   Prior to hospitalization functional status: Independent   Current cognitive status: Alert/Oriented   Lives With spouse   Able to Return to Prior Arrangements yes   Is patient able to care for self after discharge? Yes   Patient's perception of discharge disposition home or selfcare   Readmission Within the Last 30 Days no previous admission in last 30 days   Patient currently being followed by outpatient case management? No   Patient currently receives any other outside agency services? No   Equipment Currently Used at Home none   Do you have any problems affording any of your prescribed medications? No   Is the patient taking medications as prescribed? yes   Does the patient have transportation home? Yes   Transportation Anticipated family or friend will provide   Does the patient receive services at the [facility]? No   Discharge Plan A Home   DME Needed Upon Discharge  walker, rolling   Patient/Family in Agreement with Plan yes

## 2020-10-07 LAB
FINAL PATHOLOGIC DIAGNOSIS: NORMAL
GROSS: NORMAL
Lab: NORMAL

## 2020-10-07 PROCEDURE — 25000003 PHARM REV CODE 250: Performed by: SURGERY

## 2020-10-07 PROCEDURE — 63600175 PHARM REV CODE 636 W HCPCS: Performed by: ANESTHESIOLOGY

## 2020-10-07 PROCEDURE — 94761 N-INVAS EAR/PLS OXIMETRY MLT: CPT

## 2020-10-07 PROCEDURE — 11000001 HC ACUTE MED/SURG PRIVATE ROOM

## 2020-10-07 PROCEDURE — 63600175 PHARM REV CODE 636 W HCPCS: Performed by: SURGERY

## 2020-10-07 PROCEDURE — 27000221 HC OXYGEN, UP TO 24 HOURS

## 2020-10-07 PROCEDURE — 25000003 PHARM REV CODE 250: Performed by: ANESTHESIOLOGY

## 2020-10-07 PROCEDURE — 99900035 HC TECH TIME PER 15 MIN (STAT)

## 2020-10-07 PROCEDURE — 94799 UNLISTED PULMONARY SVC/PX: CPT

## 2020-10-07 RX ORDER — IBUPROFEN 400 MG/1
400 TABLET ORAL EVERY 4 HOURS PRN
Status: DISCONTINUED | OUTPATIENT
Start: 2020-10-07 | End: 2020-10-08 | Stop reason: HOSPADM

## 2020-10-07 RX ADMIN — HYDROCODONE BITARTRATE AND ACETAMINOPHEN 1 TABLET: 10; 325 TABLET ORAL at 07:10

## 2020-10-07 RX ADMIN — DOCUSATE SODIUM 100 MG: 100 CAPSULE, LIQUID FILLED ORAL at 08:10

## 2020-10-07 RX ADMIN — HYDROCODONE BITARTRATE AND ACETAMINOPHEN 1 TABLET: 10; 325 TABLET ORAL at 10:10

## 2020-10-07 RX ADMIN — PANTOPRAZOLE SODIUM 40 MG: 40 TABLET, DELAYED RELEASE ORAL at 08:10

## 2020-10-07 RX ADMIN — CEPHALEXIN 500 MG: 500 CAPSULE ORAL at 05:10

## 2020-10-07 RX ADMIN — HEPARIN SODIUM 5000 UNITS: 5000 INJECTION INTRAVENOUS; SUBCUTANEOUS at 02:10

## 2020-10-07 RX ADMIN — HYDROMORPHONE HYDROCHLORIDE 1 MG: 1 INJECTION, SOLUTION INTRAMUSCULAR; INTRAVENOUS; SUBCUTANEOUS at 04:10

## 2020-10-07 RX ADMIN — CEPHALEXIN 500 MG: 500 CAPSULE ORAL at 12:10

## 2020-10-07 RX ADMIN — CEPHALEXIN 500 MG: 500 CAPSULE ORAL at 06:10

## 2020-10-07 RX ADMIN — HYDROCODONE BITARTRATE AND ACETAMINOPHEN 1 TABLET: 10; 325 TABLET ORAL at 12:10

## 2020-10-07 RX ADMIN — HYDROCODONE BITARTRATE AND ACETAMINOPHEN 1 TABLET: 10; 325 TABLET ORAL at 06:10

## 2020-10-07 RX ADMIN — HYDROMORPHONE HYDROCHLORIDE 1 MG: 1 INJECTION, SOLUTION INTRAMUSCULAR; INTRAVENOUS; SUBCUTANEOUS at 02:10

## 2020-10-07 RX ADMIN — HEPARIN SODIUM 5000 UNITS: 5000 INJECTION INTRAVENOUS; SUBCUTANEOUS at 05:10

## 2020-10-07 RX ADMIN — ONDANSETRON 4 MG: 2 INJECTION INTRAMUSCULAR; INTRAVENOUS at 04:10

## 2020-10-07 RX ADMIN — PROMETHAZINE HYDROCHLORIDE 6.25 MG: 25 INJECTION INTRAMUSCULAR; INTRAVENOUS at 08:10

## 2020-10-07 RX ADMIN — IBUPROFEN 400 MG: 400 TABLET, FILM COATED ORAL at 08:10

## 2020-10-07 RX ADMIN — HEPARIN SODIUM 5000 UNITS: 5000 INJECTION INTRAVENOUS; SUBCUTANEOUS at 10:10

## 2020-10-07 RX ADMIN — ONDANSETRON 4 MG: 2 INJECTION INTRAMUSCULAR; INTRAVENOUS at 02:10

## 2020-10-07 NOTE — PROGRESS NOTES
Pt currently on 2Lpm nasal cannula  . Pt in no distress at this time. Sats   97%. Will continue to monitor.

## 2020-10-07 NOTE — PROGRESS NOTES
"POD 2    Pt has headaches and still requiring some oxygen.    Blood pressure (!) 165/79, pulse 101, temperature 99.1 °F (37.3 °C), temperature source Oral, resp. rate 18, height 5' 8" (1.727 m), weight 104 kg (229 lb 4.5 oz), last menstrual period 02/11/2019, SpO2 97 %, not currently breastfeeding.      Patient is flaps are with good signal.  There is some bilateral skin flap ecchymosis to the nipple-areolar complex.  There is some ecchymosis to the left skin paddle of the left flap however the flap appears well perfused.    Abdomen clean dry and intact.    A/P  status post bilateral CARMEN flap  1.)  Wean O2 encourage incentive spirometry  2.)  Ambulate in halls  3.)  Plan for discharge tomorrow  "

## 2020-10-07 NOTE — NURSING
Patient assisted to the restroom per student nurse.  Patient states having slight difficulty urinating.  50 ML of clear yellow urine produced.  Will continue to monitor.

## 2020-10-07 NOTE — PLAN OF CARE
Pt VSS and afebrile, ambulated to RR with standby assist, flap check done every 4, pain controlled with po and IV medication, Pt has x4 VICTORIA drains in place. Breast are pink and warm with strong pulses, incision to abdomen C/D/I with abd binder and abd pads in place. Pt in NAD, in low locked bed, call light in reach, safety precautions maintained, will continue to monitor. Pt on 2l O2NC.

## 2020-10-08 VITALS
BODY MASS INDEX: 34.75 KG/M2 | HEART RATE: 82 BPM | WEIGHT: 229.25 LBS | SYSTOLIC BLOOD PRESSURE: 132 MMHG | TEMPERATURE: 99 F | RESPIRATION RATE: 16 BRPM | OXYGEN SATURATION: 94 % | HEIGHT: 68 IN | DIASTOLIC BLOOD PRESSURE: 82 MMHG

## 2020-10-08 PROCEDURE — 63600175 PHARM REV CODE 636 W HCPCS: Performed by: SURGERY

## 2020-10-08 PROCEDURE — 25000003 PHARM REV CODE 250: Performed by: SURGERY

## 2020-10-08 RX ORDER — SIMETHICONE 80 MG
1 TABLET,CHEWABLE ORAL 3 TIMES DAILY PRN
Status: DISCONTINUED | OUTPATIENT
Start: 2020-10-08 | End: 2020-10-08 | Stop reason: HOSPADM

## 2020-10-08 RX ADMIN — HEPARIN SODIUM 5000 UNITS: 5000 INJECTION INTRAVENOUS; SUBCUTANEOUS at 05:10

## 2020-10-08 RX ADMIN — DOCUSATE SODIUM 100 MG: 100 CAPSULE, LIQUID FILLED ORAL at 08:10

## 2020-10-08 RX ADMIN — CEPHALEXIN 500 MG: 500 CAPSULE ORAL at 05:10

## 2020-10-08 RX ADMIN — HYDROCODONE BITARTRATE AND ACETAMINOPHEN 1 TABLET: 10; 325 TABLET ORAL at 10:10

## 2020-10-08 RX ADMIN — IBUPROFEN 400 MG: 400 TABLET, FILM COATED ORAL at 05:10

## 2020-10-08 RX ADMIN — CEPHALEXIN 500 MG: 500 CAPSULE ORAL at 12:10

## 2020-10-08 RX ADMIN — IBUPROFEN 400 MG: 400 TABLET, FILM COATED ORAL at 12:10

## 2020-10-08 RX ADMIN — HYDROCODONE BITARTRATE AND ACETAMINOPHEN 1 TABLET: 10; 325 TABLET ORAL at 05:10

## 2020-10-08 RX ADMIN — SIMETHICONE 80 MG: 80 TABLET, CHEWABLE ORAL at 08:10

## 2020-10-08 RX ADMIN — PANTOPRAZOLE SODIUM 40 MG: 40 TABLET, DELAYED RELEASE ORAL at 08:10

## 2020-10-08 NOTE — DISCHARGE SUMMARY
[facility]  Discharge Summary     Patient ID:  Allyson Bahena  [id number]  35 y.o.  1985    Admit date: 10/5/2020    Discharge Date and Time:  10/08/2020 8:01 AM    Admitting Physician: Ron Carrasco MD     Discharge Provider: Ron Carrasco    Reason for Admission: Genetic susceptibility to malignant neoplasm of breast [Z15.01]  BRCA2 positive [Z15.01, Z15.09]  At high risk for breast cancer [Z91.89]  BRCA2 positive [Z15.01, Z15.09]  BRCA2 positive [Z15.01, Z15.09]  BRCA2 positive [Z15.01, Z15.09]    Admission Condition: good    Procedures Performed: Procedure(s) (LRB):  MASTECTOMY, BILATERAL (Bilateral)  RECONSTRUCTION, BREAST, USING CARMEN SKIN FLAP (Bilateral)    Hospital Course patient underwent the above procedures without complications.  On postoperative day 3 patient was stable for discharge.    Consults: None    Significant Diagnostic Studies:  None    Final Diagnoses:    Principal Problem: BRCA2 positive   Secondary Diagnoses:  Acquired absence of bilateral breasts    Discharged Condition: good    Discharge Exam:    Patient's breast flaps are with good signal.  Breast flaps are soft.  There is mild ecchymosis to the left skin paddle however there does not appear to be any venous congestion to the flap.  Slight ecchymosis to move bilateral nipple-areolar complexes.  The abdominal incision is intact.    Disposition: Home or Self Care    Follow Up/Patient Instructions:     Patient is to follow up with me in 1 week.  No heavy lifting.  Patient is to shower daily.  Wear compression garment and postoperative bra unless showering.    Medications:  Reconciled Home Medications:      Medication List      CONTINUE taking these medications    dextroamphetamine-amphetamine 20 MG 24 hr capsule  Commonly known as: ADDERALL XR  every morning.     estradioL 2 MG tablet  Commonly known as: ESTRACE  Take 1 tablet (2 mg total) by mouth once daily.     FLUoxetine 20 MG capsule  20 mg once  daily.     HAIR,SKIN AND NAILS ORAL  Take by mouth.     lisinopriL 20 MG tablet  Commonly known as: PRINIVIL,ZESTRIL  as needed.     multivitamin capsule  Take 1 capsule by mouth once daily.     pantoprazole 20 MG tablet  Commonly known as: PROTONIX  Take 20 mg by mouth daily as needed.     prasterone (dhea) 6.5 mg Inst  Commonly known as: INTRAROSA  Place 6.5 mg vaginally every evening.     VITAMIN C ORAL  Take by mouth.          Discharge Procedure Orders   Diet Adult Regular     Lifting restrictions     Notify your health care provider if you experience any of the following:  temperature >100.4     Notify your health care provider if you experience any of the following:  persistent nausea and vomiting or diarrhea     Notify your health care provider if you experience any of the following:  severe uncontrolled pain     Notify your health care provider if you experience any of the following:  redness, tenderness, or signs of infection (pain, swelling, redness, odor or green/yellow discharge around incision site)     Notify your health care provider if you experience any of the following:  difficulty breathing or increased cough     Notify your health care provider if you experience any of the following:  severe persistent headache     Notify your health care provider if you experience any of the following:  worsening rash     Notify your health care provider if you experience any of the following:  persistent dizziness, light-headedness, or visual disturbances     Notify your health care provider if you experience any of the following:  increased confusion or weakness     Notify your health care provider if you experience any of the following:     No dressing needed     Follow-up Information     Ron Carrasco MD In 1 week.    Specialty: Plastic Surgery  Why: For post-op visit  Contact information:  [address]  [address] [place] [address] [id number] [phone]                 Activity: no heavy lifting for 4  weeks  Diet: regular diet  Wound Care: keep wound clean and dry        Signed:  Ron Carrasco  10/8/2020  8:01 AM

## 2020-10-08 NOTE — PLAN OF CARE
Patient on oxygen with documented flow. Will attempt to wean per O2 order protocol.  Pt doing IS @ 1500  L. Will cont to encourage deep breathing and coughing. No apparent respiratory distress noted and will cont to monitor

## 2020-10-08 NOTE — PLAN OF CARE
No significant events overnight. Remains free from fall, injury, and skin breakdown. Voiding spontaneously via toilet. Ambulates independently; walked halls overnight.  VSS on RA throughout the night. Positions self independently. Pain well controlled with PRN norco 10; PRN ibuprofen given for HA. Flap/external doppler checks WNL. Bilateral breast and abdominal incisions/dressings all CDI. No change to bilateral breast bruising overnight. JPs x4 draining serosanguinous output (see flowsheets). Refuses TEDs/SCDs at this time due to heat flashes/discomfort. Plan of care reviewed with patient and all questions answered. Bed low, locked w/ bed alarm on. Call light within reach. Purposeful rounding performed. Resting in bed, no other complaints at this time.

## 2020-10-08 NOTE — PLAN OF CARE
Patient is discharged home with self care.     No CM needs for discharge.     Family will transport the patient home.     10/08/20 0847   Final Note   Assessment Type Final Discharge Note   Anticipated Discharge Disposition Home   What phone number can be called within the next 1-3 days to see how you are doing after discharge? [phone]   Right Care Referral Info   Post Acute Recommendation No Care

## 2020-10-08 NOTE — PLAN OF CARE
Piv dc'd with tip intact. Dc instructions given per rn and md. Pt voiced understanding and denied questions. Md contacted about pts urine. No new orders noted att. Md to f/up with pt at f/u appt. Pt denies any pain or discomfort or any other issues on urination. Urine orange in color with noted heavy sediment.

## 2020-10-13 ENCOUNTER — PATIENT MESSAGE (OUTPATIENT)
Dept: SURGERY | Facility: OTHER | Age: 35
End: 2020-10-13

## 2020-10-14 NOTE — OP NOTE
Operative Note     10/5/2020    PRE-OP DIAGNOSIS: Genetic susceptibility to malignant neoplasm of breast [Z15.01]      POST-OP DIAGNOSIS: Post-Op Diagnosis Codes:     * Genetic susceptibility to malignant neoplasm of breast [Z15.01]     * BRCA2 positive [Z15.01, Z15.09]     * At high risk for breast cancer [Z91.89]    PROCEDURE:  Bilateral nipple sparing mastectomy    SURGEON: Surgeon(s) and Role:  Panel 1:     * Paulina Cantu MD - Primary  Panel 2:     * Ron Carrasco MD - Primary     * Kamran Khoobehi, MD - Assisting     * Harriett Estevez MD - Resident - Observing    ANESTHESIA: General     OPERATIVE FINDINGS: nothing suspicious      INDICATION FOR PROCEDURE: This patient presents with a history of BRCA mutation    PROCEDURE IN DETAIL:  Allyson Bahena is a 35 y.o. female brought to the operating room for definitive surgery of BRCA.  The patient has elected to undergo bilateral nipple sparing mastectomy without nely assessment.  The patient was informed of the possible risks and complications of the procedure, including but not limited to anesthetic risks, bleeding, infection, and need for additional surgery.  The patient concurred with the proposed plan, and has given informed consent.  The site of surgery was properly noted/marked in the preoperative holding area.    Prior to going to the Operating Room, the patient was marked in holding by the plastic surgeon.  The patient was then brought to the operating room and placed in the supine position with both upper extremities extended.  general was performed. Perioperative antibiotics were administered consisting of Ancef and a time out was performed confirming the patient, site, and procedure.  General anesthesia was administered. The bilateral chest and axilla was then prepped and draped in the usual sterile fashion.    We first turned our attention to the left prophylactic breast where an inframammary incision was made, sparing the nipple areolar  complex.  The incision was made with a plasmablade and deepened through the subcutaneous tissues with plasmablade.  Skin flaps were raised to the clavicle superiorly, to the lateral border of the sternum medially, to the inframammary fold inferiorly, and to the anterior border of the latissimus dorsi muscle laterally. Lighted retractors were used to assist in the creation of the skin flaps.  The tissue under the nipple areolar complex was carefully dissected sharply off the dermis, being sure to core the ductal tissue from beneath the nipple.  The breast tissue was sharply excised off the chest wall taking care to incorporate the pectoralis fascia while leaving the serratus fascia behind.  The resulting mastectomy specimen was marked with short stitch superiorly and long stitch laterally.  The breast was sent to pathology for permanent evaluation.   The operative field was irrigated with normal saline and all bleeding points were secured with Bovie electrocautery. The incision will be closed by the plastic surgery service.      We then turned our attention to the right breast where an inframammary incision was made, sparing the nipple areolar complex.  The incision was made with a plasmablade and extended through the subcutaneous tissues with plasmabalde.  Skin flaps were raised to the clavicle superiorly. We then proceeded to raise the remainder of the flaps to the lateral border of the sternum medially, to the inframammary fold inferiorly, and to the anterior border of the latissimus dorsi muscle laterally. Lighted retractors were used to assist in the creation of the skin flaps.  The tissue under the nipple areolar complex was carefully dissected sharply off the dermis, being sure to core the ductal tissue from beneath the nipple. The breast tissue was sharply excised off the chest wall taking care to incorporate the pectoralis fascia while leaving the serratus fascia behind.  The resulting mastectomy specimen  was using a short stitch superiorly and a long stitch laterally.  The breast was sent to pathology for permanent evaluation.      The operative field was irrigated with normal saline and all bleeding points were secured with Bovie electrocautery. The incision will be closed by the plastic surgery service.      At the end of my portion of the operation, all sponge, instrument, and needle counts x 2 were correct.    ESTIMATED BLOOD LOSS: less than 50 mL    COMPLICATIONS: none    DISPOSITION: transferred intraop to the plastic surgery service    ATTESTATION:   I performed the procedure.

## 2020-10-20 ENCOUNTER — PATIENT MESSAGE (OUTPATIENT)
Dept: SURGERY | Facility: CLINIC | Age: 35
End: 2020-10-20

## 2020-10-22 ENCOUNTER — OFFICE VISIT (OUTPATIENT)
Dept: SURGERY | Facility: CLINIC | Age: 35
End: 2020-10-22
Attending: SURGERY
Payer: COMMERCIAL

## 2020-10-22 VITALS
HEART RATE: 80 BPM | HEIGHT: 68 IN | SYSTOLIC BLOOD PRESSURE: 130 MMHG | DIASTOLIC BLOOD PRESSURE: 75 MMHG | BODY MASS INDEX: 34.75 KG/M2 | WEIGHT: 229.25 LBS

## 2020-10-22 DIAGNOSIS — Z15.09 BRCA2 GENE MUTATION POSITIVE: Primary | ICD-10-CM

## 2020-10-22 DIAGNOSIS — Z15.01 BRCA2 GENE MUTATION POSITIVE: Primary | ICD-10-CM

## 2020-10-22 PROCEDURE — 3008F PR BODY MASS INDEX (BMI) DOCUMENTED: ICD-10-PCS | Mod: CPTII,S$GLB,, | Performed by: SURGERY

## 2020-10-22 PROCEDURE — 1125F AMNT PAIN NOTED PAIN PRSNT: CPT | Mod: S$GLB,,, | Performed by: SURGERY

## 2020-10-22 PROCEDURE — 99024 POSTOP FOLLOW-UP VISIT: CPT | Mod: S$GLB,,, | Performed by: SURGERY

## 2020-10-22 PROCEDURE — 1125F PR PAIN SEVERITY QUANTIFIED, PAIN PRESENT: ICD-10-PCS | Mod: S$GLB,,, | Performed by: SURGERY

## 2020-10-22 PROCEDURE — 3008F BODY MASS INDEX DOCD: CPT | Mod: CPTII,S$GLB,, | Performed by: SURGERY

## 2020-10-22 PROCEDURE — 99024 PR POST-OP FOLLOW-UP VISIT: ICD-10-PCS | Mod: S$GLB,,, | Performed by: SURGERY

## 2020-10-22 NOTE — PROGRESS NOTES
[facility]  [facility] [place]  10/22/2020      SUBJECTIVE:   Ms. Allyson Bahena is a 35 y.o. female with a BRCA 2+ mutation who presents today for follow up, s/p bilateral mastectomy and CARMEN flap reconstruction on 10/5/20.     History of Present Illness:  Patient was originally referred for evaluation of increased risk of breast cancer based on BRCA2, family history on mother's side.  BRCA testing was performed.     Other breast cancer risk factors include family hx on mother's side.      Patient is .  Age of first live birth was 19.     Patient had a hysterectomy/oopherectomy.  On hormones with Dr. Garzon.   Repeat testing with [employer] confirms BRCA2 positive.    Final Surgical Pathology:  1. Breast, right, mastectomy:  - Benign breast tissue with changes consistent with previous surgical site  - Negative for atypia and malignancy  2. Breast, left, mastectomy:  - Benign breast tissue with microcyst formation and fibroadenomatoid changes  - Diffuse changes consistent with previous surgical site are present  - One lymph node, negative for malignancy (0/1)  - Calcifications associated with benign ductal elements  - Negative for atypia and malignancy     Review of Systems: Denies any chest pain or shortness of breath.  Denies any fever or chills.  See HPI/ Interval History for other systems reviewed.    OBJECTIVE:   Vitals:    10/22/20 1135   BP: 130/75   Pulse: 80      Physical Exam:  HEENT: Normocephalic, atraumatic.    General: alert and oriented; no acute distress.  Breast:     ASSESSMENT:  Ms. Allyson Bahena is a 35 y.o. female with a BRCA 2+ mutation who presents today for follow up, s/p bilateral mastectomy and CARMEN flap reconstruction on 10/5/20.     PLAN:   Path is all good  Wound healing issues--following with Dr. Carrasco. Will likely need debridement.  F/u 6 [facility]

## 2020-10-29 ENCOUNTER — ANESTHESIA (OUTPATIENT)
Dept: SURGERY | Facility: OTHER | Age: 35
End: 2020-10-29
Payer: COMMERCIAL

## 2020-10-29 ENCOUNTER — HOSPITAL ENCOUNTER (OUTPATIENT)
Facility: OTHER | Age: 35
Discharge: HOME | End: 2020-11-01
Attending: EMERGENCY MEDICINE | Admitting: EMERGENCY MEDICINE
Payer: COMMERCIAL

## 2020-10-29 ENCOUNTER — ANESTHESIA EVENT (OUTPATIENT)
Dept: SURGERY | Facility: OTHER | Age: 35
End: 2020-10-29
Payer: COMMERCIAL

## 2020-10-29 DIAGNOSIS — R00.0 TACHYCARDIA: ICD-10-CM

## 2020-10-29 DIAGNOSIS — T81.31XA POSTOPERATIVE WOUND DEHISCENCE, INITIAL ENCOUNTER: Primary | ICD-10-CM

## 2020-10-29 DIAGNOSIS — S21.009A BREAST WOUND, UNSPECIFIED LATERALITY, INITIAL ENCOUNTER: ICD-10-CM

## 2020-10-29 LAB
ANION GAP SERPL CALC-SCNC: 15 MMOL/L (ref 8–16)
BASOPHILS # BLD AUTO: 0.04 K/UL (ref 0–0.2)
BASOPHILS NFR BLD: 0.5 % (ref 0–1.9)
BUN SERPL-MCNC: 9 MG/DL (ref 6–20)
CALCIUM SERPL-MCNC: 9.5 MG/DL (ref 8.7–10.5)
CHLORIDE SERPL-SCNC: 103 MMOL/L (ref 95–110)
CO2 SERPL-SCNC: 19 MMOL/L (ref 23–29)
CREAT SERPL-MCNC: 0.9 MG/DL (ref 0.5–1.4)
CTP QC/QA: YES
DIFFERENTIAL METHOD: ABNORMAL
EOSINOPHIL # BLD AUTO: 0.2 K/UL (ref 0–0.5)
EOSINOPHIL NFR BLD: 2.9 % (ref 0–8)
ERYTHROCYTE [DISTWIDTH] IN BLOOD BY AUTOMATED COUNT: 12.2 % (ref 11.5–14.5)
EST. GFR  (AFRICAN AMERICAN): >60 ML/MIN/1.73 M^2
EST. GFR  (NON AFRICAN AMERICAN): >60 ML/MIN/1.73 M^2
GLUCOSE SERPL-MCNC: 92 MG/DL (ref 70–110)
HCT VFR BLD AUTO: 35.9 % (ref 37–48.5)
HGB BLD-MCNC: 11.3 G/DL (ref 12–16)
IMM GRANULOCYTES # BLD AUTO: 0.02 K/UL (ref 0–0.04)
IMM GRANULOCYTES NFR BLD AUTO: 0.2 % (ref 0–0.5)
LYMPHOCYTES # BLD AUTO: 2.1 K/UL (ref 1–4.8)
LYMPHOCYTES NFR BLD: 25.1 % (ref 18–48)
MCH RBC QN AUTO: 27.8 PG (ref 27–31)
MCHC RBC AUTO-ENTMCNC: 31.5 G/DL (ref 32–36)
MCV RBC AUTO: 88 FL (ref 82–98)
MONOCYTES # BLD AUTO: 0.7 K/UL (ref 0.3–1)
MONOCYTES NFR BLD: 8.6 % (ref 4–15)
NEUTROPHILS # BLD AUTO: 5.1 K/UL (ref 1.8–7.7)
NEUTROPHILS NFR BLD: 62.7 % (ref 38–73)
NRBC BLD-RTO: 0 /100 WBC
PLATELET # BLD AUTO: 583 K/UL (ref 150–350)
PMV BLD AUTO: 9.2 FL (ref 9.2–12.9)
POTASSIUM SERPL-SCNC: 5 MMOL/L (ref 3.5–5.1)
RBC # BLD AUTO: 4.06 M/UL (ref 4–5.4)
SARS-COV-2 RDRP RESP QL NAA+PROBE: NEGATIVE
SODIUM SERPL-SCNC: 137 MMOL/L (ref 136–145)
WBC # BLD AUTO: 8.21 K/UL (ref 3.9–12.7)

## 2020-10-29 PROCEDURE — 93010 EKG 12-LEAD: ICD-10-PCS | Mod: ,,, | Performed by: INTERNAL MEDICINE

## 2020-10-29 PROCEDURE — 96361 HYDRATE IV INFUSION ADD-ON: CPT

## 2020-10-29 PROCEDURE — 99285 EMERGENCY DEPT VISIT HI MDM: CPT | Mod: 25

## 2020-10-29 PROCEDURE — 63600175 PHARM REV CODE 636 W HCPCS: Performed by: PHYSICIAN ASSISTANT

## 2020-10-29 PROCEDURE — 25000003 PHARM REV CODE 250: Performed by: PHYSICIAN ASSISTANT

## 2020-10-29 PROCEDURE — U0002 COVID-19 LAB TEST NON-CDC: HCPCS | Performed by: PHYSICIAN ASSISTANT

## 2020-10-29 PROCEDURE — 93005 ELECTROCARDIOGRAM TRACING: CPT

## 2020-10-29 PROCEDURE — 85025 COMPLETE CBC W/AUTO DIFF WBC: CPT

## 2020-10-29 PROCEDURE — 63600175 PHARM REV CODE 636 W HCPCS: Performed by: SURGERY

## 2020-10-29 PROCEDURE — G0378 HOSPITAL OBSERVATION PER HR: HCPCS

## 2020-10-29 PROCEDURE — 80048 BASIC METABOLIC PNL TOTAL CA: CPT

## 2020-10-29 PROCEDURE — 93010 ELECTROCARDIOGRAM REPORT: CPT | Mod: ,,, | Performed by: INTERNAL MEDICINE

## 2020-10-29 RX ORDER — CIPROFLOXACIN 500 MG/1
500 TABLET ORAL EVERY 8 HOURS
COMMUNITY
End: 2021-02-02 | Stop reason: CLARIF

## 2020-10-29 RX ORDER — SODIUM CHLORIDE, SODIUM LACTATE, POTASSIUM CHLORIDE, CALCIUM CHLORIDE 600; 310; 30; 20 MG/100ML; MG/100ML; MG/100ML; MG/100ML
INJECTION, SOLUTION INTRAVENOUS CONTINUOUS
Status: DISCONTINUED | OUTPATIENT
Start: 2020-10-29 | End: 2020-10-30

## 2020-10-29 RX ORDER — ONDANSETRON 2 MG/ML
4 INJECTION INTRAMUSCULAR; INTRAVENOUS EVERY 8 HOURS PRN
Status: DISCONTINUED | OUTPATIENT
Start: 2020-10-29 | End: 2020-10-30

## 2020-10-29 RX ORDER — ONDANSETRON 2 MG/ML
4 INJECTION INTRAMUSCULAR; INTRAVENOUS
Status: COMPLETED | OUTPATIENT
Start: 2020-10-29 | End: 2020-10-29

## 2020-10-29 RX ORDER — HYDROCODONE BITARTRATE AND ACETAMINOPHEN 5; 325 MG/1; MG/1
1 TABLET ORAL
Status: DISCONTINUED | OUTPATIENT
Start: 2020-10-29 | End: 2020-10-29

## 2020-10-29 RX ORDER — HYDROCODONE BITARTRATE AND ACETAMINOPHEN 10; 325 MG/1; MG/1
1 TABLET ORAL EVERY 4 HOURS PRN
Status: DISCONTINUED | OUTPATIENT
Start: 2020-10-29 | End: 2020-10-30

## 2020-10-29 RX ORDER — CEFAZOLIN SODIUM 2 G/50ML
2 SOLUTION INTRAVENOUS
Status: COMPLETED | OUTPATIENT
Start: 2020-10-29 | End: 2020-10-29

## 2020-10-29 RX ORDER — MORPHINE SULFATE 2 MG/ML
6 INJECTION, SOLUTION INTRAMUSCULAR; INTRAVENOUS
Status: COMPLETED | OUTPATIENT
Start: 2020-10-29 | End: 2020-10-29

## 2020-10-29 RX ORDER — MORPHINE SULFATE 2 MG/ML
2 INJECTION, SOLUTION INTRAMUSCULAR; INTRAVENOUS
Status: DISCONTINUED | OUTPATIENT
Start: 2020-10-29 | End: 2020-10-30

## 2020-10-29 RX ORDER — BENZONATATE 100 MG/1
100 CAPSULE ORAL 3 TIMES DAILY PRN
Qty: 20 CAPSULE | Refills: 0 | Status: SHIPPED | OUTPATIENT
Start: 2020-10-29 | End: 2020-10-29 | Stop reason: CLARIF

## 2020-10-29 RX ORDER — SODIUM CHLORIDE 0.9 % (FLUSH) 0.9 %
10 SYRINGE (ML) INJECTION
Status: DISCONTINUED | OUTPATIENT
Start: 2020-10-29 | End: 2020-10-30

## 2020-10-29 RX ADMIN — SODIUM CHLORIDE 1000 ML: 0.9 INJECTION, SOLUTION INTRAVENOUS at 04:10

## 2020-10-29 RX ADMIN — MORPHINE SULFATE 6 MG: 2 INJECTION, SOLUTION INTRAMUSCULAR; INTRAVENOUS at 04:10

## 2020-10-29 RX ADMIN — ONDANSETRON 4 MG: 2 INJECTION INTRAMUSCULAR; INTRAVENOUS at 04:10

## 2020-10-29 RX ADMIN — SODIUM CHLORIDE, SODIUM LACTATE, POTASSIUM CHLORIDE, AND CALCIUM CHLORIDE: 600; 310; 30; 20 INJECTION, SOLUTION INTRAVENOUS at 09:10

## 2020-10-29 RX ADMIN — CEFAZOLIN SODIUM 2 G: 2 SOLUTION INTRAVENOUS at 09:10

## 2020-10-29 RX ADMIN — HYDROCODONE BITARTRATE AND ACETAMINOPHEN 1 TABLET: 10; 325 TABLET ORAL at 09:10

## 2020-10-29 NOTE — ED PROVIDER NOTES
Encounter Date: 10/29/2020       History     Chief Complaint   Patient presents with    Abdominal Pain     Patient states that she had surgery Oct 5, 2020 and now has an infection.  Patient states that her MD told her that she was going to have her admitted.       Patient is a 35-year-old female with a past medical history of bracket to positive status post recent bilateral mastectomy with CARMEN flap reconstruction, presenting for admission.  Patient had her surgery on 10/05/2020 with Dr. Cantu and Dr. Carrasco.  She states that she has been having problems with wound healing since then.  She saw Dr. Carrasco in clinic today because she has been having pain with discoloration of her abdominal wound.  She states she was sent to the emergency room for admission, is due to have surgery tomorrow morning.  She denies any shortness of breath.This is the extent of the patient's complaints at this time.       The history is provided by the patient.     Review of patient's allergies indicates:   Allergen Reactions    Neosporin [benzalkonium chloride] Hives    Percocet [oxycodone-acetaminophen]      Facial flushing and throat burning . Patient states she can take Lortab     Past Medical History:   Diagnosis Date    Asthma     childhood    BRCA2 positive     Depression     Diabetes mellitus     Genetic testing 06/20/2018    BRCA2 positive, reported by [facility] from outside provider    GERD (gastroesophageal reflux disease)     Mitral valve prolapse     PONV (postoperative nausea and vomiting)     Suspected sleep apnea      Past Surgical History:   Procedure Laterality Date    BILATERAL MASTECTOMY Bilateral 10/5/2020    Procedure: MASTECTOMY, BILATERAL;  Surgeon: Paulina Cantu MD;  Location: [facility] OR;  Service: General;  Laterality: Bilateral;    CHOLECYSTECTOMY      COLONOSCOPY      CYSTOSCOPY N/A 3/11/2019    Procedure: CYSTOSCOPY;  Surgeon: Dagoberto Smith MD;  Location: [facility] OR;  Service: OB/GYN;   Laterality: N/A;    HYSTERECTOMY      LAPAROSCOPIC SALPINGO-OOPHORECTOMY Bilateral 3/11/2019    Procedure: SALPINGO-OOPHORECTOMY, LAPAROSCOPIC;  Surgeon: Dagoberto Smith MD;  Location: [facility] OR;  Service: OB/GYN;  Laterality: Bilateral;    LAPAROSCOPIC TOTAL HYSTERECTOMY N/A 3/11/2019    Procedure: HYSTERECTOMY, TOTAL, LAPAROSCOPIC;  Surgeon: Dagoberto Smith MD;  Location: [facility] OR;  Service: OB/GYN;  Laterality: N/A;    OOPHORECTOMY      RECONSTRUCTION OF BREAST WITH DEEP INFERIOR EPIGASTRIC ARTERY  (CARMEN) FLAP Bilateral 10/5/2020    Procedure: RECONSTRUCTION, BREAST, USING CARMEN SKIN FLAP;  Surgeon: Ron Carrasco MD;  Location: [facility] OR;  Service: General;  Laterality: Bilateral;    TOTAL REDUCTION MAMMOPLASTY      TUBAL LIGATION      open    WISDOM TOOTH EXTRACTION       Family History   Problem Relation Age of Onset    Diabetes Mother     Heart disease Mother     Obesity Mother     Sleep apnea Mother     Colon polyps Mother     No Known Problems Father     No Known Problems Son     Heart disease Maternal Grandmother     Diabetes Maternal Grandmother     Hypertension Maternal Grandmother     Breast cancer Maternal Grandmother 49        bilateral, second at 59    Obesity Maternal Grandfather     Melanoma Maternal Grandfather 50    No Known Problems Son     No Known Problems Son     No Known Problems Son     Colon cancer Maternal Aunt 35    Breast cancer Maternal Cousin         mom's maternal 1st cousin    Breast cancer Maternal Cousin         mom's maternal 1st cousin    Sleep apnea Brother     No Known Problems Maternal Uncle     No Known Problems Paternal Aunt     No Known Problems Paternal Uncle     No Known Problems Paternal Grandmother     Heart attack Paternal Grandfather     Colon polyps Maternal Aunt     Ovarian cancer Neg Hx      Social History     Tobacco Use    Smoking status: Never Smoker    Smokeless tobacco: Never Used   Substance Use Topics     Alcohol use: Yes     Comment: Occasional    Drug use: No     Review of Systems   Constitutional: Negative for activity change, appetite change, chills, fatigue and fever.   HENT: Negative for congestion, rhinorrhea and sore throat.    Eyes: Negative for photophobia and visual disturbance.   Respiratory: Negative for cough, shortness of breath and wheezing.    Cardiovascular: Negative for chest pain.   Gastrointestinal: Positive for abdominal pain. Negative for diarrhea, nausea and vomiting.   Genitourinary: Negative for dysuria, hematuria and urgency.   Musculoskeletal: Negative for back pain, myalgias and neck pain.   Skin: Negative for color change and wound.   Neurological: Negative for weakness and headaches.   Psychiatric/Behavioral: Negative for agitation and confusion.       Physical Exam     Initial Vitals [10/29/20 1501]   BP Pulse Resp Temp SpO2   127/80 (!) 132 20 99.1 °F (37.3 °C) 98 %      MAP       --         Physical Exam    Nursing note and vitals reviewed.  Constitutional: She appears well-developed and well-nourished. She is not diaphoretic. She is Obese . She is cooperative.  Non-toxic appearance. She does not have a sickly appearance. No distress.   Uncomfortable appearing  female accompanied by her mother in the emergency room.  Speaking clearly in full sentences.  No acute distress.   HENT:   Head: Normocephalic and atraumatic.   Right Ear: External ear normal.   Left Ear: External ear normal.   Nose: Nose normal.   Mouth/Throat: Oropharynx is clear and moist.   Eyes: Conjunctivae and EOM are normal.   Neck: Normal range of motion. Neck supple.   Cardiovascular: Regular rhythm and normal heart sounds. Tachycardia present.    Pulmonary/Chest: Breath sounds normal. No respiratory distress. She has no wheezes.   Abdominal: Soft. Bowel sounds are normal. She exhibits no distension. There is no abdominal tenderness. There is no rebound and no guarding.   Incision surrounding the umbilicus  is normal.  Transverse lower incision has necrosis at the suture line, drain in place to the left side.  Surrounding erythema with induration and tenderness to palpation.   Musculoskeletal: Normal range of motion.   Neurological: She is alert and oriented to person, place, and time. GCS eye subscore is 4. GCS verbal subscore is 5. GCS motor subscore is 6.   Skin: Skin is warm.   Psychiatric: She has a normal mood and affect. Her behavior is normal. Judgment and thought content normal.         ED Course   Procedures  Labs Reviewed   CBC W/ AUTO DIFFERENTIAL - Abnormal; Notable for the following components:       Result Value    Hemoglobin 11.3 (*)     Hematocrit 35.9 (*)     MCHC 31.5 (*)     Platelets 583 (*)     All other components within normal limits   BASIC METABOLIC PANEL - Abnormal; Notable for the following components:    CO2 19 (*)     All other components within normal limits   SARS-COV-2 RDRP GENE     EKG Readings: (Independently Interpreted)   Initial Reading: No STEMI. Previous EKG: Compared with most recent EKG Previous EKG Date: 1/29/2020. Rhythm: Sinus Tachycardia. Heart Rate: 118.   3:44 PM - sinus tachycardia with a rate of 118 beats per minute, no STEMI  5:29 PM - sinus tachycardia with a rate of 106 beats per minute, no STEMI          Medical Decision Making:   Initial Assessment:     Urgent evaluation of a 35-year-old female with a past medical history of bracket 2 status post bilateral mastectomy with reconstruction, presenting for admission.  Patient is afebrile, nontoxic appearing, hemodynamically stable.  Physical exam reveals increases surrounding the transverse lower abdominal incision.  Patient is also tachycardic at 130-1 arrival.  Patient is requiring COVID testing for admission however will obtain labs, give fluids and analgesics given her elevated heart rate.    Independently Interpreted Test(s):   I have ordered and independently interpreted EKG Reading(s) - see prior notes  Clinical  Tests:   Lab Tests: Ordered and Reviewed  Medical Tests: Ordered and Reviewed  ED Management:    3:28 PM Spoke with Dr. Carrasco in regards to the plan of care. Will call back with results.    EKG shows sinus tachycardia with a rate of 118 beats per minute, no STEMI.  COVID negative.  CBC shows no leukocytosis, H&H 11.3 and 35.9.  BNP is within normal limits.  After receiving analgesics and fluids, patient's heart rate has normalized to 100bpm.  Spoke with Dr. Carrasco again, will admit the patient to his service for surgical intervention tomorrow.  Make NPO at midnight. The treatment plan was discussed with the patient who demonstrated understanding and comfort with plan.      This note was created using 8bit Fluency Direct. There may be typographical errors secondary to dictation.                                Clinical Impression:       ICD-10-CM ICD-9-CM   1. Postoperative wound dehiscence, initial encounter  T81.31XA 998.32   2. Tachycardia  R00.0 785.0                          ED Disposition Condition    Observation                             Irma Christianson PA-C  10/29/20 7929

## 2020-10-30 LAB — POCT GLUCOSE: 96 MG/DL (ref 70–110)

## 2020-10-30 PROCEDURE — 63600175 PHARM REV CODE 636 W HCPCS: Performed by: PHYSICIAN ASSISTANT

## 2020-10-30 PROCEDURE — 63600175 PHARM REV CODE 636 W HCPCS: Performed by: SURGERY

## 2020-10-30 PROCEDURE — 63600175 PHARM REV CODE 636 W HCPCS: Performed by: NURSE ANESTHETIST, CERTIFIED REGISTERED

## 2020-10-30 PROCEDURE — 25000003 PHARM REV CODE 250: Performed by: NURSE ANESTHETIST, CERTIFIED REGISTERED

## 2020-10-30 PROCEDURE — 25000003 PHARM REV CODE 250: Performed by: SURGERY

## 2020-10-30 PROCEDURE — 37000008 HC ANESTHESIA 1ST 15 MINUTES: Performed by: SURGERY

## 2020-10-30 PROCEDURE — 71000039 HC RECOVERY, EACH ADD'L HOUR: Performed by: SURGERY

## 2020-10-30 PROCEDURE — 96361 HYDRATE IV INFUSION ADD-ON: CPT

## 2020-10-30 PROCEDURE — 96374 THER/PROPH/DIAG INJ IV PUSH: CPT | Mod: 59

## 2020-10-30 PROCEDURE — 63600175 PHARM REV CODE 636 W HCPCS: Performed by: ANESTHESIOLOGY

## 2020-10-30 PROCEDURE — 25000003 PHARM REV CODE 250: Performed by: PHYSICIAN ASSISTANT

## 2020-10-30 PROCEDURE — 94761 N-INVAS EAR/PLS OXIMETRY MLT: CPT

## 2020-10-30 PROCEDURE — 27201423 OPTIME MED/SURG SUP & DEVICES STERILE SUPPLY: Performed by: SURGERY

## 2020-10-30 PROCEDURE — G0378 HOSPITAL OBSERVATION PER HR: HCPCS

## 2020-10-30 PROCEDURE — 71000033 HC RECOVERY, INTIAL HOUR: Performed by: SURGERY

## 2020-10-30 PROCEDURE — 37000009 HC ANESTHESIA EA ADD 15 MINS: Performed by: SURGERY

## 2020-10-30 PROCEDURE — 36000706: Performed by: SURGERY

## 2020-10-30 PROCEDURE — 36000707: Performed by: SURGERY

## 2020-10-30 PROCEDURE — 25000003 PHARM REV CODE 250: Performed by: ANESTHESIOLOGY

## 2020-10-30 RX ORDER — ONDANSETRON 2 MG/ML
4 INJECTION INTRAMUSCULAR; INTRAVENOUS EVERY 6 HOURS PRN
Status: DISCONTINUED | OUTPATIENT
Start: 2020-10-30 | End: 2020-11-01 | Stop reason: HOSPADM

## 2020-10-30 RX ORDER — FLUOXETINE HYDROCHLORIDE 20 MG/1
20 CAPSULE ORAL DAILY
Status: DISCONTINUED | OUTPATIENT
Start: 2020-10-30 | End: 2020-11-01 | Stop reason: HOSPADM

## 2020-10-30 RX ORDER — CEFAZOLIN SODIUM 1 G/3ML
INJECTION, POWDER, FOR SOLUTION INTRAMUSCULAR; INTRAVENOUS
Status: DISCONTINUED | OUTPATIENT
Start: 2020-10-30 | End: 2020-10-30 | Stop reason: HOSPADM

## 2020-10-30 RX ORDER — MIDAZOLAM HYDROCHLORIDE 1 MG/ML
INJECTION INTRAMUSCULAR; INTRAVENOUS
Status: DISCONTINUED | OUTPATIENT
Start: 2020-10-30 | End: 2020-10-30

## 2020-10-30 RX ORDER — DOCUSATE SODIUM 100 MG/1
100 CAPSULE, LIQUID FILLED ORAL EVERY 12 HOURS
Status: DISCONTINUED | OUTPATIENT
Start: 2020-10-30 | End: 2020-11-01 | Stop reason: HOSPADM

## 2020-10-30 RX ORDER — GENTAMICIN SULFATE 40 MG/ML
INJECTION, SOLUTION INTRAMUSCULAR; INTRAVENOUS
Status: DISCONTINUED | OUTPATIENT
Start: 2020-10-30 | End: 2020-10-30 | Stop reason: HOSPADM

## 2020-10-30 RX ORDER — PROPOFOL 10 MG/ML
VIAL (ML) INTRAVENOUS
Status: DISCONTINUED | OUTPATIENT
Start: 2020-10-30 | End: 2020-10-30

## 2020-10-30 RX ORDER — FENTANYL CITRATE 50 UG/ML
INJECTION, SOLUTION INTRAMUSCULAR; INTRAVENOUS
Status: DISCONTINUED | OUTPATIENT
Start: 2020-10-30 | End: 2020-10-30

## 2020-10-30 RX ORDER — SUCRALFATE 1 G/10ML
1 SUSPENSION ORAL EVERY 6 HOURS
Status: DISCONTINUED | OUTPATIENT
Start: 2020-10-30 | End: 2020-11-01 | Stop reason: HOSPADM

## 2020-10-30 RX ORDER — SODIUM CHLORIDE 0.9 % (FLUSH) 0.9 %
3 SYRINGE (ML) INJECTION
Status: DISCONTINUED | OUTPATIENT
Start: 2020-10-30 | End: 2020-11-01 | Stop reason: HOSPADM

## 2020-10-30 RX ORDER — SODIUM CHLORIDE 0.9 % (FLUSH) 0.9 %
10 SYRINGE (ML) INJECTION
Status: DISCONTINUED | OUTPATIENT
Start: 2020-10-30 | End: 2020-11-01 | Stop reason: HOSPADM

## 2020-10-30 RX ORDER — CEFAZOLIN SODIUM 2 G/50ML
2 SOLUTION INTRAVENOUS
Status: COMPLETED | OUTPATIENT
Start: 2020-10-30 | End: 2020-10-31

## 2020-10-30 RX ORDER — MEPERIDINE HYDROCHLORIDE 25 MG/ML
12.5 INJECTION INTRAMUSCULAR; INTRAVENOUS; SUBCUTANEOUS ONCE AS NEEDED
Status: DISCONTINUED | OUTPATIENT
Start: 2020-10-30 | End: 2020-10-30 | Stop reason: HOSPADM

## 2020-10-30 RX ORDER — DIPHENHYDRAMINE HCL 25 MG
25 CAPSULE ORAL EVERY 4 HOURS PRN
Status: DISCONTINUED | OUTPATIENT
Start: 2020-10-30 | End: 2020-11-01 | Stop reason: HOSPADM

## 2020-10-30 RX ORDER — LIDOCAINE HYDROCHLORIDE 20 MG/ML
INJECTION INTRAVENOUS
Status: DISCONTINUED | OUTPATIENT
Start: 2020-10-30 | End: 2020-10-30

## 2020-10-30 RX ORDER — PANTOPRAZOLE SODIUM 40 MG/1
40 TABLET, DELAYED RELEASE ORAL DAILY
Status: DISCONTINUED | OUTPATIENT
Start: 2020-10-30 | End: 2020-11-01 | Stop reason: HOSPADM

## 2020-10-30 RX ORDER — LIDOCAINE HYDROCHLORIDE 10 MG/ML
0.5 INJECTION, SOLUTION EPIDURAL; INFILTRATION; INTRACAUDAL; PERINEURAL ONCE
Status: DISCONTINUED | OUTPATIENT
Start: 2020-10-30 | End: 2020-11-01 | Stop reason: HOSPADM

## 2020-10-30 RX ORDER — ONDANSETRON 2 MG/ML
4 INJECTION INTRAMUSCULAR; INTRAVENOUS DAILY PRN
Status: DISCONTINUED | OUTPATIENT
Start: 2020-10-30 | End: 2020-10-30 | Stop reason: HOSPADM

## 2020-10-30 RX ORDER — ONDANSETRON 4 MG/1
4 TABLET, ORALLY DISINTEGRATING ORAL EVERY 6 HOURS PRN
Status: DISCONTINUED | OUTPATIENT
Start: 2020-10-30 | End: 2020-11-01 | Stop reason: HOSPADM

## 2020-10-30 RX ORDER — HYDROMORPHONE HYDROCHLORIDE 2 MG/ML
0.4 INJECTION, SOLUTION INTRAMUSCULAR; INTRAVENOUS; SUBCUTANEOUS EVERY 5 MIN PRN
Status: DISCONTINUED | OUTPATIENT
Start: 2020-10-30 | End: 2020-10-30 | Stop reason: HOSPADM

## 2020-10-30 RX ORDER — ONDANSETRON 2 MG/ML
INJECTION INTRAMUSCULAR; INTRAVENOUS
Status: DISCONTINUED | OUTPATIENT
Start: 2020-10-30 | End: 2020-10-30

## 2020-10-30 RX ORDER — MAG HYDROX/ALUMINUM HYD/SIMETH 200-200-20
30 SUSPENSION, ORAL (FINAL DOSE FORM) ORAL
Status: DISCONTINUED | OUTPATIENT
Start: 2020-10-30 | End: 2020-11-01 | Stop reason: HOSPADM

## 2020-10-30 RX ORDER — ROCURONIUM BROMIDE 10 MG/ML
INJECTION, SOLUTION INTRAVENOUS
Status: DISCONTINUED | OUTPATIENT
Start: 2020-10-30 | End: 2020-10-30

## 2020-10-30 RX ORDER — LISINOPRIL 20 MG/1
20 TABLET ORAL DAILY
Status: DISCONTINUED | OUTPATIENT
Start: 2020-10-30 | End: 2020-11-01 | Stop reason: HOSPADM

## 2020-10-30 RX ORDER — HYDROMORPHONE HYDROCHLORIDE 2 MG/ML
INJECTION, SOLUTION INTRAMUSCULAR; INTRAVENOUS; SUBCUTANEOUS
Status: DISCONTINUED | OUTPATIENT
Start: 2020-10-30 | End: 2020-10-30

## 2020-10-30 RX ORDER — NEOSTIGMINE METHYLSULFATE 1 MG/ML
INJECTION, SOLUTION INTRAVENOUS
Status: DISCONTINUED | OUTPATIENT
Start: 2020-10-30 | End: 2020-10-30

## 2020-10-30 RX ORDER — KETAMINE HCL IN 0.9 % NACL 50 MG/5 ML
SYRINGE (ML) INTRAVENOUS
Status: DISCONTINUED | OUTPATIENT
Start: 2020-10-30 | End: 2020-10-30

## 2020-10-30 RX ORDER — SODIUM CHLORIDE, SODIUM LACTATE, POTASSIUM CHLORIDE, CALCIUM CHLORIDE 600; 310; 30; 20 MG/100ML; MG/100ML; MG/100ML; MG/100ML
INJECTION, SOLUTION INTRAVENOUS CONTINUOUS
Status: DISCONTINUED | OUTPATIENT
Start: 2020-10-30 | End: 2020-11-01 | Stop reason: HOSPADM

## 2020-10-30 RX ORDER — HYDROCODONE BITARTRATE AND ACETAMINOPHEN 10; 325 MG/1; MG/1
1 TABLET ORAL EVERY 4 HOURS PRN
Status: DISCONTINUED | OUTPATIENT
Start: 2020-10-30 | End: 2020-11-01 | Stop reason: HOSPADM

## 2020-10-30 RX ORDER — ENOXAPARIN SODIUM 100 MG/ML
40 INJECTION SUBCUTANEOUS EVERY 24 HOURS
Status: DISCONTINUED | OUTPATIENT
Start: 2020-10-30 | End: 2020-11-01 | Stop reason: HOSPADM

## 2020-10-30 RX ORDER — HYDROMORPHONE HYDROCHLORIDE 1 MG/ML
1 INJECTION, SOLUTION INTRAMUSCULAR; INTRAVENOUS; SUBCUTANEOUS EVERY 4 HOURS PRN
Status: DISCONTINUED | OUTPATIENT
Start: 2020-10-30 | End: 2020-11-01 | Stop reason: HOSPADM

## 2020-10-30 RX ORDER — OXYCODONE HYDROCHLORIDE 5 MG/1
5 TABLET ORAL
Status: DISCONTINUED | OUTPATIENT
Start: 2020-10-30 | End: 2020-10-30 | Stop reason: HOSPADM

## 2020-10-30 RX ORDER — OXYCODONE HYDROCHLORIDE 5 MG/1
5 TABLET ORAL
Status: DISCONTINUED | OUTPATIENT
Start: 2020-10-30 | End: 2020-10-30

## 2020-10-30 RX ADMIN — MIDAZOLAM HYDROCHLORIDE 2 MG: 1 INJECTION, SOLUTION INTRAMUSCULAR; INTRAVENOUS at 08:10

## 2020-10-30 RX ADMIN — NEOSTIGMINE METHYLSULFATE 5 MG: 1 INJECTION INTRAVENOUS at 12:10

## 2020-10-30 RX ADMIN — GLYCOPYRROLATE 0.6 MG: 0.2 INJECTION, SOLUTION INTRAMUSCULAR; INTRAVITREAL at 12:10

## 2020-10-30 RX ADMIN — CEFAZOLIN SODIUM 2 G: 2 SOLUTION INTRAVENOUS at 05:10

## 2020-10-30 RX ADMIN — GLYCOPYRROLATE 0.2 MG: 0.2 INJECTION, SOLUTION INTRAMUSCULAR; INTRAVITREAL at 08:10

## 2020-10-30 RX ADMIN — ONDANSETRON 4 MG: 2 INJECTION INTRAMUSCULAR; INTRAVENOUS at 05:10

## 2020-10-30 RX ADMIN — LIDOCAINE HYDROCHLORIDE 75 MG: 20 INJECTION, SOLUTION INTRAVENOUS at 08:10

## 2020-10-30 RX ADMIN — DOCUSATE SODIUM 100 MG: 100 CAPSULE, LIQUID FILLED ORAL at 08:10

## 2020-10-30 RX ADMIN — FENTANYL CITRATE 50 MCG: 50 INJECTION, SOLUTION INTRAMUSCULAR; INTRAVENOUS at 09:10

## 2020-10-30 RX ADMIN — Medication 50 MG: at 08:10

## 2020-10-30 RX ADMIN — PROPOFOL 180 MG: 10 INJECTION, EMULSION INTRAVENOUS at 08:10

## 2020-10-30 RX ADMIN — HYDROCODONE BITARTRATE AND ACETAMINOPHEN 1 TABLET: 10; 325 TABLET ORAL at 01:10

## 2020-10-30 RX ADMIN — PROMETHAZINE HYDROCHLORIDE 12.5 MG: 25 INJECTION INTRAMUSCULAR; INTRAVENOUS at 07:10

## 2020-10-30 RX ADMIN — HYDROMORPHONE HYDROCHLORIDE 0.4 MG: 2 INJECTION, SOLUTION INTRAMUSCULAR; INTRAVENOUS; SUBCUTANEOUS at 01:10

## 2020-10-30 RX ADMIN — PROMETHAZINE HYDROCHLORIDE 6.25 MG: 25 INJECTION INTRAMUSCULAR; INTRAVENOUS at 01:10

## 2020-10-30 RX ADMIN — HYDROCODONE BITARTRATE AND ACETAMINOPHEN 1 TABLET: 10; 325 TABLET ORAL at 08:10

## 2020-10-30 RX ADMIN — ROCURONIUM BROMIDE 50 MG: 10 SOLUTION INTRAVENOUS at 08:10

## 2020-10-30 RX ADMIN — CARBOXYMETHYLCELLULOSE SODIUM 2 DROP: 2.5 SOLUTION/ DROPS OPHTHALMIC at 08:10

## 2020-10-30 RX ADMIN — HYDROMORPHONE HYDROCHLORIDE 1 MG: 1 INJECTION, SOLUTION INTRAMUSCULAR; INTRAVENOUS; SUBCUTANEOUS at 05:10

## 2020-10-30 RX ADMIN — DEXTROSE 2 G: 50 INJECTION, SOLUTION INTRAVENOUS at 08:10

## 2020-10-30 RX ADMIN — PROPOFOL 20 MG: 10 INJECTION, EMULSION INTRAVENOUS at 12:10

## 2020-10-30 RX ADMIN — ENOXAPARIN SODIUM 40 MG: 40 INJECTION SUBCUTANEOUS at 05:10

## 2020-10-30 RX ADMIN — SUCRALFATE 1 G: 1 SUSPENSION ORAL at 05:10

## 2020-10-30 RX ADMIN — ONDANSETRON HYDROCHLORIDE 4 MG: 2 INJECTION INTRAMUSCULAR; INTRAVENOUS at 12:10

## 2020-10-30 RX ADMIN — FENTANYL CITRATE 100 MCG: 50 INJECTION, SOLUTION INTRAMUSCULAR; INTRAVENOUS at 08:10

## 2020-10-30 RX ADMIN — HYDROMORPHONE HYDROCHLORIDE 1 MG: 2 INJECTION, SOLUTION INTRAMUSCULAR; INTRAVENOUS; SUBCUTANEOUS at 11:10

## 2020-10-30 RX ADMIN — SODIUM CHLORIDE, SODIUM LACTATE, POTASSIUM CHLORIDE, AND CALCIUM CHLORIDE: 600; 310; 30; 20 INJECTION, SOLUTION INTRAVENOUS at 10:10

## 2020-10-30 RX ADMIN — ONDANSETRON 4 MG: 2 INJECTION INTRAMUSCULAR; INTRAVENOUS at 10:10

## 2020-10-30 RX ADMIN — HYDROMORPHONE HYDROCHLORIDE 1 MG: 1 INJECTION, SOLUTION INTRAMUSCULAR; INTRAVENOUS; SUBCUTANEOUS at 10:10

## 2020-10-30 RX ADMIN — ROCURONIUM BROMIDE 10 MG: 10 SOLUTION INTRAVENOUS at 11:10

## 2020-10-30 RX ADMIN — SODIUM CHLORIDE, SODIUM LACTATE, POTASSIUM CHLORIDE, AND CALCIUM CHLORIDE: 600; 310; 30; 20 INJECTION, SOLUTION INTRAVENOUS at 07:10

## 2020-10-30 NOTE — ANESTHESIA PREPROCEDURE EVALUATION
10/30/2020  Allyson Bahena is a 35 y.o., female.    Pre-op Assessment    I have reviewed the Patient Summary Reports.     I have reviewed the Nursing Notes. I have reviewed the NPO Status.   I have reviewed the Medications.     Review of Systems  Anesthesia Hx:  Hx of Anesthetic complications PONV Denies Family Hx of Anesthesia complications.  Personal Hx of Anesthesia complications, Post-Operative Nausea/Vomiting, with every anesthetic, despite treatment   Social:  Non-Smoker    Hematology/Oncology:  Hematology Normal   Oncology Normal     EENT/Dental:EENT/Dental Normal   Cardiovascular:   Exercise tolerance: good Hypertension, well controlled    Pulmonary:   Denies Asthma.    Renal/:  Renal/ Normal     Hepatic/GI:   GERD    Musculoskeletal:  Musculoskeletal Normal    Neurological:  Neurology Normal    Endocrine:   Diabetes Pre-diabetic   Dermatological:  Skin Normal    Psych:   depression          Physical Exam  General:  Obesity    Airway/Jaw/Neck:  Airway Findings: Mouth Opening: Normal Tongue: Normal  General Airway Assessment: Adult  Mallampati: I  TM Distance: Normal, at least 6 cm  Jaw/Neck Findings:  Neck ROM: Normal ROM      Dental:  Dental Findings: In tact             Anesthesia Plan  Type of Anesthesia, risks & benefits discussed:  Anesthesia Type:  general  Patient's Preference:   Intra-op Monitoring Plan: standard ASA monitors  Intra-op Monitoring Plan Comments:   Post Op Pain Control Plan: per primary service following discharge from PACU and multimodal analgesia  Post Op Pain Control Plan Comments:   Induction:   IV  Beta Blocker:         Informed Consent: Patient understands risks and agrees with Anesthesia plan.  Questions answered. Anesthesia consent signed with patient.  ASA Score: 2     Day of Surgery Review of History & Physical:    H&P update referred to the surgeon.      Anesthesia Plan Notes: Pt returns for debridement of surgical wound.        Ready For Surgery From Anesthesia Perspective.

## 2020-10-30 NOTE — ANESTHESIA POSTPROCEDURE EVALUATION
Anesthesia Post Evaluation    Patient: Allyson Bahena    Procedure(s) Performed: Procedure(s) (LRB):  DEBRIDEMENT, WOUND, ABDOMEN (N/A)  APPLICATION, WOUND VAC (N/A)  DEBRIDEMENT, WOUND (Bilateral)  APPLICATION, GRAFT, SKIN, FULL-THICKNESS (Left)    Final Anesthesia Type: general    Patient location during evaluation: PACU  Patient participation: Yes- Able to Participate  Level of consciousness: awake and alert  Post-procedure vital signs: reviewed and stable  Pain management: adequate  Airway patency: patent  WHIT mitigation strategies: Extubation while patient is awake  PONV status at discharge: No PONV  Anesthetic complications: no      Cardiovascular status: hemodynamically stable  Respiratory status: unassisted  Hydration status: euvolemic  Follow-up not needed.          Vitals Value Taken Time   /69 10/30/20 1353   Temp 37.2 °C (98.9 °F) 10/30/20 1326   Pulse 105 10/30/20 1354   Resp 16 10/30/20 1326   SpO2 99 % 10/30/20 1354   Vitals shown include unvalidated device data.      Event Time   Out of Recovery 14:08:45         Pain/Yefri Score: Pain Rating Prior to Med Admin: 6 (10/30/2020  1:10 PM)  Pain Rating Post Med Admin: 4 (states tolerable) (10/30/2020  1:49 PM)  Yefri Score: 8 (10/30/2020  1:49 PM)

## 2020-10-30 NOTE — PLAN OF CARE
VSS and afebrile, aaox4. Incision to abdomen and bilateral breasts from previous surgery. JPX1 In place to left abdomen putting out yellow drainage. NPO since midnight. HCG bath complete. Plan of care reviewed with patient. Purposeful hourly rounding done. Call light at bedside, bed at lowest position, brakes on, non skid socks on. Will continue to monitor.

## 2020-10-30 NOTE — OP NOTE
[facility]  Operative Note    SUMMARY     Surgery Date: 10/30/2020     Surgeons:     Ron Carrasco MD surgeon      Assisting Surgeon: None    Pre-op Diagnosis:  Breast wound, unspecified laterality, initial encounter [S21.009A]  Genetic susceptibility to breast cancer [Z15.01]  Open wound of abdomen, initial encounter [S31.109A]    Post-op Diagnosis:  Post-Op Diagnosis Codes:     * Breast wound, unspecified laterality, initial encounter [S21.009A]     * Genetic susceptibility to breast cancer [Z15.01]     * Open wound of abdomen, initial encounter [S31.109A]    Procedure:    1.)  Full-thickness excisional debridement of left breast mastectomy skin necrosis measuring 7.5 cm x 6.5 cm  2.)  Full-thickness excisional debridement of abdominal wound necrosis measuring 30 cm by 4 cm  3.)  Full-thickness excisional debridement of right breast nipple areolar complex necrosis measuring 5 x 2 cm  4.)  Delayed primary complex closure of abdominal wound measuring 30 cm  5.)  Delayed primary complex closure of right breast wound measuring 5 x 2 cm  6.)  Local tissue rearrangement of left breast wound  7.)  Preparation of left breast wound for full-thickness skin graft measuring 7.5 x 6.5 cm  8.)  Full-thickness skin graft to left breast wound measuring 7.5 cm x 6.5 cm  9.)  Application of negative pressure wound therapy to abdomen  10.)  Application of negative pressure wound therapy to left breast    Anesthesia: General    Drains:  1 15 round drain to abdomen, wound VAC dressings to left breast and abdomen    Complications:  None    Condition:  Stable    Operative procedure in detail:  After risks and benefits were thoroughly discussed with the patient the patient expressed verbal understanding, informed consent was obtained.  The patient was subsequently taken to the operating room placed supine on the operating table.  After appropriate general anesthesia was provided, the patient's abdomen and breasts  were prepped and draped in a standard surgical fashion.  Attention was 1st directed towards the abdominal wound necrosis.  Sharp a full-thickness excisional debridement of the abdominal wound measuring 30 cm x 4 cm was performed.  Debridement was carried down to the abdominal fascia.  There was no signs of infection or purulent fluid.  Once the debridement was carried back to healthy bleeding tissue, the wound was then Pulsavac with antibiotic solution.  Next the patient was placed in the flexed position and a 15 round drain was then placed and secured with a 4-0 Vicryl suture.  Next the wound was then closed with a 0 PDS interrupted sutures.  Next 0 Prolene vertical mattress sutures were then placed to reapproximate the wound.    Attention was then directed towards the left breast wound.  The patient experienced left mastectomy skin necrosis measuring 7.5 x 6.5 cm.  This was then sharply debrided full thickness down to the underlying flap.  The flap was well perfused and bleeding very well.  Next the mastectomy skin flaps raised in all directions and the lateral mastectomy skin flap was then advanced medially to make the wound smaller.  3-0 Monocryl deep dermal sutures were used to reapproximate the wound in the periphery.  Next 4-0 Prolene was used in a running fashion to close the medial and lateral aspects of the wound.  Next considering the size of the wound measuring 7.5 x 6.5 cm and due to the fact that the underlying flap was well perfused and the healthy bed we decided to place a full-thickness skin graft.  The lateral dog ears on the sides of each flank was then excised and closed with 2-0 Quill suture in 2 layers.  The skin from this dog ear excision was then prepared for skin grafting.  Next the skin was then placed over the the left breast wound and secured with 4-0 chromic running sutures.  The graft was then pie crusted with a 15 blade.  Next Xeroform dressing was then applied followed by a black  sponge dressing and the wound VAC was then applied.  Good suction was achieved.  Next attention was directed towards the left breast wound.  The patient experienced a 5 x 2 cm nipple-areolar complex necrosis and this was sharply debrided full thickness down to healthy tissue.  This was then closed with a complex closure of 3-0 Monocryl deep dermal sutures and 4-0 Prolene interrupted sutures.  Next Prevena incisional wound VAC was then applied to the abdominal wound and Dermabond tape was applied to the lateral dog ear incisions.  The sponge needle instrument counts correct at the end of the case the patient tolerated procedure well was transferred to recovery room in stable condition.  Postoperative bra and ABD pads were then placed.    Description of the findings of the procedure:  Bilateral breast wound necrosis and abdominal wound necrosis appropriately debrided with delayed primary closure of the abdominal wound and right breast wound as well as full-thickness skin graft to the left breast wound    Estimated Blood Loss: * No values recorded between 10/30/2020  9:17 AM and 10/30/2020 12:39 PM *         Specimens:   Specimen (12h ago, onward)    None

## 2020-10-30 NOTE — OR NURSING
Patient Education     Self-Care for Sore Throats    Sore throats happen for many reasons, such as colds, allergies, and infections caused by viruses or bacteria. In any case, your throat becomes red and sore. Your goal for self-care is to reduce your discomfort while giving your throat a chance to heal.  Moisten and soothe your throat  Tips include the following:  · Try a sip of water first thing after waking up.  · Keep your throat moist by drinking 6 or more glasses of clear liquids every day.  · Run a cool-air humidifier in your room overnight.  · Avoid cigarette smoke.   · Suck on throat lozenges, cough drops, hard candy, ice chips, or frozen fruit-juice bars. Use the sugar-free versions if your diet or medical condition requires them.  Gargle to ease irritation  Gargling every hour or 2 can ease irritation. Try gargling with 1 of these solutions:  · 1/4 teaspoon of salt in 1/2 cup of warm water  · An over-the-counter anesthetic gargle  Use medicine for more relief  Over-the-counter medicine can reduce sore throat symptoms. Ask your pharmacist if you have questions about which medicine to use:  · Ease pain with anesthetic sprays. Aspirin or an aspirin substitute also helps. Remember, never give aspirin to anyone 18 or younger, or if you are already taking blood thinners.   · For sore throats caused by allergies, try antihistamines to block the allergic reaction.  · Remember: unless a sore throat is caused by a bacterial infection, antibiotics won’t help you.  Prevent future sore throats  Prevention tips include the following:  · Stop smoking or reduce contact with secondhand smoke. Smoke irritates the tender throat lining.  · Limit contact with pets and with allergy-causing substances, such as pollen and mold.  · When you’re around someone with a sore throat or cold, wash your hands often to keep viruses or bacteria from spreading.  · Don’t strain your vocal cords.  Call your healthcare provider  Contact your  Pt blood sugar 96. Pt wihtout change from previous assessment. States pain tolerable. Able to fall off to sleep. Prepared for transfer to inpt room.   healthcare provider if you have:  · A temperature over 101°F (38.3°C)  · White spots on the throat  · Great difficulty swallowing  · Trouble breathing  · A skin rash  · Recent exposure to someone else with strep bacteria  · Severe hoarseness and swollen glands in the neck or jaw   Date Last Reviewed: 8/1/2016  © 4379-1576 [employer]. [address] [place] [address]. All rights reserved. This information is not intended as a substitute for professional medical care. Always follow your healthcare professional's instructions.           Patient Education     Coronavirus Disease 2019 (COVID-19): Overview  Coronavirus disease 2019 (COVID-19) is a respiratory illness. It's caused by a new (novel) coronavirus. There are many types of coronavirus. Coronaviruses are a very common cause of colds and bronchitis. They may sometimes cause lung infection (pneumonia). Symptoms can range from mild to severe. Some people have no symptoms. These viruses are also found in some animals.   All 50 states in the [place] have reported cases of COVID-19. Most states report \"community spread\" of COVID-19. This means the source of the illness is not known. COVID-19 is a rapidly-emerging infectious disease. This means that scientists are actively researching it. There are information updates regularly.   Public health officials are working to find the source. How the virus spreads is not yet fully understood, but it seems to spread and infect people fairly easily. Some people who have been infected in an area may not be sure how or where they were infected. The virus may be spread through droplets of fluid that a person coughs or sneezes into the air. It may be spread if you touch a surface with the virus on it, such as a handle or object, and then touch your eyes, nose, or mouth.   For the latest information, visit the CDC website at www.cdc.gov/coronavirus/2019-ncov. Or call 878-HGD-BTDS (998-301-0876).   What are the  symptoms of COVID-19?  Some people have no symptoms or mild symptoms. Symptoms can also vary from person to person. As experts learn more about COVID-19, other symptoms are being reported. Symptoms may appear 2 to 14 days after contact with the virus:   · Fever  · Coughing  · Trouble breathing or feeling short of breath  · Sore throat  · Runny nose  · Headache and body aches  · Chills or repeated shaking with chills  · Fatigue  · Loss of appetite  · Nausea, vomiting, diarrhea, or abdominal pain  · Loss of sense of smell and taste  You can check your symptoms with the CDC’s Coronavirus Self-.   What are possible complications from COVID-19?  In many cases, this virus can cause infection (pneumonia) in both lungs. In some cases, this can cause death. Certain people are at higher risk for complications. This includes older adults and people with serious chronic health conditions such as heart or lung disease, diabetes, or kidney disease. It includes people with health conditions that suppress the immune system. And it includes people taking medicines that suppress the immune system.   As experts learn more about COVID-19, other complications are being reported that may be linked to COVID-19. Rarely, some children have developed severe complications called multisystem inflammatory syndrome in children (MIS-C). MIS-C seems to be similar to [place] disease, a rare condition causing inflammation of blood vessels and body organs. It's not yet known if MIS-C happens only in children, or if adults are also at risk. It's also not known if it's related to COVID-19, because many children, but not all, have tested positive for the virus. Experts continue to study MIS-C. The CDC advises healthcare providers to report to local health departments any person under age 21 years old who is ill enough to be in the hospital and has all of the following:   · A fever over 100.4°F (38.0°C) for more than 24 hours and a positive  SARS-CoV-2 test or exposure to the virus in the last 4 weeks  · Inflammation in at least 2 organs such as the heart, lungs, or kidneys with lab tests that show inflammation  · No other diagnoses besides COVID-19 explain the child's symptoms  How is COVID-19 diagnosed?  Your healthcare provider will ask about your symptoms. He or she will ask where you live, and about your recent travel, and any contact with sick people. If your healthcare provider thinks you may have COVID-19, he or she will consider whether to test you for COVID-19. This depends on the availability of testing in your area, and how sick you are. Follow all instructions from your healthcare provider. Guidelines for testing may change as more information about the virus becomes available. Currently, COVID-19 is diagnosed by:   · Nose-throat swab.  A swab is wiped inside your nose to the back of your throat. This is a viral test to tell you if you have a current COVID-19 infection.  If your healthcare provider thinks or confirms that you have COVID-19, you may have other tests. These tests may include:   · Antibody blood test.  Antibody tests are being looked at to find out if a person has previously been infected with the virus and may now have antibodies such as SARS AB IgG in their blood to give some immunity. The accuracy and availability of antibody tests vary. An antibody test may not be able to show if you have a current infection because it can take up to a few weeks after infection to make antibodies. It's not yet known how long immunity lasts after being infected with the virus.  · Sputum culture.  A small sample of mucus coughed from your lungs (sputum) may be collected if you have a moist cough. It may be checked for the virus or to look for pneumonia.  · Imaging tests.  You may have a chest X-ray or CT scan.  How is COVID-19 treated?  There is currently no medicine proven to prevent or treat the virus. Some experimental medicines are being  tested for COVID-19. Other medicines used to treat other conditions are being looked at for COVID-19, but these are not currently approved to treat it.   The most proven treatments right now are those to help your body while it fights the virus. This is known as supportive care. Supportive care may include:   · Getting rest.  This helps your body fight the illness.  · Staying hydrated.  Drinking liquids is the best way to prevent dehydration.. Try to drink 6 to 8 glasses of liquids every day, or as advised by your provider. Also check with your provider about which fluids are best for you. Don't drink fluids that contain caffeine or alcohol.  · Taking over-the-counter (OTC) pain medicine.  These are used to help ease pain and reduce fever. Follow your healthcare provider's instructions for which OTC medicine to use.  For severe illness, you may need to stay in the hospital. Care during severe illness may include:   · IV (intravenous) fluids.  These are given through a vein to help keep your body hydrated.  · Oxygen. You may be given supplemental oxygen or ventilation with a breathing machine (ventilator). This is done so you get enough oxygen in your body.  · Prone positioning.  Depending on how sick you are during your hospital stay, your healthcare team may turn you regularly on your stomach. This is called prone positioning. It helps increase the amount of oxygen you get to your lungs. Follow your healthcare team's instructions on position changes while you're in the hospital. Also follow their discharge advice on the best positions to help your breathing once you go home.  People who have had COVID-19 and are fully recovered may be asked by their healthcare team to consider donating plasma. This is called COVID-19 convalescent plasma donation. Plasma from people fully recovered from COVID-19 may contain antibodies to help fight COVID-19 in people who are currently seriously ill with the disease. It's not fully  known if the donated plasma will work well as a treatment, but the FDA is looking at it and has asked the [employer] to help with plasma donation and collection. Talk with your provider to learn more about convalescent plasma donation and whether you qualify to donate.   Are you at risk for COVID-19?  You are at risk for COVID-19 if you have had close contact with someone with the virus, or if you live in or traveled to an area with cases of it. Close contact means being within about 6 feet of someone, or living in the same house or visiting a person who has or may have COVID-19. Some recent studies suggest that COVID-19 may be spread by people who are not showing symptoms.   Date last modified: 5/20/2020  [employer] last reviewed this educational content on 1/1/2020 © 2000-2020 [employer]. [address] [place] [address]. All rights reserved. This information is not intended as a substitute for professional medical care. Always follow your healthcare professional's instructions.

## 2020-10-30 NOTE — PLAN OF CARE
CM met with pt and her mother for initial discharge planning assessment. Pt is alert and oriented at time of assessment.    Pt confirms her PCP is correct in Epic, pharmacy of choice is [facility] in [place], [place]< scripts already sent to her pharmacy.    Pt admitted with wound dehis and has 2 wd vacs. Pt has 2 provena vacs in her room to be placed by MD tomorrow. Pt may dc tomorrow, or Sunday. Family to provide transportation home.    Pt uses no DME and has no HH. Pt will discharge with provena vacs.    CM to follow.   10/30/20 8747   Discharge Assessment   Assessment Type Discharge Planning Assessment   Confirmed/corrected address and phone number on facesheet? Yes   Assessment information obtained from? Patient;Caregiver;Medical Record   Expected Length of Stay (days) 2   Communicated expected length of stay with patient/caregiver yes   Prior to hospitilization cognitive status: Alert/Oriented   Prior to hospitalization functional status: Independent   Current cognitive status: Alert/Oriented   Current Functional Status: Independent   Lives With spouse   Able to Return to Prior Arrangements yes   Patient's perception of discharge disposition home or selfcare   Readmission Within the Last 30 Days current reason for admission unrelated to previous admission   Patient currently being followed by outpatient case management? No   Patient currently receives any other outside agency services? No   Equipment Currently Used at Home none   Do you have any problems affording any of your prescribed medications? No   Is the patient taking medications as prescribed? yes   Does the patient have transportation home? Yes   Transportation Anticipated family or friend will provide;car, drives self   Discharge Plan A Home   Discharge Plan B Home   DME Needed Upon Discharge  none   Patient/Family in Agreement with Plan yes

## 2020-10-30 NOTE — ANESTHESIA PROCEDURE NOTES
Intubation  Performed by: Mary Ray CRNA  Authorized by: Mary Ray CRNA     Intubation:     Induction:  Intravenous    Intubated:  Postinduction    Mask Ventilation:  Easy mask    Attempts:  1    Attempted By:  CRNA    Method of Intubation:  Video laryngoscopy    Blade:  Satish 3    Laryngeal View Grade: Grade I - full view of chords      Difficult Airway Encountered?: No      Complications:  None    Airway Device:  Oral endotracheal tube    Airway Device Size:  7.5    Style/Cuff Inflation:  Cuffed    Inflation Amount (mL):  5    Tube secured:  22    Secured at:  The lips    Placement Verified By:  Capnometry    Complicating Factors:  None    Findings Post-Intubation:  BS equal bilateral

## 2020-10-31 PROCEDURE — 25000003 PHARM REV CODE 250: Performed by: SURGERY

## 2020-10-31 PROCEDURE — G0378 HOSPITAL OBSERVATION PER HR: HCPCS

## 2020-10-31 PROCEDURE — 63600175 PHARM REV CODE 636 W HCPCS: Performed by: SURGERY

## 2020-10-31 RX ADMIN — DOCUSATE SODIUM 100 MG: 100 CAPSULE, LIQUID FILLED ORAL at 08:10

## 2020-10-31 RX ADMIN — HYDROMORPHONE HYDROCHLORIDE 1 MG: 1 INJECTION, SOLUTION INTRAMUSCULAR; INTRAVENOUS; SUBCUTANEOUS at 07:10

## 2020-10-31 RX ADMIN — HYDROCODONE BITARTRATE AND ACETAMINOPHEN 1 TABLET: 10; 325 TABLET ORAL at 05:10

## 2020-10-31 RX ADMIN — HYDROMORPHONE HYDROCHLORIDE 1 MG: 1 INJECTION, SOLUTION INTRAMUSCULAR; INTRAVENOUS; SUBCUTANEOUS at 12:10

## 2020-10-31 RX ADMIN — HYDROMORPHONE HYDROCHLORIDE 1 MG: 1 INJECTION, SOLUTION INTRAMUSCULAR; INTRAVENOUS; SUBCUTANEOUS at 02:10

## 2020-10-31 RX ADMIN — HYDROCODONE BITARTRATE AND ACETAMINOPHEN 1 TABLET: 10; 325 TABLET ORAL at 12:10

## 2020-10-31 RX ADMIN — HYDROCODONE BITARTRATE AND ACETAMINOPHEN 1 TABLET: 10; 325 TABLET ORAL at 08:10

## 2020-10-31 RX ADMIN — HYDROCODONE BITARTRATE AND ACETAMINOPHEN 1 TABLET: 10; 325 TABLET ORAL at 03:10

## 2020-10-31 RX ADMIN — PANTOPRAZOLE SODIUM 40 MG: 40 TABLET, DELAYED RELEASE ORAL at 09:10

## 2020-10-31 RX ADMIN — ENOXAPARIN SODIUM 40 MG: 40 INJECTION SUBCUTANEOUS at 06:10

## 2020-10-31 RX ADMIN — CEFAZOLIN SODIUM 2 G: 2 SOLUTION INTRAVENOUS at 12:10

## 2020-10-31 RX ADMIN — DOCUSATE SODIUM 100 MG: 100 CAPSULE, LIQUID FILLED ORAL at 09:10

## 2020-10-31 RX ADMIN — HYDROCODONE BITARTRATE AND ACETAMINOPHEN 1 TABLET: 10; 325 TABLET ORAL at 09:10

## 2020-10-31 RX ADMIN — HYDROMORPHONE HYDROCHLORIDE 1 MG: 1 INJECTION, SOLUTION INTRAMUSCULAR; INTRAVENOUS; SUBCUTANEOUS at 06:10

## 2020-10-31 NOTE — NURSING
Returned to room from PACU s/p I&D bilateral breast, graft to left breast from abdomen with wound vac to abdomen and left breast.  Patient sleepy but easily to arouse. Patient c/o nausea when awake but no active vomiting noted. Khai to right abdomen to bulb suction noted. SCD's applied, bed alarm in use, side rails up x2. Will continue to monitor.

## 2020-10-31 NOTE — DISCHARGE SUMMARY
[facility]  Discharge Summary      Admit Date: 10/29/2020    Discharge Date and Time:  10/30/2020 8:30 PM    Attending Physician: Ron Carrasco MD     Reason for Admission:  Abdominal wound necrosis and bilateral breast mastectomy wound necrosis    Procedures Performed: Procedure(s) (LRB):  DEBRIDEMENT, WOUND, ABDOMEN (N/A)  APPLICATION, WOUND VAC (N/A)  DEBRIDEMENT, WOUND (Bilateral)  APPLICATION, GRAFT, SKIN, FULL-THICKNESS (Left)    Hospital Course the patient was admitted after bilateral DIP flap reconstruction several weeks ago with abdominal wound necrosis as well as bilateral mastectomy skin necrosis.  Patient underwent debridement of the abdominal wound with closure as well as bilateral breast wound debridement and full-thickness skin graft to the left breast.  Patient tolerated the procedure well and is stable for discharge on postoperative day 1.      Consults: none    Significant Diagnostic Studies:  None    Final Diagnoses:    Principal Problem: Wound dehiscence, surgical   Secondary Diagnoses:  Acquired absence of bilateral breast    Discharged Condition: good    Disposition: Home or Self Care    Follow Up/Patient Instructions:     Patient is to follow up with me in 1 week.  Patient was instructed to keep the Prevena wound VAC dressings on the abdomen and the left breast until follow-up appointment.  Patient is not to get wound wet until she sees me.  No heavy lifting.    Medications:  Reconciled Home Medications:      Medication List      CONTINUE taking these medications    ciprofloxacin HCl 500 MG tablet  Commonly known as: CIPRO  Take 500 mg by mouth every 8 (eight) hours.     dextroamphetamine-amphetamine 20 MG 24 hr capsule  Commonly known as: ADDERALL XR  every morning.     estradioL 2 MG tablet  Commonly known as: ESTRACE  Take 1 tablet (2 mg total) by mouth once daily.     FLUoxetine 20 MG capsule  20 mg once daily.     HAIR,SKIN AND NAILS ORAL  Take by mouth.      lisinopriL 20 MG tablet  Commonly known as: PRINIVIL,ZESTRIL  as needed.     multivitamin capsule  Take 1 capsule by mouth once daily.     pantoprazole 20 MG tablet  Commonly known as: PROTONIX  Take 20 mg by mouth daily as needed.     prasterone (dhea) 6.5 mg Inst  Commonly known as: INTRAROSA  Place 6.5 mg vaginally every evening.     VITAMIN C ORAL  Take by mouth.          Discharge Procedure Orders   Diet Adult Regular     Lifting restrictions     Notify your health care provider if you experience any of the following:  temperature >100.4     Notify your health care provider if you experience any of the following:  persistent nausea and vomiting or diarrhea     Notify your health care provider if you experience any of the following:  severe uncontrolled pain     Notify your health care provider if you experience any of the following:  redness, tenderness, or signs of infection (pain, swelling, redness, odor or green/yellow discharge around incision site)     Notify your health care provider if you experience any of the following:  difficulty breathing or increased cough     Notify your health care provider if you experience any of the following:  severe persistent headache     Notify your health care provider if you experience any of the following:  worsening rash     Notify your health care provider if you experience any of the following:  persistent dizziness, light-headedness, or visual disturbances     Notify your health care provider if you experience any of the following:  increased confusion or weakness     Notify your health care provider if you experience any of the following:     Leave dressing on - Keep it clean, dry, and intact until clinic visit     Follow-up Information     Ron Carrasco MD In 1 week.    Specialty: Plastic Surgery  Why: For post-op visit  Contact information:  [address]  [address] [place] [address]  [phone]

## 2020-10-31 NOTE — PLAN OF CARE
VSS and afebrile, aaox4. Incision to abdomen and bilateral breasts CDI. Woundvac to abd and left breast. JPX1 In place to abdomen putting out serous drainage. Pt c/o pain and nausea throughout the night. Up to bedside commode. Plan of care reviewed with patient. Purposeful hourly rounding done. Call light at bedside, bed at lowest position, brakes on, non skid socks on. Will continue to monitor.

## 2020-10-31 NOTE — PLAN OF CARE
Final Note  Patient is discharged home with self-care.  Patients family will transport home.  No further DC needs from  perspective.       10/31/20 1057   Final Note   Assessment Type Final Discharge Note   Anticipated Discharge Disposition Home   What phone number can be called within the next 1-3 days to see how you are doing after discharge? [phone]   Discharge plans and expectations educations in teach back method with documentation complete? Yes   Right Care Referral Info   Post Acute Recommendation No Care   Post-Acute Status   Discharge Delays None known at this time

## 2020-10-31 NOTE — PLAN OF CARE
Patient reporting no relief in pain with administration of hydrocodone. Reports minimal relief with Dilaudid. Dr. Carrasco informed that patient is not ready for discharge today. Patient to be discharged on 11/1/20. Patient calls for assistance with transfers. Bed alarm enabled. Bed in lowest position. Call button in reach.

## 2020-11-01 VITALS
RESPIRATION RATE: 16 BRPM | WEIGHT: 227.31 LBS | HEART RATE: 84 BPM | SYSTOLIC BLOOD PRESSURE: 114 MMHG | BODY MASS INDEX: 34.45 KG/M2 | TEMPERATURE: 98 F | OXYGEN SATURATION: 99 % | DIASTOLIC BLOOD PRESSURE: 71 MMHG | HEIGHT: 68 IN

## 2020-11-01 PROCEDURE — 25000003 PHARM REV CODE 250: Performed by: SURGERY

## 2020-11-01 PROCEDURE — 94761 N-INVAS EAR/PLS OXIMETRY MLT: CPT

## 2020-11-01 PROCEDURE — G0378 HOSPITAL OBSERVATION PER HR: HCPCS

## 2020-11-01 RX ADMIN — HYDROCODONE BITARTRATE AND ACETAMINOPHEN 1 TABLET: 10; 325 TABLET ORAL at 01:11

## 2020-11-01 RX ADMIN — PANTOPRAZOLE SODIUM 40 MG: 40 TABLET, DELAYED RELEASE ORAL at 08:11

## 2020-11-01 RX ADMIN — DOCUSATE SODIUM 100 MG: 100 CAPSULE, LIQUID FILLED ORAL at 08:11

## 2020-11-01 RX ADMIN — HYDROCODONE BITARTRATE AND ACETAMINOPHEN 1 TABLET: 10; 325 TABLET ORAL at 08:11

## 2020-11-01 RX ADMIN — HYDROCODONE BITARTRATE AND ACETAMINOPHEN 1 TABLET: 10; 325 TABLET ORAL at 05:11

## 2020-11-01 NOTE — PLAN OF CARE
No significant events overnight. Remains free from fall, injury, and skin breakdown. Voiding spontaneously via BSC. Ambulates w/ assist. VSS on 2L O2 via NC (sleep apnea) throughout the night. Positions self independently. Pain well controlled with PRN norco 10. Bilateral breast and abdominal incisions/dressings CDI. Wound vac to abdomen and L breast seal intact. VICTORIA x1 emptied 20cc serosanguinous output overnight. TEDs/SCDs in place. Surgical bra on. Plan of care reviewed with patient and all questions answered. Bed low, locked w/ bed alarm on. Call light within reach. Purposeful rounding performed. Resting comfortably in bed, no other complaints at this time.

## 2020-11-01 NOTE — NURSING
Discharge instructions given to patient. Patient verbalizes understanding of instructions. IV site discontinued. Wound vac disconnected. Vac connected to provena. Patient being transported for discharge to home.

## 2020-11-01 NOTE — PROGRESS NOTES
"Patient received this morning on 2LNC with 99% saturation. S/w patient states that she only wears oxygen when asleep due to "sleep apnea" and she is awaiting test results. States that she does not wear oxygen while awake. Oxygen placed on s/b this morning.     "

## 2020-11-01 NOTE — NURSING
Dilaudid administered for c/o abd pain. Right forearm site intact. Right breast drain with 15 ml serosanguinous drainage noted. Dressings dry and intact to bilateral breasts. Left breast and abd wound vac with scant amt of drainage.ASCD's in place.  Bed in lowest position. Bed alarm enabled. Call light in reach.

## 2020-12-08 ENCOUNTER — OFFICE VISIT (OUTPATIENT)
Dept: WOUND CARE | Facility: CLINIC | Age: 35
End: 2020-12-08
Payer: COMMERCIAL

## 2020-12-08 DIAGNOSIS — Z15.09 BRCA2 GENE MUTATION POSITIVE: ICD-10-CM

## 2020-12-08 DIAGNOSIS — Z15.01 BRCA2 GENE MUTATION POSITIVE: ICD-10-CM

## 2020-12-08 DIAGNOSIS — T81.31XA POSTOPERATIVE WOUND DEHISCENCE, INITIAL ENCOUNTER: Primary | ICD-10-CM

## 2020-12-08 DIAGNOSIS — E08.00 DIABETES MELLITUS DUE TO UNDERLYING CONDITION WITH HYPEROSMOLARITY WITHOUT COMA, WITHOUT LONG-TERM CURRENT USE OF INSULIN: ICD-10-CM

## 2020-12-08 PROCEDURE — 99204 PR OFFICE/OUTPT VISIT, NEW, LEVL IV, 45-59 MIN: ICD-10-PCS | Mod: S$GLB,,, | Performed by: CLINICAL NURSE SPECIALIST

## 2020-12-08 PROCEDURE — 99999 PR PBB SHADOW E&M-EST. PATIENT-LVL II: ICD-10-PCS | Mod: PBBFAC,,, | Performed by: CLINICAL NURSE SPECIALIST

## 2020-12-08 PROCEDURE — 99999 PR PBB SHADOW E&M-EST. PATIENT-LVL II: CPT | Mod: PBBFAC,,, | Performed by: CLINICAL NURSE SPECIALIST

## 2020-12-08 PROCEDURE — 99204 OFFICE O/P NEW MOD 45 MIN: CPT | Mod: S$GLB,,, | Performed by: CLINICAL NURSE SPECIALIST

## 2020-12-08 NOTE — PROGRESS NOTES
Subjective:       Patient ID: Allyson Bahena is a 35 y.o. female.    Chief Complaint: Wound Check    Ms. Allyson Adam was referred to me today by Dr Carrasco she is , s/p bilateral mastectomy and CARMEN flap reconstruction on 10/5/20. She has subsequent necrosis and debridement with skin graft that did not ultimately take, she is now very discouraged and needs other wound care recs per her plastic surgeon, who asked me to see her . She has kept her blood glucose        History of Present Illness:  Patient was originally referred for evaluation of increased risk of breast cancer based on BRCA2, family history on mother's side.  BRCA testing was performed.   Other breast cancer risk factors include family hx on mother's side. Patient is .  Age of first live birth was 19.     Patient had a hysterectomy/oopherectomy.  On hormones with Dr. Garzon.   Repeat testing with [facility] confirms BRCA2 positive.     Final Surgical Pathology:  1. Breast, right, mastectomy:  - Benign breast tissue with changes consistent with previous surgical site  - Negative for atypia and malignancy  2. Breast, left, mastectomy:  - Benign breast tissue with microcyst formation and fibroadenomatoid changes  - Diffuse changes consistent with previous surgical site are present  - One lymph node, negative for malignancy (0/1)  - Calcifications associated with benign ductal elements  - Negative for atypia and malignancy    Review of Systems   Constitutional: Positive for activity change. Negative for appetite change and fever.   HENT: Negative.    Respiratory: Negative.    Cardiovascular: Negative.    Gastrointestinal: Negative.    Genitourinary: Negative for difficulty urinating.   Musculoskeletal: Negative.    Integumentary:  Positive for wound.   Psychiatric/Behavioral:        Discouraged         Objective:      Physical Exam  Constitutional:       Appearance: She is well-developed. She is obese.   Pulmonary:      Effort: Pulmonary  "effort is normal. No respiratory distress.   Abdominal:      General: Bowel sounds are normal. There is no distension.      Palpations: Abdomen is soft.   Musculoskeletal: Normal range of motion.   Skin:     General: Skin is warm and dry.      Findings: No erythema.   Neurological:      Mental Status: She is alert.               12/8 Abdominal incision with several areas open and tissue necrotic . She does c/o nerve pain on right side mainly above open area and can be intense. Removed dry upper level of dry exudate and suggest MEDIHONEY for now to soften and liquify open areas, do this daily and cover as desired           12/8 Left breast grafted site, I removed necrotic tissue with scissors.    Wound bed is 7 cm x 15 cm x 3 mm with min to moderate serosanquinous drainage  No signs of infection ,bed is clean and granulation noted, she does need epithelization and since NPWT is not meant for that I suggest a topical approach as follows, very irregular areas of skin noted . Will suggest following topical care with dermal nani Endoform which she can do for herself every 2-3 days or 3 x a week    Cleanse with saline or in the shower Change 3 x week and prn   Apply ENDOFORM antimicrobial to wound base  Cover with Hydrofera Blue Ready   Secure with tape or use of bra if prefer to not tape.    REC: Drink Alan 2 packs per day for wound healing  ( flavor of choice)  Disp 60 pack per month      ID: [id number] Endoform AM  4 x 5 " needs one box for a month of dressing changes   Hydrofera Blue ready foam    [id number]: 4" x 5" - 1 box per month          Assessment:       1. Postoperative wound dehiscence, initial encounter    2. Diabetes mellitus due to underlying condition with hyperosmolarity without coma, without long-term current use of insulin    3. BRCA2 gene mutation positive        Plan:       Wound care to left breast as above,   Request supplies from [facility]   Wound care to abdominal wound, I gave her " Medihoney and she has some at home  Send message for any issues that arise. S N S of infection reviewed.   FU in 2 weeks  I have reviewed the plan of care with the patient and she express understanding. I spent over 50% of this 45 minute visit in face to face counseling.

## 2020-12-08 NOTE — LETTER
December 8, 2020      Ron Carrasco MD  [address]  [address]  [facility] [address]           Marc Ruvalcaba  [facility] [address]  [address]  [facility] [address]  Phone: [phone]          Patient: Allyson Bahena   MR Number: [id number]   YOB: 1985   Date of Visit: 12/8/2020       Dear Dr. Ron Carrasco:    Thank you for referring Allyson Bahena to me for evaluation. Attached you will find relevant portions of my assessment and plan of care.    If you have questions, please do not hesitate to call me. I look forward to following Allyson Bahena along with you.    Sincerely,    Jocelyn Powell, CNS    Enclosure  CC:  No Recipients    If you would like to receive this communication electronically, please contact [email or web address] or [phone] to request more information on [facility] access.    For providers and/or their staff who would like to refer a patient to [facility], please contact us through our one-stop-shop provider referral line, [facility], at [phone].    If you feel you have received this communication in error or would no longer like to receive these types of communications, please e-mail [email or web address]

## 2020-12-09 ENCOUNTER — PATIENT MESSAGE (OUTPATIENT)
Dept: WOUND CARE | Facility: CLINIC | Age: 35
End: 2020-12-09

## 2020-12-22 ENCOUNTER — OFFICE VISIT (OUTPATIENT)
Dept: WOUND CARE | Facility: CLINIC | Age: 35
End: 2020-12-22
Payer: COMMERCIAL

## 2020-12-22 DIAGNOSIS — Z15.01 BRCA2 GENE MUTATION POSITIVE: ICD-10-CM

## 2020-12-22 DIAGNOSIS — E08.00 DIABETES MELLITUS DUE TO UNDERLYING CONDITION WITH HYPEROSMOLARITY WITHOUT COMA, WITHOUT LONG-TERM CURRENT USE OF INSULIN: ICD-10-CM

## 2020-12-22 DIAGNOSIS — Z15.09 BRCA2 GENE MUTATION POSITIVE: ICD-10-CM

## 2020-12-22 DIAGNOSIS — T81.31XD POSTOPERATIVE WOUND DEHISCENCE, SUBSEQUENT ENCOUNTER: Primary | ICD-10-CM

## 2020-12-22 PROCEDURE — 99999 PR PBB SHADOW E&M-EST. PATIENT-LVL II: CPT | Mod: PBBFAC,,, | Performed by: CLINICAL NURSE SPECIALIST

## 2020-12-22 PROCEDURE — 99999 PR PBB SHADOW E&M-EST. PATIENT-LVL II: ICD-10-PCS | Mod: PBBFAC,,, | Performed by: CLINICAL NURSE SPECIALIST

## 2020-12-22 PROCEDURE — 99214 OFFICE O/P EST MOD 30 MIN: CPT | Mod: S$GLB,,, | Performed by: CLINICAL NURSE SPECIALIST

## 2020-12-22 PROCEDURE — 99214 PR OFFICE/OUTPT VISIT, EST, LEVL IV, 30-39 MIN: ICD-10-PCS | Mod: S$GLB,,, | Performed by: CLINICAL NURSE SPECIALIST

## 2020-12-22 NOTE — PROGRESS NOTES
Subjective:       Patient ID: Allyson Bahena is a 35 y.o. female.    Chief Complaint: Wound Check    Ms. Allyson Adam was referred to me 2 weeks ago by Dr Carrasco she is , s/p bilateral mastectomy and CARMEN flap reconstruction on 10/5/20. She has subsequent necrosis and debridement with skin graft that did not ultimately take, here for check in on current regimen . however she is still very discouraged and her  who does her wound care is going [place] [employer] [place] for a month , they have 7 children        History of Present Illness:  Patient was originally referred for evaluation of increased risk of breast cancer based on BRCA2, family history on mother's side.  BRCA testing was performed.   Other breast cancer risk factors include family hx on mother's side. Patient is .  Age of first live birth was 19.     Patient had a hysterectomy/oopherectomy.  On hormones with Dr. Garzon.   Repeat testing with [facility] confirms BRCA2 positive.     Final Surgical Pathology:  1. Breast, right, mastectomy:  - Benign breast tissue with changes consistent with previous surgical site  - Negative for atypia and malignancy  2. Breast, left, mastectomy:  - Benign breast tissue with microcyst formation and fibroadenomatoid changes  - Diffuse changes consistent with previous surgical site are present  - One lymph node, negative for malignancy (0/1)  - Calcifications associated with benign ductal elements  - Negative for atypia and malignancy    Review of Systems   Constitutional: Positive for activity change. Negative for appetite change and fever.   HENT: Negative.    Respiratory: Negative.    Cardiovascular: Negative.    Gastrointestinal: Negative.    Genitourinary: Negative for difficulty urinating.   Musculoskeletal: Negative.    Integumentary:  Positive for wound.   Psychiatric/Behavioral:        Discouraged         Objective:      Physical Exam  Constitutional:       Appearance: She is well-developed.  "She is obese.   Pulmonary:      Effort: Pulmonary effort is normal. No respiratory distress.   Abdominal:      General: Bowel sounds are normal. There is no distension.      Palpations: Abdomen is soft.   Musculoskeletal: Normal range of motion.   Skin:     General: Skin is warm and dry.      Findings: No erythema.   Neurological:      Mental Status: She is alert.               12/8 Abdominal incision with several areas open and tissue necrotic . She does c/o nerve pain on right side mainly above open area and can be intense. Removed dry upper level of dry exudate and suggest MEDIHONEY for now to soften and liquify open areas, do this daily and cover as desired     12/22 this area is not much different and the induration and pain noted on last visit are still present , gave her some wound gel to try on this area as well.             12/8 12/22 12/22 the breast however is doing well, can see evidence of new epitheliazation in many spots.  Probed any areas that have depth and no tunnels found .  Continue with endoform and HFB  She is getting supplies from [facility]       12/8 Left breast grafted site, I removed necrotic tissue with scissors.    Wound bed is 7 cm x 15 cm x 3 mm with min to moderate serosanquinous drainage  No signs of infection ,bed is clean and granulation noted, she does need epithelization and since NPWT is not meant for that I suggest a topical approach as follows, very irregular areas of skin noted . Will suggest following topical care with dermal nani Endoform which she can do for herself every 2-3 days or 3 x a week    Cleanse with saline or in the shower Change 3 x week and prn   Apply ENDOFORM antimicrobial to wound base  Cover with Hydrofera Blue Ready   Secure with tape or use of bra if prefer to not tape.    REC: Drink Alan 2 packs per day for wound healing  ( flavor of choice)  Disp 60 pack per month      ID: [id number] Endoform AM  4 x 5 " needs one box for a month of " "dressing changes   Hydrofera Blue ready foam    [id number]: 4" x 5" - 1 box per month          Assessment:       1. Postoperative wound dehiscence, subsequent encounter    2. Diabetes mellitus due to underlying condition with hyperosmolarity without coma, without long-term current use of insulin    3. BRCA2 gene mutation positive        Plan:       Wound care to left breast as above,   Sent message to Dr Carrasco about wound status  Send message for any issues that arise. S N S of infection reviewed.   FU as needed  I have reviewed the plan of care with the patient and she express understanding. I spent over 50% of this 25 minute visit in face to face counseling.         "

## 2020-12-31 ENCOUNTER — LAB VISIT (OUTPATIENT)
Dept: LAB | Facility: HOSPITAL | Age: 35
End: 2020-12-31
Attending: SURGERY
Payer: COMMERCIAL

## 2020-12-31 DIAGNOSIS — T81.30XA WOUND DEHISCENCE: Primary | ICD-10-CM

## 2020-12-31 DIAGNOSIS — T81.30XA WOUND DEHISCENCE: ICD-10-CM

## 2020-12-31 PROCEDURE — 84134 ASSAY OF PREALBUMIN: CPT

## 2020-12-31 PROCEDURE — 36415 COLL VENOUS BLD VENIPUNCTURE: CPT

## 2021-01-01 LAB — PREALB SERPL-MCNC: 20 MG/DL (ref 20–43)

## 2021-01-10 ENCOUNTER — PATIENT MESSAGE (OUTPATIENT)
Dept: WOUND CARE | Facility: CLINIC | Age: 36
End: 2021-01-10

## 2021-01-10 DIAGNOSIS — T81.31XD POSTOPERATIVE WOUND DEHISCENCE, SUBSEQUENT ENCOUNTER: Primary | ICD-10-CM

## 2021-01-19 ENCOUNTER — PATIENT MESSAGE (OUTPATIENT)
Dept: WOUND CARE | Facility: CLINIC | Age: 36
End: 2021-01-19

## 2021-01-19 RX ORDER — SILVER SULFADIAZINE 10 G/1000G
CREAM TOPICAL DAILY
Qty: 400 G | Refills: 1 | Status: SHIPPED | OUTPATIENT
Start: 2021-01-19 | End: 2021-02-08

## 2021-01-24 ENCOUNTER — PATIENT MESSAGE (OUTPATIENT)
Dept: WOUND CARE | Facility: CLINIC | Age: 36
End: 2021-01-24

## 2021-01-31 ENCOUNTER — LAB VISIT (OUTPATIENT)
Dept: FAMILY MEDICINE | Facility: CLINIC | Age: 36
End: 2021-01-31
Payer: COMMERCIAL

## 2021-01-31 DIAGNOSIS — Z01.818 PRE-OP TESTING: ICD-10-CM

## 2021-01-31 PROCEDURE — U0003 INFECTIOUS AGENT DETECTION BY NUCLEIC ACID (DNA OR RNA); SEVERE ACUTE RESPIRATORY SYNDROME CORONAVIRUS 2 (SARS-COV-2) (CORONAVIRUS DISEASE [COVID-19]), AMPLIFIED PROBE TECHNIQUE, MAKING USE OF HIGH THROUGHPUT TECHNOLOGIES AS DESCRIBED BY CMS-2020-01-R: HCPCS

## 2021-02-01 LAB — SARS-COV-2 RNA RESP QL NAA+PROBE: NOT DETECTED

## 2021-02-02 ENCOUNTER — ANESTHESIA EVENT (OUTPATIENT)
Dept: SURGERY | Facility: OTHER | Age: 36
End: 2021-02-02
Payer: COMMERCIAL

## 2021-02-03 ENCOUNTER — ANESTHESIA (OUTPATIENT)
Dept: SURGERY | Facility: OTHER | Age: 36
End: 2021-02-03
Payer: COMMERCIAL

## 2021-02-03 ENCOUNTER — HOSPITAL ENCOUNTER (OUTPATIENT)
Facility: OTHER | Age: 36
LOS: 1 days | Discharge: HOME HEALTH | End: 2021-02-05
Attending: SURGERY | Admitting: SURGERY
Payer: COMMERCIAL

## 2021-02-03 DIAGNOSIS — Z01.818 PRE-OP TESTING: ICD-10-CM

## 2021-02-03 DIAGNOSIS — S31.109A OPEN WOUND ANTERIOR ABDOMINAL WALL, INITIAL ENCOUNTER: ICD-10-CM

## 2021-02-03 PROCEDURE — 36000706: Performed by: SURGERY

## 2021-02-03 PROCEDURE — 36000707: Performed by: SURGERY

## 2021-02-03 PROCEDURE — 25000003 PHARM REV CODE 250: Performed by: REGISTERED NURSE

## 2021-02-03 PROCEDURE — 63600175 PHARM REV CODE 636 W HCPCS: Performed by: SURGERY

## 2021-02-03 PROCEDURE — 71000039 HC RECOVERY, EACH ADD'L HOUR: Performed by: SURGERY

## 2021-02-03 PROCEDURE — 25000003 PHARM REV CODE 250: Performed by: ANESTHESIOLOGY

## 2021-02-03 PROCEDURE — 71000033 HC RECOVERY, INTIAL HOUR: Performed by: SURGERY

## 2021-02-03 PROCEDURE — 37000009 HC ANESTHESIA EA ADD 15 MINS: Performed by: SURGERY

## 2021-02-03 PROCEDURE — 25000242 PHARM REV CODE 250 ALT 637 W/ HCPCS: Performed by: REGISTERED NURSE

## 2021-02-03 PROCEDURE — 27201423 OPTIME MED/SURG SUP & DEVICES STERILE SUPPLY: Performed by: SURGERY

## 2021-02-03 PROCEDURE — 94660 CPAP INITIATION&MGMT: CPT

## 2021-02-03 PROCEDURE — 63600175 PHARM REV CODE 636 W HCPCS: Performed by: REGISTERED NURSE

## 2021-02-03 PROCEDURE — 63600175 PHARM REV CODE 636 W HCPCS: Performed by: ANESTHESIOLOGY

## 2021-02-03 PROCEDURE — 37000008 HC ANESTHESIA 1ST 15 MINUTES: Performed by: SURGERY

## 2021-02-03 PROCEDURE — 27000190 HC CPAP FULL FACE MASK W/VALVE

## 2021-02-03 PROCEDURE — 94761 N-INVAS EAR/PLS OXIMETRY MLT: CPT

## 2021-02-03 PROCEDURE — 94799 UNLISTED PULMONARY SVC/PX: CPT

## 2021-02-03 PROCEDURE — 99900035 HC TECH TIME PER 15 MIN (STAT)

## 2021-02-03 RX ORDER — ONDANSETRON 2 MG/ML
8 INJECTION INTRAMUSCULAR; INTRAVENOUS EVERY 6 HOURS PRN
Status: DISCONTINUED | OUTPATIENT
Start: 2021-02-03 | End: 2021-02-05 | Stop reason: HOSPADM

## 2021-02-03 RX ORDER — OXYCODONE HYDROCHLORIDE 5 MG/1
5 TABLET ORAL
Status: DISCONTINUED | OUTPATIENT
Start: 2021-02-03 | End: 2021-02-03 | Stop reason: HOSPADM

## 2021-02-03 RX ORDER — SODIUM CHLORIDE 0.9 % (FLUSH) 0.9 %
10 SYRINGE (ML) INJECTION
Status: DISCONTINUED | OUTPATIENT
Start: 2021-02-03 | End: 2021-02-05 | Stop reason: HOSPADM

## 2021-02-03 RX ORDER — ROCURONIUM BROMIDE 10 MG/ML
INJECTION, SOLUTION INTRAVENOUS
Status: DISCONTINUED | OUTPATIENT
Start: 2021-02-03 | End: 2021-02-03

## 2021-02-03 RX ORDER — PROMETHAZINE HYDROCHLORIDE 25 MG/ML
INJECTION, SOLUTION INTRAMUSCULAR; INTRAVENOUS
Status: DISCONTINUED | OUTPATIENT
Start: 2021-02-03 | End: 2021-02-03

## 2021-02-03 RX ORDER — ACETAMINOPHEN 500 MG
1000 TABLET ORAL
Status: COMPLETED | OUTPATIENT
Start: 2021-02-03 | End: 2021-02-03

## 2021-02-03 RX ORDER — SCOLOPAMINE TRANSDERMAL SYSTEM 1 MG/1
1 PATCH, EXTENDED RELEASE TRANSDERMAL ONCE
Status: COMPLETED | OUTPATIENT
Start: 2021-02-03 | End: 2021-02-03

## 2021-02-03 RX ORDER — LIDOCAINE HYDROCHLORIDE 10 MG/ML
0.5 INJECTION, SOLUTION EPIDURAL; INFILTRATION; INTRACAUDAL; PERINEURAL ONCE
Status: DISCONTINUED | OUTPATIENT
Start: 2021-02-03 | End: 2021-02-03 | Stop reason: HOSPADM

## 2021-02-03 RX ORDER — ONDANSETRON 2 MG/ML
4 INJECTION INTRAMUSCULAR; INTRAVENOUS EVERY 6 HOURS PRN
Status: DISCONTINUED | OUTPATIENT
Start: 2021-02-03 | End: 2021-02-03

## 2021-02-03 RX ORDER — HYDROCODONE BITARTRATE AND ACETAMINOPHEN 10; 325 MG/1; MG/1
1 TABLET ORAL EVERY 4 HOURS PRN
Status: DISCONTINUED | OUTPATIENT
Start: 2021-02-03 | End: 2021-02-05 | Stop reason: HOSPADM

## 2021-02-03 RX ORDER — MEPERIDINE HYDROCHLORIDE 25 MG/ML
12.5 INJECTION INTRAMUSCULAR; INTRAVENOUS; SUBCUTANEOUS ONCE AS NEEDED
Status: DISCONTINUED | OUTPATIENT
Start: 2021-02-03 | End: 2021-02-03 | Stop reason: HOSPADM

## 2021-02-03 RX ORDER — PREGABALIN 50 MG/1
50 CAPSULE ORAL ONCE
Status: COMPLETED | OUTPATIENT
Start: 2021-02-03 | End: 2021-02-03

## 2021-02-03 RX ORDER — FLUOXETINE HYDROCHLORIDE 20 MG/1
20 CAPSULE ORAL DAILY
Status: DISCONTINUED | OUTPATIENT
Start: 2021-02-04 | End: 2021-02-05 | Stop reason: HOSPADM

## 2021-02-03 RX ORDER — HYDROMORPHONE HYDROCHLORIDE 2 MG/ML
0.4 INJECTION, SOLUTION INTRAMUSCULAR; INTRAVENOUS; SUBCUTANEOUS EVERY 5 MIN PRN
Status: DISCONTINUED | OUTPATIENT
Start: 2021-02-03 | End: 2021-02-03 | Stop reason: HOSPADM

## 2021-02-03 RX ORDER — SODIUM CHLORIDE 0.9 % (FLUSH) 0.9 %
3 SYRINGE (ML) INJECTION
Status: DISCONTINUED | OUTPATIENT
Start: 2021-02-03 | End: 2021-02-03

## 2021-02-03 RX ORDER — NEOSTIGMINE METHYLSULFATE 1 MG/ML
INJECTION, SOLUTION INTRAVENOUS
Status: DISCONTINUED | OUTPATIENT
Start: 2021-02-03 | End: 2021-02-03

## 2021-02-03 RX ORDER — SODIUM CHLORIDE, SODIUM LACTATE, POTASSIUM CHLORIDE, CALCIUM CHLORIDE 600; 310; 30; 20 MG/100ML; MG/100ML; MG/100ML; MG/100ML
INJECTION, SOLUTION INTRAVENOUS CONTINUOUS
Status: DISCONTINUED | OUTPATIENT
Start: 2021-02-03 | End: 2021-02-03

## 2021-02-03 RX ORDER — CEFAZOLIN SODIUM 1 G/3ML
2 INJECTION, POWDER, FOR SOLUTION INTRAMUSCULAR; INTRAVENOUS
Status: COMPLETED | OUTPATIENT
Start: 2021-02-03 | End: 2021-02-03

## 2021-02-03 RX ORDER — FENTANYL CITRATE 50 UG/ML
INJECTION, SOLUTION INTRAMUSCULAR; INTRAVENOUS
Status: DISCONTINUED | OUTPATIENT
Start: 2021-02-03 | End: 2021-02-03

## 2021-02-03 RX ORDER — PANTOPRAZOLE SODIUM 40 MG/1
40 TABLET, DELAYED RELEASE ORAL DAILY
Status: DISCONTINUED | OUTPATIENT
Start: 2021-02-04 | End: 2021-02-05 | Stop reason: HOSPADM

## 2021-02-03 RX ORDER — KETAMINE HYDROCHLORIDE 50 MG/ML
INJECTION, SOLUTION INTRAMUSCULAR; INTRAVENOUS
Status: DISCONTINUED | OUTPATIENT
Start: 2021-02-03 | End: 2021-02-03

## 2021-02-03 RX ORDER — LIDOCAINE HCL/PF 100 MG/5ML
SYRINGE (ML) INTRAVENOUS
Status: DISCONTINUED | OUTPATIENT
Start: 2021-02-03 | End: 2021-02-03

## 2021-02-03 RX ORDER — ONDANSETRON 8 MG/1
8 TABLET, ORALLY DISINTEGRATING ORAL EVERY 6 HOURS PRN
Status: DISCONTINUED | OUTPATIENT
Start: 2021-02-03 | End: 2021-02-05 | Stop reason: HOSPADM

## 2021-02-03 RX ORDER — PROPOFOL 10 MG/ML
VIAL (ML) INTRAVENOUS
Status: DISCONTINUED | OUTPATIENT
Start: 2021-02-03 | End: 2021-02-03

## 2021-02-03 RX ORDER — ONDANSETRON 2 MG/ML
INJECTION INTRAMUSCULAR; INTRAVENOUS
Status: DISCONTINUED | OUTPATIENT
Start: 2021-02-03 | End: 2021-02-03

## 2021-02-03 RX ORDER — DIPHENHYDRAMINE HCL 25 MG
25 CAPSULE ORAL EVERY 4 HOURS PRN
Status: DISCONTINUED | OUTPATIENT
Start: 2021-02-03 | End: 2021-02-05 | Stop reason: HOSPADM

## 2021-02-03 RX ORDER — ALBUTEROL SULFATE 90 UG/1
AEROSOL, METERED RESPIRATORY (INHALATION)
Status: DISCONTINUED | OUTPATIENT
Start: 2021-02-03 | End: 2021-02-03

## 2021-02-03 RX ORDER — ONDANSETRON 2 MG/ML
4 INJECTION INTRAMUSCULAR; INTRAVENOUS DAILY PRN
Status: DISCONTINUED | OUTPATIENT
Start: 2021-02-03 | End: 2021-02-03 | Stop reason: HOSPADM

## 2021-02-03 RX ORDER — MIDAZOLAM HYDROCHLORIDE 1 MG/ML
INJECTION INTRAMUSCULAR; INTRAVENOUS
Status: DISCONTINUED | OUTPATIENT
Start: 2021-02-03 | End: 2021-02-03

## 2021-02-03 RX ORDER — KETOROLAC TROMETHAMINE 30 MG/ML
INJECTION, SOLUTION INTRAMUSCULAR; INTRAVENOUS
Status: DISCONTINUED | OUTPATIENT
Start: 2021-02-03 | End: 2021-02-03

## 2021-02-03 RX ORDER — DEXAMETHASONE SODIUM PHOSPHATE 4 MG/ML
INJECTION, SOLUTION INTRA-ARTICULAR; INTRALESIONAL; INTRAMUSCULAR; INTRAVENOUS; SOFT TISSUE
Status: DISCONTINUED | OUTPATIENT
Start: 2021-02-03 | End: 2021-02-03

## 2021-02-03 RX ORDER — HYDROMORPHONE HYDROCHLORIDE 1 MG/ML
1 INJECTION, SOLUTION INTRAMUSCULAR; INTRAVENOUS; SUBCUTANEOUS EVERY 4 HOURS PRN
Status: DISCONTINUED | OUTPATIENT
Start: 2021-02-03 | End: 2021-02-05 | Stop reason: HOSPADM

## 2021-02-03 RX ADMIN — ALBUTEROL SULFATE 6 PUFF: 90 AEROSOL, METERED RESPIRATORY (INHALATION) at 03:02

## 2021-02-03 RX ADMIN — SODIUM CHLORIDE, SODIUM LACTATE, POTASSIUM CHLORIDE, AND CALCIUM CHLORIDE: 600; 310; 30; 20 INJECTION, SOLUTION INTRAVENOUS at 02:02

## 2021-02-03 RX ADMIN — ROCURONIUM BROMIDE 40 MG: 10 INJECTION, SOLUTION INTRAVENOUS at 02:02

## 2021-02-03 RX ADMIN — ESMOLOL HYDROCHLORIDE 10 MG: 10 INJECTION INTRAVENOUS at 03:02

## 2021-02-03 RX ADMIN — LIDOCAINE HYDROCHLORIDE 100 MG: 20 INJECTION, SOLUTION INTRAVENOUS at 02:02

## 2021-02-03 RX ADMIN — FENTANYL CITRATE 50 MCG: 50 INJECTION, SOLUTION INTRAMUSCULAR; INTRAVENOUS at 03:02

## 2021-02-03 RX ADMIN — CEFAZOLIN 2 G: 330 INJECTION, POWDER, FOR SOLUTION INTRAMUSCULAR; INTRAVENOUS at 03:02

## 2021-02-03 RX ADMIN — HYDROMORPHONE HYDROCHLORIDE 0.4 MG: 2 INJECTION INTRAMUSCULAR; INTRAVENOUS; SUBCUTANEOUS at 04:02

## 2021-02-03 RX ADMIN — PROMETHAZINE HYDROCHLORIDE 12.5 MG: 25 INJECTION INTRAMUSCULAR; INTRAVENOUS at 03:02

## 2021-02-03 RX ADMIN — ONDANSETRON 8 MG: 2 INJECTION INTRAMUSCULAR; INTRAVENOUS at 08:02

## 2021-02-03 RX ADMIN — ONDANSETRON 4 MG: 2 INJECTION INTRAMUSCULAR; INTRAVENOUS at 06:02

## 2021-02-03 RX ADMIN — KETAMINE HYDROCHLORIDE 30 MG: 50 INJECTION, SOLUTION INTRAMUSCULAR; INTRAVENOUS at 03:02

## 2021-02-03 RX ADMIN — LIDOCAINE HYDROCHLORIDE 60 MG: 20 INJECTION, SOLUTION INTRAVENOUS at 03:02

## 2021-02-03 RX ADMIN — CARBOXYMETHYLCELLULOSE SODIUM 1 DROP: 2.5 SOLUTION/ DROPS OPHTHALMIC at 02:02

## 2021-02-03 RX ADMIN — DEXAMETHASONE SODIUM PHOSPHATE 8 MG: 4 INJECTION, SOLUTION INTRAMUSCULAR; INTRAVENOUS at 03:02

## 2021-02-03 RX ADMIN — NEOSTIGMINE METHYLSULFATE 5 MG: 1 INJECTION INTRAVENOUS at 03:02

## 2021-02-03 RX ADMIN — PROPOFOL 20 MG: 10 INJECTION, EMULSION INTRAVENOUS at 03:02

## 2021-02-03 RX ADMIN — MIDAZOLAM HYDROCHLORIDE 2 MG: 1 INJECTION, SOLUTION INTRAMUSCULAR; INTRAVENOUS at 02:02

## 2021-02-03 RX ADMIN — PREGABALIN 50 MG: 50 CAPSULE ORAL at 02:02

## 2021-02-03 RX ADMIN — ACETAMINOPHEN 1000 MG: 500 TABLET ORAL at 02:02

## 2021-02-03 RX ADMIN — GLYCOPYRROLATE 0.6 MG: 0.2 INJECTION, SOLUTION INTRAMUSCULAR; INTRAVITREAL at 03:02

## 2021-02-03 RX ADMIN — LIDOCAINE HYDROCHLORIDE 40 MG: 20 INJECTION, SOLUTION INTRAVENOUS at 03:02

## 2021-02-03 RX ADMIN — ONDANSETRON HYDROCHLORIDE 4 MG: 2 INJECTION INTRAMUSCULAR; INTRAVENOUS at 03:02

## 2021-02-03 RX ADMIN — PROPOFOL 180 MG: 10 INJECTION, EMULSION INTRAVENOUS at 02:02

## 2021-02-03 RX ADMIN — FENTANYL CITRATE 100 MCG: 50 INJECTION, SOLUTION INTRAMUSCULAR; INTRAVENOUS at 02:02

## 2021-02-03 RX ADMIN — KETOROLAC TROMETHAMINE 30 MG: 30 INJECTION, SOLUTION INTRAMUSCULAR; INTRAVENOUS at 03:02

## 2021-02-03 RX ADMIN — SCOPALAMINE 1 PATCH: 1 PATCH, EXTENDED RELEASE TRANSDERMAL at 02:02

## 2021-02-04 LAB — PREALB SERPL-MCNC: 19 MG/DL (ref 20–43)

## 2021-02-04 PROCEDURE — 25000003 PHARM REV CODE 250: Performed by: SURGERY

## 2021-02-04 PROCEDURE — 94799 UNLISTED PULMONARY SVC/PX: CPT

## 2021-02-04 PROCEDURE — 94761 N-INVAS EAR/PLS OXIMETRY MLT: CPT

## 2021-02-04 PROCEDURE — 36415 COLL VENOUS BLD VENIPUNCTURE: CPT

## 2021-02-04 PROCEDURE — 84134 ASSAY OF PREALBUMIN: CPT

## 2021-02-04 PROCEDURE — 94660 CPAP INITIATION&MGMT: CPT

## 2021-02-04 PROCEDURE — 99900035 HC TECH TIME PER 15 MIN (STAT)

## 2021-02-04 RX ADMIN — HYDROCODONE BITARTRATE AND ACETAMINOPHEN 1 TABLET: 10; 325 TABLET ORAL at 03:02

## 2021-02-04 RX ADMIN — PANTOPRAZOLE SODIUM 40 MG: 40 TABLET, DELAYED RELEASE ORAL at 09:02

## 2021-02-05 VITALS
SYSTOLIC BLOOD PRESSURE: 113 MMHG | BODY MASS INDEX: 33.34 KG/M2 | DIASTOLIC BLOOD PRESSURE: 61 MMHG | WEIGHT: 220 LBS | OXYGEN SATURATION: 95 % | TEMPERATURE: 98 F | RESPIRATION RATE: 18 BRPM | HEART RATE: 69 BPM | HEIGHT: 68 IN

## 2021-02-05 PROCEDURE — 94660 CPAP INITIATION&MGMT: CPT

## 2021-02-05 PROCEDURE — 25000003 PHARM REV CODE 250: Performed by: SURGERY

## 2021-02-05 PROCEDURE — 94799 UNLISTED PULMONARY SVC/PX: CPT

## 2021-02-05 PROCEDURE — 99900035 HC TECH TIME PER 15 MIN (STAT)

## 2021-02-05 RX ADMIN — HYDROCODONE BITARTRATE AND ACETAMINOPHEN 1 TABLET: 10; 325 TABLET ORAL at 04:02

## 2021-02-05 RX ADMIN — PANTOPRAZOLE SODIUM 40 MG: 40 TABLET, DELAYED RELEASE ORAL at 09:02

## 2021-02-15 ENCOUNTER — EXTERNAL HOME HEALTH (OUTPATIENT)
Dept: HOME HEALTH SERVICES | Facility: HOSPITAL | Age: 36
End: 2021-02-15

## 2021-02-18 ENCOUNTER — DOCUMENT SCAN (OUTPATIENT)
Dept: HOME HEALTH SERVICES | Facility: HOSPITAL | Age: 36
End: 2021-02-18

## 2021-03-25 ENCOUNTER — PATIENT MESSAGE (OUTPATIENT)
Dept: OBSTETRICS AND GYNECOLOGY | Facility: CLINIC | Age: 36
End: 2021-03-25

## 2021-04-01 ENCOUNTER — OFFICE VISIT (OUTPATIENT)
Dept: OBSTETRICS AND GYNECOLOGY | Facility: CLINIC | Age: 36
End: 2021-04-01
Attending: OBSTETRICS & GYNECOLOGY
Payer: COMMERCIAL

## 2021-04-01 ENCOUNTER — PATIENT MESSAGE (OUTPATIENT)
Dept: OBSTETRICS AND GYNECOLOGY | Facility: CLINIC | Age: 36
End: 2021-04-01

## 2021-04-01 DIAGNOSIS — Z13.220 SCREENING FOR CHOLESTEROL LEVEL: Primary | ICD-10-CM

## 2021-04-01 DIAGNOSIS — R53.83 FATIGUE, UNSPECIFIED TYPE: ICD-10-CM

## 2021-04-01 DIAGNOSIS — Z15.01 BRCA2 POSITIVE: ICD-10-CM

## 2021-04-01 DIAGNOSIS — Z13.21 ENCOUNTER FOR VITAMIN DEFICIENCY SCREENING: ICD-10-CM

## 2021-04-01 DIAGNOSIS — N95.1 MENOPAUSAL SYMPTOM: ICD-10-CM

## 2021-04-01 DIAGNOSIS — Z86.39 HISTORY OF DIABETES MELLITUS: ICD-10-CM

## 2021-04-01 DIAGNOSIS — Z15.09 BRCA2 POSITIVE: ICD-10-CM

## 2021-04-01 PROCEDURE — 99214 PR OFFICE/OUTPT VISIT, EST, LEVL IV, 30-39 MIN: ICD-10-PCS | Mod: 95,,, | Performed by: OBSTETRICS & GYNECOLOGY

## 2021-04-01 PROCEDURE — 99214 OFFICE O/P EST MOD 30 MIN: CPT | Mod: 95,,, | Performed by: OBSTETRICS & GYNECOLOGY

## 2021-04-01 RX ORDER — ESTRADIOL 2 MG/1
2 TABLET ORAL DAILY
Qty: 30 TABLET | Refills: 11 | Status: SHIPPED | OUTPATIENT
Start: 2021-04-01 | End: 2022-08-15 | Stop reason: SDUPTHER

## 2021-04-01 RX ORDER — SERTRALINE HYDROCHLORIDE 50 MG/1
50 TABLET, FILM COATED ORAL DAILY
Qty: 30 TABLET | Refills: 2 | Status: SHIPPED | OUTPATIENT
Start: 2021-04-01 | End: 2021-07-01

## 2021-04-02 ENCOUNTER — LAB VISIT (OUTPATIENT)
Dept: LAB | Facility: HOSPITAL | Age: 36
End: 2021-04-02
Attending: OBSTETRICS & GYNECOLOGY
Payer: COMMERCIAL

## 2021-04-02 DIAGNOSIS — Z13.220 SCREENING FOR CHOLESTEROL LEVEL: ICD-10-CM

## 2021-04-02 DIAGNOSIS — Z86.39 HISTORY OF DIABETES MELLITUS: ICD-10-CM

## 2021-04-02 DIAGNOSIS — R53.83 FATIGUE, UNSPECIFIED TYPE: ICD-10-CM

## 2021-04-02 DIAGNOSIS — N95.1 MENOPAUSAL SYMPTOM: ICD-10-CM

## 2021-04-02 DIAGNOSIS — Z13.21 ENCOUNTER FOR VITAMIN DEFICIENCY SCREENING: ICD-10-CM

## 2021-04-02 LAB
25(OH)D3+25(OH)D2 SERPL-MCNC: 31 NG/ML (ref 30–96)
ALBUMIN SERPL BCP-MCNC: 4.1 G/DL (ref 3.5–5.2)
ALP SERPL-CCNC: 84 U/L (ref 55–135)
ALT SERPL W/O P-5'-P-CCNC: 28 U/L (ref 10–44)
ANION GAP SERPL CALC-SCNC: 9 MMOL/L (ref 8–16)
AST SERPL-CCNC: 27 U/L (ref 10–40)
BASOPHILS # BLD AUTO: 0.04 K/UL (ref 0–0.2)
BASOPHILS NFR BLD: 0.8 % (ref 0–1.9)
BILIRUB SERPL-MCNC: 0.5 MG/DL (ref 0.1–1)
BUN SERPL-MCNC: 10 MG/DL (ref 6–20)
CALCIUM SERPL-MCNC: 9.5 MG/DL (ref 8.7–10.5)
CHLORIDE SERPL-SCNC: 106 MMOL/L (ref 95–110)
CHOLEST SERPL-MCNC: 198 MG/DL (ref 120–199)
CHOLEST/HDLC SERPL: 5.5 {RATIO} (ref 2–5)
CO2 SERPL-SCNC: 24 MMOL/L (ref 23–29)
CREAT SERPL-MCNC: 0.8 MG/DL (ref 0.5–1.4)
DIFFERENTIAL METHOD: ABNORMAL
EOSINOPHIL # BLD AUTO: 0 K/UL (ref 0–0.5)
EOSINOPHIL NFR BLD: 0 % (ref 0–8)
ERYTHROCYTE [DISTWIDTH] IN BLOOD BY AUTOMATED COUNT: 14.1 % (ref 11.5–14.5)
EST. GFR  (AFRICAN AMERICAN): >60 ML/MIN/1.73 M^2
EST. GFR  (NON AFRICAN AMERICAN): >60 ML/MIN/1.73 M^2
ESTIMATED AVG GLUCOSE: 126 MG/DL (ref 68–131)
ESTRADIOL SERPL-MCNC: 27 PG/ML
GLUCOSE SERPL-MCNC: 102 MG/DL (ref 70–110)
HBA1C MFR BLD: 6 % (ref 4.5–6.2)
HCT VFR BLD AUTO: 41.1 % (ref 37–48.5)
HDLC SERPL-MCNC: 36 MG/DL (ref 40–75)
HDLC SERPL: 18.2 % (ref 20–50)
HGB BLD-MCNC: 13.4 G/DL (ref 12–16)
IMM GRANULOCYTES # BLD AUTO: 0.01 K/UL (ref 0–0.04)
IMM GRANULOCYTES NFR BLD AUTO: 0.2 % (ref 0–0.5)
LDLC SERPL CALC-MCNC: 140.8 MG/DL (ref 63–159)
LYMPHOCYTES # BLD AUTO: 1.4 K/UL (ref 1–4.8)
LYMPHOCYTES NFR BLD: 27.9 % (ref 18–48)
MCH RBC QN AUTO: 26.9 PG (ref 27–31)
MCHC RBC AUTO-ENTMCNC: 32.6 G/DL (ref 32–36)
MCV RBC AUTO: 83 FL (ref 82–98)
MONOCYTES # BLD AUTO: 0.4 K/UL (ref 0.3–1)
MONOCYTES NFR BLD: 7.8 % (ref 4–15)
NEUTROPHILS # BLD AUTO: 3.3 K/UL (ref 1.8–7.7)
NEUTROPHILS NFR BLD: 63.3 % (ref 38–73)
NONHDLC SERPL-MCNC: 162 MG/DL
NRBC BLD-RTO: 0 /100 WBC
PLATELET # BLD AUTO: 365 K/UL (ref 150–450)
PMV BLD AUTO: 10.2 FL (ref 9.2–12.9)
POTASSIUM SERPL-SCNC: 4.2 MMOL/L (ref 3.5–5.1)
PROT SERPL-MCNC: 7.4 G/DL (ref 6–8.4)
RBC # BLD AUTO: 4.98 M/UL (ref 4–5.4)
SODIUM SERPL-SCNC: 139 MMOL/L (ref 136–145)
TRIGL SERPL-MCNC: 106 MG/DL (ref 30–150)
TSH SERPL DL<=0.005 MIU/L-ACNC: 1.76 UIU/ML (ref 0.34–5.6)
WBC # BLD AUTO: 5.13 K/UL (ref 3.9–12.7)

## 2021-04-02 PROCEDURE — 84443 ASSAY THYROID STIM HORMONE: CPT | Performed by: OBSTETRICS & GYNECOLOGY

## 2021-04-02 PROCEDURE — 82306 VITAMIN D 25 HYDROXY: CPT | Performed by: OBSTETRICS & GYNECOLOGY

## 2021-04-02 PROCEDURE — 83036 HEMOGLOBIN GLYCOSYLATED A1C: CPT | Performed by: OBSTETRICS & GYNECOLOGY

## 2021-04-02 PROCEDURE — 82670 ASSAY OF TOTAL ESTRADIOL: CPT | Performed by: OBSTETRICS & GYNECOLOGY

## 2021-04-02 PROCEDURE — 80053 COMPREHEN METABOLIC PANEL: CPT | Performed by: OBSTETRICS & GYNECOLOGY

## 2021-04-02 PROCEDURE — 80061 LIPID PANEL: CPT | Performed by: OBSTETRICS & GYNECOLOGY

## 2021-04-02 PROCEDURE — 84402 ASSAY OF FREE TESTOSTERONE: CPT | Performed by: OBSTETRICS & GYNECOLOGY

## 2021-04-02 PROCEDURE — 36415 COLL VENOUS BLD VENIPUNCTURE: CPT | Performed by: OBSTETRICS & GYNECOLOGY

## 2021-04-02 PROCEDURE — 85025 COMPLETE CBC W/AUTO DIFF WBC: CPT | Performed by: OBSTETRICS & GYNECOLOGY

## 2021-04-05 ENCOUNTER — TELEPHONE (OUTPATIENT)
Dept: OBSTETRICS AND GYNECOLOGY | Facility: CLINIC | Age: 36
End: 2021-04-05

## 2021-04-07 LAB — TESTOST FREE SERPL-MCNC: 0.5 PG/ML

## 2021-04-08 ENCOUNTER — TELEPHONE (OUTPATIENT)
Dept: OBSTETRICS AND GYNECOLOGY | Facility: CLINIC | Age: 36
End: 2021-04-08

## 2021-04-12 ENCOUNTER — EXTERNAL HOME HEALTH (OUTPATIENT)
Dept: HOME HEALTH SERVICES | Facility: HOSPITAL | Age: 36
End: 2021-04-12

## 2021-04-15 ENCOUNTER — PATIENT MESSAGE (OUTPATIENT)
Dept: HEMATOLOGY/ONCOLOGY | Facility: CLINIC | Age: 36
End: 2021-04-15

## 2021-05-04 ENCOUNTER — DOCUMENT SCAN (OUTPATIENT)
Dept: HOME HEALTH SERVICES | Facility: HOSPITAL | Age: 36
End: 2021-05-04

## 2021-05-05 ENCOUNTER — LAB VISIT (OUTPATIENT)
Dept: LAB | Facility: HOSPITAL | Age: 36
End: 2021-05-05
Attending: NURSE PRACTITIONER
Payer: COMMERCIAL

## 2021-05-05 DIAGNOSIS — R60.0 PERIPHERAL EDEMA: ICD-10-CM

## 2021-05-05 DIAGNOSIS — T14.8XXD WOUND HEALING, DELAYED: ICD-10-CM

## 2021-05-05 LAB — BNP SERPL-MCNC: 26 PG/ML (ref 0–99)

## 2021-05-05 PROCEDURE — 83880 ASSAY OF NATRIURETIC PEPTIDE: CPT | Performed by: NURSE PRACTITIONER

## 2021-05-05 PROCEDURE — 86038 ANTINUCLEAR ANTIBODIES: CPT | Performed by: NURSE PRACTITIONER

## 2021-05-05 PROCEDURE — 36415 COLL VENOUS BLD VENIPUNCTURE: CPT | Performed by: NURSE PRACTITIONER

## 2021-05-06 LAB — ANA SER QL IF: NORMAL

## 2021-05-14 ENCOUNTER — DOCUMENT SCAN (OUTPATIENT)
Dept: HOME HEALTH SERVICES | Facility: HOSPITAL | Age: 36
End: 2021-05-14

## 2021-05-27 ENCOUNTER — DOCUMENT SCAN (OUTPATIENT)
Dept: HOME HEALTH SERVICES | Facility: HOSPITAL | Age: 36
End: 2021-05-27

## 2021-06-09 ENCOUNTER — HOSPITAL ENCOUNTER (OUTPATIENT)
Dept: RADIOLOGY | Facility: HOSPITAL | Age: 36
Discharge: HOME | End: 2021-06-09
Attending: NURSE PRACTITIONER
Payer: COMMERCIAL

## 2021-06-09 DIAGNOSIS — G89.29 CHRONIC RIGHT SI JOINT PAIN: ICD-10-CM

## 2021-06-09 DIAGNOSIS — M53.3 CHRONIC RIGHT SI JOINT PAIN: ICD-10-CM

## 2021-06-09 PROCEDURE — 72133 CT LUMBAR SPINE W/O & W/DYE: CPT | Mod: 26,,, | Performed by: RADIOLOGY

## 2021-06-09 PROCEDURE — 72133 CT LUMBAR SPINE W/O & W/DYE: CPT | Mod: TC

## 2021-06-09 PROCEDURE — 72133 CT LUMBAR SPINE W WO CONTRAST: ICD-10-PCS | Mod: 26,,, | Performed by: RADIOLOGY

## 2021-06-09 PROCEDURE — 25500020 PHARM REV CODE 255: Performed by: NURSE PRACTITIONER

## 2021-06-09 RX ADMIN — IOHEXOL 100 ML: 350 INJECTION, SOLUTION INTRAVENOUS at 12:06

## 2021-06-21 ENCOUNTER — PATIENT MESSAGE (OUTPATIENT)
Dept: HEMATOLOGY/ONCOLOGY | Facility: CLINIC | Age: 36
End: 2021-06-21

## 2021-06-29 ENCOUNTER — HOSPITAL ENCOUNTER (OUTPATIENT)
Dept: RADIOLOGY | Facility: HOSPITAL | Age: 36
Discharge: HOME | End: 2021-06-29
Attending: NURSE PRACTITIONER
Payer: COMMERCIAL

## 2021-06-29 DIAGNOSIS — E66.9 OBESITY (BMI 30.0-34.9): ICD-10-CM

## 2021-06-29 DIAGNOSIS — E11.9 DIABETES MELLITUS WITHOUT COMPLICATION: ICD-10-CM

## 2021-06-29 PROCEDURE — 76700 US EXAM ABDOM COMPLETE: CPT | Mod: TC

## 2021-06-29 PROCEDURE — 76700 US ABDOMEN COMPLETE: ICD-10-PCS | Mod: 26,,, | Performed by: RADIOLOGY

## 2021-06-29 PROCEDURE — 76700 US EXAM ABDOM COMPLETE: CPT | Mod: 26,,, | Performed by: RADIOLOGY

## 2021-07-27 ENCOUNTER — PROCEDURE VISIT (OUTPATIENT)
Dept: SLEEP MEDICINE | Facility: HOSPITAL | Age: 36
End: 2021-07-27
Attending: NURSE PRACTITIONER
Payer: COMMERCIAL

## 2021-07-27 DIAGNOSIS — T14.8XXD WOUND HEALING, DELAYED: ICD-10-CM

## 2021-07-27 PROCEDURE — 95810 POLYSOM 6/> YRS 4/> PARAM: CPT

## 2021-08-10 ENCOUNTER — PATIENT MESSAGE (OUTPATIENT)
Dept: HEMATOLOGY/ONCOLOGY | Facility: CLINIC | Age: 36
End: 2021-08-10

## 2021-08-10 ENCOUNTER — PATIENT MESSAGE (OUTPATIENT)
Dept: ALLERGY | Facility: CLINIC | Age: 36
End: 2021-08-10

## 2021-08-16 ENCOUNTER — PROCEDURE VISIT (OUTPATIENT)
Dept: SLEEP MEDICINE | Facility: HOSPITAL | Age: 36
End: 2021-08-16
Attending: NURSE PRACTITIONER
Payer: COMMERCIAL

## 2021-08-16 DIAGNOSIS — G47.33 OSA (OBSTRUCTIVE SLEEP APNEA): ICD-10-CM

## 2021-08-16 PROCEDURE — 95811 POLYSOM 6/>YRS CPAP 4/> PARM: CPT

## 2021-09-13 ENCOUNTER — PATIENT MESSAGE (OUTPATIENT)
Dept: ALLERGY | Facility: CLINIC | Age: 36
End: 2021-09-13

## 2021-09-24 ENCOUNTER — HOSPITAL ENCOUNTER (OUTPATIENT)
Dept: RADIOLOGY | Facility: HOSPITAL | Age: 36
Discharge: HOME | End: 2021-09-24
Attending: NURSE PRACTITIONER
Payer: COMMERCIAL

## 2021-09-24 DIAGNOSIS — Z15.01 BRCA2 GENE MUTATION POSITIVE: ICD-10-CM

## 2021-09-24 DIAGNOSIS — Z15.09 BRCA2 GENE MUTATION POSITIVE: ICD-10-CM

## 2021-09-24 PROCEDURE — 76376 MRI ABDOMEN WITH AND WO_INC MRCP: ICD-10-PCS | Mod: 26,,, | Performed by: RADIOLOGY

## 2021-09-24 PROCEDURE — 25500020 PHARM REV CODE 255: Performed by: NURSE PRACTITIONER

## 2021-09-24 PROCEDURE — 74183 MRI ABD W/O CNTR FLWD CNTR: CPT | Mod: 26,,, | Performed by: RADIOLOGY

## 2021-09-24 PROCEDURE — 76376 3D RENDER W/INTRP POSTPROCES: CPT | Mod: 26,,, | Performed by: RADIOLOGY

## 2021-09-24 PROCEDURE — 74183 MRI ABDOMEN WITH AND WO_INC MRCP: ICD-10-PCS | Mod: 26,,, | Performed by: RADIOLOGY

## 2021-09-24 PROCEDURE — 76376 3D RENDER W/INTRP POSTPROCES: CPT | Mod: TC

## 2021-09-24 PROCEDURE — A9585 GADOBUTROL INJECTION: HCPCS | Performed by: NURSE PRACTITIONER

## 2021-09-24 RX ORDER — GADOBUTROL 604.72 MG/ML
10 INJECTION INTRAVENOUS
Status: COMPLETED | OUTPATIENT
Start: 2021-09-24 | End: 2021-09-24

## 2021-09-24 RX ADMIN — GADOBUTROL 10 ML: 604.72 INJECTION INTRAVENOUS at 09:09

## 2021-09-30 ENCOUNTER — PATIENT MESSAGE (OUTPATIENT)
Dept: ALLERGY | Facility: CLINIC | Age: 36
End: 2021-09-30

## 2021-09-30 ENCOUNTER — PATIENT MESSAGE (OUTPATIENT)
Dept: HEMATOLOGY/ONCOLOGY | Facility: CLINIC | Age: 36
End: 2021-09-30

## 2021-09-30 ENCOUNTER — OFFICE VISIT (OUTPATIENT)
Dept: ALLERGY | Facility: CLINIC | Age: 36
End: 2021-09-30
Payer: COMMERCIAL

## 2021-09-30 VITALS — BODY MASS INDEX: 37.56 KG/M2 | HEIGHT: 66 IN | WEIGHT: 233.69 LBS

## 2021-09-30 DIAGNOSIS — Z87.898 HISTORY OF PREDIABETES: ICD-10-CM

## 2021-09-30 DIAGNOSIS — T81.89XS NONHEALING SURGICAL WOUND, SEQUELA: ICD-10-CM

## 2021-09-30 DIAGNOSIS — Z86.19 FREQUENT INFECTIONS: Primary | ICD-10-CM

## 2021-09-30 DIAGNOSIS — L63.9 ALOPECIA AREATA: ICD-10-CM

## 2021-09-30 PROCEDURE — 99999 PR PBB SHADOW E&M-EST. PATIENT-LVL III: CPT | Mod: PBBFAC,,, | Performed by: STUDENT IN AN ORGANIZED HEALTH CARE EDUCATION/TRAINING PROGRAM

## 2021-09-30 PROCEDURE — 99205 PR OFFICE/OUTPT VISIT, NEW, LEVL V, 60-74 MIN: ICD-10-PCS | Mod: S$GLB,,, | Performed by: STUDENT IN AN ORGANIZED HEALTH CARE EDUCATION/TRAINING PROGRAM

## 2021-09-30 PROCEDURE — 3044F HG A1C LEVEL LT 7.0%: CPT | Mod: CPTII,S$GLB,, | Performed by: STUDENT IN AN ORGANIZED HEALTH CARE EDUCATION/TRAINING PROGRAM

## 2021-09-30 PROCEDURE — 3008F PR BODY MASS INDEX (BMI) DOCUMENTED: ICD-10-PCS | Mod: CPTII,S$GLB,, | Performed by: STUDENT IN AN ORGANIZED HEALTH CARE EDUCATION/TRAINING PROGRAM

## 2021-09-30 PROCEDURE — 3008F BODY MASS INDEX DOCD: CPT | Mod: CPTII,S$GLB,, | Performed by: STUDENT IN AN ORGANIZED HEALTH CARE EDUCATION/TRAINING PROGRAM

## 2021-09-30 PROCEDURE — 3044F PR MOST RECENT HEMOGLOBIN A1C LEVEL <7.0%: ICD-10-PCS | Mod: CPTII,S$GLB,, | Performed by: STUDENT IN AN ORGANIZED HEALTH CARE EDUCATION/TRAINING PROGRAM

## 2021-09-30 PROCEDURE — 99205 OFFICE O/P NEW HI 60 MIN: CPT | Mod: S$GLB,,, | Performed by: STUDENT IN AN ORGANIZED HEALTH CARE EDUCATION/TRAINING PROGRAM

## 2021-09-30 PROCEDURE — 99999 PR PBB SHADOW E&M-EST. PATIENT-LVL III: ICD-10-PCS | Mod: PBBFAC,,, | Performed by: STUDENT IN AN ORGANIZED HEALTH CARE EDUCATION/TRAINING PROGRAM

## 2021-09-30 RX ORDER — CLOBETASOL PROPIONATE 0.5 MG/G
AEROSOL, FOAM TOPICAL
COMMUNITY
Start: 2021-09-22 | End: 2022-12-21

## 2021-10-04 ENCOUNTER — OFFICE VISIT (OUTPATIENT)
Dept: SURGERY | Facility: CLINIC | Age: 36
End: 2021-10-04
Payer: COMMERCIAL

## 2021-10-04 ENCOUNTER — APPOINTMENT (OUTPATIENT)
Dept: LAB | Facility: HOSPITAL | Age: 36
End: 2021-10-04
Attending: STUDENT IN AN ORGANIZED HEALTH CARE EDUCATION/TRAINING PROGRAM
Payer: COMMERCIAL

## 2021-10-04 ENCOUNTER — TELEPHONE (OUTPATIENT)
Dept: SURGERY | Facility: CLINIC | Age: 36
End: 2021-10-04

## 2021-10-04 VITALS
HEIGHT: 66 IN | WEIGHT: 226 LBS | HEART RATE: 85 BPM | BODY MASS INDEX: 36.32 KG/M2 | SYSTOLIC BLOOD PRESSURE: 119 MMHG | DIASTOLIC BLOOD PRESSURE: 82 MMHG

## 2021-10-04 DIAGNOSIS — Z90.13 HISTORY OF BILATERAL MASTECTOMY: ICD-10-CM

## 2021-10-04 DIAGNOSIS — Z15.01 BRCA2 GENE MUTATION POSITIVE: Primary | ICD-10-CM

## 2021-10-04 DIAGNOSIS — Z80.3 FAMILY HISTORY OF BREAST CANCER: ICD-10-CM

## 2021-10-04 DIAGNOSIS — Z15.09 BRCA2 GENE MUTATION POSITIVE: Primary | ICD-10-CM

## 2021-10-04 PROCEDURE — 99999 PR PBB SHADOW E&M-EST. PATIENT-LVL III: ICD-10-PCS | Mod: PBBFAC,,, | Performed by: PHYSICIAN ASSISTANT

## 2021-10-04 PROCEDURE — 3079F DIAST BP 80-89 MM HG: CPT | Mod: CPTII,S$GLB,, | Performed by: PHYSICIAN ASSISTANT

## 2021-10-04 PROCEDURE — 99999 PR PBB SHADOW E&M-EST. PATIENT-LVL III: CPT | Mod: PBBFAC,,, | Performed by: PHYSICIAN ASSISTANT

## 2021-10-04 PROCEDURE — 1159F PR MEDICATION LIST DOCUMENTED IN MEDICAL RECORD: ICD-10-PCS | Mod: CPTII,S$GLB,, | Performed by: PHYSICIAN ASSISTANT

## 2021-10-04 PROCEDURE — 3044F HG A1C LEVEL LT 7.0%: CPT | Mod: CPTII,S$GLB,, | Performed by: PHYSICIAN ASSISTANT

## 2021-10-04 PROCEDURE — 3074F PR MOST RECENT SYSTOLIC BLOOD PRESSURE < 130 MM HG: ICD-10-PCS | Mod: CPTII,S$GLB,, | Performed by: PHYSICIAN ASSISTANT

## 2021-10-04 PROCEDURE — 1159F MED LIST DOCD IN RCRD: CPT | Mod: CPTII,S$GLB,, | Performed by: PHYSICIAN ASSISTANT

## 2021-10-04 PROCEDURE — 3044F PR MOST RECENT HEMOGLOBIN A1C LEVEL <7.0%: ICD-10-PCS | Mod: CPTII,S$GLB,, | Performed by: PHYSICIAN ASSISTANT

## 2021-10-04 PROCEDURE — 1160F RVW MEDS BY RX/DR IN RCRD: CPT | Mod: CPTII,S$GLB,, | Performed by: PHYSICIAN ASSISTANT

## 2021-10-04 PROCEDURE — 3079F PR MOST RECENT DIASTOLIC BLOOD PRESSURE 80-89 MM HG: ICD-10-PCS | Mod: CPTII,S$GLB,, | Performed by: PHYSICIAN ASSISTANT

## 2021-10-04 PROCEDURE — 99213 OFFICE O/P EST LOW 20 MIN: CPT | Mod: S$GLB,,, | Performed by: PHYSICIAN ASSISTANT

## 2021-10-04 PROCEDURE — 1160F PR REVIEW ALL MEDS BY PRESCRIBER/CLIN PHARMACIST DOCUMENTED: ICD-10-PCS | Mod: CPTII,S$GLB,, | Performed by: PHYSICIAN ASSISTANT

## 2021-10-04 PROCEDURE — 3008F BODY MASS INDEX DOCD: CPT | Mod: CPTII,S$GLB,, | Performed by: PHYSICIAN ASSISTANT

## 2021-10-04 PROCEDURE — 3008F PR BODY MASS INDEX (BMI) DOCUMENTED: ICD-10-PCS | Mod: CPTII,S$GLB,, | Performed by: PHYSICIAN ASSISTANT

## 2021-10-04 PROCEDURE — 99213 PR OFFICE/OUTPT VISIT, EST, LEVL III, 20-29 MIN: ICD-10-PCS | Mod: S$GLB,,, | Performed by: PHYSICIAN ASSISTANT

## 2021-10-04 PROCEDURE — 3074F SYST BP LT 130 MM HG: CPT | Mod: CPTII,S$GLB,, | Performed by: PHYSICIAN ASSISTANT

## 2022-03-31 ENCOUNTER — PATIENT MESSAGE (OUTPATIENT)
Dept: HEMATOLOGY/ONCOLOGY | Facility: CLINIC | Age: 37
End: 2022-03-31
Payer: COMMERCIAL

## 2022-04-01 NOTE — PROGRESS NOTES
Subjective:       Patient ID: Allyson Bahena is a 36 y.o. female.    Chief Complaint: BRCA 2 Gene mutation    HPI Ms. Bahena presents today for her 6 month follow up following diagnosis of BRCA2 mutation. She had a prophylactic BROOKLYN/BSO on March 11,2019. She is here for continued appropriate surveillance.   She followed up with Dr. Cantu and had on 2/20/2020 a breast reduction and lift in preparation for her bilateral mastectomy in October.   She follows with Dr. Gamboa for her hot flashes and vaginal dryness.      She saw TONYA Garcia today. She did have cyst on her arm drained. It started about 1 week ago.   Denies fevers and chills, she had been on Keflex x 3 days, changed antibiotics to Bactrim.     Eating and drinking well. Taking care of 7 children. Exercising regularly.   Energy level is low.     She has since had bilateral mastectomy with CARMEN.   She had weight loss surgery as well and has lost 60 lbs.       Review of Systems   Constitutional: Positive for fatigue. Negative for activity change, appetite change, chills, fever and unexpected weight change.   HENT: Negative for congestion, dental problem, ear pain, hearing loss, postnasal drip, rhinorrhea, sinus pressure, sore throat, tinnitus and trouble swallowing.    Eyes: Negative for visual disturbance.   Respiratory: Negative for cough, chest tightness, shortness of breath and wheezing.    Cardiovascular: Negative for chest pain, palpitations and leg swelling.   Gastrointestinal: Positive for constipation (takes miralax). Negative for abdominal distention, abdominal pain, blood in stool, diarrhea, nausea and vomiting.   Endocrine: Positive for heat intolerance.   Genitourinary: Negative for decreased urine volume, difficulty urinating, dysuria, frequency, menstrual problem and urgency.        Vaginal Dryness    Musculoskeletal: Negative for arthralgias, back pain, gait problem, joint swelling and neck pain.   Skin: Negative for pallor and  rash.   Neurological: Negative for dizziness, weakness, light-headedness, numbness and headaches.        Hot flashes   Hematological: Negative for adenopathy. Does not bruise/bleed easily.   Psychiatric/Behavioral: Negative for dysphoric mood and sleep disturbance. The patient is not nervous/anxious.        Objective:      Physical Exam  Vitals and nursing note reviewed.   Constitutional:       General: She is not in acute distress.     Appearance: Normal appearance. She is well-developed.   HENT:      Head: Normocephalic.      Mouth/Throat:      Pharynx: No oropharyngeal exudate.   Eyes:      Pupils: Pupils are equal, round, and reactive to light.   Cardiovascular:      Rate and Rhythm: Normal rate and regular rhythm.      Heart sounds: Normal heart sounds.   Pulmonary:      Effort: Pulmonary effort is normal.      Breath sounds: Normal breath sounds.   Chest:   Breasts:      Right: No axillary adenopathy or supraclavicular adenopathy.      Left: No axillary adenopathy or supraclavicular adenopathy.            Comments: Bilateral mastectomies with reconstruction   Abdominal:      General: Bowel sounds are normal. There is no distension.      Palpations: Abdomen is soft.      Tenderness: There is no abdominal tenderness.   Musculoskeletal:      Cervical back: Normal range of motion.   Lymphadenopathy:      Cervical: No cervical adenopathy.      Upper Body:      Right upper body: No supraclavicular or axillary adenopathy.      Left upper body: No supraclavicular or axillary adenopathy.   Skin:     General: Skin is warm and dry.      Findings: No rash.          Neurological:      Mental Status: She is alert and oriented to person, place, and time.   Psychiatric:         Behavior: Behavior normal.        Assessment:       1. BRCA2 gene mutation positive    2. Family history of malignant neoplasm of breast    3. Essential hypertension    4. Morbid obesity        Plan:       1. This is a 34 y.o. female with an increased  risk of breast and ovarian cancer based on + BRCA2 test result.    She is aware of cancer risks and these were reviewed again with her today- lifetime risk of breast cancer is estimated to be 60-80%, lifetime risk of ovarian cancer 15-30%. (On March 11, 2019 she had a BROOKLYN/BSO).  She had a breast reduction and lift on 2/20/2020 with Dr. Cantu. She had a skin and nipple sparing mammogram in October, CARMEN reconstruction.   Since she has had the bilateral mastectomy she does not need to follow up with me any longer. She does need a bilateral US yearly for her nipples    2. Monitored today; continue current medication and follow up with PCP     3. Diet and exercise; she has lost 60 lbs     She has previously had a long discussion with Dr. Merino - see Dr. Merino's note from 1/16/19.          Her surveillance for each cancer associated with BRCA2 mutation are listed below:  For melanoma - we discussed whole body skin exam and will continue to follow with dermatology  For pancreatic cancer- we discussed that there is not surveillance proven to impact although we can offer pancreatic imaging q2 years.          Gilma Malone NP  Oncology     Patient dicussed with supervising physician, Dr. Merino.    Route Chart for Scheduling    Med Onc Chart Routing      Follow up with physician    Follow up with KIRK No follow up needed.   Labs    Imaging    Pharmacy appointment    Other referrals

## 2022-04-04 ENCOUNTER — OFFICE VISIT (OUTPATIENT)
Dept: SURGERY | Facility: CLINIC | Age: 37
End: 2022-04-04
Payer: COMMERCIAL

## 2022-04-04 ENCOUNTER — PATIENT MESSAGE (OUTPATIENT)
Dept: SURGERY | Facility: CLINIC | Age: 37
End: 2022-04-04

## 2022-04-04 ENCOUNTER — OFFICE VISIT (OUTPATIENT)
Dept: HEMATOLOGY/ONCOLOGY | Facility: CLINIC | Age: 37
End: 2022-04-04
Payer: COMMERCIAL

## 2022-04-04 VITALS
DIASTOLIC BLOOD PRESSURE: 71 MMHG | SYSTOLIC BLOOD PRESSURE: 115 MMHG | HEIGHT: 66 IN | WEIGHT: 176 LBS | BODY MASS INDEX: 28.28 KG/M2 | HEART RATE: 60 BPM

## 2022-04-04 VITALS
WEIGHT: 178.56 LBS | RESPIRATION RATE: 18 BRPM | BODY MASS INDEX: 28.7 KG/M2 | HEART RATE: 58 BPM | DIASTOLIC BLOOD PRESSURE: 74 MMHG | HEIGHT: 66 IN | OXYGEN SATURATION: 100 % | SYSTOLIC BLOOD PRESSURE: 130 MMHG | TEMPERATURE: 98 F

## 2022-04-04 DIAGNOSIS — Z15.09 BRCA2 GENE MUTATION POSITIVE: Primary | ICD-10-CM

## 2022-04-04 DIAGNOSIS — Z15.01 BRCA2 GENE MUTATION POSITIVE: Primary | ICD-10-CM

## 2022-04-04 DIAGNOSIS — E66.01 MORBID OBESITY: ICD-10-CM

## 2022-04-04 DIAGNOSIS — Z90.13 HISTORY OF BILATERAL MASTECTOMY: ICD-10-CM

## 2022-04-04 DIAGNOSIS — I10 ESSENTIAL HYPERTENSION: ICD-10-CM

## 2022-04-04 DIAGNOSIS — Z80.3 FAMILY HISTORY OF MALIGNANT NEOPLASM OF BREAST: ICD-10-CM

## 2022-04-04 PROCEDURE — 99999 PR PBB SHADOW E&M-EST. PATIENT-LVL IV: ICD-10-PCS | Mod: PBBFAC,,, | Performed by: PHYSICIAN ASSISTANT

## 2022-04-04 PROCEDURE — 99999 PR PBB SHADOW E&M-EST. PATIENT-LVL IV: ICD-10-PCS | Mod: PBBFAC,,, | Performed by: NURSE PRACTITIONER

## 2022-04-04 PROCEDURE — 99214 OFFICE O/P EST MOD 30 MIN: CPT | Mod: S$GLB,,, | Performed by: NURSE PRACTITIONER

## 2022-04-04 PROCEDURE — 99214 OFFICE O/P EST MOD 30 MIN: CPT | Mod: S$GLB,,, | Performed by: PHYSICIAN ASSISTANT

## 2022-04-04 PROCEDURE — 99999 PR PBB SHADOW E&M-EST. PATIENT-LVL IV: CPT | Mod: PBBFAC,,, | Performed by: PHYSICIAN ASSISTANT

## 2022-04-04 PROCEDURE — 99214 PR OFFICE/OUTPT VISIT, EST, LEVL IV, 30-39 MIN: ICD-10-PCS | Mod: S$GLB,,, | Performed by: PHYSICIAN ASSISTANT

## 2022-04-04 PROCEDURE — 99999 PR PBB SHADOW E&M-EST. PATIENT-LVL IV: CPT | Mod: PBBFAC,,, | Performed by: NURSE PRACTITIONER

## 2022-04-04 PROCEDURE — 99214 PR OFFICE/OUTPT VISIT, EST, LEVL IV, 30-39 MIN: ICD-10-PCS | Mod: S$GLB,,, | Performed by: NURSE PRACTITIONER

## 2022-04-04 RX ORDER — SULFAMETHOXAZOLE AND TRIMETHOPRIM 800; 160 MG/1; MG/1
1 TABLET ORAL 2 TIMES DAILY
Qty: 20 TABLET | Refills: 0 | Status: SHIPPED | OUTPATIENT
Start: 2022-04-04 | End: 2022-04-14
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
## 2022-04-05 ENCOUNTER — PATIENT MESSAGE (OUTPATIENT)
Dept: SURGERY | Facility: CLINIC | Age: 37
End: 2022-04-05
Payer: COMMERCIAL

## 2022-04-11 ENCOUNTER — HOSPITAL ENCOUNTER (OUTPATIENT)
Dept: RADIOLOGY | Facility: HOSPITAL | Age: 37
Discharge: HOME | End: 2022-04-11
Attending: NURSE PRACTITIONER
Payer: COMMERCIAL

## 2022-04-11 ENCOUNTER — PATIENT MESSAGE (OUTPATIENT)
Dept: SURGERY | Facility: CLINIC | Age: 37
End: 2022-04-11
Payer: COMMERCIAL

## 2022-04-11 DIAGNOSIS — D36.7 CYST, DERMOID, ARM, LEFT: Primary | ICD-10-CM

## 2022-04-11 DIAGNOSIS — D36.7 CYST, DERMOID, ARM, LEFT: ICD-10-CM

## 2022-04-11 PROCEDURE — 76882 US LMTD JT/FCL EVL NVASC XTR: CPT | Mod: 26,LT,, | Performed by: RADIOLOGY

## 2022-04-11 PROCEDURE — 76882 US EXTREMITY NON VASCULAR LIMITED LEFT: ICD-10-PCS | Mod: 26,LT,, | Performed by: RADIOLOGY

## 2022-04-11 PROCEDURE — 76882 US LMTD JT/FCL EVL NVASC XTR: CPT | Mod: TC,LT

## 2022-06-02 ENCOUNTER — PATIENT MESSAGE (OUTPATIENT)
Dept: SURGERY | Facility: CLINIC | Age: 37
End: 2022-06-02
Payer: COMMERCIAL

## 2022-06-03 ENCOUNTER — TELEPHONE (OUTPATIENT)
Dept: RADIOLOGY | Facility: HOSPITAL | Age: 37
End: 2022-06-03
Payer: COMMERCIAL

## 2022-06-03 NOTE — TELEPHONE ENCOUNTER
Called patient to schedule breast ultrasound , no answer. Left message with my contact information.

## 2022-06-20 ENCOUNTER — HOSPITAL ENCOUNTER (OUTPATIENT)
Dept: RADIOLOGY | Facility: HOSPITAL | Age: 37
Discharge: HOME | End: 2022-06-20
Attending: PHYSICIAN ASSISTANT
Payer: COMMERCIAL

## 2022-06-20 DIAGNOSIS — Z15.01 BRCA2 GENE MUTATION POSITIVE: ICD-10-CM

## 2022-06-20 DIAGNOSIS — Z90.13 HISTORY OF BILATERAL MASTECTOMY: ICD-10-CM

## 2022-06-20 DIAGNOSIS — Z15.09 BRCA2 GENE MUTATION POSITIVE: ICD-10-CM

## 2022-06-20 PROCEDURE — 76641 US BREAST BILATERAL COMPLETE: ICD-10-PCS | Mod: 26,50,, | Performed by: RADIOLOGY

## 2022-06-20 PROCEDURE — 77066 MAMMO DIGITAL DIAGNOSTIC BILAT WITH TOMO: ICD-10-PCS | Mod: 26,,, | Performed by: RADIOLOGY

## 2022-06-20 PROCEDURE — 76641 ULTRASOUND BREAST COMPLETE: CPT | Mod: 26,50,, | Performed by: RADIOLOGY

## 2022-06-20 PROCEDURE — 77062 MAMMO DIGITAL DIAGNOSTIC BILAT WITH TOMO: ICD-10-PCS | Mod: 26,,, | Performed by: RADIOLOGY

## 2022-06-20 PROCEDURE — 77066 DX MAMMO INCL CAD BI: CPT | Mod: 26,,, | Performed by: RADIOLOGY

## 2022-06-20 PROCEDURE — 77062 BREAST TOMOSYNTHESIS BI: CPT | Mod: 26,,, | Performed by: RADIOLOGY

## 2022-06-20 PROCEDURE — 77066 DX MAMMO INCL CAD BI: CPT | Mod: TC

## 2022-06-20 PROCEDURE — 76641 ULTRASOUND BREAST COMPLETE: CPT | Mod: TC,50

## 2022-06-21 ENCOUNTER — TELEPHONE (OUTPATIENT)
Dept: SURGERY | Facility: CLINIC | Age: 37
End: 2022-06-21
Payer: COMMERCIAL

## 2022-06-21 NOTE — TELEPHONE ENCOUNTER
Spoke with patient regarding bilateral mammogram, BIRADS 2. Long discussion with patient about next steps. Previously discussed bilateral US with Dr. Cantu as screening following her prophylactic mastectomy. Explained that we generally do not follow with screening imaging following mastectomy, but that an annual MRI could be offered per Dr. Centeno recommendation if she would like to followed with an imaging modality. After our conversation, she feels comfortable returning in April for her yearly follow up and clinical breast exam. She does not wish to pursue further imaging at this time. All questions asked and answered. Advised her to call our office with any further questions or concerns.

## 2022-07-12 ENCOUNTER — PATIENT MESSAGE (OUTPATIENT)
Dept: OBSTETRICS AND GYNECOLOGY | Facility: CLINIC | Age: 37
End: 2022-07-12
Payer: COMMERCIAL

## 2022-07-12 ENCOUNTER — TELEPHONE (OUTPATIENT)
Dept: OBSTETRICS AND GYNECOLOGY | Facility: CLINIC | Age: 37
End: 2022-07-12
Payer: COMMERCIAL

## 2022-07-12 DIAGNOSIS — N95.1 MENOPAUSAL SYMPTOMS: Primary | ICD-10-CM

## 2022-07-12 NOTE — TELEPHONE ENCOUNTER
Pt called c/o has been getting her injections from her GYN in [place]. Pt states now she is having enlarge genital and would like to be seen. Pt states she has told her GYN and they told her that is will go away. Advised pt spoke with Dr Garzon and she recommends getting labs since the last time her labs were done was 4/2021 and then pt will need a appointment to come in. Orders placed

## 2022-08-15 ENCOUNTER — HOSPITAL ENCOUNTER (OUTPATIENT)
Dept: CARDIOLOGY | Facility: HOSPITAL | Age: 37
Discharge: HOME | End: 2022-08-15
Attending: NURSE PRACTITIONER
Payer: COMMERCIAL

## 2022-08-15 ENCOUNTER — HOSPITAL ENCOUNTER (OUTPATIENT)
Dept: RADIOLOGY | Facility: HOSPITAL | Age: 37
Discharge: HOME | End: 2022-08-15
Attending: NURSE PRACTITIONER
Payer: COMMERCIAL

## 2022-08-15 DIAGNOSIS — R07.89 CHEST PRESSURE: ICD-10-CM

## 2022-08-15 DIAGNOSIS — R06.00 DYSPNEA, UNSPECIFIED TYPE: ICD-10-CM

## 2022-08-15 DIAGNOSIS — R07.9 ACUTE CHEST PAIN: ICD-10-CM

## 2022-08-15 DIAGNOSIS — U07.1 COVID: ICD-10-CM

## 2022-08-15 DIAGNOSIS — Z79.890 HORMONE REPLACEMENT THERAPY (HRT): ICD-10-CM

## 2022-08-15 PROCEDURE — 25500020 PHARM REV CODE 255: Performed by: NURSE PRACTITIONER

## 2022-08-15 PROCEDURE — 93010 EKG 12-LEAD: ICD-10-PCS | Mod: ,,, | Performed by: INTERNAL MEDICINE

## 2022-08-15 PROCEDURE — 93010 ELECTROCARDIOGRAM REPORT: CPT | Mod: ,,, | Performed by: INTERNAL MEDICINE

## 2022-08-15 PROCEDURE — 93005 ELECTROCARDIOGRAM TRACING: CPT

## 2022-08-15 RX ADMIN — IOHEXOL 100 ML: 350 INJECTION, SOLUTION INTRAVENOUS at 01:08

## 2022-08-16 ENCOUNTER — TELEPHONE (OUTPATIENT)
Dept: SURGERY | Facility: CLINIC | Age: 37
End: 2022-08-16
Payer: COMMERCIAL

## 2022-08-16 NOTE — TELEPHONE ENCOUNTER
Spoke to patient regarding CT Chest findings. Will be in touch with repeat studies in 3 month. Patient verbalized understanding. All questions asked and answered.

## 2022-09-29 ENCOUNTER — HOSPITAL ENCOUNTER (OUTPATIENT)
Dept: RADIOLOGY | Facility: HOSPITAL | Age: 37
Discharge: HOME | End: 2022-09-29
Attending: OBSTETRICS & GYNECOLOGY
Payer: COMMERCIAL

## 2022-09-29 DIAGNOSIS — E01.0 THYROMEGALY: ICD-10-CM

## 2022-09-29 PROCEDURE — 76536 US EXAM OF HEAD AND NECK: CPT | Mod: 26,,, | Performed by: RADIOLOGY

## 2022-09-29 PROCEDURE — 76536 US EXAM OF HEAD AND NECK: CPT | Mod: TC

## 2022-09-29 PROCEDURE — 76536 US THYROID: ICD-10-PCS | Mod: 26,,, | Performed by: RADIOLOGY

## 2022-10-18 ENCOUNTER — HOSPITAL ENCOUNTER (OUTPATIENT)
Dept: CARDIOLOGY | Facility: HOSPITAL | Age: 37
Discharge: HOME | End: 2022-10-18
Attending: PHYSICIAN ASSISTANT
Payer: COMMERCIAL

## 2022-10-18 DIAGNOSIS — Z01.818 PREOP GENERAL PHYSICAL EXAM: ICD-10-CM

## 2022-10-18 PROCEDURE — 93010 EKG 12-LEAD: ICD-10-PCS | Mod: ,,, | Performed by: INTERNAL MEDICINE

## 2022-10-18 PROCEDURE — 93005 ELECTROCARDIOGRAM TRACING: CPT

## 2022-10-18 PROCEDURE — 93010 ELECTROCARDIOGRAM REPORT: CPT | Mod: ,,, | Performed by: INTERNAL MEDICINE

## 2022-12-15 ENCOUNTER — HOSPITAL ENCOUNTER (OUTPATIENT)
Dept: RADIOLOGY | Facility: HOSPITAL | Age: 37
Discharge: HOME | End: 2022-12-15
Attending: OBSTETRICS & GYNECOLOGY
Payer: COMMERCIAL

## 2022-12-15 DIAGNOSIS — R91.8 PULMONARY NODULES: ICD-10-CM

## 2022-12-15 PROCEDURE — 71250 CT THORAX DX C-: CPT | Mod: TC

## 2022-12-15 PROCEDURE — 71250 CT CHEST WITHOUT CONTRAST: ICD-10-PCS | Mod: 26,,, | Performed by: RADIOLOGY

## 2022-12-15 PROCEDURE — 71250 CT THORAX DX C-: CPT | Mod: 26,,, | Performed by: RADIOLOGY

## 2022-12-21 ENCOUNTER — HOSPITAL ENCOUNTER (OUTPATIENT)
Dept: CARDIOLOGY | Facility: HOSPITAL | Age: 37
Discharge: HOME | End: 2022-12-21
Payer: COMMERCIAL

## 2022-12-21 ENCOUNTER — HOSPITAL ENCOUNTER (OUTPATIENT)
Dept: RADIOLOGY | Facility: HOSPITAL | Age: 37
Discharge: HOME | End: 2022-12-21
Payer: COMMERCIAL

## 2022-12-21 DIAGNOSIS — M79.604 RIGHT LEG PAIN: ICD-10-CM

## 2022-12-21 DIAGNOSIS — R42 VERTIGO: ICD-10-CM

## 2022-12-21 DIAGNOSIS — R42 VERTIGO: Primary | ICD-10-CM

## 2022-12-21 PROCEDURE — 93010 EKG 12-LEAD: ICD-10-PCS | Mod: ,,, | Performed by: INTERNAL MEDICINE

## 2022-12-21 PROCEDURE — 93005 ELECTROCARDIOGRAM TRACING: CPT

## 2022-12-21 PROCEDURE — 93010 ELECTROCARDIOGRAM REPORT: CPT | Mod: ,,, | Performed by: INTERNAL MEDICINE

## 2022-12-30 ENCOUNTER — HOSPITAL ENCOUNTER (OUTPATIENT)
Dept: RADIOLOGY | Facility: HOSPITAL | Age: 37
Discharge: HOME | End: 2022-12-30
Attending: NURSE PRACTITIONER
Payer: COMMERCIAL

## 2022-12-30 DIAGNOSIS — M25.561 ACUTE PAIN OF RIGHT KNEE: ICD-10-CM

## 2023-01-30 ENCOUNTER — OFFICE VISIT (OUTPATIENT)
Dept: URGENT CARE | Facility: CLINIC | Age: 38
End: 2023-01-30
Payer: COMMERCIAL

## 2023-01-30 VITALS
BODY MASS INDEX: 21.66 KG/M2 | HEART RATE: 87 BPM | DIASTOLIC BLOOD PRESSURE: 68 MMHG | HEIGHT: 67 IN | WEIGHT: 138 LBS | TEMPERATURE: 98 F | SYSTOLIC BLOOD PRESSURE: 124 MMHG | OXYGEN SATURATION: 99 %

## 2023-01-30 DIAGNOSIS — J02.9 PHARYNGITIS, UNSPECIFIED ETIOLOGY: Primary | ICD-10-CM

## 2023-01-30 DIAGNOSIS — J02.9 SORE THROAT: ICD-10-CM

## 2023-01-30 LAB
CTP QC/QA: YES
CTP QC/QA: YES
MOLECULAR STREP A: NEGATIVE
POC MOLECULAR INFLUENZA A AGN: NEGATIVE
POC MOLECULAR INFLUENZA B AGN: NEGATIVE

## 2023-01-30 PROCEDURE — 99203 PR OFFICE/OUTPT VISIT, NEW, LEVL III, 30-44 MIN: ICD-10-PCS | Mod: S$GLB,,, | Performed by: NURSE PRACTITIONER

## 2023-01-30 PROCEDURE — 87502 INFLUENZA DNA AMP PROBE: CPT | Mod: QW,S$GLB,, | Performed by: NURSE PRACTITIONER

## 2023-01-30 PROCEDURE — 87651 STREP A DNA AMP PROBE: CPT | Mod: QW,S$GLB,, | Performed by: NURSE PRACTITIONER

## 2023-01-30 PROCEDURE — 87651 POCT STREP A MOLECULAR: ICD-10-PCS | Mod: QW,S$GLB,, | Performed by: NURSE PRACTITIONER

## 2023-01-30 PROCEDURE — 87502 POCT INFLUENZA A/B MOLECULAR: ICD-10-PCS | Mod: QW,S$GLB,, | Performed by: NURSE PRACTITIONER

## 2023-01-30 PROCEDURE — 99203 OFFICE O/P NEW LOW 30 MIN: CPT | Mod: S$GLB,,, | Performed by: NURSE PRACTITIONER

## 2023-01-30 RX ORDER — PREDNISONE 10 MG/1
TABLET ORAL
Qty: 8 TABLET | Refills: 0 | Status: SHIPPED | OUTPATIENT
Start: 2023-01-30 | End: 2023-06-07

## 2023-01-30 RX ORDER — AZITHROMYCIN 250 MG/1
TABLET, FILM COATED ORAL
Qty: 6 TABLET | Refills: 0 | Status: SHIPPED | OUTPATIENT
Start: 2023-01-30 | End: 2023-06-07

## 2023-01-30 RX ORDER — PROMETHAZINE HYDROCHLORIDE AND DEXTROMETHORPHAN HYDROBROMIDE 6.25; 15 MG/5ML; MG/5ML
5 SYRUP ORAL EVERY 4 HOURS PRN
Qty: 120 ML | Refills: 0 | Status: SHIPPED | OUTPATIENT
Start: 2023-01-30 | End: 2023-02-09

## 2023-01-30 RX ORDER — PROGESTERONE 100 MG/1
100 CAPSULE ORAL DAILY
COMMUNITY
End: 2024-01-31

## 2023-01-30 NOTE — PROGRESS NOTES
"Subjective:       Patient ID: Allyson Bahena is a 37 y.o. female.    Vitals:  height is 5' 7" (1.702 m) and weight is 62.6 kg (138 lb). Her oral temperature is 98.4 °F (36.9 °C). Her blood pressure is 124/68 and her pulse is 87. Her oxygen saturation is 99%.     Chief Complaint: Sore Throat    This is a 37 y.o. female who presents today with a chief complaint of bilateral ear pain and sore throat.   Patient presents with:  Sore Throat        Sore Throat   This is a new problem. The current episode started yesterday. The problem has been gradually worsening. There has been no fever. The pain is at a severity of 8/10. The pain is mild. Associated symptoms include ear pain and trouble swallowing.     HENT:  Positive for ear pain, sore throat and trouble swallowing.          Objective:      Physical Exam   Constitutional: She is oriented to person, place, and time. She appears well-developed. She is cooperative.  Non-toxic appearance. She does not appear ill. No distress.   HENT:   Head: Normocephalic and atraumatic.   Ears:   Right Ear: Hearing, external ear and ear canal normal. Tympanic membrane is injected and erythematous.   Left Ear: Hearing, external ear and ear canal normal. Tympanic membrane is injected and erythematous.   Nose: Rhinorrhea present. No mucosal edema or nasal deformity. No epistaxis. Right sinus exhibits no maxillary sinus tenderness and no frontal sinus tenderness. Left sinus exhibits no maxillary sinus tenderness and no frontal sinus tenderness.   Mouth/Throat: Uvula is midline and mucous membranes are normal. No trismus in the jaw. Normal dentition. No uvula swelling. Posterior oropharyngeal erythema present. No oropharyngeal exudate or posterior oropharyngeal edema.   Eyes: Conjunctivae and lids are normal. No scleral icterus.   Neck: Trachea normal and phonation normal. Neck supple. No edema present. No erythema present. No neck rigidity present.   Cardiovascular: Normal rate, " regular rhythm, normal heart sounds and normal pulses.   Pulmonary/Chest: Effort normal and breath sounds normal. No respiratory distress. She has no decreased breath sounds. She has no rhonchi.   Abdominal: Normal appearance.   Musculoskeletal: Normal range of motion.         General: No deformity. Normal range of motion.   Neurological: She is alert and oriented to person, place, and time. She exhibits normal muscle tone. Coordination normal.   Skin: Skin is warm, dry, intact, not diaphoretic and not pale.   Psychiatric: Her speech is normal and behavior is normal. Judgment and thought content normal.   Nursing note and vitals reviewed.      Past medical history and current medications reviewed.     Results for orders placed or performed in visit on 01/30/23   POCT Influenza A/B MOLECULAR   Result Value Ref Range    POC Molecular Influenza A Ag Negative Negative, Not Reported    POC Molecular Influenza B Ag Negative Negative, Not Reported     Acceptable Yes    POCT Strep A, Molecular   Result Value Ref Range    Molecular Strep A, POC Negative Negative     Acceptable Yes        Assessment:           1. Sore throat              Plan:         Sore throat  -     POCT Influenza A/B MOLECULAR  -     POCT Strep A, Molecular             Patient Instructions     You must understand that you've received an Urgent Care treatment only and that you may be released before all your medical problems are known or treated. You, the patient, will arrange for follow up care as instructed.  Follow up with your PCP or specialty clinic as directed in the next 1-2 weeks if not improved or as needed.  You can call [phone] to schedule an appointment with the appropriate provider.  If your condition worsens we recommend that you receive another evaluation at the emergency room immediately or contact your primary medical clinics after hours call service to discuss your concerns.  Please return here or go  to the Emergency Department for any concerns or worsening of condition.    If you were prescribed a narcotic or controlled medication, do not drive or operate heavy equipment or machinery while taking these medications.    Please drink plenty of fluids.  Please get plenty of rest.  Please return here or go to the Emergency Department for any concerns or worsening of condition.  If you were prescribed antibiotics, please take them to completion.  If you were given wait & see antibiotics, please wait 5-7 days before taking them, and only take them if your symptoms have worsened or not improved.  If you do begin taking the antibiotics, please take them to completion.  If you were prescribed a narcotic medication, do not drive or operate heavy equipment or machinery while taking these medications.  If you were given a steroid shot in the clinic and have also been given a prescription for a steroid such as Prednisone or a Medrol Dose Pack, please begin taking them tomorrow.  If you do not have Hypertension or any history of palpitations, it is ok to take over the counter Sudafed or Mucinex D or Allegra-D or Claritin-D or Zyrtec-D.  If you do take one of the above, it is ok to combine that with plain over the counter Mucinex or Allegra or Claritin or Zyrtec.  If for example you are taking Zyrtec -D, you can combine that with Mucinex, but not Mucinex-D.  If you are taking Mucinex-D, you can combine that with plain Allegra or Claritin or Zyrtec.   If you do have Hypertension or palpitations, it is safe to take Coricidin HBP for relief of sinus symptoms.  If not allergic, please take over the counter Tylenol (Acetaminophen) and/or Motrin (Ibuprofen) as directed for control of pain and/or fever.  Please follow up with your primary care doctor or specialist as needed.    If you  smoke, please stop smoking.

## 2023-01-30 NOTE — LETTER
January 30, 2023      [facility]  [facility] [address], [address]  [place] [address]  Phone: [phone]  Fax: [phone]       Patient: Allyson Bahena   YOB: 1985  Date of Visit: 01/30/2023    To Whom It May Concern:    Dorothy Bahena  was at [facility] on 01/30/2023. The patient may return to work/school on 01/31/23 with no restrictions. If you have any questions or concerns, or if I can be of further assistance, please do not hesitate to contact me.    Sincerely,    SHYLA Miller

## 2023-01-30 NOTE — PATIENT INSTRUCTIONS
You must understand that you've received an Urgent Care treatment only and that you may be released before all your medical problems are known or treated. You, the patient, will arrange for follow up care as instructed.  Follow up with your PCP or specialty clinic as directed in the next 1-2 weeks if not improved or as needed.  You can call [phone] to schedule an appointment with the appropriate provider.  If your condition worsens we recommend that you receive another evaluation at the emergency room immediately or contact your primary medical clinics after hours call service to discuss your concerns.  Please return here or go to the Emergency Department for any concerns or worsening of condition.    If you were prescribed a narcotic or controlled medication, do not drive or operate heavy equipment or machinery while taking these medications.    Please drink plenty of fluids.  Please get plenty of rest.  Please return here or go to the Emergency Department for any concerns or worsening of condition.  If you were prescribed antibiotics, please take them to completion.  If you were given wait & see antibiotics, please wait 5-7 days before taking them, and only take them if your symptoms have worsened or not improved.  If you do begin taking the antibiotics, please take them to completion.  If you were prescribed a narcotic medication, do not drive or operate heavy equipment or machinery while taking these medications.  If you were given a steroid shot in the clinic and have also been given a prescription for a steroid such as Prednisone or a Medrol Dose Pack, please begin taking them tomorrow.  If you do not have Hypertension or any history of palpitations, it is ok to take over the counter Sudafed or Mucinex D or Allegra-D or Claritin-D or Zyrtec-D.  If you do take one of the above, it is ok to combine that with plain over the counter Mucinex or Allegra or Claritin or Zyrtec.  If for example you are taking  Zyrtec -D, you can combine that with Mucinex, but not Mucinex-D.  If you are taking Mucinex-D, you can combine that with plain Allegra or Claritin or Zyrtec.   If you do have Hypertension or palpitations, it is safe to take Coricidin HBP for relief of sinus symptoms.  If not allergic, please take over the counter Tylenol (Acetaminophen) and/or Motrin (Ibuprofen) as directed for control of pain and/or fever.  Please follow up with your primary care doctor or specialist as needed.    If you  smoke, please stop smoking.

## 2023-03-08 ENCOUNTER — PATIENT MESSAGE (OUTPATIENT)
Dept: SURGERY | Facility: CLINIC | Age: 38
End: 2023-03-08
Payer: COMMERCIAL

## 2023-03-08 DIAGNOSIS — N63.25 MASS OVERLAPPING MULTIPLE QUADRANTS OF LEFT BREAST: Primary | ICD-10-CM

## 2023-03-29 ENCOUNTER — HOSPITAL ENCOUNTER (OUTPATIENT)
Dept: RADIOLOGY | Facility: HOSPITAL | Age: 38
Discharge: HOME | End: 2023-03-29
Attending: PHYSICIAN ASSISTANT
Payer: COMMERCIAL

## 2023-03-29 DIAGNOSIS — N63.25 MASS OVERLAPPING MULTIPLE QUADRANTS OF LEFT BREAST: ICD-10-CM

## 2023-03-29 PROCEDURE — 76642 ULTRASOUND BREAST LIMITED: CPT | Mod: TC,LT

## 2023-03-29 PROCEDURE — 76642 ULTRASOUND BREAST LIMITED: CPT | Mod: 26,LT,, | Performed by: RADIOLOGY

## 2023-03-29 PROCEDURE — 76642 US BREAST LEFT LIMITED: ICD-10-PCS | Mod: 26,LT,, | Performed by: RADIOLOGY

## 2023-03-30 ENCOUNTER — PATIENT MESSAGE (OUTPATIENT)
Dept: SURGERY | Facility: CLINIC | Age: 38
End: 2023-03-30
Payer: COMMERCIAL

## 2023-03-31 ENCOUNTER — TELEPHONE (OUTPATIENT)
Dept: SURGERY | Facility: CLINIC | Age: 38
End: 2023-03-31
Payer: COMMERCIAL

## 2023-03-31 NOTE — TELEPHONE ENCOUNTER
Spoke with patient regarding recent US of new nipple mass. Reviewed imaging and discussed recommendation for biopsy of this new finding. Patient will be proceeding at [facility]. I will be keep an eye out for results of this work up and will be touch with results. Patient will plan to follow up at the breast clinic after biopsy. Patient verbalized understanding. All questions asked and answered.

## 2023-04-13 ENCOUNTER — HOSPITAL ENCOUNTER (OUTPATIENT)
Dept: RADIOLOGY | Facility: HOSPITAL | Age: 38
Discharge: HOME | End: 2023-04-13
Attending: PHYSICIAN ASSISTANT
Payer: COMMERCIAL

## 2023-04-13 DIAGNOSIS — R92.8 ABNORMAL MAMMOGRAM OF LEFT BREAST: ICD-10-CM

## 2023-04-13 DIAGNOSIS — R92.8 ABNORMAL ULTRASOUND OF BREAST: ICD-10-CM

## 2023-04-13 PROCEDURE — 88342 IMHCHEM/IMCYTCHM 1ST ANTB: CPT | Performed by: PATHOLOGY

## 2023-04-13 PROCEDURE — 77061 BREAST TOMOSYNTHESIS UNI: CPT | Mod: 26,LT,, | Performed by: RADIOLOGY

## 2023-04-13 PROCEDURE — 19083 BX BREAST 1ST LESION US IMAG: CPT | Mod: LT,,, | Performed by: RADIOLOGY

## 2023-04-13 PROCEDURE — 19083 US BREAST BIOPSY WITH IMAGING 1ST SITE LEFT: ICD-10-PCS | Mod: LT,,, | Performed by: RADIOLOGY

## 2023-04-13 PROCEDURE — 77065 MAMMO DIGITAL DIAGNOSTIC LEFT WITH TOMO: ICD-10-PCS | Mod: 26,LT,, | Performed by: RADIOLOGY

## 2023-04-13 PROCEDURE — 77061 MAMMO DIGITAL DIAGNOSTIC LEFT WITH TOMO: ICD-10-PCS | Mod: 26,LT,, | Performed by: RADIOLOGY

## 2023-04-13 PROCEDURE — 77061 BREAST TOMOSYNTHESIS UNI: CPT | Mod: TC,LT

## 2023-04-13 PROCEDURE — 88342 CHG IMMUNOCYTOCHEMISTRY: ICD-10-PCS | Mod: 26,,, | Performed by: PATHOLOGY

## 2023-04-13 PROCEDURE — 88305 TISSUE EXAM BY PATHOLOGIST: CPT | Performed by: PATHOLOGY

## 2023-04-13 PROCEDURE — 19083 BX BREAST 1ST LESION US IMAG: CPT | Mod: LT

## 2023-04-13 PROCEDURE — 88305 TISSUE EXAM BY PATHOLOGIST: ICD-10-PCS | Mod: 26,,, | Performed by: PATHOLOGY

## 2023-04-13 PROCEDURE — 77065 DX MAMMO INCL CAD UNI: CPT | Mod: 26,LT,, | Performed by: RADIOLOGY

## 2023-04-13 PROCEDURE — 88305 TISSUE EXAM BY PATHOLOGIST: CPT | Mod: 26,,, | Performed by: PATHOLOGY

## 2023-04-13 PROCEDURE — 88342 IMHCHEM/IMCYTCHM 1ST ANTB: CPT | Mod: 26,,, | Performed by: PATHOLOGY

## 2023-04-18 ENCOUNTER — TELEPHONE (OUTPATIENT)
Dept: SURGERY | Facility: CLINIC | Age: 38
End: 2023-04-18
Payer: COMMERCIAL

## 2023-04-18 ENCOUNTER — PATIENT MESSAGE (OUTPATIENT)
Dept: SURGERY | Facility: CLINIC | Age: 38
End: 2023-04-18
Payer: COMMERCIAL

## 2023-04-18 LAB
COMMENT: NORMAL
FINAL PATHOLOGIC DIAGNOSIS: NORMAL
GROSS: NORMAL
Lab: NORMAL

## 2023-04-18 NOTE — TELEPHONE ENCOUNTER
Called patient with results of recent biopsy, no atypia or malignancy. Patient reassured. She is due now for her annual follow up CBE. Will get her scheduled at her earliest convenience. Patient verbalized understanding. All questions asked answered.

## 2023-04-21 ENCOUNTER — TELEPHONE (OUTPATIENT)
Dept: HEMATOLOGY/ONCOLOGY | Facility: CLINIC | Age: 38
End: 2023-04-21
Payer: COMMERCIAL

## 2023-06-07 ENCOUNTER — INFUSION (OUTPATIENT)
Dept: INFUSION THERAPY | Facility: HOSPITAL | Age: 38
End: 2023-06-07
Attending: NURSE PRACTITIONER
Payer: COMMERCIAL

## 2023-06-07 VITALS
OXYGEN SATURATION: 100 % | TEMPERATURE: 98 F | SYSTOLIC BLOOD PRESSURE: 112 MMHG | RESPIRATION RATE: 18 BRPM | BODY MASS INDEX: 19.55 KG/M2 | HEIGHT: 68 IN | HEART RATE: 53 BPM | WEIGHT: 129 LBS | DIASTOLIC BLOOD PRESSURE: 75 MMHG

## 2023-06-07 DIAGNOSIS — Z98.84 S/P BARIATRIC SURGERY: ICD-10-CM

## 2023-06-07 DIAGNOSIS — E86.0 DEHYDRATION: Primary | ICD-10-CM

## 2023-06-07 PROCEDURE — 25000003 PHARM REV CODE 250: Performed by: NURSE PRACTITIONER

## 2023-06-07 PROCEDURE — 96360 HYDRATION IV INFUSION INIT: CPT

## 2023-06-07 RX ORDER — SODIUM CHLORIDE 0.9 % (FLUSH) 0.9 %
10 SYRINGE (ML) INJECTION
Status: DISCONTINUED | OUTPATIENT
Start: 2023-06-07 | End: 2023-06-07 | Stop reason: HOSPADM

## 2023-06-07 RX ORDER — SODIUM CHLORIDE 0.9 % (FLUSH) 0.9 %
10 SYRINGE (ML) INJECTION
OUTPATIENT
Start: 2023-06-07

## 2023-06-07 RX ADMIN — SODIUM CHLORIDE 1000 ML: 9 INJECTION, SOLUTION INTRAVENOUS at 11:06

## 2023-06-07 NOTE — PLAN OF CARE
"Problem: Adult Inpatient Plan of Care  Goal: Plan of Care Review  Outcome: Ongoing, Progressing  Flowsheets (Taken 6/7/2023 1235)  Plan of Care Reviewed With: patient  /75 (BP Location: Left arm, Patient Position: Sitting)   Pulse (!) 53   Temp 97.6 °F (36.4 °C) (Oral)   Resp 18   Ht 5' 8" (1.727 m)   Wt 58.5 kg (129 lb)   LMP 02/11/2019 (Exact Date)   SpO2 100%   BMI 19.61 kg/m²   Pleasant, alert and oriented patient to OPT per self for IVF's - VSS and patient tolerated well - patient discharged to home per self with no concerns - RTC PRN     "

## 2023-06-14 ENCOUNTER — HOSPITAL ENCOUNTER (OUTPATIENT)
Dept: RADIOLOGY | Facility: HOSPITAL | Age: 38
Discharge: HOME | End: 2023-06-14
Attending: NURSE PRACTITIONER
Payer: COMMERCIAL

## 2023-06-14 DIAGNOSIS — R42 DIZZINESS: ICD-10-CM

## 2023-06-14 DIAGNOSIS — Z78.0 MENOPAUSE: ICD-10-CM

## 2023-06-14 DIAGNOSIS — R51.9 NONINTRACTABLE HEADACHE, UNSPECIFIED CHRONICITY PATTERN, UNSPECIFIED HEADACHE TYPE: ICD-10-CM

## 2023-06-14 DIAGNOSIS — Z82.62 FAMILY HISTORY OF OSTEOPOROSIS IN MOTHER: ICD-10-CM

## 2023-06-14 PROCEDURE — 70450 CT HEAD/BRAIN W/O DYE: CPT | Mod: 26,,, | Performed by: RADIOLOGY

## 2023-06-14 PROCEDURE — 77080 DXA BONE DENSITY AXIAL SKELETON 1 OR MORE SITES: ICD-10-PCS | Mod: 26,,, | Performed by: RADIOLOGY

## 2023-06-14 PROCEDURE — 70450 CT HEAD WITHOUT CONTRAST: ICD-10-PCS | Mod: 26,,, | Performed by: RADIOLOGY

## 2023-06-14 PROCEDURE — 77080 DXA BONE DENSITY AXIAL: CPT | Mod: TC

## 2023-06-14 PROCEDURE — 77080 DXA BONE DENSITY AXIAL: CPT | Mod: 26,,, | Performed by: RADIOLOGY

## 2023-06-14 PROCEDURE — 70450 CT HEAD/BRAIN W/O DYE: CPT | Mod: TC

## 2023-07-26 ENCOUNTER — HOSPITAL ENCOUNTER (EMERGENCY)
Facility: HOSPITAL | Age: 38
Discharge: HOME | End: 2023-07-26
Attending: EMERGENCY MEDICINE
Payer: COMMERCIAL

## 2023-07-26 VITALS
HEIGHT: 67 IN | BODY MASS INDEX: 21.19 KG/M2 | WEIGHT: 135 LBS | RESPIRATION RATE: 20 BRPM | DIASTOLIC BLOOD PRESSURE: 57 MMHG | SYSTOLIC BLOOD PRESSURE: 94 MMHG | OXYGEN SATURATION: 99 % | HEART RATE: 83 BPM | TEMPERATURE: 100 F

## 2023-07-26 DIAGNOSIS — A09 DIARRHEA OF INFECTIOUS ORIGIN: ICD-10-CM

## 2023-07-26 DIAGNOSIS — R53.1 WEAKNESS: ICD-10-CM

## 2023-07-26 DIAGNOSIS — K52.9 GASTROENTERITIS: Primary | ICD-10-CM

## 2023-07-26 LAB
ALBUMIN SERPL BCP-MCNC: 4.2 G/DL (ref 3.5–5.2)
ALP SERPL-CCNC: 104 U/L (ref 55–135)
ALT SERPL W/O P-5'-P-CCNC: 26 U/L (ref 10–44)
ANION GAP SERPL CALC-SCNC: 13 MMOL/L (ref 8–16)
AST SERPL-CCNC: 25 U/L (ref 10–40)
BASOPHILS # BLD AUTO: 0.01 K/UL (ref 0–0.2)
BASOPHILS NFR BLD: 0.1 % (ref 0–1.9)
BILIRUB SERPL-MCNC: 0.6 MG/DL (ref 0.1–1)
BILIRUB UR QL STRIP: NEGATIVE
BUN SERPL-MCNC: 11 MG/DL (ref 6–20)
C DIFF GDH STL QL: NEGATIVE
C DIFF TOX A+B STL QL IA: NEGATIVE
CALCIUM SERPL-MCNC: 10 MG/DL (ref 8.7–10.5)
CHLORIDE SERPL-SCNC: 103 MMOL/L (ref 95–110)
CLARITY UR: CLEAR
CO2 SERPL-SCNC: 23 MMOL/L (ref 23–29)
COLOR UR: YELLOW
CREAT SERPL-MCNC: 0.9 MG/DL (ref 0.5–1.4)
DIFFERENTIAL METHOD: ABNORMAL
EOSINOPHIL # BLD AUTO: 0 K/UL (ref 0–0.5)
EOSINOPHIL NFR BLD: 0.1 % (ref 0–8)
ERYTHROCYTE [DISTWIDTH] IN BLOOD BY AUTOMATED COUNT: 12.2 % (ref 11.5–14.5)
EST. GFR  (NO RACE VARIABLE): >60 ML/MIN/1.73 M^2
GLUCOSE SERPL-MCNC: 95 MG/DL (ref 70–110)
GLUCOSE UR QL STRIP: NEGATIVE
HCT VFR BLD AUTO: 44.6 % (ref 37–48.5)
HGB BLD-MCNC: 14.9 G/DL (ref 12–16)
HGB UR QL STRIP: NEGATIVE
IMM GRANULOCYTES # BLD AUTO: 0.02 K/UL (ref 0–0.04)
IMM GRANULOCYTES NFR BLD AUTO: 0.3 % (ref 0–0.5)
KETONES UR QL STRIP: NEGATIVE
LACTATE SERPL-SCNC: 1.8 MMOL/L (ref 0.5–2.2)
LACTATE SERPL-SCNC: 2.3 MMOL/L (ref 0.5–2.2)
LEUKOCYTE ESTERASE UR QL STRIP: NEGATIVE
LYMPHOCYTES # BLD AUTO: 0.4 K/UL (ref 1–4.8)
LYMPHOCYTES NFR BLD: 5.6 % (ref 18–48)
MCH RBC QN AUTO: 30.2 PG (ref 27–31)
MCHC RBC AUTO-ENTMCNC: 33.4 G/DL (ref 32–36)
MCV RBC AUTO: 90 FL (ref 82–98)
MONOCYTES # BLD AUTO: 0.2 K/UL (ref 0.3–1)
MONOCYTES NFR BLD: 2.7 % (ref 4–15)
NEUTROPHILS # BLD AUTO: 6.7 K/UL (ref 1.8–7.7)
NEUTROPHILS NFR BLD: 91.2 % (ref 38–73)
NITRITE UR QL STRIP: NEGATIVE
NRBC BLD-RTO: 0 /100 WBC
OB PNL STL: NEGATIVE
PH UR STRIP: 5 [PH] (ref 5–8)
PLATELET # BLD AUTO: 261 K/UL (ref 150–450)
PMV BLD AUTO: 10.5 FL (ref 9.2–12.9)
POTASSIUM SERPL-SCNC: 4.2 MMOL/L (ref 3.5–5.1)
PROT SERPL-MCNC: 8 G/DL (ref 6–8.4)
PROT UR QL STRIP: NEGATIVE
RBC # BLD AUTO: 4.94 M/UL (ref 4–5.4)
SODIUM SERPL-SCNC: 139 MMOL/L (ref 136–145)
SP GR UR STRIP: 1.02 (ref 1–1.03)
URN SPEC COLLECT METH UR: NORMAL
UROBILINOGEN UR STRIP-ACNC: NEGATIVE EU/DL
WBC # BLD AUTO: 7.36 K/UL (ref 3.9–12.7)

## 2023-07-26 PROCEDURE — 71045 X-RAY EXAM CHEST 1 VIEW: CPT | Mod: TC

## 2023-07-26 PROCEDURE — 81003 URINALYSIS AUTO W/O SCOPE: CPT | Performed by: EMERGENCY MEDICINE

## 2023-07-26 PROCEDURE — 74176 CT ABD & PELVIS W/O CONTRAST: CPT | Mod: 26,,, | Performed by: RADIOLOGY

## 2023-07-26 PROCEDURE — 93005 ELECTROCARDIOGRAM TRACING: CPT

## 2023-07-26 PROCEDURE — 87209 SMEAR COMPLEX STAIN: CPT | Performed by: EMERGENCY MEDICINE

## 2023-07-26 PROCEDURE — 96365 THER/PROPH/DIAG IV INF INIT: CPT

## 2023-07-26 PROCEDURE — 71045 X-RAY EXAM CHEST 1 VIEW: CPT | Mod: 26,,, | Performed by: RADIOLOGY

## 2023-07-26 PROCEDURE — 36415 COLL VENOUS BLD VENIPUNCTURE: CPT | Performed by: EMERGENCY MEDICINE

## 2023-07-26 PROCEDURE — 71045 XR CHEST AP PORTABLE: ICD-10-PCS | Mod: 26,,, | Performed by: RADIOLOGY

## 2023-07-26 PROCEDURE — 85025 COMPLETE CBC W/AUTO DIFF WBC: CPT | Performed by: EMERGENCY MEDICINE

## 2023-07-26 PROCEDURE — 93010 EKG 12-LEAD: ICD-10-PCS | Mod: ,,, | Performed by: INTERNAL MEDICINE

## 2023-07-26 PROCEDURE — 80053 COMPREHEN METABOLIC PANEL: CPT | Performed by: EMERGENCY MEDICINE

## 2023-07-26 PROCEDURE — 96361 HYDRATE IV INFUSION ADD-ON: CPT

## 2023-07-26 PROCEDURE — 25000003 PHARM REV CODE 250: Performed by: EMERGENCY MEDICINE

## 2023-07-26 PROCEDURE — 96375 TX/PRO/DX INJ NEW DRUG ADDON: CPT

## 2023-07-26 PROCEDURE — 93010 ELECTROCARDIOGRAM REPORT: CPT | Mod: ,,, | Performed by: INTERNAL MEDICINE

## 2023-07-26 PROCEDURE — 74176 CT ABDOMEN PELVIS WITHOUT CONTRAST: ICD-10-PCS | Mod: 26,,, | Performed by: RADIOLOGY

## 2023-07-26 PROCEDURE — 96367 TX/PROPH/DG ADDL SEQ IV INF: CPT

## 2023-07-26 PROCEDURE — 87449 NOS EACH ORGANISM AG IA: CPT | Mod: 91 | Performed by: EMERGENCY MEDICINE

## 2023-07-26 PROCEDURE — 83605 ASSAY OF LACTIC ACID: CPT | Performed by: EMERGENCY MEDICINE

## 2023-07-26 PROCEDURE — 99285 EMERGENCY DEPT VISIT HI MDM: CPT | Mod: 25

## 2023-07-26 PROCEDURE — 87040 BLOOD CULTURE FOR BACTERIA: CPT | Performed by: EMERGENCY MEDICINE

## 2023-07-26 PROCEDURE — 63600175 PHARM REV CODE 636 W HCPCS: Performed by: EMERGENCY MEDICINE

## 2023-07-26 PROCEDURE — 87427 SHIGA-LIKE TOXIN AG IA: CPT | Mod: 59 | Performed by: EMERGENCY MEDICINE

## 2023-07-26 PROCEDURE — 87045 FECES CULTURE AEROBIC BACT: CPT | Performed by: EMERGENCY MEDICINE

## 2023-07-26 PROCEDURE — 74176 CT ABD & PELVIS W/O CONTRAST: CPT | Mod: TC

## 2023-07-26 PROCEDURE — 87046 STOOL CULTR AEROBIC BACT EA: CPT | Performed by: EMERGENCY MEDICINE

## 2023-07-26 PROCEDURE — 87449 NOS EACH ORGANISM AG IA: CPT | Performed by: EMERGENCY MEDICINE

## 2023-07-26 PROCEDURE — 82272 OCCULT BLD FECES 1-3 TESTS: CPT | Performed by: EMERGENCY MEDICINE

## 2023-07-26 RX ORDER — ACETAMINOPHEN 500 MG
1000 TABLET ORAL
Status: COMPLETED | OUTPATIENT
Start: 2023-07-26 | End: 2023-07-26

## 2023-07-26 RX ORDER — METRONIDAZOLE 500 MG/1
500 TABLET ORAL 3 TIMES DAILY
Qty: 21 TABLET | Refills: 0 | Status: SHIPPED | OUTPATIENT
Start: 2023-07-26 | End: 2023-07-26 | Stop reason: SDUPTHER

## 2023-07-26 RX ORDER — CIPROFLOXACIN 500 MG/1
500 TABLET ORAL 2 TIMES DAILY
Qty: 20 TABLET | Refills: 0 | Status: SHIPPED | OUTPATIENT
Start: 2023-07-26 | End: 2023-08-05

## 2023-07-26 RX ORDER — PROMETHAZINE HYDROCHLORIDE 25 MG/1
25 TABLET ORAL EVERY 6 HOURS PRN
Qty: 15 TABLET | Refills: 0 | Status: SHIPPED | OUTPATIENT
Start: 2023-07-26 | End: 2023-09-19 | Stop reason: ALTCHOICE

## 2023-07-26 RX ORDER — CIPROFLOXACIN 500 MG/1
500 TABLET ORAL 2 TIMES DAILY
Qty: 20 TABLET | Refills: 0 | Status: SHIPPED | OUTPATIENT
Start: 2023-07-26 | End: 2023-07-26 | Stop reason: SDUPTHER

## 2023-07-26 RX ORDER — CIPROFLOXACIN 2 MG/ML
400 INJECTION, SOLUTION INTRAVENOUS
Status: COMPLETED | OUTPATIENT
Start: 2023-07-26 | End: 2023-07-26

## 2023-07-26 RX ORDER — METRONIDAZOLE 500 MG/100ML
500 INJECTION, SOLUTION INTRAVENOUS
Status: COMPLETED | OUTPATIENT
Start: 2023-07-26 | End: 2023-07-26

## 2023-07-26 RX ORDER — HYDROCODONE BITARTRATE AND ACETAMINOPHEN 10; 325 MG/1; MG/1
1 TABLET ORAL EVERY 6 HOURS PRN
Qty: 12 TABLET | Refills: 0 | Status: SHIPPED | OUTPATIENT
Start: 2023-07-26 | End: 2023-09-19 | Stop reason: ALTCHOICE

## 2023-07-26 RX ORDER — METRONIDAZOLE 500 MG/1
500 TABLET ORAL 3 TIMES DAILY
Qty: 21 TABLET | Refills: 0 | Status: SHIPPED | OUTPATIENT
Start: 2023-07-26 | End: 2023-08-02

## 2023-07-26 RX ORDER — HYDROMORPHONE HYDROCHLORIDE 1 MG/ML
0.5 INJECTION, SOLUTION INTRAMUSCULAR; INTRAVENOUS; SUBCUTANEOUS
Status: COMPLETED | OUTPATIENT
Start: 2023-07-26 | End: 2023-07-26

## 2023-07-26 RX ADMIN — PIPERACILLIN AND TAZOBACTAM 4.5 G: 4; .5 INJECTION, POWDER, LYOPHILIZED, FOR SOLUTION INTRAVENOUS; PARENTERAL at 04:07

## 2023-07-26 RX ADMIN — SODIUM CHLORIDE 1848 ML: 9 INJECTION, SOLUTION INTRAVENOUS at 03:07

## 2023-07-26 RX ADMIN — PROMETHAZINE HYDROCHLORIDE 12.5 MG: 25 INJECTION INTRAMUSCULAR; INTRAVENOUS at 03:07

## 2023-07-26 RX ADMIN — ACETAMINOPHEN 1000 MG: 500 TABLET ORAL at 04:07

## 2023-07-26 RX ADMIN — CIPROFLOXACIN 400 MG: 2 INJECTION, SOLUTION INTRAVENOUS at 06:07

## 2023-07-26 RX ADMIN — HYDROMORPHONE HYDROCHLORIDE 0.5 MG: 1 INJECTION, SOLUTION INTRAMUSCULAR; INTRAVENOUS; SUBCUTANEOUS at 03:07

## 2023-07-26 RX ADMIN — METRONIDAZOLE 500 MG: 5 INJECTION, SOLUTION INTRAVENOUS at 05:07

## 2023-07-26 NOTE — ED PROVIDER NOTES
"Encounter Date: 7/26/2023       History     Chief Complaint   Patient presents with    General Illness     Intermittent body aches, generalized weakness, fever, n/v/d x 3 weeks     Pleasant well-developed 38-year-old female comes emergency with complaints of intermittent body aches, temperature is greater than 102 over the last 3 weeks and a half, generalized weakness, feeling very very "sick. "  Patient has had sepsis in the past.  Is recently on a about 9 months away from having extensive abdominal surgery and breast reconstructive surgery with a sepsis episode during the middle this.  She is done well since that time.  Has no reason for this sickness.  Does have diarrhea copiously associated with this.  Some nausea and vomiting.  Global weakness otherwise.  Has 7 children at the house.  None of these have been ill recently.    Review of patient's allergies indicates:   Allergen Reactions    Cefazolin      Other reaction(s): red skin    Neosporin [benzalkonium chloride] Hives    Percocet [oxycodone-acetaminophen]      Facial flushing and throat burning . Patient states she can take Lortab    Zofran [ondansetron hcl] Nausea And Vomiting     Past Medical History:   Diagnosis Date    Allergy     Asthma     childhood    BRCA2 positive     Depression     Diabetes mellitus     Genetic testing 06/20/2018    BRCA2 positive, reported by [facility] from outside provider    GERD (gastroesophageal reflux disease)     Mitral valve prolapse     PONV (postoperative nausea and vomiting)     Suspected sleep apnea      Past Surgical History:   Procedure Laterality Date    APPLICATION OF WOUND VACUUM-ASSISTED CLOSURE DEVICE N/A 10/30/2020    Procedure: APPLICATION, WOUND VAC;  Surgeon: Ron Carrasco MD;  Location: [facility];  Service: General;  Laterality: N/A;    BILATERAL MASTECTOMY Bilateral 10/5/2020    Procedure: MASTECTOMY, BILATERAL;  Surgeon: Paulina Cantu MD;  Location: [facility];  Service: General;  Laterality: Bilateral;    " CHOLECYSTECTOMY      COLONOSCOPY      CYSTOSCOPY N/A 3/11/2019    Procedure: CYSTOSCOPY;  Surgeon: Dagoberto Smith MD;  Location: [facility] OR;  Service: OB/GYN;  Laterality: N/A;    DEBRIDEMENT OF WOUND OF ABDOMEN N/A 10/30/2020    Procedure: DEBRIDEMENT, WOUND, ABDOMEN;  Surgeon: Ron Carrasco MD;  Location: [facility];  Service: General;  Laterality: N/A;    DEBRIDEMENT OF WOUND OF ABDOMEN N/A 2/3/2021    Procedure: DEBRIDEMENT, WOUND, ABDOMEN - abdominal wound debridment with placement of wound vac;  Surgeon: Ron Carrasco MD;  Location: [facility];  Service: General;  Laterality: N/A;    HYSTERECTOMY      LAPAROSCOPIC SALPINGO-OOPHORECTOMY Bilateral 3/11/2019    Procedure: SALPINGO-OOPHORECTOMY, LAPAROSCOPIC;  Surgeon: Dagoberto Smith MD;  Location: [facility];  Service: OB/GYN;  Laterality: Bilateral;    LAPAROSCOPIC TOTAL HYSTERECTOMY N/A 3/11/2019    Procedure: HYSTERECTOMY, TOTAL, LAPAROSCOPIC;  Surgeon: Dagoberto Smith MD;  Location: [facility];  Service: OB/GYN;  Laterality: N/A;    OOPHORECTOMY      RECONSTRUCTION OF BREAST WITH DEEP INFERIOR EPIGASTRIC ARTERY  (CARMEN) FLAP Bilateral 10/5/2020    Procedure: RECONSTRUCTION, BREAST, USING CARMEN SKIN FLAP;  Surgeon: Ron Carrasco MD;  Location: [facility];  Service: General;  Laterality: Bilateral;    SKIN FULL THICKNESS GRAFT Left 10/30/2020    Procedure: APPLICATION, GRAFT, SKIN, FULL-THICKNESS;  Surgeon: Ron Carrasco MD;  Location: [facility];  Service: General;  Laterality: Left;    TOTAL REDUCTION MAMMOPLASTY      TUBAL LIGATION      open    WISDOM TOOTH EXTRACTION      WOUND DEBRIDEMENT Bilateral 10/30/2020    Procedure: DEBRIDEMENT, WOUND;  Surgeon: Ron Carrasco MD;  Location: [facility];  Service: General;  Laterality: Bilateral;     Family History   Problem Relation Age of Onset    Diabetes Mother     Heart disease Mother     Obesity Mother     Sleep apnea Mother     Colon polyps Mother     No Known Problems Father     No Known  Problems Son     Heart disease Maternal Grandmother     Diabetes Maternal Grandmother     Hypertension Maternal Grandmother     Breast cancer Maternal Grandmother 49        bilateral, second at 59    Obesity Maternal Grandfather     Melanoma Maternal Grandfather 50    No Known Problems Son     No Known Problems Son     No Known Problems Son     Colon cancer Maternal Aunt 35    Breast cancer Maternal Cousin         mom's maternal 1st cousin    Breast cancer Maternal Cousin         mom's maternal 1st cousin    Sleep apnea Brother     No Known Problems Maternal Uncle     No Known Problems Paternal Aunt     No Known Problems Paternal Uncle     No Known Problems Paternal Grandmother     Heart attack Paternal Grandfather     Colon polyps Maternal Aunt     Ovarian cancer Neg Hx      Social History     Tobacco Use    Smoking status: Never    Smokeless tobacco: Never   Substance Use Topics    Alcohol use: Yes     Comment: Occasional    Drug use: No     Review of Systems   Constitutional:  Positive for fatigue and fever.   HENT:  Negative for sore throat.    Respiratory:  Negative for cough, chest tightness, shortness of breath and wheezing.    Cardiovascular:  Negative for chest pain.   Gastrointestinal:  Positive for diarrhea, nausea and vomiting. Negative for abdominal distention.   Genitourinary:  Negative for dysuria.   Musculoskeletal:  Negative for back pain.   Skin:  Negative for rash.   Neurological:  Negative for weakness.   Hematological:  Does not bruise/bleed easily.   All other systems reviewed and are negative.    Physical Exam     Initial Vitals   BP Pulse Resp Temp SpO2   07/26/23 1257 07/26/23 1254 07/26/23 1254 07/26/23 1254 07/26/23 1254   117/73 (!) 130 20 99.7 °F (37.6 °C) 97 %      MAP       --                Physical Exam    Nursing note and vitals reviewed.  Constitutional: She appears well-developed and well-nourished.   HENT:   Head: Normocephalic and atraumatic.   Eyes: Pupils are equal, round, and  reactive to light.   Neck:   Normal range of motion.  Cardiovascular:  Normal rate, regular rhythm and normal heart sounds.           Pulmonary/Chest: Breath sounds normal.   Abdominal: Abdomen is soft. Bowel sounds are normal. There is abdominal tenderness (Positive tenderness to palpation in the right lower quadrant the patient is adamant.  Near McBurney's point.).   Musculoskeletal:         General: No tenderness or edema. Normal range of motion.      Cervical back: Normal range of motion.     Neurological: She is alert and oriented to person, place, and time. She has normal strength. GCS score is 15. GCS eye subscore is 4. GCS verbal subscore is 5. GCS motor subscore is 6.   Skin: Skin is warm and dry.   Psychiatric: She has a normal mood and affect. Her behavior is normal. Judgment and thought content normal.       ED Course   Procedures  Labs Reviewed   CBC W/ AUTO DIFFERENTIAL - Abnormal; Notable for the following components:       Result Value    Lymph # 0.4 (*)     Mono # 0.2 (*)     Gran % 91.2 (*)     Lymph % 5.6 (*)     Mono % 2.7 (*)     All other components within normal limits   CULTURE, BLOOD   CULTURE, BLOOD   CLOSTRIDIUM DIFFICILE   CULTURE, STOOL   ENTEROHEMORRHAGIC E.COLI   COMPREHENSIVE METABOLIC PANEL   LACTIC ACID, PLASMA   URINALYSIS, REFLEX TO URINE CULTURE    Narrative:     Preferred Collection Type->Urine, Clean Catch  Specimen Source->Urine   LACTIC ACID, PLASMA   OCCULT BLOOD X 1, STOOL   STOOL EXAM-OVA,CYSTS,PARASITES     EKG Readings: (Independently Interpreted)   Rate 100, normal sinus rhythm, normal axis, normal QRS, normal EKG.     Imaging Results              CT Abdomen Pelvis  Without Contrast (Final result)  Result time 07/26/23 16:57:25      Final result by Holger Schofield MD (07/26/23 16:57:25)                   Impression:      Liquid stool throughout the colon compatible with a diarrheal illness.    Bilateral breast reconstruction, cholecystectomy, gastric sleeve, and  hysterectomy.      Electronically signed by: Holger Schofield MD  Date:    07/26/2023  Time:    16:57               Narrative:    EXAMINATION:  CT ABDOMEN PELVIS WITHOUT CONTRAST    CLINICAL HISTORY:  Right lower quadrant abdominal pain;    TECHNIQUE:  Low dose axial images, sagittal and coronal reformations were obtained from the lung bases to the pubic symphysis.  Oral contrast was not administered.    COMPARISON:  None    FINDINGS:  Liver, spleen, pancreas, and adrenal glands are unremarkable.  There has been a cholecystectomy.    There is no urolithiasis or hydroureteronephrosis.  There is focal cortical scarring of the midpole of the left kidney.    There are postoperative changes of gastric sleeve.  The bowel is nondilated.  There is postop change along the ventral abdominal wall.  The appendix is not directly visualized.  There are no secondary right lower quadrant findings of appendicitis.  Liquid stool is present throughout the colon.    There is no free air or free fluid.    There has been hysterectomy.  There has been bilateral breast reconstruction.  There is no significant bony abnormality.                                       X-Ray Chest AP Portable (Final result)  Result time 07/26/23 15:18:51      Final result by Corine Niño MD (07/26/23 15:18:51)                   Impression:      No acute abnormality.      Electronically signed by: Corine Niño  Date:    07/26/2023  Time:    15:18               Narrative:    EXAMINATION:  XR CHEST AP PORTABLE    CLINICAL HISTORY:  Sepsis;    TECHNIQUE:  Single frontal view of the chest was performed.    COMPARISON:  09/12/2019, and CT of 12/15/2022    FINDINGS:  The lungs are clear, with normal appearance of pulmonary vasculature and no pleural effusion or pneumothorax.    The cardiac silhouette is normal in size. The hilar and mediastinal contours are unremarkable.    Bones are intact.  Surgical clips again noted in chest wall related to previous  mastectomy and flap reconstruction.                                       Medications   ciprofloxacin (CIPRO)400mg/200ml D5W IVPB 400 mg (400 mg Intravenous New Bag 7/26/23 1813)   metronidazole IVPB 500 mg (500 mg Intravenous New Bag 7/26/23 1730)   sodium chloride 0.9% bolus 1,848 mL 1,848 mL (0 mLs Intravenous Stopped 7/26/23 1813)   piperacillin-tazobactam (ZOSYN) 4.5 g in dextrose 5 % in water (D5W) 5 % 100 mL IVPB (MB+) (0 g Intravenous Stopped 7/26/23 1718)   HYDROmorphone injection 0.5 mg (0.5 mg Intravenous Given 7/26/23 1554)   promethazine (PHENERGAN) 12.5 mg in dextrose 5 % (D5W) 50 mL IVPB (0 mg Intravenous Stopped 7/26/23 1610)   acetaminophen tablet 1,000 mg (1,000 mg Oral Given 7/26/23 1608)     Medical Decision Making:   Differential Diagnosis:   Anemia, electrolyte abnormality, infection, sepsis, dehydration, urinate check infection, cancerous process, viral infection, hereditary hypokalemia syndrome.  CVA, TIA, hemorrhagic stroke, embolic stroke.    ED Management:  The patient is stable this time.  Although she does not appear to be toxic I suspect there may be a sepsis environment with her.  I am going to do a sepsis workup on her.  Get a CT scan of the abdomen pelvis to rule out the right lower quadrant.  Based on the surgeries that she is had in the past, her history of gastric bypass which became infected distally and the breast surgery I am concerned for a smoldering abscess or possibly a new intra-abdominal pathology.    The patient is stable this time.  She is received her IV fluids.  CT scan demonstrates copious amounts of diarrhea in the abdomen.  Otherwise unremarkable.  There is no obvious intra abdominal pathology besides the diarrhea.  Lactic acids are normal.  Laboratory values are essentially normal as well.  The patient did develop another temperature while she was here.                        Clinical Impression:   Final diagnoses:  [R53.1] Weakness  [K52.9] Gastroenteritis  (Primary)  [A09] Diarrhea of infectious origin        ED Disposition Condition    Discharge Stable          ED Prescriptions       Medication Sig Dispense Start Date End Date Auth. Provider    ciprofloxacin HCl (CIPRO) 500 MG tablet  (Status: Discontinued) Take 1 tablet (500 mg total) by mouth 2 (two) times daily. for 10 days 20 tablet 7/26/2023 7/26/2023 Tab Bhakta MD    metroNIDAZOLE (FLAGYL) 500 MG tablet  (Status: Discontinued) Take 1 tablet (500 mg total) by mouth 3 (three) times daily. for 7 days 21 tablet 7/26/2023 7/26/2023 Tab Bhakta MD    promethazine (PHENERGAN) 25 MG tablet Take 1 tablet (25 mg total) by mouth every 6 (six) hours as needed for Nausea. 15 tablet 7/26/2023 -- Tab Bhakta MD    HYDROcodone-acetaminophen (NORCO)  mg per tablet Take 1 tablet by mouth every 6 (six) hours as needed for Pain. 12 tablet 7/26/2023 -- Tab Bhakta MD    ciprofloxacin HCl (CIPRO) 500 MG tablet Take 1 tablet (500 mg total) by mouth 2 (two) times daily. for 10 days 20 tablet 7/26/2023 8/5/2023 Tab Bhakta MD    metroNIDAZOLE (FLAGYL) 500 MG tablet Take 1 tablet (500 mg total) by mouth 3 (three) times daily. for 7 days 21 tablet 7/26/2023 8/2/2023 Tab Bhakta MD          Follow-up Information    None          Tab Bhakta MD  07/26/23 [id number]       Tab Bhakta MD  07/26/23 0989

## 2023-07-26 NOTE — DISCHARGE INSTRUCTIONS
Take medications as prescribed.  Return emergency with any worsening symptomatology to include worsening diarrhea, weakness, fever, chills or any worsening pain or otherwise.

## 2023-07-26 NOTE — ED NOTES
Patient states for abut 3 weeks she has had nausea, vomiting, and diarrhea with fever off and on. Generalized body aches. Patient reports dizziness and weakness.

## 2023-07-29 LAB — BACTERIA STL CULT: NORMAL

## 2023-07-30 LAB
E COLI SXT1 STL QL IA: NEGATIVE
E COLI SXT2 STL QL IA: NEGATIVE

## 2023-08-01 LAB
BACTERIA BLD CULT: NORMAL
BACTERIA BLD CULT: NORMAL

## 2023-08-03 LAB — O+P STL MICRO: NORMAL

## 2023-08-17 ENCOUNTER — LAB VISIT (OUTPATIENT)
Dept: LAB | Facility: HOSPITAL | Age: 38
End: 2023-08-17
Attending: NURSE PRACTITIONER
Payer: COMMERCIAL

## 2023-08-17 DIAGNOSIS — R19.7 DIARRHEA, UNSPECIFIED TYPE: ICD-10-CM

## 2023-08-17 LAB — WBC #/AREA STL HPF: NORMAL /[HPF]

## 2023-08-17 PROCEDURE — 89055 LEUKOCYTE ASSESSMENT FECAL: CPT | Performed by: NURSE PRACTITIONER

## 2023-09-19 ENCOUNTER — OFFICE VISIT (OUTPATIENT)
Dept: URGENT CARE | Facility: CLINIC | Age: 38
End: 2023-09-19
Payer: COMMERCIAL

## 2023-09-19 ENCOUNTER — TELEPHONE (OUTPATIENT)
Dept: PODIATRY | Facility: CLINIC | Age: 38
End: 2023-09-19
Payer: COMMERCIAL

## 2023-09-19 VITALS
BODY MASS INDEX: 19.4 KG/M2 | SYSTOLIC BLOOD PRESSURE: 110 MMHG | HEIGHT: 68 IN | OXYGEN SATURATION: 98 % | HEART RATE: 75 BPM | WEIGHT: 128 LBS | DIASTOLIC BLOOD PRESSURE: 60 MMHG | TEMPERATURE: 98 F

## 2023-09-19 DIAGNOSIS — M79.671 PAIN OF RIGHT FOOT: ICD-10-CM

## 2023-09-19 DIAGNOSIS — R22.41 NODULE OF SKIN OF RIGHT FOOT: Primary | ICD-10-CM

## 2023-09-19 PROCEDURE — 99213 OFFICE O/P EST LOW 20 MIN: CPT | Mod: S$GLB,,, | Performed by: NURSE PRACTITIONER

## 2023-09-19 PROCEDURE — 99213 PR OFFICE/OUTPT VISIT, EST, LEVL III, 20-29 MIN: ICD-10-PCS | Mod: S$GLB,,, | Performed by: NURSE PRACTITIONER

## 2023-09-19 NOTE — PROGRESS NOTES
"Subjective:       Patient ID: Allyson Bahena is a 38 y.o. female.    Vitals:  height is 5' 8" (1.727 m) and weight is 58.1 kg (128 lb). Her oral temperature is 98.2 °F (36.8 °C). Her blood pressure is 110/60 and her pulse is 75. Her oxygen saturation is 98%.     Chief Complaint:  swollen knot on right foot (X a month ago she had a knot on the top of her right foot that went away, she began to notice it again x 3 days ago.  States that she did not injure it.)    This is a 38 y.o. female who presents today with a chief complaint of  "swollen knot on right foot"     Patient experiencing an enlarged firm area to the dorsal aspect of right foot.  Patient reports that she is experienced a similar symptom approximally 1 month ago that resolved on his own.  She reports that over the past few days this area has returned in his now increasing in size and causing moderate pain that is worse with walking.  Patient denies any injury.    Patient presents with:   swollen knot on right foot: X a month ago she had a knot on the top of her right foot that went away, she began to notice it again x 3 days ago.  States that she did not injure it.           Constitution: Negative.   Musculoskeletal:  Positive for pain. Negative for abnormal ROM of joint.   Skin:  Negative for color change.           Objective:      Physical Exam   Constitutional: She is oriented to person, place, and time. She appears well-developed. She is cooperative.   HENT:   Head: Normocephalic and atraumatic.   Ears:   Right Ear: Hearing, tympanic membrane, external ear and ear canal normal.   Left Ear: Hearing, tympanic membrane, external ear and ear canal normal.   Nose: Nose normal. No mucosal edema or nasal deformity. No epistaxis. Right sinus exhibits no maxillary sinus tenderness and no frontal sinus tenderness. Left sinus exhibits no maxillary sinus tenderness and no frontal sinus tenderness.   Mouth/Throat: Uvula is midline, oropharynx is clear " and moist and mucous membranes are normal. No trismus in the jaw. Normal dentition. No uvula swelling.   Eyes: Conjunctivae and lids are normal.   Neck: Trachea normal and phonation normal. Neck supple.   Cardiovascular: Normal rate, regular rhythm, normal heart sounds and normal pulses.   Pulmonary/Chest: Effort normal and breath sounds normal.   Abdominal: Normal appearance and bowel sounds are normal. Soft.   Musculoskeletal: Normal range of motion.         General: Normal range of motion.      Right foot: Tenderness (Round raised firm nodular area noted to dorsal aspect.  1 cm in diameter.  Patient reports areas painful.  No erythema or skin sores noted to the area) present.   Neurological: She is alert and oriented to person, place, and time. She exhibits normal muscle tone.   Skin: Skin is warm, dry and intact.   Psychiatric: Her speech is normal and behavior is normal. Judgment and thought content normal.   Nursing note and vitals reviewed.        Past medical history and current medications reviewed.       Assessment:           1. Nodule of skin of right foot    2. Pain of right foot              Plan:         Nodule of skin of right foot  -     Ambulatory referral/consult to Podiatry    Pain of right foot  -     Ambulatory referral/consult to Podiatry             Patient Instructions   May alternate Tylenol and ibuprofen as directed for pain if you are not allergic.    Recommend application of ice, rest, elevation, and compression for support.     Recommend wearing supportive brace or sling as directed to promote healing.    Follow-up with Podiatrist for further evaluation and treatment.          Aubrey Bahena, WAQASC

## 2023-09-19 NOTE — TELEPHONE ENCOUNTER
----- Message from Nickolas Arias sent at 9/19/2023 11:01 AM CDT -----  Type:  Same Day Appointment Request    Caller is requesting a same day appointment.  Caller declined first available appointment listed below.      Name of Caller:  pt  When is the first available appointment?  9/26  Symptoms:  cyst on right foot  Best Call Back Number:  [phone]  Additional Information:  urgent care following up appt. Pl call bk to advise thanks

## 2023-09-19 NOTE — PATIENT INSTRUCTIONS
May alternate Tylenol and ibuprofen as directed for pain if you are not allergic.    Recommend application of ice, rest, elevation, and compression for support.     Recommend wearing supportive brace or sling as directed to promote healing.    Follow-up with Podiatrist for further evaluation and treatment.

## 2023-09-20 ENCOUNTER — OFFICE VISIT (OUTPATIENT)
Dept: PODIATRY | Facility: CLINIC | Age: 38
End: 2023-09-20
Payer: COMMERCIAL

## 2023-09-20 ENCOUNTER — HOSPITAL ENCOUNTER (OUTPATIENT)
Dept: RADIOLOGY | Facility: HOSPITAL | Age: 38
Discharge: HOME | End: 2023-09-20
Attending: PODIATRIST
Payer: COMMERCIAL

## 2023-09-20 VITALS
BODY MASS INDEX: 19.4 KG/M2 | WEIGHT: 128 LBS | HEART RATE: 66 BPM | DIASTOLIC BLOOD PRESSURE: 67 MMHG | SYSTOLIC BLOOD PRESSURE: 106 MMHG | HEIGHT: 68 IN

## 2023-09-20 DIAGNOSIS — M79.671 FOOT PAIN, RIGHT: ICD-10-CM

## 2023-09-20 DIAGNOSIS — M79.671 FOOT PAIN, RIGHT: Primary | ICD-10-CM

## 2023-09-20 DIAGNOSIS — M67.40 GANGLION: ICD-10-CM

## 2023-09-20 PROCEDURE — 73630 X-RAY EXAM OF FOOT: CPT | Mod: 26,RT,, | Performed by: RADIOLOGY

## 2023-09-20 PROCEDURE — 99999 PR PBB SHADOW E&M-EST. PATIENT-LVL IV: ICD-10-PCS | Mod: PBBFAC,,, | Performed by: PODIATRIST

## 2023-09-20 PROCEDURE — 73630 X-RAY EXAM OF FOOT: CPT | Mod: TC,RT

## 2023-09-20 PROCEDURE — 99203 PR OFFICE/OUTPT VISIT, NEW, LEVL III, 30-44 MIN: ICD-10-PCS | Mod: S$GLB,,, | Performed by: PODIATRIST

## 2023-09-20 PROCEDURE — 99999 PR PBB SHADOW E&M-EST. PATIENT-LVL IV: CPT | Mod: PBBFAC,,, | Performed by: PODIATRIST

## 2023-09-20 PROCEDURE — 99203 OFFICE O/P NEW LOW 30 MIN: CPT | Mod: S$GLB,,, | Performed by: PODIATRIST

## 2023-09-20 PROCEDURE — 73630 XR FOOT COMPLETE 3 VIEW RIGHT: ICD-10-PCS | Mod: 26,RT,, | Performed by: RADIOLOGY

## 2023-09-20 RX ORDER — DICLOFENAC SODIUM 10 MG/G
2 GEL TOPICAL 4 TIMES DAILY
Qty: 200 G | Refills: 3 | Status: SHIPPED | OUTPATIENT
Start: 2023-09-20 | End: 2024-01-31

## 2023-09-23 NOTE — PROGRESS NOTES
Subjective:       Patient ID: Allyson Bahena is a 38 y.o. female.    Chief Complaint: No chief complaint on file.  Patient presents today for a new patient evaluation she is relating right foot pain with a raised area that is painful to the touch on the top of her right foot she states it swells at night she and states that she does have a history of cystic masses that have happened on other areas of her body that have been had surgically removed this includes her left arm and she states she has a cyst behind her right knee that is currently being monitored.  Patient relates no trauma or injury to the top of the right foot.    Past Medical History:   Diagnosis Date    Allergy     Asthma     childhood    BRCA2 positive     Depression     Diabetes mellitus     Genetic testing 06/20/2018    BRCA2 positive, reported by [facility] from outside provider    GERD (gastroesophageal reflux disease)     Mitral valve prolapse     PONV (postoperative nausea and vomiting)     Suspected sleep apnea      Past Surgical History:   Procedure Laterality Date    APPLICATION OF WOUND VACUUM-ASSISTED CLOSURE DEVICE N/A 10/30/2020    Procedure: APPLICATION, WOUND VAC;  Surgeon: Ron Carrasco MD;  Location: [facility] OR;  Service: General;  Laterality: N/A;    BILATERAL MASTECTOMY Bilateral 10/5/2020    Procedure: MASTECTOMY, BILATERAL;  Surgeon: Paulina Cantu MD;  Location: [facility] OR;  Service: General;  Laterality: Bilateral;    CHOLECYSTECTOMY      COLONOSCOPY      CYSTOSCOPY N/A 3/11/2019    Procedure: CYSTOSCOPY;  Surgeon: Dagoberto Smith MD;  Location: [facility] OR;  Service: OB/GYN;  Laterality: N/A;    DEBRIDEMENT OF WOUND OF ABDOMEN N/A 10/30/2020    Procedure: DEBRIDEMENT, WOUND, ABDOMEN;  Surgeon: Ron Carrasco MD;  Location: [facility] OR;  Service: General;  Laterality: N/A;    DEBRIDEMENT OF WOUND OF ABDOMEN N/A 2/3/2021    Procedure: DEBRIDEMENT, WOUND, ABDOMEN - abdominal wound debridment with placement of wound vac;   Surgeon: Ron Carrasco MD;  Location: [facility];  Service: General;  Laterality: N/A;    HYSTERECTOMY      LAPAROSCOPIC SALPINGO-OOPHORECTOMY Bilateral 3/11/2019    Procedure: SALPINGO-OOPHORECTOMY, LAPAROSCOPIC;  Surgeon: Dagoberto Smith MD;  Location: [facility];  Service: OB/GYN;  Laterality: Bilateral;    LAPAROSCOPIC TOTAL HYSTERECTOMY N/A 3/11/2019    Procedure: HYSTERECTOMY, TOTAL, LAPAROSCOPIC;  Surgeon: Dagoberto Smith MD;  Location: [facility];  Service: OB/GYN;  Laterality: N/A;    OOPHORECTOMY      RECONSTRUCTION OF BREAST WITH DEEP INFERIOR EPIGASTRIC ARTERY  (CARMEN) FLAP Bilateral 10/5/2020    Procedure: RECONSTRUCTION, BREAST, USING CARMEN SKIN FLAP;  Surgeon: Ron Carrasco MD;  Location: [facility];  Service: General;  Laterality: Bilateral;    SKIN FULL THICKNESS GRAFT Left 10/30/2020    Procedure: APPLICATION, GRAFT, SKIN, FULL-THICKNESS;  Surgeon: Ron Carrasco MD;  Location: [facility];  Service: General;  Laterality: Left;    TOTAL REDUCTION MAMMOPLASTY      TUBAL LIGATION      open    WISDOM TOOTH EXTRACTION      WOUND DEBRIDEMENT Bilateral 10/30/2020    Procedure: DEBRIDEMENT, WOUND;  Surgeon: Ron Carrasco MD;  Location: [facility];  Service: General;  Laterality: Bilateral;     Family History   Problem Relation Age of Onset    Diabetes Mother     Heart disease Mother     Obesity Mother     Sleep apnea Mother     Colon polyps Mother     No Known Problems Father     No Known Problems Son     Heart disease Maternal Grandmother     Diabetes Maternal Grandmother     Hypertension Maternal Grandmother     Breast cancer Maternal Grandmother 49        bilateral, second at 59    Obesity Maternal Grandfather     Melanoma Maternal Grandfather 50    No Known Problems Son     No Known Problems Son     No Known Problems Son     Colon cancer Maternal Aunt 35    Breast cancer Maternal Cousin         mom's maternal 1st cousin    Breast cancer Maternal Cousin         mom's maternal 1st cousin     Sleep apnea Brother     No Known Problems Maternal Uncle     No Known Problems Paternal Aunt     No Known Problems Paternal Uncle     No Known Problems Paternal Grandmother     Heart attack Paternal Grandfather     Colon polyps Maternal Aunt     Ovarian cancer Neg Hx      Social History     Socioeconomic History    Marital status:    Tobacco Use    Smoking status: Never    Smokeless tobacco: Never   Substance and Sexual Activity    Alcohol use: Yes     Comment: Occasional    Drug use: No    Sexual activity: Yes     Partners: Male     Birth control/protection: None, See Surgical Hx   Social History Narrative    Lives at home with 4 kids and     She buys and cooks all the food     Social Determinants of Health     Financial Resource Strain: Unknown (1/31/2021)    Overall Financial Resource Strain (CARDIA)     Difficulty of Paying Living Expenses: Patient refused   Food Insecurity: Unknown (1/31/2021)    Hunger Vital Sign     Worried About Running Out of Food in the Last Year: Patient refused     Ran Out of Food in the Last Year: Patient refused   Transportation Needs: No Transportation Needs (1/31/2021)    PRAPARE - Transportation     Lack of Transportation (Medical): No     Lack of Transportation (Non-Medical): No   Physical Activity: Unknown (1/31/2021)    Exercise Vital Sign     Days of Exercise per Week: Patient refused   Stress: Unknown (1/31/2021)    [employer] [place] [employer] - Occupational Stress Questionnaire     Feeling of Stress : Patient refused   Social Connections: Unknown (1/31/2021)    Social Connection and Isolation Panel [NHANES]     Frequency of Social Gatherings with Friends and Family: Patient refused     Active Member of Clubs or Organizations: Patient refused     Marital Status: Patient refused       Current Outpatient Medications   Medication Sig Dispense Refill    blood-glucose meter,continuous (DEXCOM ) Misc 1 each by Misc.(Non-Drug; Combo Route) route  "every 14 (fourteen) days. 2 each 11    buPROPion (WELLBUTRIN XL) 300 MG 24 hr tablet TAKE 1 TABLET BY MOUTH EACH MORNING "THANK YOU" 30 tablet 10    flash glucose sensor (FREESTYLE TRESA 14 DAY SENSOR) Kit 1 each by Misc.(Non-Drug; Combo Route) route every 14 (fourteen) days. 2 kit 11    multivitamin capsule Take 1 capsule by mouth once daily.      multivitamin with minerals (HAIR,SKIN AND NAILS ORAL) Take by mouth.      pantoprazole (PROTONIX) 40 MG tablet TAKE ONE TABLET BY MOUTH EVERY DAY "THANK YOU"      diclofenac sodium (VOLTAREN) 1 % Gel Apply 2 g topically 4 (four) times daily. for 14 days 200 g 3    estradioL (VIVELLE-DOT) 0.1 mg/24 hr PTSW Place 1 patch onto the skin twice a week. (Patient not taking: Reported on 9/20/2023) 8 patch 11    progesterone (PROMETRIUM) 100 MG capsule Take 100 mg by mouth once daily.      progesterone micronized (ENDOMETRIN) 100 mg vaginal insert Place 100 mg vaginally 2 (two) times daily.       No current facility-administered medications for this visit.     Review of patient's allergies indicates:   Allergen Reactions    Cefazolin      Other reaction(s): red skin    Neosporin [benzalkonium chloride] Hives    Percocet [oxycodone-acetaminophen]      Facial flushing and throat burning . Patient states she can take Lortab    Zofran [ondansetron hcl] Nausea And Vomiting       Review of Systems   Musculoskeletal:  Positive for arthralgias and joint swelling.   Skin:  Positive for color change.   All other systems reviewed and are negative.      Objective:      Vitals:    09/20/23 1144   BP: 106/67   Pulse: 66   Weight: 58.1 kg (128 lb)   Height: 5' 8" (1.727 m)     Physical Exam  Vitals and nursing note reviewed.   Constitutional:       Appearance: Normal appearance.   Cardiovascular:      Pulses:           Dorsalis pedis pulses are 2+ on the right side and 2+ on the left side.        Posterior tibial pulses are 1+ on the right side and 1+ on the left side.   Pulmonary:      Effort: " Pulmonary effort is normal.   Musculoskeletal:         General: Swelling and tenderness present.      Right foot: Deformity present.        Feet:    Feet:      Right foot:      Protective Sensation: 2 sites tested.  2 sites sensed.      Skin integrity: Erythema present.      Left foot:      Protective Sensation: 2 sites tested.  2 sites sensed.   Skin:     Capillary Refill: Capillary refill takes 2 to 3 seconds.      Findings: Erythema present.   Neurological:      General: No focal deficit present.      Mental Status: She is alert.   Psychiatric:         Mood and Affect: Mood normal.         Behavior: Behavior normal.                                Assessment:       1. Foot pain, right    2. Ganglion        Plan:        Patient presents today for a new patient evaluation she is relating right foot pain with a raised area that is painful to the touch on the top of her right foot she states it swells at night she and states that she does have a history of cystic masses that have happened on other areas of her body that have been had surgically removed this includes her left arm and she states she has a cyst behind her right knee that is currently being monitored.  Patient relates no trauma or injury to the top of the right foot.  A comprehensive new patient evaluation was performed today patient does have an area of discomfort with what appears to be a cyst formation on the dorsal aspect of the patient's right midfoot this appears directly overlying the extensor tendon to the 4th digit when I do put the 4th digit through range of motion the area in question does elicit discomfort it is palpable and uncomfortable upon examination and the patient states it does get bigger and more inflamed especially in the evening.  There is some mild erythema edema overlying this area.  X-rays were reviewed and evaluated with the patient while there is no significant spur present on the lateral view of the patient's right foot she  does have what appears to be a area of cyst formation that is notable due to a change in great tones overlying the midfoot right I pointed this out to the patient advised her this is likely consistent with a ganglionic cyst.  Patient advised very often there are very small spurs that are present underlying cyst the cause the irritation on the soft tissue in the subsequent cyst to form.  Patient does have significantly elevated arches both weight-bearing and nonweightbearing bilateral I have advised her having a high arch possibly a shoe rubbing too tight may have initiated the cyst to form she clearly has a history of cyst formation I did discuss with the patient treatment options including surgical excision however we have agreed to proceed conservatively utilizing Voltaren gel over the area to try to shrink the inflammation surrounding the area I advised her a steroid injection may also shrink this because it is relatively small with a lot of inflammation around it ultimately surgical excision may be required however this is a very small area and would be a relatively minor procedure.  Patient is going to control try conservative treatment as discussed utilizing the Voltaren gel if she is not seeing results over the next several weeks she is going to contact us for follow-up further evaluation and treatment as necessary.This note was created using [email or web address] voice recognition software that occasionally misinterpreted phrases or words.

## 2023-10-25 ENCOUNTER — PATIENT MESSAGE (OUTPATIENT)
Dept: SURGERY | Facility: CLINIC | Age: 38
End: 2023-10-25
Payer: COMMERCIAL

## 2023-10-27 ENCOUNTER — HOSPITAL ENCOUNTER (OUTPATIENT)
Dept: RADIOLOGY | Facility: HOSPITAL | Age: 38
Discharge: HOME | End: 2023-10-27
Attending: NURSE PRACTITIONER
Payer: COMMERCIAL

## 2023-10-27 DIAGNOSIS — R91.8 PULMONARY NODULES: ICD-10-CM

## 2023-11-07 ENCOUNTER — TELEPHONE (OUTPATIENT)
Dept: HEMATOLOGY/ONCOLOGY | Facility: CLINIC | Age: 38
End: 2023-11-07
Payer: COMMERCIAL

## 2023-11-07 NOTE — TELEPHONE ENCOUNTER
Attempted to schedule coordinating appts w/ Donavan Raymundo DNP and Graciela Burgos PA-C for genetic testing at 11am, no answer. Left her a detailed message with my direct call back number. Message also sent via patient portal.

## 2023-12-19 ENCOUNTER — PATIENT MESSAGE (OUTPATIENT)
Dept: SURGERY | Facility: CLINIC | Age: 38
End: 2023-12-19
Payer: COMMERCIAL

## 2023-12-22 ENCOUNTER — PATIENT MESSAGE (OUTPATIENT)
Dept: HEMATOLOGY/ONCOLOGY | Facility: CLINIC | Age: 38
End: 2023-12-22
Payer: COMMERCIAL

## 2023-12-22 ENCOUNTER — HOSPITAL ENCOUNTER (OUTPATIENT)
Dept: RADIOLOGY | Facility: HOSPITAL | Age: 38
Discharge: HOME | End: 2023-12-22
Payer: COMMERCIAL

## 2023-12-22 ENCOUNTER — HOSPITAL ENCOUNTER (OUTPATIENT)
Dept: RADIOLOGY | Facility: HOSPITAL | Age: 38
Discharge: HOME | End: 2023-12-22
Attending: PHYSICIAN ASSISTANT
Payer: COMMERCIAL

## 2023-12-22 DIAGNOSIS — J10.1 INFLUENZA B: ICD-10-CM

## 2023-12-22 DIAGNOSIS — E86.0 DEHYDRATION: ICD-10-CM

## 2023-12-22 DIAGNOSIS — M54.50 ACUTE LOW BACK PAIN, UNSPECIFIED BACK PAIN LATERALITY, UNSPECIFIED WHETHER SCIATICA PRESENT: ICD-10-CM

## 2023-12-22 DIAGNOSIS — R05.9 COUGH, UNSPECIFIED TYPE: ICD-10-CM

## 2023-12-22 PROCEDURE — 72110 X-RAY EXAM L-2 SPINE 4/>VWS: CPT | Mod: TC

## 2023-12-22 PROCEDURE — 72110 XR LUMBAR SPINE COMPLETE 5 VIEW: ICD-10-PCS | Mod: 26,,, | Performed by: RADIOLOGY

## 2023-12-22 PROCEDURE — 72110 X-RAY EXAM L-2 SPINE 4/>VWS: CPT | Mod: 26,,, | Performed by: RADIOLOGY

## 2023-12-27 ENCOUNTER — OFFICE VISIT (OUTPATIENT)
Dept: SURGERY | Facility: CLINIC | Age: 38
End: 2023-12-27
Payer: COMMERCIAL

## 2023-12-27 VITALS
BODY MASS INDEX: 21.67 KG/M2 | HEIGHT: 68 IN | DIASTOLIC BLOOD PRESSURE: 69 MMHG | SYSTOLIC BLOOD PRESSURE: 108 MMHG | HEART RATE: 69 BPM | WEIGHT: 143 LBS

## 2023-12-27 DIAGNOSIS — Z12.39 SCREENING BREAST EXAMINATION: ICD-10-CM

## 2023-12-27 DIAGNOSIS — Z15.01 BRCA2 GENE MUTATION POSITIVE: Primary | ICD-10-CM

## 2023-12-27 DIAGNOSIS — Z15.09 BRCA2 GENE MUTATION POSITIVE: Primary | ICD-10-CM

## 2023-12-27 DIAGNOSIS — Z90.13 S/P MASTECTOMY, BILATERAL: ICD-10-CM

## 2023-12-27 PROCEDURE — 99999 PR PBB SHADOW E&M-EST. PATIENT-LVL III: CPT | Mod: PBBFAC,,, | Performed by: PHYSICIAN ASSISTANT

## 2023-12-27 PROCEDURE — 99214 OFFICE O/P EST MOD 30 MIN: CPT | Mod: S$GLB,,, | Performed by: PHYSICIAN ASSISTANT

## 2024-01-03 NOTE — PROGRESS NOTES
[facility]  Department of Surgery  2024    REFERRING PROVIDER:  Sue Sutherland, NP-C  CHIEF COMPLAINT:   Chief Complaint   Patient presents with    Follow-up       DIAGNOSIS:   This is a 38 y.o. female with a BRCA 2+ mutation who presents today for follow up, s/p bilateral mastectomy and CARMEN flap reconstruction on 10/5/20.     HISTORY OF PRESENT ILLNESS:   Allyson Bahena is a 38 y.o. female originally referred for evaluation of increased risk of breast cancer. Patient is confirmed to be BRCA 2 + with a significant family history on mother's side. Final pathology noted only benign breast tissue. Negative for atypia or malignancy.     Of note, patient experienced some post operative complications. She underwent wound debridement with Dr. Carrasco 2/3/21 due to wound healing issues.     Interval History 23:   Patient presents today for follow up CBE. She states she has been doing well since she was last seen. She admits to continued breast pain after surgery, but states it is manageable. Previous area of concern of her left breast that was concerning when last seen in  has since been excised. Incision is well healed. Otherwise denying new breast complaints of pain, palpable masses or skin changes. Patient also denying constitutional sxs of CP, SOB, bone pain, fatigue or headache.     Gyn History:   Patient is .  Age of first live birth was 19.  Patient had a hysterectomy/oopherectomy.  On hormones with Dr. Garzon.   Repeat testing with [facility] confirms BRCA2 positive.    Review of Systems: Denies any cough, fever or chills.  See HPI/ Interval History for other systems reviewed.       MEDICATIONS/ALLERGIES:     Current Outpatient Medications   Medication Sig    benzonatate (TESSALON PERLES) 100 MG capsule Take 1 capsule (100 mg total) by mouth 3 (three) times daily as needed for Cough.    blood-glucose meter,continuous (DEXCOM ) Misc 1 each by Misc.(Non-Drug; Combo Route)  "route every 14 (fourteen) days.    buPROPion (WELLBUTRIN XL) 300 MG 24 hr tablet TAKE 1 TABLET BY MOUTH EACH MORNING "THANK YOU"    estradioL (VIVELLE-DOT) 0.1 mg/24 hr PTSW Place 1 patch onto the skin twice a week.    flash glucose sensor (FREESTYLE TRESA 14 DAY SENSOR) Kit 1 each by Misc.(Non-Drug; Combo Route) route every 14 (fourteen) days.    multivitamin capsule Take 1 capsule by mouth once daily.    multivitamin with minerals (HAIR,SKIN AND NAILS ORAL) Take by mouth.    pantoprazole (PROTONIX) 40 MG tablet TAKE ONE TABLET BY MOUTH EVERY DAY "THANK YOU"    progesterone (PROMETRIUM) 100 MG capsule Take 100 mg by mouth once daily.    progesterone micronized (ENDOMETRIN) 100 mg vaginal insert Place 100 mg vaginally 2 (two) times daily.    diclofenac sodium (VOLTAREN) 1 % Gel Apply 2 g topically 4 (four) times daily. for 14 days     Current Facility-Administered Medications   Medication    0.9%  NaCl infusion      Review of patient's allergies indicates:   Allergen Reactions    Adhesive Hives and Itching     Steri-Strip    Cefazolin      Other reaction(s): red skin    Neosporin [benzalkonium chloride] Hives    Percocet [oxycodone-acetaminophen]      Facial flushing and throat burning . Patient states she can take Lortab    Zofran [ondansetron hcl] Nausea And Vomiting       PHYSICAL EXAM:   /69   Pulse 69   Ht 5' 8" (1.727 m)   Wt 64.9 kg (143 lb)   LMP 02/11/2019 (Exact Date)   BMI 21.74 kg/m²     Physical Exam   Vitals reviewed.  Constitutional: She is oriented to person, place, and time.   HENT:   Head: Normocephalic and atraumatic.   Nose: Nose normal.   Eyes: Pupils are equal, round, and reactive to light. Right eye exhibits no discharge. Left eye exhibits no discharge.   Pulmonary/Chest: Effort normal and breath sounds normal. No stridor. No respiratory distress. She exhibits no mass, no tenderness and no edema. Right breast exhibits no inverted nipple, no mass, no nipple discharge, no skin change " and no tenderness. Left breast exhibits no inverted nipple, no mass, no nipple discharge, no skin change and no tenderness. No breast swelling or bleeding. Breasts are symmetrical.   Abdominal: Normal appearance.   Genitourinary: No breast swelling or bleeding.   Neurological: She is alert and oriented to person, place, and time.   Skin: Skin is warm and dry.     Psychiatric: Her behavior is normal. Mood, judgment and thought content normal.         ASSESSMENT:   This is a 38 y.o. female with a BRCA 2+ mutation who presents today for follow up, s/p bilateral mastectomy and CARMEN flap reconstruction on 10/5/20.      PLAN:   1. Continue to follow up with Dr. Amaro for hormone management.    2. Continue monthly self breast exams and call the clinic with any changes or problems.  3. No further imaging given bilateral mastectomies. Radiology did review her last US for evidence of breast tissue given nipple sparing, but annual imaging was not recommended.   4. Will follow up with MARCI Raymundo for review of recommendations given her BRCA 2 mutation.     The patient is in agreement with the plan. Questions were encouraged and answered to patient's satisfaction. Allyson will call our office with any questions or concerns.     Graciela Burgos PA-C  Breast Surgery

## 2024-02-06 ENCOUNTER — PATIENT MESSAGE (OUTPATIENT)
Dept: SURGERY | Facility: CLINIC | Age: 39
End: 2024-02-06
Payer: COMMERCIAL

## 2024-02-08 ENCOUNTER — PATIENT MESSAGE (OUTPATIENT)
Dept: SURGERY | Facility: CLINIC | Age: 39
End: 2024-02-08
Payer: COMMERCIAL

## 2024-03-29 ENCOUNTER — PATIENT MESSAGE (OUTPATIENT)
Dept: SURGERY | Facility: CLINIC | Age: 39
End: 2024-03-29
Payer: COMMERCIAL

## 2024-04-19 NOTE — PROGRESS NOTES
"Dieter Santos" Erik was seen and treated in our emergency department on 4/19/2024.  He may return to school on 04/20/2024.      If you have any questions or concerns, please don't hesitate to call.      Jeri ACOSTA RN" Initial Consult    Chief Complaint   Patient presents with    Obesity       History of Present Illness:  Patient is a 33 y.o. female who is referred for evaluation of surgical treatment of morbid obesity. Her Body mass index is 35.02 kg/m². She has known comorbidities of depression, diabetes mellitus, GERD and osteoarthritis. She has not attended the bariatric seminar and is most interested in gastric sleeve surgery.      Past attempts at weight loss include: Unsupervised: calorie counting, high protein, low carbohydrates;  Supervised:  [facility] weight [facility] center;  Diet pills: phentermine;  Exercise attempts: walking or running, weight training, group classes    Weight history:   At current weight:  3 years  Obese for 5 years.  More than 35 pounds overweight for 10 years.  Started dieting at 27 years old.  Maximum weight reached: 235 pounds  Most weight lost was 20 pounds through diet pills and working out for 3 months.  She describes Her eating habits as volume eater, sweet eater, snacker/grazer, emotional eater, binge eater.    WHIT screening: getting tested    Reflux screening: takes daily medication- had endoscopy and biopsy- no hiatal hernia- [place]- 2017    Review of patient's allergies indicates:   Allergen Reactions    Percocet [oxycodone-acetaminophen]        Current Outpatient Prescriptions   Medication Sig Dispense Refill    buPROPion (WELLBUTRIN XL) 300 MG 24 hr tablet Take 300 mg by mouth once daily.      liraglutide (VICTOZA 3-PHOENIX SUBQ) Inject 1 g/day into the skin.      metFORMIN (GLUCOPHAGE-XR) 500 MG 24 hr tablet Take 500 mg by mouth daily with breakfast.      pantoprazole (PROTONIX) 20 MG tablet Take 20 mg by mouth once daily.       No current facility-administered medications for this visit.        Past Medical History:   Diagnosis Date    Depression     Diabetes mellitus     GERD (gastroesophageal reflux disease)     Suspected sleep apnea      Past Surgical History:   Procedure  "Laterality Date    CHOLECYSTECTOMY      TUBAL LIGATION      open     Family History   Problem Relation Age of Onset    Diabetes Mother     Heart disease Mother     Obesity Mother     Sleep apnea Mother     No Known Problems Son     Heart disease Maternal Grandmother     Cancer Maternal Grandmother     Diabetes Maternal Grandmother     Hypertension Maternal Grandmother     Obesity Maternal Grandfather     No Known Problems Son     No Known Problems Son     No Known Problems Son      Social History   Substance Use Topics    Smoking status: Never Smoker    Smokeless tobacco: Never Used    Alcohol use No      Review of Systems:  Review of Systems   Constitutional: Negative for chills, diaphoresis and fever.   HENT: Negative for congestion, sinus pressure, sneezing, sore throat, tinnitus and voice change.    Eyes: Negative for pain, redness and itching.   Respiratory: Negative for apnea, cough, choking, chest tightness, shortness of breath, wheezing and stridor.    Cardiovascular: Negative for chest pain, palpitations and leg swelling.   Gastrointestinal: Positive for constipation. Negative for abdominal pain, anal bleeding, diarrhea, nausea and vomiting.   Endocrine: Negative for cold intolerance and heat intolerance.   Genitourinary: Negative for difficulty urinating and dysuria.   Musculoskeletal: Positive for back pain. Negative for arthralgias and gait problem.   Skin: Negative for rash and wound.   Allergic/Immunologic: Negative for environmental allergies and food allergies.   Neurological: Positive for headaches. Negative for dizziness and light-headedness.   Hematological: Negative for adenopathy. Bruises/bleeds easily.   Psychiatric/Behavioral: Negative for agitation and confusion.       Physical:     Vital Signs (Most Recent)  Temp: 98.7 °F (37.1 °C) (06/29/18 1450)  Pulse: 104 (06/29/18 1450)  Resp: 16 (06/29/18 1450)  BP: 123/77 (06/29/18 1450)  5' 6" (1.676 m)  98.4 kg (217 lb)     Body " comp:  Fat Percent:  47.6 %  Fat Mass:  101.8 lb  FFM:  112 lb  TBW: 81.8 lb  TBW %:  38.3 %  BMR: 1622 kcal      Physical Exam:  Physical Exam   Constitutional: She is oriented to person, place, and time. She appears well-developed and well-nourished. No distress.   HENT:   Head: Normocephalic and atraumatic.   Mouth/Throat: No oropharyngeal exudate.   Eyes: Conjunctivae and EOM are normal. Pupils are equal, round, and reactive to light. No scleral icterus.   Neck: Normal range of motion. Neck supple. No JVD present. No tracheal deviation present. No thyromegaly present.   Cardiovascular: Normal rate, regular rhythm and normal heart sounds.  Exam reveals no gallop and no friction rub.    No murmur heard.  Pulmonary/Chest: Effort normal and breath sounds normal. No stridor. No respiratory distress. She has no wheezes. She has no rales. She exhibits no tenderness.   Abdominal: Soft. Bowel sounds are normal. She exhibits no distension and no mass. There is no tenderness. There is no rebound and no guarding.   Lap carmen scars   Musculoskeletal: Normal range of motion. She exhibits no edema or tenderness.   Lymphadenopathy:     She has no cervical adenopathy.   Neurological: She is alert and oriented to person, place, and time. No cranial nerve deficit.   Skin: Skin is warm and dry. No rash noted. She is not diaphoretic. No erythema.   Psychiatric: She has a normal mood and affect. Her behavior is normal.   Nursing note and vitals reviewed.      ASSESSMENT/PLAN:        1. Morbid obesity  BMP    CBC w/ Auto Differential    Folate Serum    H. Pylori Antibody, IGG    Hg A1c    Hepatic Panel    Iron & TIBC    Lipid Profile    Magnesium    Phosphorus    T3    T4    TSH    Free T4    Vitamin B12    Vitamin B1    Vitamin D 25 Hydroxy    Ambulatory consult to Nutrition Services    EKG 12-lead    Ambulatory consult to Psychology    FL Upper GI Without KUB   2. Obesity, Class II, BMI 35-39.9, with comorbidity     3. Type 2  diabetes mellitus without complication, with long-term current use of insulin     4. Suspected sleep apnea         Plan:  Allyson Bahena has morbid obesity as their Body mass index is 35.02 kg/m². She would benefit from weight loss surgery and has chosen gastric sleeve surgery as the preferred procedure. She understands that this is a tool and lifestyle change will be necessary to keep weight off. I went over possible complications of all surgeries with the patient and she is agreeable to surgery.    She will need:    MSD 0/3  Labs  EKG  UGI   dietary consult  psych consult   Seminar    I will obtain the following clearances prior to surgery: none        Diet plan: high protein low carb- mainly meats and vegetables  Exercise plan: Cardiovascular exercise, get HR over 100 for 20 minutes 3 times per week.  Start multivitamin

## 2024-04-23 ENCOUNTER — PATIENT MESSAGE (OUTPATIENT)
Dept: HEMATOLOGY/ONCOLOGY | Facility: CLINIC | Age: 39
End: 2024-04-23
Payer: COMMERCIAL

## 2024-04-23 ENCOUNTER — TELEPHONE (OUTPATIENT)
Dept: HEMATOLOGY/ONCOLOGY | Facility: CLINIC | Age: 39
End: 2024-04-23
Payer: COMMERCIAL

## 2024-04-23 NOTE — TELEPHONE ENCOUNTER
"Phoned pt.    Per pt:  Pain in right side near ribs and in back; no chest pain other than "pleurisy-feeling" "little sharp pain" occasionally that pt thinks could be due to multiple breast surgeries; no dyspnea  Blood sugar has been dipping to 50s-60s, sometimes less than 50; pt states she had a diagnosis of DM type 2 but due to having lost weight her blood sugars have been below even pre-diabetes levels for "forever"; pt states she's been told before that her insulin levels are high;  pt states she is normally able to raise her blood sugar with snacking; she is not established with an endocrinologist; earlier today blood sugar was in 50s and later 70  Has had nausea with some vomiting; notes that she's undergone hernia repair and had acid reflux  Has had feeling of dizziness/lightheadedness and feeling off-balance; no falls; has to sit down  Wants to follow up for BRCA2 check-in    Plan (numbers correspond w/ those above):  Pt to report to ER immediately with any chest pain.  Sent pancreas clinic msg that pt needs soonest-available appt (as she is concerned about her pancreas and is BRCA2+).  Sent Endocrinology msg that pt needs soonest-available appt.  Sent red flags to pt via portal as we discussed on phone I would do.   See #1.  Sent red flags to pt via portal.  Asked my office to schedule pt with me.      "

## 2024-04-23 NOTE — TELEPHONE ENCOUNTER
===View-only below this line===  ----- Message -----  From: Estrella Quach  Sent: 4/23/2024  11:26 AM CDT  To: Donavan Raymundo, MONSERRAT  Subject: Lost to follow up                                Venancio Go,      I just spoke with this patient in regards to her wanting to be seen by you again; she last saw you looks like in 2023 and she has a few concerns as of right now:     pain in right side (near ribs and in the back area),  blood sugar low 50-60's, feeling dizzy, headaches (unknown if its from blood sugar or what), she also voiced some concerns about her pancreas as well.     I know that she tried to get a punch bx scheduled back in December of '23 and she was trying to respond via the portal but I let her know Jocelyn is no longer in this dept.     Please advise on what would you like me to do.     Thanks,   Estrella

## 2024-04-26 ENCOUNTER — PATIENT MESSAGE (OUTPATIENT)
Dept: ENDOSCOPY | Facility: HOSPITAL | Age: 39
End: 2024-04-26
Payer: COMMERCIAL

## 2024-04-26 ENCOUNTER — PATIENT MESSAGE (OUTPATIENT)
Dept: HEMATOLOGY/ONCOLOGY | Facility: CLINIC | Age: 39
End: 2024-04-26
Payer: COMMERCIAL

## 2024-05-07 ENCOUNTER — HOSPITAL ENCOUNTER (OUTPATIENT)
Dept: RADIOLOGY | Facility: HOSPITAL | Age: 39
Discharge: HOME | End: 2024-05-07
Attending: NURSE PRACTITIONER
Payer: COMMERCIAL

## 2024-05-07 DIAGNOSIS — Z15.09 BRCA2 GENE MUTATION POSITIVE: ICD-10-CM

## 2024-05-07 DIAGNOSIS — Z15.01 BRCA2 GENE MUTATION POSITIVE: ICD-10-CM

## 2024-05-07 PROCEDURE — A9585 GADOBUTROL INJECTION: HCPCS | Performed by: NURSE PRACTITIONER

## 2024-05-07 PROCEDURE — 76376 3D RENDER W/INTRP POSTPROCES: CPT | Mod: TC

## 2024-05-07 PROCEDURE — 25500020 PHARM REV CODE 255: Performed by: NURSE PRACTITIONER

## 2024-05-07 PROCEDURE — 74183 MRI ABD W/O CNTR FLWD CNTR: CPT | Mod: TC

## 2024-05-07 PROCEDURE — 76376 3D RENDER W/INTRP POSTPROCES: CPT | Mod: 26,,, | Performed by: RADIOLOGY

## 2024-05-07 PROCEDURE — 74183 MRI ABD W/O CNTR FLWD CNTR: CPT | Mod: 26,,, | Performed by: RADIOLOGY

## 2024-05-07 RX ORDER — GADOBUTROL 604.72 MG/ML
6 INJECTION INTRAVENOUS
Status: COMPLETED | OUTPATIENT
Start: 2024-05-07 | End: 2024-05-07

## 2024-05-07 RX ADMIN — GADOBUTROL 6 ML: 604.72 INJECTION INTRAVENOUS at 10:05

## 2024-05-09 ENCOUNTER — PATIENT MESSAGE (OUTPATIENT)
Dept: SURGERY | Facility: CLINIC | Age: 39
End: 2024-05-09
Payer: COMMERCIAL

## 2024-05-10 ENCOUNTER — HOSPITAL ENCOUNTER (OUTPATIENT)
Dept: RADIOLOGY | Facility: HOSPITAL | Age: 39
Discharge: HOME | End: 2024-05-10
Attending: NURSE PRACTITIONER
Payer: COMMERCIAL

## 2024-05-10 DIAGNOSIS — R10.9 ABDOMINAL PAIN, UNSPECIFIED ABDOMINAL LOCATION: ICD-10-CM

## 2024-05-14 ENCOUNTER — INFUSION (OUTPATIENT)
Dept: INFUSION THERAPY | Facility: HOSPITAL | Age: 39
End: 2024-05-14
Attending: NURSE PRACTITIONER
Payer: COMMERCIAL

## 2024-05-14 VITALS
RESPIRATION RATE: 18 BRPM | OXYGEN SATURATION: 100 % | WEIGHT: 130 LBS | TEMPERATURE: 97 F | BODY MASS INDEX: 19.7 KG/M2 | HEART RATE: 77 BPM | HEIGHT: 68 IN | DIASTOLIC BLOOD PRESSURE: 55 MMHG | SYSTOLIC BLOOD PRESSURE: 115 MMHG

## 2024-05-14 DIAGNOSIS — E86.0 DEHYDRATION: Primary | ICD-10-CM

## 2024-05-14 DIAGNOSIS — Z98.84 S/P BARIATRIC SURGERY: ICD-10-CM

## 2024-05-14 PROCEDURE — 96361 HYDRATE IV INFUSION ADD-ON: CPT

## 2024-05-14 PROCEDURE — 96366 THER/PROPH/DIAG IV INF ADDON: CPT

## 2024-05-14 PROCEDURE — 96365 THER/PROPH/DIAG IV INF INIT: CPT

## 2024-05-14 PROCEDURE — 96360 HYDRATION IV INFUSION INIT: CPT

## 2024-05-14 PROCEDURE — 25000003 PHARM REV CODE 250: Performed by: NURSE PRACTITIONER

## 2024-05-14 RX ORDER — HEPARIN 100 UNIT/ML
500 SYRINGE INTRAVENOUS
OUTPATIENT
Start: 2024-05-14

## 2024-05-14 RX ORDER — SODIUM CHLORIDE 0.9 % (FLUSH) 0.9 %
10 SYRINGE (ML) INJECTION
OUTPATIENT
Start: 2024-05-14

## 2024-05-14 RX ADMIN — SODIUM CHLORIDE 1000 ML: 9 INJECTION, SOLUTION INTRAVENOUS at 10:05

## 2024-05-15 ENCOUNTER — LAB VISIT (OUTPATIENT)
Dept: LAB | Facility: HOSPITAL | Age: 39
End: 2024-05-15
Attending: INTERNAL MEDICINE
Payer: COMMERCIAL

## 2024-05-15 ENCOUNTER — PATIENT MESSAGE (OUTPATIENT)
Dept: HEMATOLOGY/ONCOLOGY | Facility: CLINIC | Age: 39
End: 2024-05-15

## 2024-05-15 ENCOUNTER — OFFICE VISIT (OUTPATIENT)
Dept: HEMATOLOGY/ONCOLOGY | Facility: CLINIC | Age: 39
End: 2024-05-15
Payer: COMMERCIAL

## 2024-05-15 VITALS
DIASTOLIC BLOOD PRESSURE: 72 MMHG | HEIGHT: 68 IN | HEART RATE: 70 BPM | SYSTOLIC BLOOD PRESSURE: 119 MMHG | TEMPERATURE: 97 F | RESPIRATION RATE: 12 BRPM | WEIGHT: 143.94 LBS | OXYGEN SATURATION: 100 % | BODY MASS INDEX: 21.81 KG/M2

## 2024-05-15 DIAGNOSIS — R23.3 EASY BRUISING: ICD-10-CM

## 2024-05-15 DIAGNOSIS — Z98.84 S/P BARIATRIC SURGERY: ICD-10-CM

## 2024-05-15 DIAGNOSIS — I10 ESSENTIAL HYPERTENSION: Primary | ICD-10-CM

## 2024-05-15 LAB
APTT PPP: 26.8 SEC (ref 21–32)
FIBRINOGEN PPP-MCNC: 297 MG/DL (ref 182–400)
INR PPP: 1 (ref 0.8–1.2)
PLATELET FUNCTION ASSAY - EPINEPHRINE: 128 SECS (ref 76–199)
PROTHROMBIN TIME: 10.9 SEC (ref 9–12.5)

## 2024-05-15 PROCEDURE — 99214 OFFICE O/P EST MOD 30 MIN: CPT | Mod: S$GLB,,, | Performed by: INTERNAL MEDICINE

## 2024-05-15 PROCEDURE — 85730 THROMBOPLASTIN TIME PARTIAL: CPT | Performed by: INTERNAL MEDICINE

## 2024-05-15 PROCEDURE — 85576 BLOOD PLATELET AGGREGATION: CPT | Performed by: INTERNAL MEDICINE

## 2024-05-15 PROCEDURE — 99999 PR PBB SHADOW E&M-EST. PATIENT-LVL V: CPT | Mod: PBBFAC,,, | Performed by: INTERNAL MEDICINE

## 2024-05-15 PROCEDURE — 85384 FIBRINOGEN ACTIVITY: CPT | Performed by: INTERNAL MEDICINE

## 2024-05-15 PROCEDURE — 85610 PROTHROMBIN TIME: CPT | Performed by: INTERNAL MEDICINE

## 2024-05-15 PROCEDURE — 36415 COLL VENOUS BLD VENIPUNCTURE: CPT | Performed by: INTERNAL MEDICINE

## 2024-05-15 NOTE — PROGRESS NOTES
Subjective:       Patient ID: Allyson Bahena is a 39 y.o. female.    Chief Complaint: Easy bruising    HPI  BRCA + bariatric surgery history for morbid obesity   Sees Dr Mcdermott pcp  Had bilat mastectomy and zaid / bso  Had complicated sx hx    Review of Systems      Patient denies issues related to appetite or recent weight change.  +issues with generalized weakness .  +fatigue over above what is normally experienced with day-to-day activities  Denies fever, chills, rigors  Denies issues with ambulation  Denies generalized swelling or new lumps and bumps felt in any part  of body  Denies visual or hearing loss  Denies issues with congestion, sinus issues, cough, sputum production runny nose or itching eyes  Denies chest pain or palpitations, or passing out  Denies abdominal pain, reflux symptoms, nausea vomiting loose stools or constipation  Denies seizure activity or focal weaknesses or symptoms related to TIA, no head aches or blurred vision reported  She is being maneuvered with hormones both progesterone and estradiol since her TAHBSO she does feel the difference feeling tired and weak and fatigued   Denies issues with skin rash ++ bruising at the slighest   Denies issues with swelling of feet, tingling or numbness   No issues with sleep,   No recent foreign travel   Good family support reported       Past Medical History:   Diagnosis Date    Allergy     Asthma     childhood    BRCA2 positive     Depression     Diabetes mellitus     Genetic testing 06/20/2018    BRCA2 positive, reported by [facility] from outside provider    GERD (gastroesophageal reflux disease)     Mitral valve prolapse     PONV (postoperative nausea and vomiting)     Suspected sleep apnea      Past Surgical History:   Procedure Laterality Date    APPLICATION OF WOUND VACUUM-ASSISTED CLOSURE DEVICE N/A 10/30/2020    Procedure: APPLICATION, WOUND VAC;  Surgeon: Ron Carrasco MD;  Location: [facility];  Service: General;  Laterality: N/A;     BILATERAL MASTECTOMY Bilateral 10/5/2020    Procedure: MASTECTOMY, BILATERAL;  Surgeon: Paulina Cantu MD;  Location: [facility] OR;  Service: General;  Laterality: Bilateral;    CHOLECYSTECTOMY      COLONOSCOPY      CYSTOSCOPY N/A 3/11/2019    Procedure: CYSTOSCOPY;  Surgeon: Dagoberto Smith MD;  Location: [facility];  Service: OB/GYN;  Laterality: N/A;    DEBRIDEMENT OF WOUND OF ABDOMEN N/A 10/30/2020    Procedure: DEBRIDEMENT, WOUND, ABDOMEN;  Surgeon: Ron Carrasco MD;  Location: [facility] OR;  Service: General;  Laterality: N/A;    DEBRIDEMENT OF WOUND OF ABDOMEN N/A 2/3/2021    Procedure: DEBRIDEMENT, WOUND, ABDOMEN - abdominal wound debridment with placement of wound vac;  Surgeon: Ron Carrasco MD;  Location: [facility];  Service: General;  Laterality: N/A;    HYSTERECTOMY      LAPAROSCOPIC SALPINGO-OOPHORECTOMY Bilateral 3/11/2019    Procedure: SALPINGO-OOPHORECTOMY, LAPAROSCOPIC;  Surgeon: Dagoberto Smith MD;  Location: [facility];  Service: OB/GYN;  Laterality: Bilateral;    LAPAROSCOPIC TOTAL HYSTERECTOMY N/A 3/11/2019    Procedure: HYSTERECTOMY, TOTAL, LAPAROSCOPIC;  Surgeon: Dagoberto Smith MD;  Location: [facility];  Service: OB/GYN;  Laterality: N/A;    OOPHORECTOMY      RECONSTRUCTION OF BREAST WITH DEEP INFERIOR EPIGASTRIC ARTERY  (CARMEN) FLAP Bilateral 10/5/2020    Procedure: RECONSTRUCTION, BREAST, USING CARMEN SKIN FLAP;  Surgeon: Ron Carrasco MD;  Location: [facility];  Service: General;  Laterality: Bilateral;    SKIN FULL THICKNESS GRAFT Left 10/30/2020    Procedure: APPLICATION, GRAFT, SKIN, FULL-THICKNESS;  Surgeon: Ron Carrasco MD;  Location: [facility];  Service: General;  Laterality: Left;    TOTAL REDUCTION MAMMOPLASTY      TUBAL LIGATION      open    WISDOM TOOTH EXTRACTION      WOUND DEBRIDEMENT Bilateral 10/30/2020    Procedure: DEBRIDEMENT, WOUND;  Surgeon: Ron Carrasco MD;  Location: [facility];  Service: General;  Laterality: Bilateral;     Family History   Problem  Relation Name Age of Onset    Diabetes Mother      Heart disease Mother      Obesity Mother      Sleep apnea Mother      Colon polyps Mother      No Known Problems Father      No Known Problems Son      Heart disease Maternal Grandmother      Diabetes Maternal Grandmother      Hypertension Maternal Grandmother      Breast cancer Maternal Grandmother  49        bilateral, second at 59    Obesity Maternal Grandfather      Melanoma Maternal Grandfather  50    No Known Problems Son      No Known Problems Son      No Known Problems Son      Colon cancer Maternal Aunt Nan. 35    Breast cancer Maternal Cousin          mom's maternal 1st cousin    Breast cancer Maternal Cousin          mom's maternal 1st cousin    Sleep apnea Brother      No Known Problems Maternal Uncle      No Known Problems Paternal Aunt      No Known Problems Paternal Uncle      No Known Problems Paternal Grandmother      Heart attack Paternal Grandfather      Colon polyps Maternal Aunt Mar.     Ovarian cancer Neg Hx        Social History     Socioeconomic History    Marital status:    Tobacco Use    Smoking status: Never    Smokeless tobacco: Never   Substance and Sexual Activity    Alcohol use: Yes     Comment: Occasional    Drug use: No    Sexual activity: Yes     Partners: Male     Birth control/protection: None, See Surgical Hx   Social History Narrative    Lives at home with 4 kids and     She buys and cooks all the food     Social Determinants of Health     Financial Resource Strain: Unknown (1/31/2021)    Overall Financial Resource Strain (CARDIA)     Difficulty of Paying Living Expenses: Patient declined   Food Insecurity: Unknown (1/31/2021)    Hunger Vital Sign     Worried About Running Out of Food in the Last Year: Patient declined     Ran Out of Food in the Last Year: Patient declined   Transportation Needs: No Transportation Needs (1/31/2021)    PRAPARE - Transportation     Lack of Transportation (Medical): No     Lack of  "Transportation (Non-Medical): No   Physical Activity: Unknown (1/31/2021)    Exercise Vital Sign     Days of Exercise per Week: Patient declined   Stress: Unknown (1/31/2021)    [employer] - Occupational Stress Questionnaire     Feeling of Stress : Patient declined     Review of patient's allergies indicates:   Allergen Reactions    Oxycodone-acetaminophen Anaphylaxis     Facial flushing and throat burning . Patient states she can take Lortab    Adhesive Hives and Itching     Steri-Strip    Neosporin [benzalkonium chloride] Hives    Cefazolin Rash     Other reaction(s): red skin    Zofran [ondansetron hcl] Nausea And Vomiting       Current Outpatient Medications:     buPROPion (WELLBUTRIN XL) 300 MG 24 hr tablet, TAKE ONE TABLET BY MOUTH EVERY MORNING "happy new year", Disp: 30 tablet, Rfl: 10    cyanocobalamin 1,000 mcg/mL injection, Inject 1 mL (1,000 mcg total) into the skin every 28 days., Disp: 1 mL, Rfl: 11    LUIS ANTONIO 0.1 mg/24 hr PTSW, APPLY ONE PATCH TOPICALLY TWICE A WEEK "happy new year", Disp: 8 patch, Rfl: 11    estradioL (IMVEXXY MAINTENANCE PACK) 10 mcg Inst, Place 10 mcg vaginally every evening., Disp: 30 each, Rfl: 11    methen-mMichelleblue-s.phos-phsal-hyo (URIBEL) 118-10-40.8-36 mg Cap, Take 1 capsule by mouth 4 (four) times daily., Disp: 30 capsule, Rfl: 0    multivitamin capsule, Take 1 capsule by mouth once daily., Disp: , Rfl:     multivitamin with minerals (HAIR,SKIN AND NAILS ORAL), Take by mouth., Disp: , Rfl:     pantoprazole (PROTONIX) 40 MG tablet, TAKE ONE TABLET BY MOUTH EVERY DAY "THANK YOU", Disp: , Rfl:     progesterone (PROMETRIUM) 200 MG capsule, Take 1 capsule (200 mg total) by mouth nightly., Disp: 30 capsule, Rfl: 11    estradioL (VIVELLE-DOT) 0.1 mg/24 hr PTSW, Place 1 patch onto the skin twice a week. (Patient not taking: Reported on 5/15/2024), Disp: 8 patch, Rfl: 11    metoclopramide HCl (REGLAN) 10 MG tablet, TAKE ONE TABLET BY MOUTH EVERY MORNING, " "TAKE ONE TABLET BY MOUTH AT NOON, AND TAKE ONE TABLET BY MOUTH BEFORE BEDTIME DAILY "HAPPY NEW YEAR" (Patient not taking: Reported on 5/10/2024), Disp: , Rfl:     progesterone (PROMETRIUM) 200 MG capsule, Place 1 capsule (200 mg total) vaginally nightly. (Patient not taking: Reported on 5/15/2024), Disp: 30 capsule, Rfl: 11    promethazine (PHENERGAN) 25 MG tablet, Take 25 mg by mouth every 6 (six) hours as needed. (Patient not taking: Reported on 5/10/2024), Disp: , Rfl:     Current Facility-Administered Medications:     0.9%  NaCl infusion, , Intravenous, Once, Helen Dong PA-C    Physical Exam    Wt Readings from Last 3 Encounters:   05/15/24 65.3 kg (143 lb 15.4 oz)   05/14/24 59 kg (130 lb)   05/10/24 64.9 kg (143 lb)     Temp Readings from Last 3 Encounters:   05/15/24 97.4 °F (36.3 °C) (Temporal)   05/14/24 97.3 °F (36.3 °C) (Temporal)   12/22/23 99.7 °F (37.6 °C)     BP Readings from Last 3 Encounters:   05/15/24 119/72   05/14/24 (!) 115/55   05/10/24 98/62     Pulse Readings from Last 3 Encounters:   05/15/24 70   05/14/24 77   12/27/23 69    VITAL SIGNS:  as above   GENERAL: appears well-built, well-nourished.  No anxiety, no agitation, and in no distress.  Patient is awake, alert, oriented and cooperative.  HEENT:  Showed no congestion. Trachea is central no obvious icterus or pallor noted no hoarseness. no obvious JVD   NECK:  Supple.  No JVD. No obvious cervical submental or supraclavicular adenopathy.  RS:the visualized portion of  Chest expands well. chest appears symmetric, no audible wheezes.  No dyspnea recognized  ABDOMEN:  abdomen appears undistended.  EXTREMITIES:  Without edema.  NEUROLOGICAL:  The patient is appropriate, higher functions are normal.  No  obvious neurological deficits.  normal judgement normal thought content  No confusion, no speech impediment. Cranial nerves are intact and show no deficit. No gross motor deficits noted   SKIN MUSCULOSKELETAL: no joint or skeletal " deformity, no clubbing of nails.  No visible rash ecchymosis or petechiae   Lab Results   Component Value Date    WBC 7.2 02/06/2024    HGB 14.2 02/06/2024    HCT 44.0 02/06/2024    MCV 92 02/06/2024     02/06/2024       BMP  Lab Results   Component Value Date     05/07/2024    K 4.2 05/07/2024     05/07/2024    CO2 27 05/07/2024    BUN 12 05/07/2024    CREATININE 1.0 05/07/2024    CALCIUM 10.0 05/07/2024    ANIONGAP 10 05/07/2024    ESTGFRAFRICA >60.0 06/09/2021    EGFRNONAA 90 08/19/2021     Lab Results   Component Value Date    IRON 52 01/03/2024    TIBC 289 01/03/2024    FERRITIN 166 (H) 01/03/2024         Patient Active Problem List   Diagnosis    Morbid obesity    Diabetes mellitus    Suspected sleep apnea    BRCA2 gene mutation positive    Menometrorrhagia    Essential hypertension    Family history of malignant neoplasm of breast    BRCA2 positive    Wound dehiscence, surgical    Breast wound    Open wound anterior abdominal wall, initial encounter    Dehydration    S/P bariatric surgery    Foot pain, right    Ganglion        Assessment and Plan     Easy bruising; will obtain PT PTT fibrinogen and platelet function assay  Hormonal issues can cause easy bruising which the patient seems to be having plenty of since her TAHBSO.  Medications can also cause this    BRCA positive; patient has had bilateral mastectomies and [facility] recommend close follow-up with imaging studies and genetics follow-ups which she already has done    Status post bariatric surgery.  Her iron levels were done in January and so was her B12 this is being monitored by her primary care patient advised to return any time she feels that hematology follow-up may be necessary    Prolonged hospital course and wound and wound care postop in 2022 patient underwent almost 10 surgeries totally

## 2024-05-15 NOTE — Clinical Note
Pt / ptt/ platelet function jose ( this isd a spe test need to tel her exactly where to go and time etc fibrinogen

## 2024-05-17 ENCOUNTER — TELEPHONE (OUTPATIENT)
Dept: HEMATOLOGY/ONCOLOGY | Facility: CLINIC | Age: 39
End: 2024-05-17
Payer: COMMERCIAL

## 2024-05-17 ENCOUNTER — OFFICE VISIT (OUTPATIENT)
Dept: HEMATOLOGY/ONCOLOGY | Facility: CLINIC | Age: 39
End: 2024-05-17
Payer: COMMERCIAL

## 2024-05-17 DIAGNOSIS — Z15.01 BRCA2 GENE MUTATION POSITIVE: ICD-10-CM

## 2024-05-17 DIAGNOSIS — Z15.09 BRCA2 GENE MUTATION POSITIVE: ICD-10-CM

## 2024-05-17 DIAGNOSIS — I10 ESSENTIAL HYPERTENSION: ICD-10-CM

## 2024-05-17 DIAGNOSIS — Z98.84 S/P BARIATRIC SURGERY: Primary | ICD-10-CM

## 2024-05-17 PROCEDURE — 99214 OFFICE O/P EST MOD 30 MIN: CPT | Mod: 95,,, | Performed by: INTERNAL MEDICINE

## 2024-05-17 NOTE — PROGRESS NOTES
Subjective:    The patient location is:  vehicle  Visit type: Virtual visit with synchronous audio and video  Total time spent on and for  this encounter which includes non face-to-face time preparing to see patient, review of tests, obtaining and or reviewing separately obtained records documenting clinical information in the electronic or other health records, independently interpreting results which is not separately reported ,and communicating results to the patient/family/caregiver and in care coordination and treatment planning/communicating with pharmacy for prescriptions/addressing social needs/arranging follow-up and or referrals :20 mn    Each patient I provide medical services by telemedicine is:  (1) informed of the relationship between the physician and patient and the respective role of any other health care provider with respect to management of the patient; and (2) notified that he or she may decline to receive medical services by telemedicine and may withdraw from such care at any time.  This is a video visit therefore some elements of the physical exam such as vital signs, heart sounds are breath sounds are not included and may be included if found in recent clinic notes of other providers assessing same patient. Any symptoms or signs that were visualized were stated by the patient may be included in this note.    Patient ID: Allyson Bahena is a 39 y.o. female.    Chief Complaint:  f/u of labs    HPI  BRCA + bariatric surgery history for morbid obesity   Sees Dr Mcdermott pcp  Had bilat mastectomy and zaid / bso  Had complicated sx hx    Review of Systems   Constitutional:  Negative for appetite change and unexpected weight change.   HENT:  Negative for mouth sores.    Eyes:  Negative for visual disturbance.   Respiratory:  Negative for cough and shortness of breath.    Cardiovascular:  Negative for chest pain.   Gastrointestinal:  Negative for abdominal pain and diarrhea.   Genitourinary:   Negative for frequency.   Musculoskeletal:  Negative for back pain.   Skin:  Negative for rash.   Neurological:  Negative for headaches.   Hematological:  Negative for adenopathy.   Psychiatric/Behavioral:  The patient is not nervous/anxious.          Patient denies issues related to appetite or recent weight change.  +issues with generalized weakness .  +fatigue over above what is normally experienced with day-to-day activities  Denies fever, chills, rigors  Denies issues with ambulation  Denies generalized swelling or new lumps and bumps felt in any part  of body  Denies visual or hearing loss  Denies issues with congestion, sinus issues, cough, sputum production runny nose or itching eyes  Denies chest pain or palpitations, or passing out  Denies abdominal pain, reflux symptoms, nausea vomiting loose stools or constipation  Denies seizure activity or focal weaknesses or symptoms related to TIA, no head aches or blurred vision reported  She is being maneuvered with hormones both progesterone and estradiol since her TAHBSO she does feel the difference feeling tired and weak and fatigued   Denies issues with skin rash ++ bruising at the slighest   Denies issues with swelling of feet, tingling or numbness   No issues with sleep,   No recent foreign travel   Good family support reported       Past Medical History:   Diagnosis Date    Allergy     Asthma     childhood    BRCA2 positive     Depression     Diabetes mellitus     Genetic testing 06/20/2018    BRCA2 positive, reported by [facility] from outside provider    GERD (gastroesophageal reflux disease)     Mitral valve prolapse     PONV (postoperative nausea and vomiting)     Suspected sleep apnea      Past Surgical History:   Procedure Laterality Date    APPLICATION OF WOUND VACUUM-ASSISTED CLOSURE DEVICE N/A 10/30/2020    Procedure: APPLICATION, WOUND VAC;  Surgeon: Ron Carrasco MD;  Location: [facility];  Service: General;  Laterality: N/A;    BILATERAL MASTECTOMY  Bilateral 10/5/2020    Procedure: MASTECTOMY, BILATERAL;  Surgeon: Paulina Cantu MD;  Location: [facility] OR;  Service: General;  Laterality: Bilateral;    CHOLECYSTECTOMY      COLONOSCOPY      CYSTOSCOPY N/A 3/11/2019    Procedure: CYSTOSCOPY;  Surgeon: Dagoberto Smith MD;  Location: [facility] OR;  Service: OB/GYN;  Laterality: N/A;    DEBRIDEMENT OF WOUND OF ABDOMEN N/A 10/30/2020    Procedure: DEBRIDEMENT, WOUND, ABDOMEN;  Surgeon: Ron Carrasco MD;  Location: [facility] OR;  Service: General;  Laterality: N/A;    DEBRIDEMENT OF WOUND OF ABDOMEN N/A 2/3/2021    Procedure: DEBRIDEMENT, WOUND, ABDOMEN - abdominal wound debridment with placement of wound vac;  Surgeon: Ron Carrasco MD;  Location: [facility];  Service: General;  Laterality: N/A;    HYSTERECTOMY      LAPAROSCOPIC SALPINGO-OOPHORECTOMY Bilateral 3/11/2019    Procedure: SALPINGO-OOPHORECTOMY, LAPAROSCOPIC;  Surgeon: Dagoberto Smith MD;  Location: [facility];  Service: OB/GYN;  Laterality: Bilateral;    LAPAROSCOPIC TOTAL HYSTERECTOMY N/A 3/11/2019    Procedure: HYSTERECTOMY, TOTAL, LAPAROSCOPIC;  Surgeon: Dagoberto Smith MD;  Location: [facility] OR;  Service: OB/GYN;  Laterality: N/A;    OOPHORECTOMY      RECONSTRUCTION OF BREAST WITH DEEP INFERIOR EPIGASTRIC ARTERY  (CARMEN) FLAP Bilateral 10/5/2020    Procedure: RECONSTRUCTION, BREAST, USING CARMEN SKIN FLAP;  Surgeon: Ron Carrasco MD;  Location: [facility];  Service: General;  Laterality: Bilateral;    SKIN FULL THICKNESS GRAFT Left 10/30/2020    Procedure: APPLICATION, GRAFT, SKIN, FULL-THICKNESS;  Surgeon: Ron Carrasco MD;  Location: [facility];  Service: General;  Laterality: Left;    TOTAL REDUCTION MAMMOPLASTY      TUBAL LIGATION      open    WISDOM TOOTH EXTRACTION      WOUND DEBRIDEMENT Bilateral 10/30/2020    Procedure: DEBRIDEMENT, WOUND;  Surgeon: Ron Carrasco MD;  Location: [facility];  Service: General;  Laterality: Bilateral;     Family History   Problem Relation Name Age of Onset     Diabetes Mother      Heart disease Mother      Obesity Mother      Sleep apnea Mother      Colon polyps Mother      No Known Problems Father      No Known Problems Son      Heart disease Maternal Grandmother      Diabetes Maternal Grandmother      Hypertension Maternal Grandmother      Breast cancer Maternal Grandmother  49        bilateral, second at 59    Obesity Maternal Grandfather      Melanoma Maternal Grandfather  50    No Known Problems Son      No Known Problems Son      No Known Problems Son      Colon cancer Maternal Aunt Nan. 35    Breast cancer Maternal Cousin          mom's maternal 1st cousin    Breast cancer Maternal Cousin          mom's maternal 1st cousin    Sleep apnea Brother      No Known Problems Maternal Uncle      No Known Problems Paternal Aunt      No Known Problems Paternal Uncle      No Known Problems Paternal Grandmother      Heart attack Paternal Grandfather      Colon polyps Maternal Aunt Mar.     Ovarian cancer Neg Hx        Social History     Socioeconomic History    Marital status:    Tobacco Use    Smoking status: Never    Smokeless tobacco: Never   Substance and Sexual Activity    Alcohol use: Yes     Comment: Occasional    Drug use: No    Sexual activity: Yes     Partners: Male     Birth control/protection: None, See Surgical Hx   Social History Narrative    Lives at home with 4 kids and     She buys and cooks all the food     Social Determinants of Health     Financial Resource Strain: Low Risk  (5/17/2024)    Overall Financial Resource Strain (CARDIA)     Difficulty of Paying Living Expenses: Not hard at all   Food Insecurity: Patient Declined (5/17/2024)    Hunger Vital Sign     Worried About Running Out of Food in the Last Year: Patient declined     Ran Out of Food in the Last Year: Patient declined   Transportation Needs: No Transportation Needs (1/31/2021)    PRAPARE - Transportation     Lack of Transportation (Medical): No     Lack of Transportation  "(Non-Medical): No   Physical Activity: Inactive (5/17/2024)    Exercise Vital Sign     Days of Exercise per Week: 0 days     Minutes of Exercise per Session: 0 min   Stress: No Stress Concern Present (5/17/2024)    [employer] [place] [employer] - Occupational Stress Questionnaire     Feeling of Stress : Not at all   Housing Stability: Unknown (5/17/2024)    Housing Stability Vital Sign     Unable to Pay for Housing in the Last Year: No     Review of patient's allergies indicates:   Allergen Reactions    Oxycodone-acetaminophen Anaphylaxis     Facial flushing and throat burning . Patient states she can take Lortab    Adhesive Hives and Itching     Steri-Strip    Neosporin [benzalkonium chloride] Hives    Cefazolin Rash     Other reaction(s): red skin    Zofran [ondansetron hcl] Nausea And Vomiting       Current Outpatient Medications:     buPROPion (WELLBUTRIN XL) 300 MG 24 hr tablet, TAKE ONE TABLET BY MOUTH EVERY MORNING "happy new year", Disp: 30 tablet, Rfl: 10    ciprofloxacin HCl (CIPRO) 500 MG tablet, Take 1 tablet (500 mg total) by mouth 2 (two) times daily. for 5 days, Disp: 10 tablet, Rfl: 0    cyanocobalamin 1,000 mcg/mL injection, Inject 1 mL (1,000 mcg total) into the skin every 28 days., Disp: 1 mL, Rfl: 11    LUIS ANTONIO 0.1 mg/24 hr PTSW, APPLY ONE PATCH TOPICALLY TWICE A WEEK "happy new year", Disp: 8 patch, Rfl: 11    estradioL (IMVEXXY MAINTENANCE PACK) 10 mcg Inst, Place 10 mcg vaginally every evening., Disp: 30 each, Rfl: 11    estradioL (VIVELLE-DOT) 0.1 mg/24 hr PTSW, Place 1 patch onto the skin twice a week. (Patient not taking: Reported on 5/15/2024), Disp: 8 patch, Rfl: 11    methen-m.blue-s.phos-phsal-hyo (URIBEL) 118-10-40.8-36 mg Cap, Take 1 capsule by mouth 4 (four) times daily., Disp: 30 capsule, Rfl: 0    metoclopramide HCl (REGLAN) 10 MG tablet, TAKE ONE TABLET BY MOUTH EVERY MORNING, TAKE ONE TABLET BY MOUTH AT NOON, AND TAKE ONE TABLET BY MOUTH BEFORE BEDTIME DAILY "HAPPY NEW " "YEAR" (Patient not taking: Reported on 5/10/2024), Disp: , Rfl:     multivitamin capsule, Take 1 capsule by mouth once daily., Disp: , Rfl:     multivitamin with minerals (HAIR,SKIN AND NAILS ORAL), Take by mouth., Disp: , Rfl:     pantoprazole (PROTONIX) 40 MG tablet, TAKE ONE TABLET BY MOUTH EVERY DAY "THANK YOU", Disp: , Rfl:     progesterone (PROMETRIUM) 200 MG capsule, Place 1 capsule (200 mg total) vaginally nightly. (Patient not taking: Reported on 5/15/2024), Disp: 30 capsule, Rfl: 11    progesterone (PROMETRIUM) 200 MG capsule, Take 1 capsule (200 mg total) by mouth nightly., Disp: 30 capsule, Rfl: 11    promethazine (PHENERGAN) 25 MG tablet, Take 25 mg by mouth every 6 (six) hours as needed. (Patient not taking: Reported on 5/10/2024), Disp: , Rfl:     Current Facility-Administered Medications:     0.9%  NaCl infusion, , Intravenous, Once, Helen Dong PA-C    Physical Exam    Wt Readings from Last 3 Encounters:   05/15/24 65.3 kg (143 lb 15.4 oz)   05/14/24 59 kg (130 lb)   05/10/24 64.9 kg (143 lb)     Temp Readings from Last 3 Encounters:   05/15/24 97.4 °F (36.3 °C) (Temporal)   05/14/24 97.3 °F (36.3 °C) (Temporal)   12/22/23 99.7 °F (37.6 °C)     BP Readings from Last 3 Encounters:   05/15/24 119/72   05/14/24 (!) 115/55   05/10/24 98/62     Pulse Readings from Last 3 Encounters:   05/15/24 70   05/14/24 77   12/27/23 69    VITAL SIGNS:  as above   GENERAL: appears well-built, well-nourished.  No anxiety, no agitation, and in no distress.  Patient is awake, alert, oriented and cooperative.  HEENT:  Showed no congestion. Trachea is central no obvious icterus or pallor noted no hoarseness. no obvious JVD   NECK:  Supple.  No JVD. No obvious cervical submental or supraclavicular adenopathy.  RS:the visualized portion of  Chest expands well. chest appears symmetric, no audible wheezes.  No dyspnea recognized  ABDOMEN:  abdomen appears undistended.  EXTREMITIES:  Without edema.  NEUROLOGICAL:  " The patient is appropriate, higher functions are normal.  No  obvious neurological deficits.  normal judgement normal thought content  No confusion, no speech impediment. Cranial nerves are intact and show no deficit. No gross motor deficits noted   SKIN MUSCULOSKELETAL: no joint or skeletal deformity, no clubbing of nails.  No visible rash ecchymosis or petechiae   Lab Results   Component Value Date    WBC 7.2 02/06/2024    HGB 14.2 02/06/2024    HCT 44.0 02/06/2024    MCV 92 02/06/2024     02/06/2024       BMP  Lab Results   Component Value Date     05/07/2024    K 4.2 05/07/2024     05/07/2024    CO2 27 05/07/2024    BUN 12 05/07/2024    CREATININE 1.0 05/07/2024    CALCIUM 10.0 05/07/2024    ANIONGAP 10 05/07/2024    ESTGFRAFRICA >60.0 06/09/2021    EGFRNONAA 90 08/19/2021     Lab Results   Component Value Date    IRON 52 01/03/2024    TIBC 289 01/03/2024    FERRITIN 166 (H) 01/03/2024         Patient Active Problem List   Diagnosis    Morbid obesity    Diabetes mellitus    Suspected sleep apnea    BRCA2 gene mutation positive    Menometrorrhagia    Essential hypertension    Family history of malignant neoplasm of breast    BRCA2 positive    Wound dehiscence, surgical    Breast wound    Open wound anterior abdominal wall, initial encounter    Dehydration    S/P bariatric surgery    Foot pain, right    Ganglion        Assessment and Plan     Easy bruising;  PT PTT fibrinogen and platelet function assay wnl  Hormonal issues can cause easy bruising which the patient seems to be having plenty of since her TAHBSO.  Medications can also cause this    BRCA positive; [facility] studies and genetics follow-ups which she already has done    Status post bariatric surgery.  Her iron levels were done in January and so was her B12 this is being monitored by her primary care patient advised to return any time she feels that  hematology follow-up may be necessary    Prolonged hospital course and wound and wound care postop in 2022 patient underwent almost 10 surgeries totally

## 2024-05-17 NOTE — TELEPHONE ENCOUNTER
----- Message from Melissa Alfaro sent at 5/17/2024  1:46 PM CDT -----  Contact: self  Type:  Patient Returning Call    Who Called: Pt  Who Left Message for Patient: Karla ZUÑIGA  Does the patient know what this is regarding?: Missed appt / Rescheduling  Would the patient rather a call back or a response via [facility]?  Call   Best Call Back Number: [phone]  Please return call to pt... Thank you..

## 2024-05-29 ENCOUNTER — OFFICE VISIT (OUTPATIENT)
Dept: SURGERY | Facility: CLINIC | Age: 39
End: 2024-05-29
Payer: COMMERCIAL

## 2024-05-29 VITALS — BODY MASS INDEX: 21.67 KG/M2 | WEIGHT: 143 LBS | HEIGHT: 68 IN

## 2024-05-29 DIAGNOSIS — Z90.13 S/P MASTECTOMY, BILATERAL: Primary | ICD-10-CM

## 2024-05-29 DIAGNOSIS — Z15.01 BRCA2 GENE MUTATION POSITIVE: ICD-10-CM

## 2024-05-29 DIAGNOSIS — Z15.09 BRCA2 GENE MUTATION POSITIVE: ICD-10-CM

## 2024-05-29 DIAGNOSIS — Z42.8 ENCOUNTER FOR OTHER PLASTIC AND RECONSTRUCTIVE SURGERY FOLLOWING MEDICAL PROCEDURE OR HEALED INJURY: ICD-10-CM

## 2024-05-29 DIAGNOSIS — L81.8 TATTOOS: ICD-10-CM

## 2024-05-29 PROCEDURE — 99999 PR PBB SHADOW E&M-EST. PATIENT-LVL III: CPT | Mod: PBBFAC,,, | Performed by: PHYSICIAN ASSISTANT

## 2024-05-29 PROCEDURE — 11921 CORRECT SKN COLOR 6.1-20.0CM: CPT | Mod: S$GLB,,, | Performed by: PHYSICIAN ASSISTANT

## 2024-05-29 PROCEDURE — 99499 UNLISTED E&M SERVICE: CPT | Mod: S$GLB,,, | Performed by: PHYSICIAN ASSISTANT

## 2024-05-29 PROCEDURE — 11922 CORRECT SKIN COLOR EA 20.0CM: CPT | Mod: S$GLB,,, | Performed by: PHYSICIAN ASSISTANT

## 2024-05-29 NOTE — PROGRESS NOTES
"[facility]  Department of Surgery        REFERRING PROVIDER: No referring provider defined for this encounter.    Chief Complaint: No chief complaint on file.    Allyson Bahena presents to [facility] on 5/29/2024 for bilateral 3 dimensional nipple tattoo for breast reconstruction. She is doing well today with no issue since her last visit. All incisions well healed. She denies pain, fever, chills, Nausea, vomiting, or other systemic signs of infection. She is pleased with the outcome of her breast reconstruction and would like to proceed with tattoos.     Review of patient's allergies indicates:   Allergen Reactions    Oxycodone-acetaminophen Anaphylaxis     Facial flushing and throat burning . Patient states she can take Lortab    Adhesive Hives and Itching     Steri-Strip    Neosporin [benzalkonium chloride] Hives    Cefazolin Rash     Other reaction(s): red skin    Zofran [ondansetron hcl] Nausea And Vomiting     Current Outpatient Medications on File Prior to Visit   Medication Sig Dispense Refill    buPROPion (WELLBUTRIN XL) 300 MG 24 hr tablet TAKE ONE TABLET BY MOUTH EVERY MORNING "happy new year" 30 tablet 10    cyanocobalamin 1,000 mcg/mL injection Inject 1 mL (1,000 mcg total) into the skin every 28 days. 1 mL 11    LUIS ANTONIO 0.1 mg/24 hr PTSW APPLY ONE PATCH TOPICALLY TWICE A WEEK "happy new year" 8 patch 11    estradioL (IMVEXXY MAINTENANCE PACK) 10 mcg Inst Place 10 mcg vaginally every evening. 30 each 11    estradioL (VIVELLE-DOT) 0.1 mg/24 hr PTSW Place 1 patch onto the skin twice a week. 8 patch 11    nurys-m.blue-s.phos-phsal-hyo (URIBEL) 118-10-40.8-36 mg Cap Take 1 capsule by mouth 4 (four) times daily. 30 capsule 0    metoclopramide HCl (REGLAN) 10 MG tablet       multivitamin capsule Take 1 capsule by mouth once daily.      multivitamin with minerals (HAIR,SKIN AND NAILS ORAL) Take by mouth.      pantoprazole (PROTONIX) 40 MG tablet TAKE ONE TABLET BY MOUTH EVERY DAY " ""THANK YOU"      progesterone (PROMETRIUM) 200 MG capsule Place 1 capsule (200 mg total) vaginally nightly. 30 capsule 11    progesterone (PROMETRIUM) 200 MG capsule Take 1 capsule (200 mg total) by mouth nightly. 30 capsule 11    promethazine (PHENERGAN) 25 MG tablet Take 25 mg by mouth every 6 (six) hours as needed.       Current Facility-Administered Medications on File Prior to Visit   Medication Dose Route Frequency Provider Last Rate Last Admin    0.9%  NaCl infusion   Intravenous Once Helen Dong PA-C         Patient Active Problem List   Diagnosis    Morbid obesity    Diabetes mellitus    Suspected sleep apnea    BRCA2 gene mutation positive    Menometrorrhagia    Essential hypertension    Family history of malignant neoplasm of breast    BRCA2 positive    Wound dehiscence, surgical    Breast wound    Open wound anterior abdominal wall, initial encounter    Dehydration    S/P bariatric surgery    Foot pain, right    Ganglion     [unfilled]  Social History     Socioeconomic History    Marital status:    Tobacco Use    Smoking status: Never    Smokeless tobacco: Never   Substance and Sexual Activity    Alcohol use: Yes     Comment: Occasional    Drug use: No    Sexual activity: Yes     Partners: Male     Birth control/protection: None, See Surgical Hx   Social History Narrative    Lives at home with 4 kids and     She buys and cooks all the food     Social Determinants of Health     Financial Resource Strain: Low Risk  (5/17/2024)    Overall Financial Resource Strain (CARDIA)     Difficulty of Paying Living Expenses: Not hard at all   Food Insecurity: Patient Declined (5/17/2024)    Hunger Vital Sign     Worried About Running Out of Food in the Last Year: Patient declined     Ran Out of Food in the Last Year: Patient declined   Transportation Needs: No Transportation Needs (1/31/2021)    PRAPARE - Transportation     Lack of Transportation (Medical): No     Lack of Transportation " (Non-Medical): No   Physical Activity: Inactive (5/17/2024)    Exercise Vital Sign     Days of Exercise per Week: 0 days     Minutes of Exercise per Session: 0 min   Stress: No Stress Concern Present (5/17/2024)    [employer] [place] [employer] - Occupational Stress Questionnaire     Feeling of Stress : Not at all   Housing Stability: Unknown (5/17/2024)    Housing Stability Vital Sign     Unable to Pay for Housing in the Last Year: No         PROCEDURE NOTE     Procedure for 3 dimensional nipple tattooing was discussed in detail with the patient as were the risks and possible complications. She understands that the risks include but are not limited to bleeding, scarring, infection, pain, asymmetry, allergic reaction, failure to take and need for second stage tattoo. She understands that greater than 50% of patients require a second stage tattoo procedure for better saturation and 3 dimensional shading. After all questions were answered, the patient signed the consent form and that will be scanned into her medical record.     ~4.2 cm nipple areolar complexes were hand drawn on both breast (8.4 cm total). The patient visualized placement in exam room mirror and agreed to proceed with placement. Colors for nipple and areola were mixed and tested on skin of breast, patient agreed to proceed with colors tested. The breasts were then prepped with chlora prep. The nipple areola complexes were then tattooed using 3 dimensional technique. There was positive punctate bleeding noted. Breast were cleaned and a tegaderm was applied to tattoo.   Patient tolerated the procedure well. There were no complications.     Post op instructions and care were discussed in detail with the patient. Tegaderm to remain in place for 3 days then removed and washed with soap and water then apply AquaPhor or Eucerin to tattoos as needed for moisturizer twice daily. Patient voiced understanding. A written copy of post op care was also  provided. All questions were answered. She will return to clinic in 4 weeks.     The patient was advised to call the clinic with any questions or concerns prior to their next visit.       Graciela Burgos PA-C  Breast Surgery

## 2024-06-05 ENCOUNTER — PATIENT MESSAGE (OUTPATIENT)
Dept: SURGERY | Facility: CLINIC | Age: 39
End: 2024-06-05
Payer: COMMERCIAL

## 2024-06-06 ENCOUNTER — INFUSION (OUTPATIENT)
Dept: INFUSION THERAPY | Facility: HOSPITAL | Age: 39
End: 2024-06-06
Attending: NURSE PRACTITIONER
Payer: COMMERCIAL

## 2024-06-06 VITALS
BODY MASS INDEX: 20.92 KG/M2 | OXYGEN SATURATION: 100 % | TEMPERATURE: 98 F | WEIGHT: 138 LBS | HEART RATE: 86 BPM | HEIGHT: 68 IN | RESPIRATION RATE: 17 BRPM | DIASTOLIC BLOOD PRESSURE: 56 MMHG | SYSTOLIC BLOOD PRESSURE: 113 MMHG

## 2024-06-06 DIAGNOSIS — Z98.84 S/P BARIATRIC SURGERY: ICD-10-CM

## 2024-06-06 DIAGNOSIS — E86.0 DEHYDRATION: Primary | ICD-10-CM

## 2024-06-06 PROCEDURE — 63600175 PHARM REV CODE 636 W HCPCS: Performed by: OBSTETRICS & GYNECOLOGY

## 2024-06-06 PROCEDURE — 25000003 PHARM REV CODE 250: Performed by: OBSTETRICS & GYNECOLOGY

## 2024-06-06 RX ORDER — FOLIC ACID 5 MG/ML
1 INJECTION, SOLUTION INTRAMUSCULAR; INTRAVENOUS; SUBCUTANEOUS DAILY
Qty: 0.2 ML | Refills: 0 | Status: SHIPPED | OUTPATIENT
Start: 2024-06-06

## 2024-06-06 RX ORDER — THIAMINE HYDROCHLORIDE 100 MG/ML
100 INJECTION, SOLUTION INTRAMUSCULAR; INTRAVENOUS ONCE
Qty: 1 ML | Refills: 0 | Status: SHIPPED | OUTPATIENT
Start: 2024-06-06 | End: 2024-06-06

## 2024-06-06 RX ADMIN — THIAMINE HYDROCHLORIDE: 100 INJECTION, SOLUTION INTRAMUSCULAR; INTRAVENOUS at 08:06

## 2024-06-25 ENCOUNTER — PATIENT MESSAGE (OUTPATIENT)
Dept: SURGERY | Facility: CLINIC | Age: 39
End: 2024-06-25
Payer: COMMERCIAL

## 2024-07-03 ENCOUNTER — OFFICE VISIT (OUTPATIENT)
Dept: SURGERY | Facility: CLINIC | Age: 39
End: 2024-07-03
Payer: COMMERCIAL

## 2024-07-03 VITALS
SYSTOLIC BLOOD PRESSURE: 108 MMHG | HEART RATE: 68 BPM | DIASTOLIC BLOOD PRESSURE: 73 MMHG | BODY MASS INDEX: 21.67 KG/M2 | WEIGHT: 143 LBS | OXYGEN SATURATION: 100 % | HEIGHT: 68 IN

## 2024-07-03 DIAGNOSIS — Z42.8 ENCOUNTER FOR OTHER PLASTIC AND RECONSTRUCTIVE SURGERY FOLLOWING MEDICAL PROCEDURE OR HEALED INJURY: Primary | ICD-10-CM

## 2024-07-03 DIAGNOSIS — L81.8 TATTOOS: ICD-10-CM

## 2024-07-03 DIAGNOSIS — Z15.09 BRCA2 GENE MUTATION POSITIVE: ICD-10-CM

## 2024-07-03 DIAGNOSIS — Z15.01 BRCA2 GENE MUTATION POSITIVE: ICD-10-CM

## 2024-07-03 PROCEDURE — 99999 PR PBB SHADOW E&M-EST. PATIENT-LVL III: CPT | Mod: PBBFAC,,, | Performed by: PHYSICIAN ASSISTANT

## 2024-07-03 PROCEDURE — 99024 POSTOP FOLLOW-UP VISIT: CPT | Mod: S$GLB,,, | Performed by: PHYSICIAN ASSISTANT

## 2024-07-03 NOTE — PROGRESS NOTES
"[facility]  Department of Surgery       No chief complaint on file.        Subjective:      Allyson Bahena is a 39 y.o.  female who presents to the Breast Surgery Clinic on 7/3/2024 for follow up visit status post bilateral 3D nipple areola tattoo on 5/29/24. Denies fever, chills, nausea, vomiting, or other systemic signs of infection. She is very pleased with the aesthetic outcome of her breast reconstruction and nipple areola tattoo(s).    Review of patient's allergies indicates:   Allergen Reactions    Oxycodone-acetaminophen Anaphylaxis     Facial flushing and throat burning . Patient states she can take Lortab    Adhesive Hives and Itching     Steri-Strip    Neosporin [benzalkonium chloride] Hives    Cefazolin Rash     Other reaction(s): red skin    Zofran [ondansetron hcl] Nausea And Vomiting       Current Outpatient Medications on File Prior to Visit   Medication Sig Dispense Refill    blood-glucose sensor (FREESTYLE TRESA 3 SENSOR) Xenia 1 each by Misc.(Non-Drug; Combo Route) route every 14 (fourteen) days. 2 each 11    buPROPion (WELLBUTRIN XL) 300 MG 24 hr tablet TAKE ONE TABLET BY MOUTH EVERY MORNING "happy new year" 30 tablet 10    cyanocobalamin 1,000 mcg/mL injection Inject 1 mL (1,000 mcg total) into the skin every 14 (fourteen) days. 1 mL 11    LUIS ANTONIO 0.1 mg/24 hr PTSW APPLY ONE PATCH TOPICALLY TWICE A WEEK "happy new year" 8 patch 11    estradioL (IMVEXXY MAINTENANCE PACK) 10 mcg Inst Place 10 mcg vaginally every evening. 30 each 11    estradioL (VIVELLE-DOT) 0.1 mg/24 hr PTSW Place 1 patch onto the skin twice a week. 8 patch 11    folic acid 5 mg/mL injection Inject 0.2 mLs (1 mg total) into the vein once daily. 0.2 mL 0    methen-m.blue-s.phos-phsal-hyo (URIBEL) 118-10-40.8-36 mg Cap Take 1 capsule by mouth 4 (four) times daily. 30 capsule 0    metoclopramide HCl (REGLAN) 10 MG tablet       multivitamin capsule Take 1 capsule by mouth once daily.      multivitamin with minerals " "(HAIR,SKIN AND NAILS ORAL) Take by mouth.      pantoprazole (PROTONIX) 40 MG tablet TAKE ONE TABLET BY MOUTH EVERY DAY "THANK YOU"      progesterone (PROMETRIUM) 200 MG capsule Place 1 capsule (200 mg total) vaginally nightly. 30 capsule 11    progesterone (PROMETRIUM) 200 MG capsule Take 1 capsule (200 mg total) by mouth nightly. 30 capsule 11    promethazine (PHENERGAN) 25 MG tablet Take 25 mg by mouth every 6 (six) hours as needed.       Current Facility-Administered Medications on File Prior to Visit   Medication Dose Route Frequency Provider Last Rate Last Admin    0.9%  NaCl infusion   Intravenous Once Helen Dong PA-C           [unfilled]    Social History     Socioeconomic History    Marital status:    Tobacco Use    Smoking status: Never    Smokeless tobacco: Never   Substance and Sexual Activity    Alcohol use: Yes     Comment: Occasional    Drug use: No    Sexual activity: Yes     Partners: Male     Birth control/protection: None, See Surgical Hx   Social History Narrative    Lives at home with 4 kids and     She buys and cooks all the food     Social Determinants of Health     Financial Resource Strain: Low Risk  (5/17/2024)    Overall Financial Resource Strain (CARDIA)     Difficulty of Paying Living Expenses: Not hard at all   Food Insecurity: Patient Declined (5/17/2024)    Hunger Vital Sign     Worried About Running Out of Food in the Last Year: Patient declined     Ran Out of Food in the Last Year: Patient declined   Transportation Needs: No Transportation Needs (1/31/2021)    PRAPARE - Transportation     Lack of Transportation (Medical): No     Lack of Transportation (Non-Medical): No   Physical Activity: Inactive (5/17/2024)    Exercise Vital Sign     Days of Exercise per Week: 0 days     Minutes of Exercise per Session: 0 min   Stress: No Stress Concern Present (5/17/2024)    Citizen of [place] [employer] - Occupational Stress Questionnaire     Feeling of Stress : " Not at all   Housing Stability: Unknown (5/17/2024)    Housing Stability Vital Sign     Unable to Pay for Housing in the Last Year: No         Review of Systems: Denies any cough, chest pain or shortness of breath.  Denies any fever or chills.  See HPI/ Interval History for other systems reviewed.        Objective:     Physical Exam:  Vitals:    07/03/24 1409   BP: 108/73   Pulse: 68       WD WN NAD  VSS  Normal resp effort  R breast - incisions healed, nipple areola tattoo well healed with some pigment loss, no erythema/drainage  L breast - incisions healed, nipple areola tattoo well healed with some pigment loss, no erythema/drainage        Assessment:       S/p bilateral  3D areola tattoos for breast reconstruction.     Plan:   39 y.o. female status post bilateral 3D nipple areola tattoo  - Doing well, no issues. Pleased with outcome.   - both nipple areola tattoo(s) well healed with good saturation although she does have some pigment loss. Will  need second stage tattoo procedure at this time.   - Second stage performed today.       All questions were answered. The patient was advised to call the clinic with any questions or concerns prior to their next visit.

## 2024-07-24 ENCOUNTER — PATIENT MESSAGE (OUTPATIENT)
Dept: SURGERY | Facility: CLINIC | Age: 39
End: 2024-07-24
Payer: COMMERCIAL

## 2024-08-21 ENCOUNTER — PROCEDURE VISIT (OUTPATIENT)
Dept: SURGERY | Facility: CLINIC | Age: 39
End: 2024-08-21
Payer: COMMERCIAL

## 2024-08-21 ENCOUNTER — PATIENT MESSAGE (OUTPATIENT)
Dept: SURGERY | Facility: CLINIC | Age: 39
End: 2024-08-21

## 2024-08-21 VITALS — WEIGHT: 145 LBS | HEIGHT: 66 IN | BODY MASS INDEX: 23.3 KG/M2

## 2024-08-21 DIAGNOSIS — Z90.13 S/P MASTECTOMY, BILATERAL: ICD-10-CM

## 2024-08-21 DIAGNOSIS — Z09 POSTOP CHECK: ICD-10-CM

## 2024-08-21 DIAGNOSIS — Z85.3 PERSONAL HISTORY OF BREAST CANCER: Primary | ICD-10-CM

## 2024-08-21 DIAGNOSIS — L81.8 TATTOOS: ICD-10-CM

## 2024-08-23 NOTE — PROCEDURES
"[facility]  Department of Surgery     Nipple Tattoo Touch Up      Subjective:      Allyson Bahena is a 39 y.o.  female who presents to the Breast Surgery Clinic on 8/21/2024 for follow up visit status post bilateral 3D nipple areola tattoo on 5/29/24. Denies fever, chills, nausea, vomiting, or other systemic signs of infection. She is very pleased with the aesthetic outcome of her breast reconstruction and nipple areola tattoo(s).    Review of patient's allergies indicates:   Allergen Reactions    Oxycodone-acetaminophen Anaphylaxis     Facial flushing and throat burning . Patient states she can take Lortab    Adhesive Hives and Itching     Steri-Strip    Neosporin [benzalkonium chloride] Hives    Cefazolin Rash     Other reaction(s): red skin    Zofran [ondansetron hcl] Nausea And Vomiting       Current Outpatient Medications on File Prior to Visit   Medication Sig Dispense Refill    blood-glucose sensor (FREESTYLE TRESA 3 SENSOR) Xenia 1 each by Misc.(Non-Drug; Combo Route) route every 14 (fourteen) days. 2 each 11    buPROPion (WELLBUTRIN XL) 300 MG 24 hr tablet TAKE ONE TABLET BY MOUTH EVERY MORNING "happy new year" 30 tablet 10    cyanocobalamin 1,000 mcg/mL injection Inject 1 mL (1,000 mcg total) into the skin every 14 (fourteen) days. 1 mL 11    LIUS ANTONIO 0.1 mg/24 hr PTSW APPLY ONE PATCH TOPICALLY TWICE A WEEK "happy new year" 8 patch 11    estradioL (IMVEXXY MAINTENANCE PACK) 10 mcg Inst Place 10 mcg vaginally every evening. 30 each 11    estradioL (VIVELLE-DOT) 0.1 mg/24 hr PTSW Place 1 patch onto the skin twice a week. 8 patch 11    folic acid 5 mg/mL injection Inject 0.2 mLs (1 mg total) into the vein once daily. 0.2 mL 0    multivitamin capsule Take 1 capsule by mouth once daily.      multivitamin with minerals (HAIR,SKIN AND NAILS ORAL) Take by mouth.      pantoprazole (PROTONIX) 40 MG tablet TAKE ONE TABLET BY MOUTH EVERY DAY "THANK YOU"      progesterone (PROMETRIUM) 200 MG capsule " Take 1 capsule (200 mg total) by mouth nightly. 30 capsule 11    spironolactone (ALDACTONE) 25 MG tablet Take 1 tablet (25 mg total) by mouth once daily. 30 tablet 11     Current Facility-Administered Medications on File Prior to Visit   Medication Dose Route Frequency Provider Last Rate Last Admin    0.9%  NaCl infusion   Intravenous Once Helen Dong PA-C           [unfilled]    Social History     Socioeconomic History    Marital status:    Tobacco Use    Smoking status: Never    Smokeless tobacco: Never   Substance and Sexual Activity    Alcohol use: Yes     Comment: Occasional    Drug use: No    Sexual activity: Yes     Partners: Male     Birth control/protection: None, See Surgical Hx   Social History Narrative    Lives at home with 4 kids and     She buys and cooks all the food     Social Determinants of Health     Financial Resource Strain: Low Risk  (5/17/2024)    Overall Financial Resource Strain (CARDIA)     Difficulty of Paying Living Expenses: Not hard at all   Food Insecurity: Patient Declined (5/17/2024)    Hunger Vital Sign     Worried About Running Out of Food in the Last Year: Patient declined     Ran Out of Food in the Last Year: Patient declined   Transportation Needs: No Transportation Needs (1/31/2021)    PRAPARE - Transportation     Lack of Transportation (Medical): No     Lack of Transportation (Non-Medical): No   Physical Activity: Inactive (5/17/2024)    Exercise Vital Sign     Days of Exercise per Week: 0 days     Minutes of Exercise per Session: 0 min   Stress: No Stress Concern Present (5/17/2024)    [place] [employer] - Occupational Stress Questionnaire     Feeling of Stress : Not at all   Housing Stability: Unknown (5/17/2024)    Housing Stability Vital Sign     Unable to Pay for Housing in the Last Year: No         Review of Systems: Denies any cough, chest pain or shortness of breath.  Denies any fever or chills.  See HPI/ Interval History for  other systems reviewed.        Objective:     Physical Exam:  There were no vitals filed for this visit.      WD WN NAD  VSS  Normal resp effort  R breast - incisions healed, nipple areola tattoo well healed with some pigment loss, no erythema/drainage  L breast - incisions healed, nipple areola tattoo well healed with some pigment loss, no erythema/drainage        Assessment:       S/p bilateral  3D areola tattoos for breast reconstruction.     Plan:   39 y.o. female status post bilateral 3D nipple areola tattoo  - Doing well, no issues. Pleased with outcome.   - both nipple areola tattoo(s) well healed with good saturation although she does have some pigment loss. Will  need second stage tattoo procedure at this time.   - Additional touch up performed today.       All questions were answered. The patient was advised to call the clinic with any questions or concerns prior to their next visit.

## 2024-09-16 ENCOUNTER — TELEPHONE (OUTPATIENT)
Dept: HEMATOLOGY/ONCOLOGY | Facility: CLINIC | Age: 39
End: 2024-09-16
Payer: COMMERCIAL

## 2024-09-16 NOTE — NURSING
DAREK Murphy phoned patient regarding current HRT s/e.     Patient is taking E/P/T regimen through Sharmaine Michelle NP at [facility] in [place].     She complains of clitoromegaly, generalized edema and weight gain on current regimen consisting of Nena 0.1mg patch twice weekly, progesterone 200mg nightly and restarted Testosterone 76mg IM first dose 9/6/24. Mentions free testosterone level was 3.0 on 8/9/24. RN Jeffrey requesting lab results.     Patient has an appt 9/18/24 with Ely Michelle NP. She would like to see Dr. Garzon for HRT recommendations as she is not responding well to current regimen. Hot flashes, mood and clitoral discomfort. She has mentioned these to NP in the past but side effects did not resolve when she held Testosterone. She continues Aldactone daily.     RN will confer with Dr. Garzon for recommendations and appointment coordination, DAREK Iraheta.

## 2024-09-18 ENCOUNTER — HOSPITAL ENCOUNTER (OUTPATIENT)
Dept: RADIOLOGY | Facility: HOSPITAL | Age: 39
Discharge: HOME | End: 2024-09-18
Attending: NURSE PRACTITIONER
Payer: COMMERCIAL

## 2024-09-18 DIAGNOSIS — R10.9 RIGHT FLANK PAIN: ICD-10-CM

## 2024-10-03 PROBLEM — N94.10 DYSPAREUNIA IN FEMALE: Status: ACTIVE | Noted: 2024-10-03

## 2024-10-03 PROBLEM — N94.89 LABIAL SWELLING: Status: ACTIVE | Noted: 2024-10-03

## 2024-10-03 PROBLEM — R53.83 FATIGUE: Status: ACTIVE | Noted: 2024-10-03

## 2024-10-03 PROBLEM — R30.0 DYSURIA: Status: ACTIVE | Noted: 2024-10-03

## 2024-10-03 PROBLEM — R35.0 URINARY FREQUENCY: Status: ACTIVE | Noted: 2024-10-03

## 2024-10-03 PROBLEM — E55.9 VITAMIN D DEFICIENCY: Status: ACTIVE | Noted: 2024-10-03

## 2024-10-03 PROBLEM — R10.9 RIGHT FLANK PAIN: Status: ACTIVE | Noted: 2024-10-03

## 2024-10-03 PROBLEM — R11.0 NAUSEA: Status: ACTIVE | Noted: 2024-10-03

## 2024-10-03 PROBLEM — Z90.710 S/P HYSTERECTOMY: Status: ACTIVE | Noted: 2024-10-03

## 2024-10-17 ENCOUNTER — HOSPITAL ENCOUNTER (OUTPATIENT)
Dept: RADIOLOGY | Facility: HOSPITAL | Age: 39
Discharge: HOME | End: 2024-10-17
Attending: NURSE PRACTITIONER
Payer: COMMERCIAL

## 2024-10-17 DIAGNOSIS — R91.1 SOLITARY PULMONARY NODULE: ICD-10-CM

## 2024-10-17 PROCEDURE — 71250 CT THORAX DX C-: CPT | Mod: 26,,, | Performed by: RADIOLOGY

## 2024-10-17 PROCEDURE — 71250 CT THORAX DX C-: CPT | Mod: TC

## 2024-10-19 ENCOUNTER — OFFICE VISIT (OUTPATIENT)
Dept: URGENT CARE | Facility: CLINIC | Age: 39
End: 2024-10-19
Payer: COMMERCIAL

## 2024-10-19 VITALS
TEMPERATURE: 98 F | BODY MASS INDEX: 23.87 KG/M2 | DIASTOLIC BLOOD PRESSURE: 69 MMHG | SYSTOLIC BLOOD PRESSURE: 102 MMHG | HEART RATE: 119 BPM | OXYGEN SATURATION: 98 % | RESPIRATION RATE: 17 BRPM | WEIGHT: 148.5 LBS | HEIGHT: 66 IN

## 2024-10-19 DIAGNOSIS — J02.9 SORE THROAT: ICD-10-CM

## 2024-10-19 DIAGNOSIS — R09.81 NASAL CONGESTION: ICD-10-CM

## 2024-10-19 DIAGNOSIS — J98.8 VIRAL RESPIRATORY ILLNESS: Primary | ICD-10-CM

## 2024-10-19 DIAGNOSIS — R05.9 COUGH, UNSPECIFIED TYPE: ICD-10-CM

## 2024-10-19 DIAGNOSIS — B97.89 VIRAL RESPIRATORY ILLNESS: Primary | ICD-10-CM

## 2024-10-19 LAB
CTP QC/QA: YES
CTP QC/QA: YES
MOLECULAR STREP A: NEGATIVE
SARS-COV-2 AG RESP QL IA.RAPID: NEGATIVE

## 2024-10-19 PROCEDURE — 87651 STREP A DNA AMP PROBE: CPT | Mod: QW,,,

## 2024-10-19 PROCEDURE — 87811 SARS-COV-2 COVID19 W/OPTIC: CPT | Mod: QW,S$GLB,,

## 2024-10-19 PROCEDURE — 99213 OFFICE O/P EST LOW 20 MIN: CPT | Mod: S$GLB,,,

## 2024-10-19 RX ORDER — FLUTICASONE PROPIONATE 50 MCG
1 SPRAY, SUSPENSION (ML) NASAL DAILY
Qty: 9.9 ML | Refills: 0 | Status: SHIPPED | OUTPATIENT
Start: 2024-10-19

## 2024-10-19 RX ORDER — PROMETHAZINE HYDROCHLORIDE AND DEXTROMETHORPHAN HYDROBROMIDE 6.25; 15 MG/5ML; MG/5ML
5 SYRUP ORAL EVERY 8 HOURS PRN
Qty: 120 ML | Refills: 0 | Status: SHIPPED | OUTPATIENT
Start: 2024-10-19

## 2024-10-19 RX ORDER — LORATADINE AND PSEUDOEPHEDRINE SULFATE 5; 120 MG/1; MG/1
1 TABLET, EXTENDED RELEASE ORAL 2 TIMES DAILY
COMMUNITY
Start: 2024-10-19 | End: 2024-10-29

## 2024-10-19 RX ORDER — METHYLPREDNISOLONE 4 MG/1
TABLET ORAL
Qty: 21 EACH | Refills: 0 | Status: SHIPPED | OUTPATIENT
Start: 2024-10-19 | End: 2024-11-09

## 2024-10-19 NOTE — PROGRESS NOTES
"Subjective:      Patient ID: Allyson Bahena is a 39 y.o. female.    Vitals:  height is 5' 6" (1.676 m) and weight is 67.4 kg (148 lb 7.7 oz). Her oral temperature is 98.2 °F (36.8 °C). Her blood pressure is 102/69 and her pulse is 119 (abnormal). Her respiration is 17 and oxygen saturation is 98%.     Chief Complaint: Cough (Symptoms started on 1 day ago. Symptoms are the following: cough, sore throat, bodyache, nasal congestion,headache.daughter had strep 2 weeks ago Symptoms treated with the following: cough drop, tylenol, ibuprofen, sinus medication otc)    This is a 39 y.o. female who presents today with a chief complaint of Cough: Symptoms started on 1 day ago. Symptoms are the following: cough, sore throat, bodyache, nasal congestion,headache.daughter had strep 2 weeks ago Symptoms treated with the following: cough drop, tylenol, ibuprofen, sinus medication otc  Patient presents with:  Cough: Symptoms started on 1 day ago. Symptoms are the following: cough, sore throat, bodyache, nasal congestion,headache.daughter had strep 2 weeks ago Symptoms treated with the following: cough drop, tylenol, ibuprofen, sinus medication otc         Cough  This is a new problem. The current episode started yesterday. The problem has been gradually worsening. The problem occurs constantly. The cough is Productive of sputum. Associated symptoms include headaches, nasal congestion, postnasal drip and a sore throat. Associated symptoms comments: bodyache. Treatments tried: cough drop, tylenol, ibuprofen, sinus medication otc. The treatment provided no relief. Her past medical history is significant for asthma.       Constitution: Negative.   HENT:  Positive for congestion, postnasal drip, sinus pain, sinus pressure and sore throat.    Neck: neck negative.   Cardiovascular: Negative.    Eyes: Negative.    Respiratory:  Positive for cough.    Gastrointestinal: Negative.    Endocrine: negative.   Genitourinary: Negative.  "   Musculoskeletal: Negative.    Skin: Negative.    Allergic/Immunologic: Negative.    Neurological:  Positive for headaches.   Hematologic/Lymphatic: Negative.    Psychiatric/Behavioral: Negative.        Objective:     Physical Exam   Constitutional: She is oriented to person, place, and time. She is cooperative. She does not appear ill. No distress.   HENT:   Head: Normocephalic and atraumatic.   Ears:   Right Ear: Tympanic membrane, external ear and ear canal normal.   Left Ear: Tympanic membrane, external ear and ear canal normal.   Nose: Congestion present. Right sinus exhibits maxillary sinus tenderness and frontal sinus tenderness. Left sinus exhibits maxillary sinus tenderness and frontal sinus tenderness.   Mouth/Throat: Mucous membranes are moist. Posterior oropharyngeal erythema present.   Eyes: Conjunctivae are normal. Pupils are equal, round, and reactive to light. Extraocular movement intact   Neck: Neck supple. No neck rigidity present.   Cardiovascular: Normal rate, regular rhythm, normal heart sounds and normal pulses.   Pulmonary/Chest: Effort normal and breath sounds normal. No respiratory distress. She has no wheezes.   Abdominal: Normal appearance.   Musculoskeletal: Normal range of motion.         General: Normal range of motion.      Cervical back: She exhibits no tenderness.   Neurological: no focal deficit. She is alert, oriented to person, place, and time and at baseline.   Skin: Skin is warm and dry.   Psychiatric: Her behavior is normal. Mood, judgment and thought content normal.   Nursing note and vitals reviewed.      Assessment:     1. Viral respiratory illness    2. Sore throat    3. Nasal congestion    4. Cough, unspecified type        Plan:       Viral respiratory illness  -     fluticasone propionate (FLONASE) 50 mcg/actuation nasal spray; 1 spray (50 mcg total) by Each Nostril route once daily.  Dispense: 9.9 mL; Refill: 0  -     promethazine-dextromethorphan (PROMETHAZINE-DM)  6.25-15 mg/5 mL Syrp; Take 5 mLs by mouth every 8 (eight) hours as needed (cough).  Dispense: 120 mL; Refill: 0  -     loratadine-pseudoephedrine 5-120 mg (CLARITIN-D 12 HOUR) 5-120 mg per tablet; Take 1 tablet by mouth 2 (two) times daily. for 10 days  -     methylPREDNISolone (MEDROL DOSEPACK) 4 mg tablet; use as directed  Dispense: 21 each; Refill: 0    Sore throat  -     Cancel: SARS Coronavirus 2 Antigen, POCT Manual Read  -     Cancel: POCT Strep A, Molecular  -     POCT Strep A, Molecular  -     SARS Coronavirus 2 Antigen, POCT Manual Read  -     fluticasone propionate (FLONASE) 50 mcg/actuation nasal spray; 1 spray (50 mcg total) by Each Nostril route once daily.  Dispense: 9.9 mL; Refill: 0  -     promethazine-dextromethorphan (PROMETHAZINE-DM) 6.25-15 mg/5 mL Syrp; Take 5 mLs by mouth every 8 (eight) hours as needed (cough).  Dispense: 120 mL; Refill: 0  -     loratadine-pseudoephedrine 5-120 mg (CLARITIN-D 12 HOUR) 5-120 mg per tablet; Take 1 tablet by mouth 2 (two) times daily. for 10 days  -     methylPREDNISolone (MEDROL DOSEPACK) 4 mg tablet; use as directed  Dispense: 21 each; Refill: 0    Nasal congestion  -     fluticasone propionate (FLONASE) 50 mcg/actuation nasal spray; 1 spray (50 mcg total) by Each Nostril route once daily.  Dispense: 9.9 mL; Refill: 0  -     promethazine-dextromethorphan (PROMETHAZINE-DM) 6.25-15 mg/5 mL Syrp; Take 5 mLs by mouth every 8 (eight) hours as needed (cough).  Dispense: 120 mL; Refill: 0  -     loratadine-pseudoephedrine 5-120 mg (CLARITIN-D 12 HOUR) 5-120 mg per tablet; Take 1 tablet by mouth 2 (two) times daily. for 10 days  -     methylPREDNISolone (MEDROL DOSEPACK) 4 mg tablet; use as directed  Dispense: 21 each; Refill: 0    Cough, unspecified type  -     fluticasone propionate (FLONASE) 50 mcg/actuation nasal spray; 1 spray (50 mcg total) by Each Nostril route once daily.  Dispense: 9.9 mL; Refill: 0  -     promethazine-dextromethorphan (PROMETHAZINE-DM)  6.25-15 mg/5 mL Syrp; Take 5 mLs by mouth every 8 (eight) hours as needed (cough).  Dispense: 120 mL; Refill: 0  -     loratadine-pseudoephedrine 5-120 mg (CLARITIN-D 12 HOUR) 5-120 mg per tablet; Take 1 tablet by mouth 2 (two) times daily. for 10 days  -     methylPREDNISolone (MEDROL DOSEPACK) 4 mg tablet; use as directed  Dispense: 21 each; Refill: 0

## 2024-11-21 ENCOUNTER — PATIENT MESSAGE (OUTPATIENT)
Dept: SURGERY | Facility: CLINIC | Age: 39
End: 2024-11-21
Payer: COMMERCIAL

## 2024-12-20 ENCOUNTER — PATIENT MESSAGE (OUTPATIENT)
Dept: SURGERY | Facility: CLINIC | Age: 39
End: 2024-12-20
Payer: COMMERCIAL

## 2025-01-03 ENCOUNTER — PATIENT MESSAGE (OUTPATIENT)
Dept: SURGERY | Facility: CLINIC | Age: 40
End: 2025-01-03
Payer: COMMERCIAL

## 2025-01-08 ENCOUNTER — HOSPITAL ENCOUNTER (OUTPATIENT)
Facility: HOSPITAL | Age: 40
Discharge: HOME | End: 2025-01-09
Attending: EMERGENCY MEDICINE | Admitting: INTERNAL MEDICINE
Payer: COMMERCIAL

## 2025-01-08 DIAGNOSIS — A08.4 VIRAL GASTROENTERITIS: ICD-10-CM

## 2025-01-08 DIAGNOSIS — R07.9 CHEST PAIN: ICD-10-CM

## 2025-01-08 DIAGNOSIS — E86.0 DEHYDRATION: Primary | ICD-10-CM

## 2025-01-08 DIAGNOSIS — K52.9 GASTROENTERITIS: ICD-10-CM

## 2025-01-08 LAB
ALBUMIN SERPL BCP-MCNC: 4.4 G/DL (ref 3.5–5.2)
ALP SERPL-CCNC: 75 U/L (ref 40–150)
ALT SERPL W/O P-5'-P-CCNC: 16 U/L (ref 10–44)
ANION GAP SERPL CALC-SCNC: 14 MMOL/L (ref 8–16)
AST SERPL-CCNC: 29 U/L (ref 10–40)
BASOPHILS # BLD AUTO: 0.02 K/UL (ref 0–0.2)
BASOPHILS NFR BLD: 0.3 % (ref 0–1.9)
BILIRUB SERPL-MCNC: 0.8 MG/DL (ref 0.1–1)
BUN SERPL-MCNC: 14 MG/DL (ref 6–20)
CALCIUM SERPL-MCNC: 9.5 MG/DL (ref 8.7–10.5)
CHLORIDE SERPL-SCNC: 104 MMOL/L (ref 95–110)
CO2 SERPL-SCNC: 21 MMOL/L (ref 23–29)
CREAT SERPL-MCNC: 0.9 MG/DL (ref 0.5–1.4)
DIFFERENTIAL METHOD BLD: ABNORMAL
EOSINOPHIL # BLD AUTO: 0 K/UL (ref 0–0.5)
EOSINOPHIL NFR BLD: 0 % (ref 0–8)
ERYTHROCYTE [DISTWIDTH] IN BLOOD BY AUTOMATED COUNT: 12 % (ref 11.5–14.5)
EST. GFR  (NO RACE VARIABLE): >60 ML/MIN/1.73 M^2
GLUCOSE SERPL-MCNC: 107 MG/DL (ref 70–110)
HCT VFR BLD AUTO: 40.1 % (ref 37–48.5)
HGB BLD-MCNC: 13.9 G/DL (ref 12–16)
IMM GRANULOCYTES # BLD AUTO: 0.02 K/UL (ref 0–0.04)
IMM GRANULOCYTES NFR BLD AUTO: 0.3 % (ref 0–0.5)
INFLUENZA A, MOLECULAR: NEGATIVE
INFLUENZA B, MOLECULAR: NEGATIVE
LYMPHOCYTES # BLD AUTO: 0.3 K/UL (ref 1–4.8)
LYMPHOCYTES NFR BLD: 3.1 % (ref 18–48)
MCH RBC QN AUTO: 30.2 PG (ref 27–31)
MCHC RBC AUTO-ENTMCNC: 34.7 G/DL (ref 32–36)
MCV RBC AUTO: 87 FL (ref 82–98)
MONOCYTES # BLD AUTO: 0.3 K/UL (ref 0.3–1)
MONOCYTES NFR BLD: 4 % (ref 4–15)
NEUTROPHILS # BLD AUTO: 7.4 K/UL (ref 1.8–7.7)
NEUTROPHILS NFR BLD: 92.3 % (ref 38–73)
NRBC BLD-RTO: 0 /100 WBC
PLATELET # BLD AUTO: 250 K/UL (ref 150–450)
PMV BLD AUTO: 11 FL (ref 9.2–12.9)
POTASSIUM SERPL-SCNC: 4.8 MMOL/L (ref 3.5–5.1)
PROT SERPL-MCNC: 7.8 G/DL (ref 6–8.4)
RBC # BLD AUTO: 4.6 M/UL (ref 4–5.4)
SARS-COV-2 RDRP RESP QL NAA+PROBE: NEGATIVE
SODIUM SERPL-SCNC: 139 MMOL/L (ref 136–145)
SPECIMEN SOURCE: NORMAL
WBC # BLD AUTO: 7.98 K/UL (ref 3.9–12.7)

## 2025-01-08 PROCEDURE — 87502 INFLUENZA DNA AMP PROBE: CPT | Performed by: NURSE PRACTITIONER

## 2025-01-08 PROCEDURE — 83605 ASSAY OF LACTIC ACID: CPT | Performed by: EMERGENCY MEDICINE

## 2025-01-08 PROCEDURE — 63600175 PHARM REV CODE 636 W HCPCS: Performed by: EMERGENCY MEDICINE

## 2025-01-08 PROCEDURE — 74177 CT ABD & PELVIS W/CONTRAST: CPT | Mod: TC

## 2025-01-08 PROCEDURE — 96375 TX/PRO/DX INJ NEW DRUG ADDON: CPT

## 2025-01-08 PROCEDURE — 85025 COMPLETE CBC W/AUTO DIFF WBC: CPT | Performed by: EMERGENCY MEDICINE

## 2025-01-08 PROCEDURE — 96365 THER/PROPH/DIAG IV INF INIT: CPT

## 2025-01-08 PROCEDURE — 25000003 PHARM REV CODE 250: Performed by: EMERGENCY MEDICINE

## 2025-01-08 PROCEDURE — 25500020 PHARM REV CODE 255: Performed by: EMERGENCY MEDICINE

## 2025-01-08 PROCEDURE — 87635 SARS-COV-2 COVID-19 AMP PRB: CPT | Performed by: NURSE PRACTITIONER

## 2025-01-08 PROCEDURE — 96361 HYDRATE IV INFUSION ADD-ON: CPT

## 2025-01-08 PROCEDURE — 99285 EMERGENCY DEPT VISIT HI MDM: CPT | Mod: 25

## 2025-01-08 PROCEDURE — 80053 COMPREHEN METABOLIC PANEL: CPT | Performed by: EMERGENCY MEDICINE

## 2025-01-08 PROCEDURE — 74177 CT ABD & PELVIS W/CONTRAST: CPT | Mod: 26,,, | Performed by: RADIOLOGY

## 2025-01-08 RX ORDER — ERGOCALCIFEROL 1.25 MG/1
50000 CAPSULE ORAL
COMMUNITY

## 2025-01-08 RX ORDER — FAMOTIDINE 10 MG/ML
20 INJECTION INTRAVENOUS
Status: COMPLETED | OUTPATIENT
Start: 2025-01-08 | End: 2025-01-08

## 2025-01-08 RX ADMIN — FAMOTIDINE 20 MG: 10 INJECTION, SOLUTION INTRAVENOUS at 10:01

## 2025-01-08 RX ADMIN — IOHEXOL 75 ML: 350 INJECTION, SOLUTION INTRAVENOUS at 11:01

## 2025-01-08 RX ADMIN — PROMETHAZINE HYDROCHLORIDE 6.25 MG: 25 INJECTION INTRAMUSCULAR; INTRAVENOUS at 10:01

## 2025-01-08 RX ADMIN — SODIUM CHLORIDE 1000 ML: 9 INJECTION, SOLUTION INTRAVENOUS at 10:01

## 2025-01-09 VITALS
WEIGHT: 140.88 LBS | TEMPERATURE: 100 F | HEIGHT: 66 IN | HEART RATE: 107 BPM | SYSTOLIC BLOOD PRESSURE: 104 MMHG | RESPIRATION RATE: 20 BRPM | BODY MASS INDEX: 22.64 KG/M2 | DIASTOLIC BLOOD PRESSURE: 57 MMHG | OXYGEN SATURATION: 99 %

## 2025-01-09 PROBLEM — Z79.890 HORMONE REPLACEMENT THERAPY, POSTMENOPAUSAL: Chronic | Status: ACTIVE | Noted: 2025-01-09

## 2025-01-09 PROBLEM — A08.4 VIRAL GASTROENTERITIS: Status: ACTIVE | Noted: 2025-01-09

## 2025-01-09 PROBLEM — A08.4 VIRAL GASTROENTERITIS: Status: RESOLVED | Noted: 2025-01-09 | Resolved: 2025-01-09

## 2025-01-09 LAB
ALBUMIN SERPL BCP-MCNC: 3.6 G/DL (ref 3.5–5.2)
ALP SERPL-CCNC: 63 U/L (ref 40–150)
ALT SERPL W/O P-5'-P-CCNC: 12 U/L (ref 10–44)
ANION GAP SERPL CALC-SCNC: 11 MMOL/L (ref 8–16)
AST SERPL-CCNC: 20 U/L (ref 10–40)
BASOPHILS # BLD AUTO: 0.03 K/UL (ref 0–0.2)
BASOPHILS NFR BLD: 0.5 % (ref 0–1.9)
BILIRUB SERPL-MCNC: 0.6 MG/DL (ref 0.1–1)
BILIRUB UR QL STRIP: NEGATIVE
BUN SERPL-MCNC: 11 MG/DL (ref 6–20)
CALCIUM SERPL-MCNC: 8.3 MG/DL (ref 8.7–10.5)
CHLORIDE SERPL-SCNC: 109 MMOL/L (ref 95–110)
CLARITY UR: CLEAR
CO2 SERPL-SCNC: 19 MMOL/L (ref 23–29)
COLOR UR: YELLOW
CREAT SERPL-MCNC: 0.8 MG/DL (ref 0.5–1.4)
DIFFERENTIAL METHOD BLD: ABNORMAL
EOSINOPHIL # BLD AUTO: 0 K/UL (ref 0–0.5)
EOSINOPHIL NFR BLD: 0 % (ref 0–8)
ERYTHROCYTE [DISTWIDTH] IN BLOOD BY AUTOMATED COUNT: 12 % (ref 11.5–14.5)
EST. GFR  (NO RACE VARIABLE): >60 ML/MIN/1.73 M^2
GLUCOSE SERPL-MCNC: 105 MG/DL (ref 70–110)
GLUCOSE UR QL STRIP: NEGATIVE
HCT VFR BLD AUTO: 35.6 % (ref 37–48.5)
HGB BLD-MCNC: 12.3 G/DL (ref 12–16)
HGB UR QL STRIP: NEGATIVE
IMM GRANULOCYTES # BLD AUTO: 0.08 K/UL (ref 0–0.04)
IMM GRANULOCYTES NFR BLD AUTO: 1.4 % (ref 0–0.5)
KETONES UR QL STRIP: ABNORMAL
LACTATE SERPL-SCNC: 0.9 MMOL/L (ref 0.5–2.2)
LEUKOCYTE ESTERASE UR QL STRIP: NEGATIVE
LYMPHOCYTES # BLD AUTO: 0.3 K/UL (ref 1–4.8)
LYMPHOCYTES NFR BLD: 4.5 % (ref 18–48)
MAGNESIUM SERPL-MCNC: 1.6 MG/DL (ref 1.6–2.6)
MCH RBC QN AUTO: 30.2 PG (ref 27–31)
MCHC RBC AUTO-ENTMCNC: 34.6 G/DL (ref 32–36)
MCV RBC AUTO: 88 FL (ref 82–98)
MONOCYTES # BLD AUTO: 0.2 K/UL (ref 0.3–1)
MONOCYTES NFR BLD: 3.6 % (ref 4–15)
NEUTROPHILS # BLD AUTO: 5 K/UL (ref 1.8–7.7)
NEUTROPHILS NFR BLD: 90 % (ref 38–73)
NITRITE UR QL STRIP: NEGATIVE
NRBC BLD-RTO: 0 /100 WBC
PH UR STRIP: 7 [PH] (ref 5–8)
PHOSPHATE SERPL-MCNC: 2.8 MG/DL (ref 2.7–4.5)
PLATELET # BLD AUTO: 208 K/UL (ref 150–450)
PMV BLD AUTO: 10.6 FL (ref 9.2–12.9)
POTASSIUM SERPL-SCNC: 3.8 MMOL/L (ref 3.5–5.1)
PROT SERPL-MCNC: 6.1 G/DL (ref 6–8.4)
PROT UR QL STRIP: NEGATIVE
RBC # BLD AUTO: 4.07 M/UL (ref 4–5.4)
RV AG STL QL IA.RAPID: NEGATIVE
SODIUM SERPL-SCNC: 139 MMOL/L (ref 136–145)
SP GR UR STRIP: 1.01 (ref 1–1.03)
URN SPEC COLLECT METH UR: ABNORMAL
UROBILINOGEN UR STRIP-ACNC: NEGATIVE EU/DL
WBC # BLD AUTO: 5.53 K/UL (ref 3.9–12.7)
WBC #/AREA STL HPF: ABNORMAL /[HPF]

## 2025-01-09 PROCEDURE — 96361 HYDRATE IV INFUSION ADD-ON: CPT

## 2025-01-09 PROCEDURE — 83735 ASSAY OF MAGNESIUM: CPT | Performed by: INTERNAL MEDICINE

## 2025-01-09 PROCEDURE — 99223 1ST HOSP IP/OBS HIGH 75: CPT | Mod: AI,GT,, | Performed by: INTERNAL MEDICINE

## 2025-01-09 PROCEDURE — 87449 NOS EACH ORGANISM AG IA: CPT | Mod: 59 | Performed by: INTERNAL MEDICINE

## 2025-01-09 PROCEDURE — 96375 TX/PRO/DX INJ NEW DRUG ADDON: CPT

## 2025-01-09 PROCEDURE — 85025 COMPLETE CBC W/AUTO DIFF WBC: CPT | Performed by: INTERNAL MEDICINE

## 2025-01-09 PROCEDURE — G0378 HOSPITAL OBSERVATION PER HR: HCPCS

## 2025-01-09 PROCEDURE — 63600175 PHARM REV CODE 636 W HCPCS: Performed by: EMERGENCY MEDICINE

## 2025-01-09 PROCEDURE — 87425 ROTAVIRUS AG IA: CPT | Performed by: INTERNAL MEDICINE

## 2025-01-09 PROCEDURE — 87427 SHIGA-LIKE TOXIN AG IA: CPT | Performed by: INTERNAL MEDICINE

## 2025-01-09 PROCEDURE — 81003 URINALYSIS AUTO W/O SCOPE: CPT | Performed by: EMERGENCY MEDICINE

## 2025-01-09 PROCEDURE — 84100 ASSAY OF PHOSPHORUS: CPT | Performed by: INTERNAL MEDICINE

## 2025-01-09 PROCEDURE — 89055 LEUKOCYTE ASSESSMENT FECAL: CPT | Performed by: INTERNAL MEDICINE

## 2025-01-09 PROCEDURE — 25000003 PHARM REV CODE 250: Performed by: EMERGENCY MEDICINE

## 2025-01-09 PROCEDURE — 87045 FECES CULTURE AEROBIC BACT: CPT | Performed by: INTERNAL MEDICINE

## 2025-01-09 PROCEDURE — 96365 THER/PROPH/DIAG IV INF INIT: CPT | Mod: 59

## 2025-01-09 PROCEDURE — 87798 DETECT AGENT NOS DNA AMP: CPT | Performed by: INTERNAL MEDICINE

## 2025-01-09 PROCEDURE — 87040 BLOOD CULTURE FOR BACTERIA: CPT | Performed by: EMERGENCY MEDICINE

## 2025-01-09 PROCEDURE — 36415 COLL VENOUS BLD VENIPUNCTURE: CPT | Performed by: EMERGENCY MEDICINE

## 2025-01-09 PROCEDURE — 87046 STOOL CULTR AEROBIC BACT EA: CPT | Performed by: INTERNAL MEDICINE

## 2025-01-09 PROCEDURE — 63600175 PHARM REV CODE 636 W HCPCS: Performed by: INTERNAL MEDICINE

## 2025-01-09 PROCEDURE — 99239 HOSP IP/OBS DSCHRG MGMT >30: CPT | Mod: ,,, | Performed by: INTERNAL MEDICINE

## 2025-01-09 PROCEDURE — 80053 COMPREHEN METABOLIC PANEL: CPT | Performed by: INTERNAL MEDICINE

## 2025-01-09 PROCEDURE — 25000003 PHARM REV CODE 250: Performed by: INTERNAL MEDICINE

## 2025-01-09 RX ORDER — CYANOCOBALAMIN 1000 UG/ML
1000 INJECTION, SOLUTION INTRAMUSCULAR; SUBCUTANEOUS
Status: DISCONTINUED | OUTPATIENT
Start: 2025-01-09 | End: 2025-01-09 | Stop reason: HOSPADM

## 2025-01-09 RX ORDER — DEXTROSE MONOHYDRATE AND SODIUM CHLORIDE 5; .9 G/100ML; G/100ML
INJECTION, SOLUTION INTRAVENOUS CONTINUOUS
Status: ACTIVE | OUTPATIENT
Start: 2025-01-09 | End: 2025-01-09

## 2025-01-09 RX ORDER — SODIUM CHLORIDE 0.9 % (FLUSH) 0.9 %
10 SYRINGE (ML) INJECTION EVERY 12 HOURS PRN
Status: DISCONTINUED | OUTPATIENT
Start: 2025-01-09 | End: 2025-01-09 | Stop reason: HOSPADM

## 2025-01-09 RX ORDER — SODIUM,POTASSIUM PHOSPHATES 280-250MG
2 POWDER IN PACKET (EA) ORAL
Status: DISCONTINUED | OUTPATIENT
Start: 2025-01-09 | End: 2025-01-09 | Stop reason: HOSPADM

## 2025-01-09 RX ORDER — METRONIDAZOLE 500 MG/100ML
500 INJECTION, SOLUTION INTRAVENOUS
Status: DISCONTINUED | OUTPATIENT
Start: 2025-01-09 | End: 2025-01-09

## 2025-01-09 RX ORDER — LANOLIN ALCOHOL/MO/W.PET/CERES
800 CREAM (GRAM) TOPICAL
Status: DISCONTINUED | OUTPATIENT
Start: 2025-01-09 | End: 2025-01-09 | Stop reason: HOSPADM

## 2025-01-09 RX ORDER — BUPROPION HYDROCHLORIDE 100 MG/1
100 TABLET ORAL 3 TIMES DAILY
Status: DISCONTINUED | OUTPATIENT
Start: 2025-01-09 | End: 2025-01-09 | Stop reason: HOSPADM

## 2025-01-09 RX ORDER — IBUPROFEN 200 MG
16 TABLET ORAL
Status: DISCONTINUED | OUTPATIENT
Start: 2025-01-09 | End: 2025-01-09 | Stop reason: HOSPADM

## 2025-01-09 RX ORDER — METOPROLOL SUCCINATE 25 MG/1
25 TABLET, EXTENDED RELEASE ORAL DAILY
Status: DISCONTINUED | OUTPATIENT
Start: 2025-01-09 | End: 2025-01-09

## 2025-01-09 RX ORDER — HYOSCYAMINE SULFATE 0.12 MG/1
0.12 TABLET SUBLINGUAL EVERY 4 HOURS PRN
Status: DISCONTINUED | OUTPATIENT
Start: 2025-01-09 | End: 2025-01-09 | Stop reason: HOSPADM

## 2025-01-09 RX ORDER — IBUPROFEN 200 MG
24 TABLET ORAL
Status: DISCONTINUED | OUTPATIENT
Start: 2025-01-09 | End: 2025-01-09 | Stop reason: HOSPADM

## 2025-01-09 RX ORDER — ALUMINUM HYDROXIDE, MAGNESIUM HYDROXIDE, AND SIMETHICONE 1200; 120; 1200 MG/30ML; MG/30ML; MG/30ML
30 SUSPENSION ORAL 4 TIMES DAILY PRN
Status: DISCONTINUED | OUTPATIENT
Start: 2025-01-09 | End: 2025-01-09 | Stop reason: HOSPADM

## 2025-01-09 RX ORDER — PANTOPRAZOLE SODIUM 40 MG/1
40 TABLET, DELAYED RELEASE ORAL DAILY
Status: DISCONTINUED | OUTPATIENT
Start: 2025-01-09 | End: 2025-01-09 | Stop reason: HOSPADM

## 2025-01-09 RX ORDER — ACETAMINOPHEN 325 MG/1
650 TABLET ORAL
Status: COMPLETED | OUTPATIENT
Start: 2025-01-09 | End: 2025-01-09

## 2025-01-09 RX ORDER — NALOXONE HCL 0.4 MG/ML
0.02 VIAL (ML) INJECTION
Status: DISCONTINUED | OUTPATIENT
Start: 2025-01-09 | End: 2025-01-09 | Stop reason: HOSPADM

## 2025-01-09 RX ORDER — HYDROCODONE BITARTRATE AND ACETAMINOPHEN 5; 325 MG/1; MG/1
1 TABLET ORAL EVERY 6 HOURS PRN
Status: DISCONTINUED | OUTPATIENT
Start: 2025-01-09 | End: 2025-01-09 | Stop reason: HOSPADM

## 2025-01-09 RX ORDER — ACETAMINOPHEN 325 MG/1
650 TABLET ORAL EVERY 4 HOURS PRN
Status: DISCONTINUED | OUTPATIENT
Start: 2025-01-09 | End: 2025-01-09 | Stop reason: HOSPADM

## 2025-01-09 RX ORDER — GLUCAGON 1 MG
1 KIT INJECTION
Status: DISCONTINUED | OUTPATIENT
Start: 2025-01-09 | End: 2025-01-09 | Stop reason: HOSPADM

## 2025-01-09 RX ORDER — LOPERAMIDE HYDROCHLORIDE 2 MG/1
2 CAPSULE ORAL 4 TIMES DAILY PRN
Status: DISCONTINUED | OUTPATIENT
Start: 2025-01-09 | End: 2025-01-09 | Stop reason: HOSPADM

## 2025-01-09 RX ORDER — CHOLECALCIFEROL (VITAMIN D3) 10 MCG
1200 TABLET ORAL DAILY
Status: DISCONTINUED | OUTPATIENT
Start: 2025-01-09 | End: 2025-01-09 | Stop reason: HOSPADM

## 2025-01-09 RX ORDER — TALC
6 POWDER (GRAM) TOPICAL NIGHTLY PRN
Status: DISCONTINUED | OUTPATIENT
Start: 2025-01-09 | End: 2025-01-09 | Stop reason: HOSPADM

## 2025-01-09 RX ORDER — SODIUM CHLORIDE 9 MG/ML
INJECTION, SOLUTION INTRAVENOUS CONTINUOUS
Status: DISCONTINUED | OUTPATIENT
Start: 2025-01-09 | End: 2025-01-09 | Stop reason: HOSPADM

## 2025-01-09 RX ORDER — PROCHLORPERAZINE EDISYLATE 5 MG/ML
5 INJECTION INTRAMUSCULAR; INTRAVENOUS EVERY 6 HOURS PRN
Status: DISCONTINUED | OUTPATIENT
Start: 2025-01-09 | End: 2025-01-09 | Stop reason: HOSPADM

## 2025-01-09 RX ORDER — CEFTRIAXONE 1 G/1
1 INJECTION, POWDER, FOR SOLUTION INTRAMUSCULAR; INTRAVENOUS
Status: COMPLETED | OUTPATIENT
Start: 2025-01-09 | End: 2025-01-09

## 2025-01-09 RX ORDER — SIMETHICONE 80 MG
1 TABLET,CHEWABLE ORAL 4 TIMES DAILY PRN
Status: DISCONTINUED | OUTPATIENT
Start: 2025-01-09 | End: 2025-01-09 | Stop reason: HOSPADM

## 2025-01-09 RX ADMIN — METRONIDAZOLE 500 MG: 500 INJECTION, SOLUTION INTRAVENOUS at 12:01

## 2025-01-09 RX ADMIN — PROCHLORPERAZINE EDISYLATE 5 MG: 5 INJECTION INTRAMUSCULAR; INTRAVENOUS at 03:01

## 2025-01-09 RX ADMIN — DEXTROSE AND SODIUM CHLORIDE: 5; 900 INJECTION, SOLUTION INTRAVENOUS at 02:01

## 2025-01-09 RX ADMIN — HYDROCODONE BITARTRATE AND ACETAMINOPHEN 1 TABLET: 5; 325 TABLET ORAL at 03:01

## 2025-01-09 RX ADMIN — MAGNESIUM OXIDE 800 MG: 400 TABLET ORAL at 08:01

## 2025-01-09 RX ADMIN — SODIUM CHLORIDE, POTASSIUM CHLORIDE, SODIUM LACTATE AND CALCIUM CHLORIDE 1000 ML: 600; 310; 30; 20 INJECTION, SOLUTION INTRAVENOUS at 04:01

## 2025-01-09 RX ADMIN — CEFTRIAXONE SODIUM 1 G: 1 INJECTION, POWDER, FOR SOLUTION INTRAMUSCULAR; INTRAVENOUS at 12:01

## 2025-01-09 RX ADMIN — ACETAMINOPHEN 650 MG: 325 TABLET ORAL at 12:01

## 2025-01-09 RX ADMIN — ACETAMINOPHEN 650 MG: 325 TABLET ORAL at 08:01

## 2025-01-09 RX ADMIN — SODIUM CHLORIDE 1000 ML: 9 INJECTION, SOLUTION INTRAVENOUS at 12:01

## 2025-01-09 NOTE — DISCHARGE SUMMARY
"[facility]  Discharge Summary      Patient Name: Allyson Bahena  MRN: [id number]  RAQUEL: [id number]  Patient Class: OP- Observation  Admission Date: 1/8/2025  Hospital Length of Stay: 0 days  Discharge Date and Time:  01/09/2025 1:55 PM  Attending Physician: Hernando Martinez MD   Discharging Provider: ZURITA MD  Primary Care Provider: No primary care provider on file.    Primary Care Team: Networked reference to record PCT     HPI:   Ms. Bahena is a 38yo lady with a past medical history of childhood asthma, DM2, GERD, gastric sleeve x 3 yrs, depression, and BRCA2 positivity.    She now comes to the ED with flu-like symptoms and N/V/D.  She tells me that all 5 of her children and she came down with the symptoms at once and have been having vomiting and diarrhea x 1-2 days.  She reports cramping and twisting abdominal discomfort.  She has had fever and chills, but denies blood in her stool.  The N/V started first yesterday morning and has not abated, then the diarrhea started today x 4 episodes only.  She has not been on any antibiotics recently.  She came in as she was concerned with her gastric sleeve with all the N/V.  Currently all symptoms have resolved except nausea.    In the ED her VS were /69 -> 107/66   Pulse (!) 136 -> 120   Temp max 101.2F -> 99.8 °F (37.7 °C)   Resp 20   Ht 5' 6" (1.676 m)   Wt 58.1 kg (128 lb)   LMP 02/11/2019 (Exact Date)   SpO2 100%   BMI 20.66 kg/m².  Labs showed WBC 8, Cr 0.9, NML LFTs, LA 0.9, FLU/COVID NEGATIVE, UA NML.    CT abdomen and pelvis with IV contrast showed prominent fluid-filled loops of small bowel with liquid stool throughout the colon.  Findings are suggestive of a nonspecific gastroenteritis/diarrheal illness.  Remote postoperative change of the stomach.  Cholecystectomy.  Hysterectomy.    In the ED she was treated with:  Medications   metronidazole IVPB 500 mg (500 mg Intravenous New Bag 1/9/25 0039) "   sodium chloride 0.9% bolus 1,000 mL 1,000 mL (1,000 mLs Intravenous New Bag 1/9/25 0046)   sodium chloride 0.9% bolus 1,000 mL 1,000 mL (0 mLs Intravenous Stopped 1/9/25 0040)   promethazine (PHENERGAN) 6.25 mg in 0.9% NaCl 50 mL IVPB (0 mg Intravenous Stopped 1/8/25 2258)   famotidine (PF) injection 20 mg (20 mg Intravenous Given 1/8/25 2239)   iohexoL (OMNIPAQUE 350) injection 75 mL (75 mLs Intravenous Given 1/8/25 2329)   cefTRIAXone injection 1 g (1 g Intravenous Given 1/9/25 0047)   acetaminophen tablet 650 mg (650 mg Oral Given 1/9/25 0037)             * No surgery found *      Hospital Course:   Pt is a 40yo lady with a past medical history of childhood asthma, DM2, GERD, gastric sleeve x 3 yrs, depression, and BRCA2 positivity. She now comes to the ED with flu-like symptoms and N/V/D.  She tells me that all 5 of her children and she came down with the symptoms at once and have been having vomiting and diarrhea x 1-2 days.  She reports cramping and twisting abdominal discomfort.  She has had fever and chills, but denies blood in her stool.  The N/V started first yesterday morning and has not abated, then the diarrhea started today x 4 episodes only.  She has not been on any antibiotics recently.  She came in as she was concerned with her gastric sleeve with all the N/V.  Currently all symptoms have resolved except nausea.  Pt has had a negative work up, she appears stable, her appetite is returning, and she will be discharged to home with follow up with PCP as needed. I have asked pt to stop her MVI until her symptoms have resolved.      Goals of Care Treatment Preferences:  Code Status: Full Code         Consults:     * Gastroenteritis and colitis, viral  She reports that the entire family of 5 children and she came down with viral GE symptoms at once  She has no risk factors of C.diff  CT shows changes of acute gastroenteritis  Flu-like illness with fever as well, all speak to viral cause  She got  Rocephin and Flagyl in ED, but unlikely to be bacterial, more likely viral   -Holding on further ABx for now  Check Valmora and Rota studies and stool for WBC  Allergic to Zofran       aluminum-magnesium hydroxide-simethicone 200-200-20 mg/5 mL suspension 30 mL, 30 mL, Oral, QID PRN, indigestion    HYDROcodone-acetaminophen 5-325 mg per tablet 1 tablet, 1 tablet, Oral, Q6H PRN, pain    hyoscyamine SL tablet 0.125 mg, 0.125 mg, Sublingual, Q4H PRN, abd cramping    loperamide capsule 2 mg, 2 mg, Oral, QID PRN, diarrhea    pantoprazole EC tablet 40 mg, 40 mg, Oral, Daily     prochlorperazine injection Soln 5 mg, 5 mg, Intravenous, Q6H PRN, nausea 1st line    promethazine (PHENERGAN) 12.5 mg in 0.9% NaCl 50 mL IVPB, 12.5 mg, Intravenous, Q6H PRN, nausea 2nd line    Pt's symptoms have largely resolved. She will be discharged later today when her  arrives to pick her up, until  then I will continue her IVF.     Hormone replacement therapy, postmenopausal  Pt is s/p BROOKLYN/BSO and double mastectomy  She is BRCA2 positive  She is on Progesterone and Estrogen supplements  Pt gets regular check ups and has been told that this is a safe course of treatment for her postmenopausal symptoms, which she assures me are severe.   Follow up with PCP, Gynocologist    S/P bariatric surgery  Continue home Protonix for gastric sleeve done 3 years ago  CT with no pernicious findings  Continue vitamin supplementation but hold MVI until symptoms have resolved      Dehydration  She got 2l NS in the ED  Continue IVF until discharge, increased the rate to 150 mL/hr  Follow up with PCP      Essential hypertension  Pt has essential HTN  I have asked her to stop her diuretics (Lasix and Spironolactone) until she is able to increase her PO intake  Will continue her Metoprolol for now  BP has been stable, although she has remained tachycardic. Her SBP is trending upward.     Temp:  [99.4 °F (37.4 °C)-101.2 °F (38.4 °C)] 99.6 °F (37.6 °C)  Pulse:   [107-136] 107  Resp:  [16-20] 18  SpO2:  [98 %-100 %] 99 %  BP: ()/(55-69) 104/57       metoprolol succinate (TOPROL-XL) 24 hr tablet 25 mg, 25 mg, Oral, Daily       Final Active Diagnoses:    Diagnosis Date Noted POA    PRINCIPAL PROBLEM:  Gastroenteritis and colitis, viral [A08.4] 01/09/2025 Yes    Hormone replacement therapy, postmenopausal [Z79.890] 01/09/2025 Not Applicable     Chronic    S/P bariatric surgery [Z98.84] 06/07/2023 Not Applicable    Dehydration [E86.0] 06/07/2023 Yes    Essential hypertension [I10] 07/02/2019 Yes      Problems Resolved During this Admission:       Discharged Condition: good    Disposition: Home or Self Care    Follow Up:   Follow-up Information       Sue Sutherland NP-C. Go on 1/14/2025.    Specialty: Obstetrics and Gynecology  Why: Hospital follow- up is Tuesday January 14th at 2:30PM  Contact information:  [address] [place] [address] [place] [address] [phone]                           Patient Instructions:      Diet clear liquid   Order Comments: May slowly advance as tolerated.     Activity as tolerated       Significant Diagnostic Studies: Labs: CMP   Recent Labs   Lab 01/08/25  2226 01/09/25  0245    139   K 4.8 3.8    109   CO2 21* 19*    105   BUN 14 11   CREATININE 0.9 0.8   CALCIUM 9.5 8.3*   PROT 7.8 6.1   ALBUMIN 4.4 3.6   BILITOT 0.8 0.6   ALKPHOS 75 63   AST 29 20   ALT 16 12   ANIONGAP 14 11    and CBC   Recent Labs   Lab 01/08/25  2226 01/09/25  0245   WBC 7.98 5.53   HGB 13.9 12.3   HCT 40.1 35.6*    208       Pending Diagnostic Studies:       Procedure Component Value Units Date/Time    Norovirus Group 1 & 2, Dectection by RT-PCR [[id number]] Collected: 01/09/25 0844    Order Status: Sent Lab Status: In process Updated: 01/09/25 0855    Specimen: Stool            Medications:  Reconciled Home Medications:      Medication List        CHANGE how you take these medications      * IMVEXXY MAINTENANCE PACK  "10 mcg Inst  Generic drug: estradioL  Place 10 mcg vaginally every evening.  What changed: Another medication with the same name was removed. Continue taking this medication, and follow the directions you see here.     * LUIS ANTONIO 0.1 mg/24 hr Ptsw  Generic drug: estradioL  APPLY ONE PATCH TOPICALLY TWICE A WEEK "happy new year"  What changed: Another medication with the same name was removed. Continue taking this medication, and follow the directions you see here.           * This list has 2 medication(s) that are the same as other medications prescribed for you. Read the directions carefully, and ask your doctor or other care provider to review them with you.                CONTINUE taking these medications      buPROPion 300 MG 24 hr tablet  Commonly known as: WELLBUTRIN XL  TAKE ONE TABLET BY MOUTH EVERY MORNING "happy new year"     cyanocobalamin 1,000 mcg/mL injection  Inject 1 mL (1,000 mcg total) into the skin every 14 (fourteen) days.     ergocalciferol 50,000 unit Cap  Commonly known as: ERGOCALCIFEROL  Take 50,000 Units by mouth.     fluticasone propionate 50 mcg/actuation nasal spray  Commonly known as: FLONASE  1 spray (50 mcg total) by Each Nostril route once daily.     hyoscyamine 0.375 mg Tb12  Commonly known as: Levbid  Take 1 tablet (0.375 mg total) by mouth every 12 (twelve) hours.     pantoprazole 40 MG tablet  Commonly known as: PROTONIX  TAKE ONE TABLET BY MOUTH EVERY DAY "THANK YOU"     progesterone 200 MG capsule  Commonly known as: PROMETRIUM  Take 1 capsule (200 mg total) by mouth nightly.     testosterone cypionate 200 mg/mL Kit  Inject 76 mg into the muscle every 28 days.            STOP taking these medications      folic acid 5 mg/mL injection     furosemide 20 MG tablet  Commonly known as: LASIX     HAIR,SKIN AND NAILS ORAL     LOMAIRA 8 mg Tab  Generic drug: phentermine     metoprolol succinate 25 MG 24 hr tablet  Commonly known as: TOPROL-XL     multivitamin capsule   "   promethazine-dextromethorphan 6.25-15 mg/5 mL Syrp  Commonly known as: PROMETHAZINE-DM     spironolactone 25 MG tablet  Commonly known as: ALDACTONE              Indwelling Lines/Drains at time of discharge:   Lines/Drains/Airways       None                   Time spent on the discharge of patient:  35 minutes         ZURITA MD  Department of Hospital Medicine  [facility]

## 2025-01-09 NOTE — ASSESSMENT & PLAN NOTE
Pt is s/p BROOKLYN/BSO and double mastectomy  She is BRCA2 positive  She is on Progesterone and Estrogen supplements  Pt gets regular check ups and has been told that this is a safe course of treatment for her postmenopausal symptoms, which she assures me are severe.   Follow up with PCP, Gynocologist

## 2025-01-09 NOTE — PLAN OF CARE
01/09/25 1015   Post-Acute Status   Hospital Resources/Appts/Education Provided Appointments scheduled and added to AVS     PCP appt made for F/U and added to AVS

## 2025-01-09 NOTE — PLAN OF CARE
01/09/25 1345   Final Note   Assessment Type Final Discharge Note   Anticipated Discharge Disposition Home   What phone number can be called within the next 1-3 days to see how you are doing after discharge?   ([phone])   Hospital Resources/Appts/Education Provided Appointments scheduled and added to AVS     Pt clear for DC from case management standpoint. Discharging to home spouse to transport home. No new meds. F/U made. No additional CM needs    1530 Pt  will not be able to pick her up until after 7pm and Dr. Martinez will continue to hydrate her with IV fluids until her spouse arrives

## 2025-01-09 NOTE — HPI
"Ms. Bahena is a 38yo lady with a past medical history of childhood asthma, DM2, GERD, gastric sleeve x 3 yrs, depression, and BRCA2 positivity.    She now comes to the ED with flu-like symptoms and N/V/D.  She tells me that all 5 of her children and she came down with the symptoms at once and have been having vomiting and diarrhea x 1-2 days.  She reports cramping and twisting abdominal discomfort.  She has had fever and chills, but denies blood in her stool.  The N/V started first yesterday morning and has not abated, then the diarrhea started today x 4 episodes only.  She has not been on any antibiotics recently.  She came in as she was concerned with her gastric sleeve with all the N/V.  Currently all symptoms have resolved except nausea.    In the ED her VS were /69 -> 107/66   Pulse (!) 136 -> 120   Temp max 101.2F -> 99.8 °F (37.7 °C)   Resp 20   Ht 5' 6" (1.676 m)   Wt 58.1 kg (128 lb)   LMP 02/11/2019 (Exact Date)   SpO2 100%   BMI 20.66 kg/m².  Labs showed WBC 8, Cr 0.9, NML LFTs, LA 0.9, FLU/COVID NEGATIVE, UA NML.    CT abdomen and pelvis with IV contrast showed prominent fluid-filled loops of small bowel with liquid stool throughout the colon.  Findings are suggestive of a nonspecific gastroenteritis/diarrheal illness.  Remote postoperative change of the stomach.  Cholecystectomy.  Hysterectomy.    In the ED she was treated with:  Medications   metronidazole IVPB 500 mg (500 mg Intravenous New Bag 1/9/25 0039)   sodium chloride 0.9% bolus 1,000 mL 1,000 mL (1,000 mLs Intravenous New Bag 1/9/25 0046)   sodium chloride 0.9% bolus 1,000 mL 1,000 mL (0 mLs Intravenous Stopped 1/9/25 0040)   promethazine (PHENERGAN) 6.25 mg in 0.9% NaCl 50 mL IVPB (0 mg Intravenous Stopped 1/8/25 2258)   famotidine (PF) injection 20 mg (20 mg Intravenous Given 1/8/25 2239)   iohexoL (OMNIPAQUE 350) injection 75 mL (75 mLs Intravenous Given 1/8/25 0012)   cefTRIAXone injection 1 g (1 g Intravenous Given 1/9/25 " 0047)   acetaminophen tablet 650 mg (650 mg Oral Given 1/9/25 0037)

## 2025-01-09 NOTE — ED PROVIDER NOTES
"Encounter Date: 1/8/2025       History     Chief Complaint   Patient presents with    Emesis     N/v/d that started today. Children at home with similar symptoms.     39-year-old female past history of gastric sleeve 3 years ago states that multiple family members including her children have been having nausea, vomiting and diarrhea the last few days.  She started to have chills, muscle aches started throwing up today and having diarrhea.  No blood in her vomit or stool.  She reports intense upper abdominal pain feeling like something "twisted".  She is concerned about her gastric sleeve.  No urinary symptoms.  No other exacerbating or relieving factors identified.      Review of patient's allergies indicates:   Allergen Reactions    Oxycodone-acetaminophen Anaphylaxis     Facial flushing and throat burning . Patient states she can take Lortab    Adhesive Hives and Itching     Steri-Strip    Neosporin [benzalkonium chloride] Hives    Cefazolin Rash     Other reaction(s): red skin    Zofran [ondansetron hcl] Nausea And Vomiting     Past Medical History:   Diagnosis Date    Allergy     Asthma     childhood    BRCA2 positive     Depression     Diabetes mellitus     Genetic testing 06/20/2018    BRCA2 positive, reported by [facility] from outside provider    GERD (gastroesophageal reflux disease)     Mitral valve prolapse     PONV (postoperative nausea and vomiting)     Suspected sleep apnea      Past Surgical History:   Procedure Laterality Date    APPLICATION OF WOUND VACUUM-ASSISTED CLOSURE DEVICE N/A 10/30/2020    Procedure: APPLICATION, WOUND VAC;  Surgeon: Ron Carrasco MD;  Location: [facility];  Service: General;  Laterality: N/A;    BILATERAL MASTECTOMY Bilateral 10/5/2020    Procedure: MASTECTOMY, BILATERAL;  Surgeon: Paulina Cantu MD;  Location: [facility] OR;  Service: General;  Laterality: Bilateral;    CHOLECYSTECTOMY      COLONOSCOPY      CYSTOSCOPY N/A 3/11/2019    Procedure: CYSTOSCOPY;  Surgeon: Dagoberto BELLO" MD Sarah;  Location: [facility];  Service: OB/GYN;  Laterality: N/A;    DEBRIDEMENT OF WOUND OF ABDOMEN N/A 10/30/2020    Procedure: DEBRIDEMENT, WOUND, ABDOMEN;  Surgeon: Ron Carrasco MD;  Location: [facility];  Service: General;  Laterality: N/A;    DEBRIDEMENT OF WOUND OF ABDOMEN N/A 2/3/2021    Procedure: DEBRIDEMENT, WOUND, ABDOMEN - abdominal wound debridment with placement of wound vac;  Surgeon: Ron Carrasco MD;  Location: [facility];  Service: General;  Laterality: N/A;    HYSTERECTOMY      LAPAROSCOPIC SALPINGO-OOPHORECTOMY Bilateral 3/11/2019    Procedure: SALPINGO-OOPHORECTOMY, LAPAROSCOPIC;  Surgeon: Dagoberto Smith MD;  Location: [facility];  Service: OB/GYN;  Laterality: Bilateral;    LAPAROSCOPIC TOTAL HYSTERECTOMY N/A 3/11/2019    Procedure: HYSTERECTOMY, TOTAL, LAPAROSCOPIC;  Surgeon: Dagoberto Smith MD;  Location: [facility];  Service: OB/GYN;  Laterality: N/A;    OOPHORECTOMY      RECONSTRUCTION OF BREAST WITH DEEP INFERIOR EPIGASTRIC ARTERY  (CARMEN) FLAP Bilateral 10/5/2020    Procedure: RECONSTRUCTION, BREAST, USING CARMEN SKIN FLAP;  Surgeon: Ron Carrasco MD;  Location: [facility];  Service: General;  Laterality: Bilateral;    SKIN FULL THICKNESS GRAFT Left 10/30/2020    Procedure: APPLICATION, GRAFT, SKIN, FULL-THICKNESS;  Surgeon: Ron Carrasco MD;  Location: [facility];  Service: General;  Laterality: Left;    TOTAL REDUCTION MAMMOPLASTY      TUBAL LIGATION      open    WISDOM TOOTH EXTRACTION      WOUND DEBRIDEMENT Bilateral 10/30/2020    Procedure: DEBRIDEMENT, WOUND;  Surgeon: Ron Carrasco MD;  Location: [facility];  Service: General;  Laterality: Bilateral;     Family History   Problem Relation Name Age of Onset    Diabetes Mother      Heart disease Mother      Obesity Mother      Sleep apnea Mother      Colon polyps Mother      No Known Problems Father      No Known Problems Son      Heart disease Maternal Grandmother      Diabetes Maternal Grandmother       Hypertension Maternal Grandmother      Breast cancer Maternal Grandmother  49        bilateral, second at 59    Obesity Maternal Grandfather      Melanoma Maternal Grandfather  50    No Known Problems Son      No Known Problems Son      No Known Problems Son      Colon cancer Maternal Aunt Nan. 35    Breast cancer Maternal Cousin          mom's maternal 1st cousin    Breast cancer Maternal Cousin          mom's maternal 1st cousin    Sleep apnea Brother      No Known Problems Maternal Uncle      No Known Problems Paternal Aunt      No Known Problems Paternal Uncle      No Known Problems Paternal Grandmother      Heart attack Paternal Grandfather      Colon polyps Maternal Aunt Mar.     Ovarian cancer Neg Hx       Social History     Tobacco Use    Smoking status: Never    Smokeless tobacco: Never   Substance Use Topics    Alcohol use: Yes     Comment: Occasional    Drug use: No     Review of Systems   Constitutional:  Negative for fever.   HENT:  Negative for sore throat.    Respiratory:  Negative for shortness of breath.    Cardiovascular:  Negative for chest pain.   Gastrointestinal:  Positive for abdominal pain, nausea and vomiting.   Genitourinary:  Negative for dysuria.   Musculoskeletal:  Negative for back pain.   Skin:  Negative for rash.   Neurological:  Negative for weakness.   Hematological:  Does not bruise/bleed easily.       Physical Exam     Initial Vitals [01/08/25 2107]   BP Pulse Resp Temp SpO2   129/69 (!) 134 20 99.8 °F (37.7 °C) 100 %      MAP       --         Physical Exam    Nursing note and vitals reviewed.  Constitutional: She appears well-developed and well-nourished.   HENT:   Head: Normocephalic and atraumatic.   Dry mucous membranes   Eyes: EOM are normal. Pupils are equal, round, and reactive to light.   Neck: Neck supple.   Normal range of motion.  Cardiovascular:  Regular rhythm and normal heart sounds.           Tachycardia, regular rhythm no murmur   Pulmonary/Chest: Breath sounds  normal. No respiratory distress.   Abdominal: Abdomen is soft. Bowel sounds are normal.   Mild mid abdominal tenderness.  Abdomen soft normal bowel sounds.  No guarding or rebound   Musculoskeletal:         General: Normal range of motion.      Cervical back: Normal range of motion and neck supple.     Neurological: She is alert and oriented to person, place, and time. She has normal strength. No cranial nerve deficit or sensory deficit.   Skin: Skin is warm and dry.   Psychiatric: She has a normal mood and affect.         ED Course   Procedures  Labs Reviewed   CBC W/ AUTO DIFFERENTIAL - Abnormal       Result Value    WBC 7.98      RBC 4.60      Hemoglobin 13.9      Hematocrit 40.1      MCV 87      MCH 30.2      MCHC 34.7      RDW 12.0      Platelets 250      MPV 11.0      Immature Granulocytes 0.3      Gran # (ANC) 7.4      Immature Grans (Abs) 0.02      Lymph # 0.3 (*)     Mono # 0.3      Eos # 0.0      Baso # 0.02      nRBC 0      Gran % 92.3 (*)     Lymph % 3.1 (*)     Mono % 4.0      Eosinophil % 0.0      Basophil % 0.3      Differential Method Automated     COMPREHENSIVE METABOLIC PANEL - Abnormal    Sodium 139      Potassium 4.8      Chloride 104      CO2 21 (*)     Glucose 107      BUN 14      Creatinine 0.9      Calcium 9.5      Total Protein 7.8      Albumin 4.4      Total Bilirubin 0.8      Alkaline Phosphatase 75      AST 29      ALT 16      eGFR >60.0      Anion Gap 14     URINALYSIS, REFLEX TO URINE CULTURE - Abnormal    Specimen UA Urine, Unspecified      Color, UA Yellow      Appearance, UA Clear      pH, UA 7.0      Specific Gravity, UA 1.015      Protein, UA Negative      Glucose, UA Negative      Ketones, UA 1+ (*)     Bilirubin (UA) Negative      Occult Blood UA Negative      Nitrite, UA Negative      Urobilinogen, UA Negative      Leukocytes, UA Negative      Narrative:     Preferred Collection Type->Urine, Clean Catch  Specimen Source->Urine   INFLUENZA A & B BY MOLECULAR    Influenza A,  Molecular Negative      Influenza B, Molecular Negative      Flu A & B Source Nasal Swab     CULTURE, BLOOD   CULTURE, BLOOD   CULTURE, STOOL   SARS-COV-2 RNA AMPLIFICATION, QUAL    SARS-CoV-2 RNA, Amplification, Qual Negative     LACTIC ACID, PLASMA    Lactate (Lactic Acid) 0.9     WBC, STOOL   NOROVIRUS GPOUP 1 & 2, DETECTION BY RT-PCR   ROTAVIRUS ANTIGEN, STOOL   COMPREHENSIVE METABOLIC PANEL   MAGNESIUM   PHOSPHORUS   CBC W/ AUTO DIFFERENTIAL          Imaging Results              CT Abdomen Pelvis With IV Contrast NO Oral Contrast (Final result)  Result time 01/08/25 23:48:20      Final result by Cory Williamson MD (01/08/25 23:48:20)                   Impression:      Prominent fluid-filled loops of small bowel with liquid stool throughout the colon.  Findings are suggestive of a nonspecific gastroenteritis/diarrheal illness.  Clinical correlation is advised.    Remote postoperative change of the stomach.  Cholecystectomy.  Hysterectomy.    Additional findings as above.      Electronically signed by: Cory Williamson MD  Date:    01/08/2025  Time:    23:48               Narrative:    EXAMINATION:  CT ABDOMEN PELVIS WITH IV CONTRAST    CLINICAL HISTORY:  Abdominal pain, acute, nonlocalized;    TECHNIQUE:  Low dose axial images, sagittal and coronal reformations were obtained from the lung bases to the pubic symphysis following the IV administration of 75 mL of Omnipaque 350 .  Oral contrast was not given.    COMPARISON:  CT 05/10/2024    FINDINGS:  The lung bases are unremarkable.  There is no pleural fluid present.  The visualized portions of the heart appear normal.    Liver is not significantly enlarged.  There is presumed focal fatty infiltration along the falciform ligament.  Portal vein is patent.  Gallbladder is surgically absent.  There is no intra-or extrahepatic biliary ductal dilatation.    There is postoperative change of the stomach.  The stomach is not significantly distended.  The spleen,  pancreas, and adrenal glands appear within normal limits.    Kidneys enhance symmetrically.  There are mild cortical defects noted involving the left kidney.  There is no evidence of hydronephrosis. The urinary bladder is unremarkable. Uterus is surgically absent.  No significant free fluid in the pelvis.    The abdominal aorta is normal in course and caliber without significant atherosclerotic calcifications.    There are prominent fluid-filled loops of small bowel throughout the abdomen and pelvis without discrete transition point.  There is liquid stool throughout the colon.  Findings are suggestive a nonspecific gastroenteritis/diarrheal illness.  The appendix is not definitively visualized, however no localized inflammatory change is seen at its expected location. There is no ascites, free fluid, or intraperitoneal free air. There are scattered shotty small mesenteric and retroperitoneal lymph nodes. There are remote postoperative changes of the lower anterior abdominal wall.    The visualized osseous structures appear intact.  The extraperitoneal soft tissues are unremarkable.                                       Medications   metronidazole IVPB 500 mg (0 mg Intravenous Stopped 1/9/25 0139)   buPROPion tablet 100 mg (has no administration in time range)   cyanocobalamin injection 1,000 mcg (has no administration in time range)   cholecalciferol (vitamin D3) capsule/tablet 1,200 Units (has no administration in time range)   hyoscyamine SL tablet 0.125 mg (has no administration in time range)   metoprolol succinate (TOPROL-XL) 24 hr tablet 25 mg (has no administration in time range)   multivitamin tablet (has no administration in time range)   pantoprazole EC tablet 40 mg (has no administration in time range)   sodium chloride 0.9% flush 10 mL (has no administration in time range)   naloxone 0.4 mg/mL injection 0.02 mg (has no administration in time range)   glucose chewable tablet 16 g (has no administration  in time range)   glucose chewable tablet 24 g (has no administration in time range)   dextrose 50% injection 12.5 g (has no administration in time range)   dextrose 50% injection 25 g (has no administration in time range)   glucagon (human recombinant) injection 1 mg (has no administration in time range)   potassium bicarbonate disintegrating tablet 60 mEq (has no administration in time range)   potassium bicarbonate disintegrating tablet 35 mEq (has no administration in time range)   potassium bicarbonate disintegrating tablet 50 mEq (has no administration in time range)   magnesium oxide tablet 800 mg (has no administration in time range)   magnesium oxide tablet 800 mg (has no administration in time range)   potassium, sodium phosphates 280-160-250 mg packet 2 packet (has no administration in time range)   potassium, sodium phosphates 280-160-250 mg packet 2 packet (has no administration in time range)   potassium, sodium phosphates 280-160-250 mg packet 2 packet (has no administration in time range)   acetaminophen tablet 650 mg (has no administration in time range)   HYDROcodone-acetaminophen 5-325 mg per tablet 1 tablet (has no administration in time range)   prochlorperazine injection Soln 5 mg (has no administration in time range)   promethazine (PHENERGAN) 12.5 mg in 0.9% NaCl 50 mL IVPB (has no administration in time range)   melatonin tablet 6 mg (has no administration in time range)   aluminum-magnesium hydroxide-simethicone 200-200-20 mg/5 mL suspension 30 mL (has no administration in time range)   simethicone chewable tablet 80 mg (has no administration in time range)   dextrose 5 % and 0.9 % NaCl infusion (has no administration in time range)   loperamide capsule 2 mg (has no administration in time range)   sodium chloride 0.9% bolus 1,000 mL 1,000 mL (0 mLs Intravenous Stopped 1/9/25 0040)   promethazine (PHENERGAN) 6.25 mg in 0.9% NaCl 50 mL IVPB (0 mg Intravenous Stopped 1/8/25 6628)   famotidine  (PF) injection 20 mg (20 mg Intravenous Given 1/8/25 2239)   iohexoL (OMNIPAQUE 350) injection 75 mL (75 mLs Intravenous Given 1/8/25 2329)   cefTRIAXone injection 1 g (1 g Intravenous Given 1/9/25 0047)   sodium chloride 0.9% bolus 1,000 mL 1,000 mL (0 mLs Intravenous Stopped 1/9/25 0146)   acetaminophen tablet 650 mg (650 mg Oral Given 1/9/25 0037)     Medical Decision Making  Differential diagnosis; gastroenteritis, viral syndrome, bowel obstruction, perforation, dehydration, electrolyte abnormality, anemia, sepsis  Plan; IV fluids, antiemetics, pain control, imaging, labs and reassess    Patient with normal CBC, CMP, lipase.  CT abdomen/pelvis shows fluid-filled colon, nonspecific small-bowel changes consistent with a gastroenteritis.  Abdomen is fairly benign at the bedside after IV fluids.  No further vomiting.  Patient has feels symptomatically improved but she spiked a fever to 101 and heart rate is consistently in the 130s.  2 L of normal saline ordered we will add cultures and antibiotics in case there is a bacterial component to this.  No hypotension in the ER.  Plan for admission to the hospital further treatment and evaluation.  At this time patient is stable for the floor    Amount and/or Complexity of Data Reviewed  Labs: ordered.  Radiology: ordered.    Risk  OTC drugs.  Prescription drug management.               ED Course as of 01/09/25 0240 Wed Jan 08, 2025   2351 Pulse(!): 134 [BF]      ED Course User Index  [BF] Guru Zelaya MD                           Clinical Impression:  Final diagnoses:  [E86.0] Dehydration (Primary)  [K52.9] Gastroenteritis  [A08.4] Viral gastroenteritis          ED Disposition Condition    Observation Stable                Guru Zelaya MD  01/09/25 0240

## 2025-01-09 NOTE — ASSESSMENT & PLAN NOTE
Continue home Protonix for gastric sleeve done 3 years ago  CT with no pernicious findings  Continue vitamin supplementation

## 2025-01-09 NOTE — HOSPITAL COURSE
Pt is a 38yo lady with a past medical history of childhood asthma, DM2, GERD, gastric sleeve x 3 yrs, depression, and BRCA2 positivity. She now comes to the ED with flu-like symptoms and N/V/D.  She tells me that all 5 of her children and she came down with the symptoms at once and have been having vomiting and diarrhea x 1-2 days.  She reports cramping and twisting abdominal discomfort.  She has had fever and chills, but denies blood in her stool.  The N/V started first yesterday morning and has not abated, then the diarrhea started today x 4 episodes only.  She has not been on any antibiotics recently.  She came in as she was concerned with her gastric sleeve with all the N/V.  Currently all symptoms have resolved except nausea.  Pt has had a negative work up, she appears stable, her appetite is returning, and she will be discharged to home with follow up with PCP as needed. I have asked pt to stop her MVI until her symptoms have resolved.

## 2025-01-09 NOTE — ASSESSMENT & PLAN NOTE
She got 2l NS in the ED  Continue IVF until discharge, increased the rate to 150 mL/hr  Follow up with PCP

## 2025-01-09 NOTE — SUBJECTIVE & OBJECTIVE
Past Medical History:   Diagnosis Date    Allergy     Asthma     childhood    BRCA2 positive     Depression     Diabetes mellitus     Genetic testing 06/20/2018    BRCA2 positive, reported by [facility] from outside provider    GERD (gastroesophageal reflux disease)     Mitral valve prolapse     PONV (postoperative nausea and vomiting)     Suspected sleep apnea        Past Surgical History:   Procedure Laterality Date    APPLICATION OF WOUND VACUUM-ASSISTED CLOSURE DEVICE N/A 10/30/2020    Procedure: APPLICATION, WOUND VAC;  Surgeon: Ron Carrasco MD;  Location: [facility] OR;  Service: General;  Laterality: N/A;    BILATERAL MASTECTOMY Bilateral 10/5/2020    Procedure: MASTECTOMY, BILATERAL;  Surgeon: Paulina Cantu MD;  Location: [facility] OR;  Service: General;  Laterality: Bilateral;    CHOLECYSTECTOMY      COLONOSCOPY      CYSTOSCOPY N/A 3/11/2019    Procedure: CYSTOSCOPY;  Surgeon: Dagoberto Smith MD;  Location: [facility] OR;  Service: OB/GYN;  Laterality: N/A;    DEBRIDEMENT OF WOUND OF ABDOMEN N/A 10/30/2020    Procedure: DEBRIDEMENT, WOUND, ABDOMEN;  Surgeon: Ron Carrasco MD;  Location: [facility] OR;  Service: General;  Laterality: N/A;    DEBRIDEMENT OF WOUND OF ABDOMEN N/A 2/3/2021    Procedure: DEBRIDEMENT, WOUND, ABDOMEN - abdominal wound debridment with placement of wound vac;  Surgeon: Ron Carrasco MD;  Location: [facility];  Service: General;  Laterality: N/A;    HYSTERECTOMY      LAPAROSCOPIC SALPINGO-OOPHORECTOMY Bilateral 3/11/2019    Procedure: SALPINGO-OOPHORECTOMY, LAPAROSCOPIC;  Surgeon: Dagoberto Smith MD;  Location: [facility] OR;  Service: OB/GYN;  Laterality: Bilateral;    LAPAROSCOPIC TOTAL HYSTERECTOMY N/A 3/11/2019    Procedure: HYSTERECTOMY, TOTAL, LAPAROSCOPIC;  Surgeon: Dagoberto Smith MD;  Location: [facility] OR;  Service: OB/GYN;  Laterality: N/A;    OOPHORECTOMY      RECONSTRUCTION OF BREAST WITH DEEP INFERIOR EPIGASTRIC ARTERY  (CARMEN) FLAP Bilateral 10/5/2020    Procedure:  "RECONSTRUCTION, BREAST, USING CARMEN SKIN FLAP;  Surgeon: Ron Carrasco MD;  Location: [facility];  Service: General;  Laterality: Bilateral;    SKIN FULL THICKNESS GRAFT Left 10/30/2020    Procedure: APPLICATION, GRAFT, SKIN, FULL-THICKNESS;  Surgeon: Ron Carrasco MD;  Location: [facility];  Service: General;  Laterality: Left;    TOTAL REDUCTION MAMMOPLASTY      TUBAL LIGATION      open    WISDOM TOOTH EXTRACTION      WOUND DEBRIDEMENT Bilateral 10/30/2020    Procedure: DEBRIDEMENT, WOUND;  Surgeon: Ron Carrasco MD;  Location: [facility];  Service: General;  Laterality: Bilateral;       Review of patient's allergies indicates:   Allergen Reactions    Oxycodone-acetaminophen Anaphylaxis     Facial flushing and throat burning . Patient states she can take Lortab    Adhesive Hives and Itching     Steri-Strip    Neosporin [benzalkonium chloride] Hives    Cefazolin Rash     Other reaction(s): red skin    Zofran [ondansetron hcl] Nausea And Vomiting       Current Facility-Administered Medications on File Prior to Encounter   Medication    0.9%  NaCl infusion     Current Outpatient Medications on File Prior to Encounter   Medication Sig    buPROPion (WELLBUTRIN XL) 300 MG 24 hr tablet TAKE ONE TABLET BY MOUTH EVERY MORNING "happy new year"    cyanocobalamin 1,000 mcg/mL injection Inject 1 mL (1,000 mcg total) into the skin every 14 (fourteen) days.    LUIS ANTONIO 0.1 mg/24 hr PTSW APPLY ONE PATCH TOPICALLY TWICE A WEEK "happy new year"    ergocalciferol (ERGOCALCIFEROL) 50,000 unit Cap Take 50,000 Units by mouth.    estradioL (IMVEXXY MAINTENANCE PACK) 10 mcg Inst Place 10 mcg vaginally every evening.    estradioL (VIVELLE-DOT) 0.1 mg/24 hr PTSW Place 1 patch onto the skin twice a week.    fluticasone propionate (FLONASE) 50 mcg/actuation nasal spray 1 spray (50 mcg total) by Each Nostril route once daily.    folic acid 5 mg/mL injection Inject 0.2 mLs (1 mg total) into the vein once daily. (Patient not taking: Reported on " "10/19/2024)    furosemide (LASIX) 20 MG tablet Take 1 tablet (20 mg total) by mouth once daily. for 10 days    hyoscyamine (LEVBID) 0.375 mg Tb12 Take 1 tablet (0.375 mg total) by mouth every 12 (twelve) hours.    LOMAIRA 8 mg Tab     metoprolol succinate (TOPROL-XL) 25 MG 24 hr tablet Take 25 mg by mouth once daily.    multivitamin capsule Take 1 capsule by mouth once daily.    multivitamin with minerals (HAIR,SKIN AND NAILS ORAL) Take by mouth.    pantoprazole (PROTONIX) 40 MG tablet TAKE ONE TABLET BY MOUTH EVERY DAY "THANK YOU"    progesterone (PROMETRIUM) 200 MG capsule Take 1 capsule (200 mg total) by mouth nightly.    promethazine-dextromethorphan (PROMETHAZINE-DM) 6.25-15 mg/5 mL Syrp Take 5 mLs by mouth every 8 (eight) hours as needed (cough).    spironolactone (ALDACTONE) 25 MG tablet Take 1 tablet (25 mg total) by mouth once daily.    testosterone cypionate 200 mg/mL Kit Inject 76 mg into the muscle every 28 days.     Family History       Problem Relation (Age of Onset)    Breast cancer Maternal Grandmother (49), Maternal Cousin, Maternal Cousin    Colon cancer Maternal Aunt (35)    Colon polyps Mother, Maternal Aunt    Diabetes Mother, Maternal Grandmother    Heart attack Paternal Grandfather    Heart disease Mother, Maternal Grandmother    Hypertension Maternal Grandmother    Melanoma Maternal Grandfather (50)    No Known Problems Father, Son, Son, Son, Son, Maternal Uncle, Paternal Aunt, Paternal Uncle, Paternal Grandmother    Obesity Mother, Maternal Grandfather    Sleep apnea Mother, Brother          Tobacco Use    Smoking status: Never    Smokeless tobacco: Never   Substance and Sexual Activity    Alcohol use: Yes     Comment: Occasional    Drug use: No    Sexual activity: Yes     Partners: Male     Birth control/protection: None, See Surgical Hx     Review of Systems   Constitutional:  Positive for activity change, appetite change, chills, diaphoresis, fatigue and fever.   HENT:  Negative for " congestion.    Eyes:  Negative for visual disturbance.   Respiratory:  Negative for cough and shortness of breath.    Cardiovascular:  Negative for chest pain.   Gastrointestinal:  Positive for abdominal pain, diarrhea, nausea and vomiting.   Endocrine: Positive for cold intolerance and heat intolerance.   Genitourinary:  Negative for dysuria and flank pain.   Musculoskeletal:  Positive for myalgias.   Skin:  Negative for rash.   Allergic/Immunologic: Negative for immunocompromised state.   Neurological:  Positive for dizziness, light-headedness and headaches.   Hematological:  Does not bruise/bleed easily.   Psychiatric/Behavioral:  Negative for decreased concentration and dysphoric mood.      Objective:     Vital Signs (Most Recent):  Temp: 99.8 °F (37.7 °C) (01/09/25 0133)  Pulse: (!) 120 (01/09/25 0133)  Resp: 20 (01/09/25 0000)  BP: 107/66 (01/09/25 0133)  SpO2: 100 % (01/09/25 0133) Vital Signs (24h Range):  Temp:  [99.8 °F (37.7 °C)-101.2 °F (38.4 °C)] 99.8 °F (37.7 °C)  Pulse:  [120-136] 120  Resp:  [20] 20  SpO2:  [98 %-100 %] 100 %  BP: (107-129)/(63-69) 107/66     Weight: 58.1 kg (128 lb)  Body mass index is 20.66 kg/m².     Physical Exam  Constitutional:       General: She is sleeping. She is not in acute distress.     Appearance: She is normal weight. She is ill-appearing. She is not toxic-appearing or diaphoretic.   HENT:      Head: Normocephalic and atraumatic.   Cardiovascular:      Rate and Rhythm: Tachycardia present.   Pulmonary:      Effort: No tachypnea or bradypnea.   Abdominal:      General: Abdomen is protuberant.   Genitourinary:     Comments: No chery in place  Skin:     Coloration: Skin is sallow.   Neurological:      Mental Status: She is oriented to person, place, and time. She is lethargic.      GCS: GCS eye subscore is 4. GCS verbal subscore is 5. GCS motor subscore is 6.   Psychiatric:         Attention and Perception: She is inattentive.         Behavior: Behavior is slowed.          Cognition and Memory: Cognition and memory normal.      Comments: A bit sedated from her nausea meds                Significant Labs: All pertinent labs within the past 24 hours have been reviewed.  Recent Results (from the past 24 hours)   COVID-19 Rapid Screening    Collection Time: 01/08/25  9:13 PM   Result Value Ref Range    SARS-CoV-2 RNA, Amplification, Qual Negative Negative   Influenza A & B by Molecular    Collection Time: 01/08/25  9:13 PM    Specimen: Nasopharyngeal Swab   Result Value Ref Range    Influenza A, Molecular Negative Negative    Influenza B, Molecular Negative Negative    Flu A & B Source Nasal Swab    CBC auto differential    Collection Time: 01/08/25 10:26 PM   Result Value Ref Range    WBC 7.98 3.90 - 12.70 K/uL    RBC 4.60 4.00 - 5.40 M/uL    Hemoglobin 13.9 12.0 - 16.0 g/dL    Hematocrit 40.1 37.0 - 48.5 %    MCV 87 82 - 98 fL    MCH 30.2 27.0 - 31.0 pg    MCHC 34.7 32.0 - 36.0 g/dL    RDW 12.0 11.5 - 14.5 %    Platelets 250 150 - 450 K/uL    MPV 11.0 9.2 - 12.9 fL    Immature Granulocytes 0.3 0.0 - 0.5 %    Gran # (ANC) 7.4 1.8 - 7.7 K/uL    Immature Grans (Abs) 0.02 0.00 - 0.04 K/uL    Lymph # 0.3 (L) 1.0 - 4.8 K/uL    Mono # 0.3 0.3 - 1.0 K/uL    Eos # 0.0 0.0 - 0.5 K/uL    Baso # 0.02 0.00 - 0.20 K/uL    nRBC 0 0 /100 WBC    Gran % 92.3 (H) 38.0 - 73.0 %    Lymph % 3.1 (L) 18.0 - 48.0 %    Mono % 4.0 4.0 - 15.0 %    Eosinophil % 0.0 0.0 - 8.0 %    Basophil % 0.3 0.0 - 1.9 %    Differential Method Automated    Comprehensive metabolic panel    Collection Time: 01/08/25 10:26 PM   Result Value Ref Range    Sodium 139 136 - 145 mmol/L    Potassium 4.8 3.5 - 5.1 mmol/L    Chloride 104 95 - 110 mmol/L    CO2 21 (L) 23 - 29 mmol/L    Glucose 107 70 - 110 mg/dL    BUN 14 6 - 20 mg/dL    Creatinine 0.9 0.5 - 1.4 mg/dL    Calcium 9.5 8.7 - 10.5 mg/dL    Total Protein 7.8 6.0 - 8.4 g/dL    Albumin 4.4 3.5 - 5.2 g/dL    Total Bilirubin 0.8 0.1 - 1.0 mg/dL    Alkaline Phosphatase 75 40 - 150 U/L     AST 29 10 - 40 U/L    ALT 16 10 - 44 U/L    eGFR >60.0 >60 mL/min/1.73 m^2    Anion Gap 14 8 - 16 mmol/L   Lactic acid, plasma    Collection Time: 01/08/25 11:50 PM   Result Value Ref Range    Lactate (Lactic Acid) 0.9 0.5 - 2.2 mmol/L   Urinalysis, Reflex to Urine Culture Urine, Clean Catch    Collection Time: 01/09/25 12:59 AM    Specimen: Urine   Result Value Ref Range    Specimen UA Urine, Unspecified     Color, UA Yellow Yellow, Straw, Teresa    Appearance, UA Clear Clear    pH, UA 7.0 5.0 - 8.0    Specific Gravity, UA 1.015 1.005 - 1.030    Protein, UA Negative Negative    Glucose, UA Negative Negative    Ketones, UA 1+ (A) Negative    Bilirubin (UA) Negative Negative    Occult Blood UA Negative Negative    Nitrite, UA Negative Negative    Urobilinogen, UA Negative Negative EU/dL    Leukocytes, UA Negative Negative         Significant Imaging: I have reviewed all pertinent imaging results/findings within the past 24 hours.

## 2025-01-09 NOTE — ASSESSMENT & PLAN NOTE
She reports that the entire family of 5 children and she came down with viral GE symptoms at once  She has no risk factors of C.diff  CT shows changes of acute gastroenteritis  Flu-like illness with fever as well, all speak to viral cause  She got Rocephin and Flagyl in ED, but unlikely to be bacterial, more likely viral   -Holding on further ABx for now  Check [place] and [place] studies and stool for WBC  Allergic to Zofran       aluminum-magnesium hydroxide-simethicone 200-200-20 mg/5 mL suspension 30 mL, 30 mL, Oral, QID PRN, indigestion    HYDROcodone-acetaminophen 5-325 mg per tablet 1 tablet, 1 tablet, Oral, Q6H PRN, pain    hyoscyamine SL tablet 0.125 mg, 0.125 mg, Sublingual, Q4H PRN, abd cramping    loperamide capsule 2 mg, 2 mg, Oral, QID PRN, diarrhea    pantoprazole EC tablet 40 mg, 40 mg, Oral, Daily     prochlorperazine injection Soln 5 mg, 5 mg, Intravenous, Q6H PRN, nausea 1st line    promethazine (PHENERGAN) 12.5 mg in 0.9% NaCl 50 mL IVPB, 12.5 mg, Intravenous, Q6H PRN, nausea 2nd line    Pt's symptoms have largely resolved. She will be discharged later today when her  arrives to pick her up, until  then I will continue her IVF.

## 2025-01-09 NOTE — PLAN OF CARE
[facility]  Initial Discharge Assessment       Primary Care Provider: No, Primary Doctor    Admission Diagnosis: Dehydration [E86.0]  Gastroenteritis [K52.9]  Viral gastroenteritis [A08.4]  Chest pain [R07.9]    Admission Date: 1/8/2025  Expected Discharge Date: 1/9/2025    DC assessment completed with patient at bedside. Verified information on facesheet as correct. Pt lives at listed address with spouse and children. Reports she has help if needed from spouse.  PCP is Ely Rodriguez at [facility] [place]- reports last apt was 1-2 months ago.  Pharmacy is [facility] [place]. Denies hh/hd/outpt services or DME. Reports being independent with activities. Drives herself to apts. Reports spouse will provide transportation home upon DC. Reports taking home medications as prescribed and can currently afford them. Verified insurance on file. Denies recent inpt stay in last 30 days.  DC plan is Home with Family      Transition of Care Barriers: None    Payor: AETNA / Plan: [employer]ET[employer] CHOICE POS / Product Type: Commercial /     Extended Emergency Contact Information  Primary Emergency Contact: NATHALY RAY  Mobile Phone: [phone]  Relation: Spouse  Preferred language: English   needed? No  Secondary Emergency Contact: HennessyJuliana  Address: [address] [place] [address] [facility]  Mobile Phone: [phone]  Relation: Mother  Preferred language: English    Discharge Plan A: Home with family  Discharge Plan B: Home      [facility] [place] - [address]  [address] [place] [address]  Phone: [phone] Fax: [phone]    [place], [place] - [address], [address]  [address]  [facility] [address]  Phone: [phone] Fax: [phone]      Initial Assessment (most recent)       Adult Discharge Assessment - 01/09/25 1008          Discharge Assessment    Assessment Type Discharge Planning  Assessment     Confirmed/corrected address, phone number and insurance Yes     Confirmed Demographics Correct on Facesheet     Source of Information patient     When was your last doctors appointment? --   a couple of months ago    Does patient/caregiver understand observation status Yes     Communicated CASSIDY with patient/caregiver Yes     Reason For Admission gastroenteritis     People in Home child(sarai), dependent;spouse     Do you expect to return to your current living situation? Yes     Do you have help at home or someone to help you manage your care at home? Yes     Who are your caregiver(s) and their phone number(s)? Spouse [phone]     Prior to hospitilization cognitive status: Alert/Oriented;No Deficits     Current cognitive status: No Deficits;Alert/Oriented     Walking or Climbing Stairs Difficulty no     Dressing/Bathing Difficulty no     Equipment Currently Used at Home none     Readmission within 30 days? No     Patient currently being followed by outpatient case management? No     Do you currently have service(s) that help you manage your care at home? No     Do you take prescription medications? Yes     Do you have prescription coverage? Yes     Coverage Commercial BC/Aetna plan     Do you have any problems affording any of your prescribed medications? No     Is the patient taking medications as prescribed? yes     Who is going to help you get home at discharge? Spouse     How do you get to doctors appointments? car, drives self     Are you on dialysis? No     Do you take coumadin? No     Discharge Plan A Home with family     Discharge Plan B Home     DME Needed Upon Discharge  none     Discharge Plan discussed with: Patient     Transition of Care Barriers None

## 2025-01-09 NOTE — CARE UPDATE
"Ordered LR 1L bolus    BP (!) 88/55 (BP Location: Left arm, Patient Position: Lying)   Pulse (!) 116   Temp 99.4 °F (37.4 °C) (Oral)   Resp 16   Ht 5' 6" (1.676 m)   Wt 63.9 kg (140 lb 14 oz)   LMP 02/11/2019 (Exact Date)   SpO2 98%   Breastfeeding No   BMI 22.74 kg/m²     "

## 2025-01-09 NOTE — ASSESSMENT & PLAN NOTE
Continue home Protonix for gastric sleeve done 3 years ago  CT with no pernicious findings  Continue vitamin supplementation but hold MVI until symptoms have resolved

## 2025-01-09 NOTE — NURSING
Spouse arrived earlier than expected to  patient. Dr. Martinez aware and ok with d/c at this time. Patient discharged safely in stable condition without any complications. VSS. NAD noted. AAOx4. Discharge instructions given to patient verbally and paper copy also provided. Patient verbalized understanding of all discharge instructions and follow up appointment importance. All patients personal belongings taken home with patient. No patient needs or complaints at this time.

## 2025-01-09 NOTE — H&P
"[facility]  History & Physical    Patient Name: Allyson Bahena  MRN: [id number]  Admission Date: 1/8/2025  Attending Physician: Hernando Martinez MD   Primary Care Provider: Kael[facility]         Patient information was obtained from patient, past medical records, and ER records.       Subjective:     Principal Problem:Viral gastroenteritis    Chief Complaint:   Chief Complaint   Patient presents with    Emesis     N/v/d that started today. Children at home with similar symptoms.        HPI: Ms. Bahena is a 38yo lady with a past medical history of childhood asthma, DM2, GERD, gastric sleeve x 3 yrs, depression, and BRCA2 positivity.    She now comes to the ED with flu-like symptoms and N/V/D.  She tells me that all 5 of her children and she came down with the symptoms at once and have been having vomiting and diarrhea x 1-2 days.  She reports cramping and twisting abdominal discomfort.  She has had fever and chills, but denies blood in her stool.  The N/V started first yesterday morning and has not abated, then the diarrhea started today x 4 episodes only.  She has not been on any antibiotics recently.  She came in as she was concerned with her gastric sleeve with all the N/V.  Currently all symptoms have resolved except nausea.    In the ED her VS were /69 -> 107/66   Pulse (!) 136 -> 120   Temp max 101.2F -> 99.8 °F (37.7 °C)   Resp 20   Ht 5' 6" (1.676 m)   Wt 58.1 kg (128 lb)   LMP 02/11/2019 (Exact Date)   SpO2 100%   BMI 20.66 kg/m².  Labs showed WBC 8, Cr 0.9, NML LFTs, LA 0.9, FLU/COVID NEGATIVE, UA NML.    CT abdomen and pelvis with IV contrast showed prominent fluid-filled loops of small bowel with liquid stool throughout the colon.  Findings are suggestive of a nonspecific gastroenteritis/diarrheal illness.  Remote postoperative change of the stomach.  Cholecystectomy.  Hysterectomy.    In the ED she was treated with:  Medications   metronidazole " IVPB 500 mg (500 mg Intravenous New Bag 1/9/25 0039)   sodium chloride 0.9% bolus 1,000 mL 1,000 mL (1,000 mLs Intravenous New Bag 1/9/25 0046)   sodium chloride 0.9% bolus 1,000 mL 1,000 mL (0 mLs Intravenous Stopped 1/9/25 0040)   promethazine (PHENERGAN) 6.25 mg in 0.9% NaCl 50 mL IVPB (0 mg Intravenous Stopped 1/8/25 2258)   famotidine (PF) injection 20 mg (20 mg Intravenous Given 1/8/25 2239)   iohexoL (OMNIPAQUE 350) injection 75 mL (75 mLs Intravenous Given 1/8/25 2329)   cefTRIAXone injection 1 g (1 g Intravenous Given 1/9/25 0047)   acetaminophen tablet 650 mg (650 mg Oral Given 1/9/25 0037)             Past Medical History:   Diagnosis Date    Allergy     Asthma     childhood    BRCA2 positive     Depression     Diabetes mellitus     Genetic testing 06/20/2018    BRCA2 positive, reported by [facility] from outside provider    GERD (gastroesophageal reflux disease)     Mitral valve prolapse     PONV (postoperative nausea and vomiting)     Suspected sleep apnea        Past Surgical History:   Procedure Laterality Date    APPLICATION OF WOUND VACUUM-ASSISTED CLOSURE DEVICE N/A 10/30/2020    Procedure: APPLICATION, WOUND VAC;  Surgeon: Ron Carrasco MD;  Location: [facility];  Service: General;  Laterality: N/A;    BILATERAL MASTECTOMY Bilateral 10/5/2020    Procedure: MASTECTOMY, BILATERAL;  Surgeon: Paulina Cantu MD;  Location: [facility] OR;  Service: General;  Laterality: Bilateral;    CHOLECYSTECTOMY      COLONOSCOPY      CYSTOSCOPY N/A 3/11/2019    Procedure: CYSTOSCOPY;  Surgeon: Dagoberto Smith MD;  Location: [facility] OR;  Service: OB/GYN;  Laterality: N/A;    DEBRIDEMENT OF WOUND OF ABDOMEN N/A 10/30/2020    Procedure: DEBRIDEMENT, WOUND, ABDOMEN;  Surgeon: Ron Carrasco MD;  Location: [facility] OR;  Service: General;  Laterality: N/A;    DEBRIDEMENT OF WOUND OF ABDOMEN N/A 2/3/2021    Procedure: DEBRIDEMENT, WOUND, ABDOMEN - abdominal wound debridment with placement of wound vac;  Surgeon: Ron Carrasco  "MD;  Location: [facility];  Service: General;  Laterality: N/A;    HYSTERECTOMY      LAPAROSCOPIC SALPINGO-OOPHORECTOMY Bilateral 3/11/2019    Procedure: SALPINGO-OOPHORECTOMY, LAPAROSCOPIC;  Surgeon: Dagoberto Smith MD;  Location: [facility];  Service: OB/GYN;  Laterality: Bilateral;    LAPAROSCOPIC TOTAL HYSTERECTOMY N/A 3/11/2019    Procedure: HYSTERECTOMY, TOTAL, LAPAROSCOPIC;  Surgeon: Dagoberto Smith MD;  Location: [facility] OR;  Service: OB/GYN;  Laterality: N/A;    OOPHORECTOMY      RECONSTRUCTION OF BREAST WITH DEEP INFERIOR EPIGASTRIC ARTERY  (CARMEN) FLAP Bilateral 10/5/2020    Procedure: RECONSTRUCTION, BREAST, USING CARMEN SKIN FLAP;  Surgeon: Ron Carrasco MD;  Location: [facility];  Service: General;  Laterality: Bilateral;    SKIN FULL THICKNESS GRAFT Left 10/30/2020    Procedure: APPLICATION, GRAFT, SKIN, FULL-THICKNESS;  Surgeon: Ron Carrasco MD;  Location: [facility];  Service: General;  Laterality: Left;    TOTAL REDUCTION MAMMOPLASTY      TUBAL LIGATION      open    WISDOM TOOTH EXTRACTION      WOUND DEBRIDEMENT Bilateral 10/30/2020    Procedure: DEBRIDEMENT, WOUND;  Surgeon: Ron Carrasco MD;  Location: [facility];  Service: General;  Laterality: Bilateral;       Review of patient's allergies indicates:   Allergen Reactions    Oxycodone-acetaminophen Anaphylaxis     Facial flushing and throat burning . Patient states she can take Lortab    Adhesive Hives and Itching     Steri-Strip    Neosporin [benzalkonium chloride] Hives    Cefazolin Rash     Other reaction(s): red skin    Zofran [ondansetron hcl] Nausea And Vomiting       Current Facility-Administered Medications on File Prior to Encounter   Medication    0.9%  NaCl infusion     Current Outpatient Medications on File Prior to Encounter   Medication Sig    buPROPion (WELLBUTRIN XL) 300 MG 24 hr tablet TAKE ONE TABLET BY MOUTH EVERY MORNING "happy new year"    cyanocobalamin 1,000 mcg/mL injection Inject 1 mL (1,000 mcg total) into the " "skin every 14 (fourteen) days.    LUIS ANTONIO 0.1 mg/24 hr PTSW APPLY ONE PATCH TOPICALLY TWICE A WEEK "happy new year"    ergocalciferol (ERGOCALCIFEROL) 50,000 unit Cap Take 50,000 Units by mouth.    estradioL (IMVEXXY MAINTENANCE PACK) 10 mcg Inst Place 10 mcg vaginally every evening.    estradioL (VIVELLE-DOT) 0.1 mg/24 hr PTSW Place 1 patch onto the skin twice a week.    fluticasone propionate (FLONASE) 50 mcg/actuation nasal spray 1 spray (50 mcg total) by Each Nostril route once daily.    folic acid 5 mg/mL injection Inject 0.2 mLs (1 mg total) into the vein once daily. (Patient not taking: Reported on 10/19/2024)    furosemide (LASIX) 20 MG tablet Take 1 tablet (20 mg total) by mouth once daily. for 10 days    hyoscyamine (LEVBID) 0.375 mg Tb12 Take 1 tablet (0.375 mg total) by mouth every 12 (twelve) hours.    LOMAIRA 8 mg Tab     metoprolol succinate (TOPROL-XL) 25 MG 24 hr tablet Take 25 mg by mouth once daily.    multivitamin capsule Take 1 capsule by mouth once daily.    multivitamin with minerals (HAIR,SKIN AND NAILS ORAL) Take by mouth.    pantoprazole (PROTONIX) 40 MG tablet TAKE ONE TABLET BY MOUTH EVERY DAY "THANK YOU"    progesterone (PROMETRIUM) 200 MG capsule Take 1 capsule (200 mg total) by mouth nightly.    promethazine-dextromethorphan (PROMETHAZINE-DM) 6.25-15 mg/5 mL Syrp Take 5 mLs by mouth every 8 (eight) hours as needed (cough).    spironolactone (ALDACTONE) 25 MG tablet Take 1 tablet (25 mg total) by mouth once daily.    testosterone cypionate 200 mg/mL Kit Inject 76 mg into the muscle every 28 days.     Family History       Problem Relation (Age of Onset)    Breast cancer Maternal Grandmother (49), Maternal Cousin, Maternal Cousin    Colon cancer Maternal Aunt (35)    Colon polyps Mother, Maternal Aunt    Diabetes Mother, Maternal Grandmother    Heart attack Paternal Grandfather    Heart disease Mother, Maternal Grandmother    Hypertension Maternal Grandmother    Melanoma Maternal " Grandfather (50)    No Known Problems Father, Son, Son, Son, Son, Maternal Uncle, Paternal Aunt, Paternal Uncle, Paternal Grandmother    Obesity Mother, Maternal Grandfather    Sleep apnea Mother, Brother          Tobacco Use    Smoking status: Never    Smokeless tobacco: Never   Substance and Sexual Activity    Alcohol use: Yes     Comment: Occasional    Drug use: No    Sexual activity: Yes     Partners: Male     Birth control/protection: None, See Surgical Hx     Review of Systems   Constitutional:  Positive for activity change, appetite change, chills, diaphoresis, fatigue and fever.   HENT:  Negative for congestion.    Eyes:  Negative for visual disturbance.   Respiratory:  Negative for cough and shortness of breath.    Cardiovascular:  Negative for chest pain.   Gastrointestinal:  Positive for abdominal pain, diarrhea, nausea and vomiting.   Endocrine: Positive for cold intolerance and heat intolerance.   Genitourinary:  Negative for dysuria and flank pain.   Musculoskeletal:  Positive for myalgias.   Skin:  Negative for rash.   Allergic/Immunologic: Negative for immunocompromised state.   Neurological:  Positive for dizziness, light-headedness and headaches.   Hematological:  Does not bruise/bleed easily.   Psychiatric/Behavioral:  Negative for decreased concentration and dysphoric mood.      Objective:     Vital Signs (Most Recent):  Temp: 99.8 °F (37.7 °C) (01/09/25 0133)  Pulse: (!) 120 (01/09/25 0133)  Resp: 20 (01/09/25 0000)  BP: 107/66 (01/09/25 0133)  SpO2: 100 % (01/09/25 0133) Vital Signs (24h Range):  Temp:  [99.8 °F (37.7 °C)-101.2 °F (38.4 °C)] 99.8 °F (37.7 °C)  Pulse:  [120-136] 120  Resp:  [20] 20  SpO2:  [98 %-100 %] 100 %  BP: (107-129)/(63-69) 107/66     Weight: 58.1 kg (128 lb)  Body mass index is 20.66 kg/m².     Physical Exam  Constitutional:       General: She is sleeping. She is not in acute distress.     Appearance: She is normal weight. She is ill-appearing. She is not toxic-appearing  or diaphoretic.   HENT:      Head: Normocephalic and atraumatic.   Cardiovascular:      Rate and Rhythm: Tachycardia present.   Pulmonary:      Effort: No tachypnea or bradypnea.   Abdominal:      General: Abdomen is protuberant.   Genitourinary:     Comments: No chery in place  Skin:     Coloration: Skin is sallow.   Neurological:      Mental Status: She is oriented to person, place, and time. She is lethargic.      GCS: GCS eye subscore is 4. GCS verbal subscore is 5. GCS motor subscore is 6.   Psychiatric:         Attention and Perception: She is inattentive.         Behavior: Behavior is slowed.         Cognition and Memory: Cognition and memory normal.      Comments: A bit sedated from her nausea meds                Significant Labs: All pertinent labs within the past 24 hours have been reviewed.  Recent Results (from the past 24 hours)   COVID-19 Rapid Screening    Collection Time: 01/08/25  9:13 PM   Result Value Ref Range    SARS-CoV-2 RNA, Amplification, Qual Negative Negative   Influenza A & B by Molecular    Collection Time: 01/08/25  9:13 PM    Specimen: Nasopharyngeal Swab   Result Value Ref Range    Influenza A, Molecular Negative Negative    Influenza B, Molecular Negative Negative    Flu A & B Source Nasal Swab    CBC auto differential    Collection Time: 01/08/25 10:26 PM   Result Value Ref Range    WBC 7.98 3.90 - 12.70 K/uL    RBC 4.60 4.00 - 5.40 M/uL    Hemoglobin 13.9 12.0 - 16.0 g/dL    Hematocrit 40.1 37.0 - 48.5 %    MCV 87 82 - 98 fL    MCH 30.2 27.0 - 31.0 pg    MCHC 34.7 32.0 - 36.0 g/dL    RDW 12.0 11.5 - 14.5 %    Platelets 250 150 - 450 K/uL    MPV 11.0 9.2 - 12.9 fL    Immature Granulocytes 0.3 0.0 - 0.5 %    Gran # (ANC) 7.4 1.8 - 7.7 K/uL    Immature Grans (Abs) 0.02 0.00 - 0.04 K/uL    Lymph # 0.3 (L) 1.0 - 4.8 K/uL    Mono # 0.3 0.3 - 1.0 K/uL    Eos # 0.0 0.0 - 0.5 K/uL    Baso # 0.02 0.00 - 0.20 K/uL    nRBC 0 0 /100 WBC    Gran % 92.3 (H) 38.0 - 73.0 %    Lymph % 3.1 (L) 18.0 -  48.0 %    Mono % 4.0 4.0 - 15.0 %    Eosinophil % 0.0 0.0 - 8.0 %    Basophil % 0.3 0.0 - 1.9 %    Differential Method Automated    Comprehensive metabolic panel    Collection Time: 01/08/25 10:26 PM   Result Value Ref Range    Sodium 139 136 - 145 mmol/L    Potassium 4.8 3.5 - 5.1 mmol/L    Chloride 104 95 - 110 mmol/L    CO2 21 (L) 23 - 29 mmol/L    Glucose 107 70 - 110 mg/dL    BUN 14 6 - 20 mg/dL    Creatinine 0.9 0.5 - 1.4 mg/dL    Calcium 9.5 8.7 - 10.5 mg/dL    Total Protein 7.8 6.0 - 8.4 g/dL    Albumin 4.4 3.5 - 5.2 g/dL    Total Bilirubin 0.8 0.1 - 1.0 mg/dL    Alkaline Phosphatase 75 40 - 150 U/L    AST 29 10 - 40 U/L    ALT 16 10 - 44 U/L    eGFR >60.0 >60 mL/min/1.73 m^2    Anion Gap 14 8 - 16 mmol/L   Lactic acid, plasma    Collection Time: 01/08/25 11:50 PM   Result Value Ref Range    Lactate (Lactic Acid) 0.9 0.5 - 2.2 mmol/L   Urinalysis, Reflex to Urine Culture Urine, Clean Catch    Collection Time: 01/09/25 12:59 AM    Specimen: Urine   Result Value Ref Range    Specimen UA Urine, Unspecified     Color, UA Yellow Yellow, Straw, Teresa    Appearance, UA Clear Clear    pH, UA 7.0 5.0 - 8.0    Specific Gravity, UA 1.015 1.005 - 1.030    Protein, UA Negative Negative    Glucose, UA Negative Negative    Ketones, UA 1+ (A) Negative    Bilirubin (UA) Negative Negative    Occult Blood UA Negative Negative    Nitrite, UA Negative Negative    Urobilinogen, UA Negative Negative EU/dL    Leukocytes, UA Negative Negative         Significant Imaging: I have reviewed all pertinent imaging results/findings within the past 24 hours.  Assessment/Plan:     * Viral gastroenteritis  She reports that the entire family of 5 children and she came down with viral GE symptoms at once  She has no risk factors of C.diff  CT shows changes of acute gastroenteritis  Flu-like illness with fever as well, all speak to viral cause  She got Rocephin and Flagyl in ED, but unlikely to be bacterial, more likely viral   -Holding on  further ABx for now  Check [place] and Rota studies and stool for WBC  Allergic to Zofran       aluminum-magnesium hydroxide-simethicone 200-200-20 mg/5 mL suspension 30 mL, 30 mL, Oral, QID PRN, indigestion    HYDROcodone-acetaminophen 5-325 mg per tablet 1 tablet, 1 tablet, Oral, Q6H PRN, pain    hyoscyamine SL tablet 0.125 mg, 0.125 mg, Sublingual, Q4H PRN, abd cramping    loperamide capsule 2 mg, 2 mg, Oral, QID PRN, diarrhea    pantoprazole EC tablet 40 mg, 40 mg, Oral, Daily     prochlorperazine injection Soln 5 mg, 5 mg, Intravenous, Q6H PRN, nausea 1st line    promethazine (PHENERGAN) 12.5 mg in 0.9% NaCl 50 mL IVPB, 12.5 mg, Intravenous, Q6H PRN, nausea 2nd line    Dehydration  She got 2l NS in the ED  Continue with D5NS at 100 cc/hr x 1L      S/P bariatric surgery  Continue home Protonix for gastric sleeve done 3 years ago  CT with no pernicious findings  Continue vitamin supplementation      Essential hypertension    metoprolol succinate (TOPROL-XL) 24 hr tablet 25 mg, 25 mg, Oral, Daily       VTE Risk Mitigation (From admission, onward)           Ordered     Place JOSE hose  Until discontinued         01/09/25 0203     IP VTE LOW RISK PATIENT  Once         01/09/25 0203     Place sequential compression device  Until discontinued         01/09/25 0203                         The attending portion of this evaluation, treatment, and documentation was performed per DORI Zambrano MD via Telemedicine AudioVisual using the secure [employer] software platform with 2 way audio/video. The provider was located off-site and the patient is located in the hospital. The aforementioned video software was utilized to document the relevant history and physical exam    On 01/09/2025, patient should be placed in hospital observation services under my care.        DORI Zambrano MD  Department of Hospital Medicine   [facility] Emergency Dept

## 2025-01-09 NOTE — ASSESSMENT & PLAN NOTE
Pt has essential HTN  I have asked her to stop her diuretics (Lasix and Spironolactone) until she is able to increase her PO intake  Will continue her Metoprolol for now  BP has been stable, although she has remained tachycardic. Her SBP is trending upward.     Temp:  [99.4 °F (37.4 °C)-101.2 °F (38.4 °C)] 99.6 °F (37.6 °C)  Pulse:  [107-136] 107  Resp:  [16-20] 18  SpO2:  [98 %-100 %] 99 %  BP: ()/(55-69) 104/57       metoprolol succinate (TOPROL-XL) 24 hr tablet 25 mg, 25 mg, Oral, Daily

## 2025-01-09 NOTE — ASSESSMENT & PLAN NOTE
She reports that the entire family of 5 children and she came down with viral GE symptoms at once  She has no risk factors of C.diff  CT shows changes of acute gastroenteritis  Flu-like illness with fever as well, all speak to viral cause  She got Rocephin and Flagyl in ED, but unlikely to be bacterial, more likely viral   -Holding on further ABx for now  Check [place] and Rota studies and stool for WBC  Allergic to Zofran       aluminum-magnesium hydroxide-simethicone 200-200-20 mg/5 mL suspension 30 mL, 30 mL, Oral, QID PRN, indigestion    HYDROcodone-acetaminophen 5-325 mg per tablet 1 tablet, 1 tablet, Oral, Q6H PRN, pain    hyoscyamine SL tablet 0.125 mg, 0.125 mg, Sublingual, Q4H PRN, abd cramping    loperamide capsule 2 mg, 2 mg, Oral, QID PRN, diarrhea    pantoprazole EC tablet 40 mg, 40 mg, Oral, Daily     prochlorperazine injection Soln 5 mg, 5 mg, Intravenous, Q6H PRN, nausea 1st line    promethazine (PHENERGAN) 12.5 mg in 0.9% NaCl 50 mL IVPB, 12.5 mg, Intravenous, Q6H PRN, nausea 2nd line

## 2025-01-09 NOTE — PLAN OF CARE
Problem: Adult Inpatient Plan of Care  Goal: Plan of Care Review  Outcome: Progressing  Goal: Patient-Specific Goal (Individualized)  Outcome: Progressing  Goal: Absence of Hospital-Acquired Illness or Injury  Outcome: Progressing  Goal: Optimal Comfort and Wellbeing  Outcome: Progressing  Goal: Readiness for Transition of Care  Outcome: Progressing     Problem: Infection  Goal: Absence of Infection Signs and Symptoms  Outcome: Progressing     Problem: Diabetes Comorbidity  Goal: Blood Glucose Level Within Targeted Range  Outcome: Progressing    Pt Aox4 throughout shift. VSS. Free from falls and skin breakdown. Bed in lowest position, locked, alarm set, side rails raised x2, and call light placed within reach. NAOEN. Rounding done hourly. All questions answered, no complaints at this time. Plan of care ongoing.

## 2025-01-10 LAB
E COLI SXT1 STL QL IA: NEGATIVE
E COLI SXT2 STL QL IA: NEGATIVE

## 2025-01-11 LAB — BACTERIA STL CULT: NORMAL

## 2025-01-13 LAB
NOROVIRUS GI RNA STL QL NAA+PROBE: NOT DETECTED
NOROVIRUS GII RNA STL QL NAA+PROBE: DETECTED
SPECIMEN SOURCE: ABNORMAL

## 2025-01-14 LAB
BACTERIA BLD CULT: NORMAL
BACTERIA BLD CULT: NORMAL

## 2025-01-20 ENCOUNTER — HOSPITAL ENCOUNTER (OUTPATIENT)
Dept: RADIOLOGY | Facility: HOSPITAL | Age: 40
Discharge: HOME | End: 2025-01-20
Attending: NURSE PRACTITIONER
Payer: COMMERCIAL

## 2025-01-20 DIAGNOSIS — T59.811A SMOKE INHALATION: ICD-10-CM

## 2025-01-24 ENCOUNTER — HOSPITAL ENCOUNTER (OUTPATIENT)
Dept: RADIOLOGY | Facility: HOSPITAL | Age: 40
Discharge: HOME | End: 2025-01-24
Attending: NURSE PRACTITIONER
Payer: COMMERCIAL

## 2025-01-24 DIAGNOSIS — R22.32 AXILLARY MASS, LEFT: ICD-10-CM

## 2025-01-24 PROCEDURE — 76642 ULTRASOUND BREAST LIMITED: CPT | Mod: TC,LT

## 2025-01-24 PROCEDURE — 76642 ULTRASOUND BREAST LIMITED: CPT | Mod: 26,LT,, | Performed by: RADIOLOGY

## 2025-01-25 ENCOUNTER — HOSPITAL ENCOUNTER (EMERGENCY)
Facility: HOSPITAL | Age: 40
Discharge: HOME | End: 2025-01-25
Attending: EMERGENCY MEDICINE
Payer: COMMERCIAL

## 2025-01-25 VITALS
RESPIRATION RATE: 20 BRPM | WEIGHT: 138 LBS | SYSTOLIC BLOOD PRESSURE: 133 MMHG | HEART RATE: 61 BPM | BODY MASS INDEX: 20.92 KG/M2 | OXYGEN SATURATION: 100 % | HEIGHT: 68 IN | TEMPERATURE: 98 F | DIASTOLIC BLOOD PRESSURE: 64 MMHG

## 2025-01-25 DIAGNOSIS — M79.642 LEFT HAND PAIN: ICD-10-CM

## 2025-01-25 DIAGNOSIS — M79.622 LEFT UPPER ARM PAIN: ICD-10-CM

## 2025-01-25 DIAGNOSIS — R55 SYNCOPE, UNSPECIFIED SYNCOPE TYPE: ICD-10-CM

## 2025-01-25 DIAGNOSIS — R00.2 PALPITATIONS: Primary | ICD-10-CM

## 2025-01-25 DIAGNOSIS — M25.522 LEFT ELBOW PAIN: ICD-10-CM

## 2025-01-25 LAB
ALBUMIN SERPL BCP-MCNC: 3.8 G/DL (ref 3.5–5.2)
ALP SERPL-CCNC: 76 U/L (ref 40–150)
ALT SERPL W/O P-5'-P-CCNC: 24 U/L (ref 10–44)
ANION GAP SERPL CALC-SCNC: 11 MMOL/L (ref 8–16)
AST SERPL-CCNC: 22 U/L (ref 10–40)
BASOPHILS # BLD AUTO: 0.07 K/UL (ref 0–0.2)
BASOPHILS NFR BLD: 1.2 % (ref 0–1.9)
BILIRUB SERPL-MCNC: 0.3 MG/DL (ref 0.1–1)
BUN SERPL-MCNC: 8 MG/DL (ref 6–20)
CALCIUM SERPL-MCNC: 9.5 MG/DL (ref 8.7–10.5)
CHLORIDE SERPL-SCNC: 110 MMOL/L (ref 95–110)
CO2 SERPL-SCNC: 21 MMOL/L (ref 23–29)
CREAT SERPL-MCNC: 0.7 MG/DL (ref 0.5–1.4)
DIFFERENTIAL METHOD BLD: ABNORMAL
EOSINOPHIL # BLD AUTO: 0.2 K/UL (ref 0–0.5)
EOSINOPHIL NFR BLD: 2.8 % (ref 0–8)
ERYTHROCYTE [DISTWIDTH] IN BLOOD BY AUTOMATED COUNT: 12.1 % (ref 11.5–14.5)
EST. GFR  (NO RACE VARIABLE): >60 ML/MIN/1.73 M^2
GLUCOSE SERPL-MCNC: 80 MG/DL (ref 70–110)
HCT VFR BLD AUTO: 34.9 % (ref 37–48.5)
HGB BLD-MCNC: 11.5 G/DL (ref 12–16)
IMM GRANULOCYTES # BLD AUTO: 0.02 K/UL (ref 0–0.04)
IMM GRANULOCYTES NFR BLD AUTO: 0.3 % (ref 0–0.5)
LYMPHOCYTES # BLD AUTO: 2.1 K/UL (ref 1–4.8)
LYMPHOCYTES NFR BLD: 34.9 % (ref 18–48)
MCH RBC QN AUTO: 29.9 PG (ref 27–31)
MCHC RBC AUTO-ENTMCNC: 33 G/DL (ref 32–36)
MCV RBC AUTO: 91 FL (ref 82–98)
MONOCYTES # BLD AUTO: 0.5 K/UL (ref 0.3–1)
MONOCYTES NFR BLD: 8.4 % (ref 4–15)
NEUTROPHILS # BLD AUTO: 3.2 K/UL (ref 1.8–7.7)
NEUTROPHILS NFR BLD: 52.4 % (ref 38–73)
NRBC BLD-RTO: 0 /100 WBC
PLATELET # BLD AUTO: 283 K/UL (ref 150–450)
PMV BLD AUTO: 10.2 FL (ref 9.2–12.9)
POTASSIUM SERPL-SCNC: 4.2 MMOL/L (ref 3.5–5.1)
PROT SERPL-MCNC: 6.8 G/DL (ref 6–8.4)
RBC # BLD AUTO: 3.84 M/UL (ref 4–5.4)
SODIUM SERPL-SCNC: 142 MMOL/L (ref 136–145)
TROPONIN I SERPL DL<=0.01 NG/ML-MCNC: <0.006 NG/ML (ref 0–0.03)
WBC # BLD AUTO: 6.05 K/UL (ref 3.9–12.7)

## 2025-01-25 PROCEDURE — 73070 X-RAY EXAM OF ELBOW: CPT | Mod: TC,LT

## 2025-01-25 PROCEDURE — 80053 COMPREHEN METABOLIC PANEL: CPT | Performed by: EMERGENCY MEDICINE

## 2025-01-25 PROCEDURE — 73060 X-RAY EXAM OF HUMERUS: CPT | Mod: TC,LT

## 2025-01-25 PROCEDURE — 93010 ELECTROCARDIOGRAM REPORT: CPT | Mod: ,,, | Performed by: INTERNAL MEDICINE

## 2025-01-25 PROCEDURE — 73130 X-RAY EXAM OF HAND: CPT | Mod: 26,LT,, | Performed by: RADIOLOGY

## 2025-01-25 PROCEDURE — 73130 X-RAY EXAM OF HAND: CPT | Mod: TC,LT

## 2025-01-25 PROCEDURE — 93005 ELECTROCARDIOGRAM TRACING: CPT

## 2025-01-25 PROCEDURE — 73030 X-RAY EXAM OF SHOULDER: CPT | Mod: TC,LT

## 2025-01-25 PROCEDURE — 85025 COMPLETE CBC W/AUTO DIFF WBC: CPT | Performed by: EMERGENCY MEDICINE

## 2025-01-25 PROCEDURE — 73080 X-RAY EXAM OF ELBOW: CPT | Mod: 26,LT,, | Performed by: RADIOLOGY

## 2025-01-25 PROCEDURE — 96360 HYDRATION IV INFUSION INIT: CPT

## 2025-01-25 PROCEDURE — 99285 EMERGENCY DEPT VISIT HI MDM: CPT | Mod: 25

## 2025-01-25 PROCEDURE — 73030 X-RAY EXAM OF SHOULDER: CPT | Mod: 26,LT,, | Performed by: RADIOLOGY

## 2025-01-25 PROCEDURE — 84484 ASSAY OF TROPONIN QUANT: CPT | Performed by: EMERGENCY MEDICINE

## 2025-01-25 PROCEDURE — 73060 X-RAY EXAM OF HUMERUS: CPT | Mod: 26,LT,, | Performed by: RADIOLOGY

## 2025-01-25 PROCEDURE — 63600175 PHARM REV CODE 636 W HCPCS: Performed by: EMERGENCY MEDICINE

## 2025-01-25 RX ADMIN — SODIUM CHLORIDE, POTASSIUM CHLORIDE, SODIUM LACTATE AND CALCIUM CHLORIDE 1000 ML: 600; 310; 30; 20 INJECTION, SOLUTION INTRAVENOUS at 03:01

## 2025-01-25 NOTE — ED PROVIDER NOTES
Encounter Date: 1/25/2025       History     Chief Complaint   Patient presents with    Loss of Consciousness     Pt. States she had a syncopal episode this morning while at home and fell backwards. States she has history of tachycardia and a fib. Pt. C/o left shoulder pain. States later on in the day she felt weak and clammy and HR was in the 120's.      39-year-old female, here from home via private vehicle for evaluation and treatment of a syncopal episode that occurred this morning while she was at home.  She states she was opening the back door to let the dogs out when she suddenly felt lightheaded and also felt her heart racing.  Next thing she remembers was waking up on the floor with her daughter standing over her.  She felt very weak but was able to get her daughter to help her to the bed.  After a short while, she felt better and was able to resume her daily activities.  She has felt her heart racing on a few occasions since then, but did not feel lightheaded as a result.  She never felt any chest pain.  She tells me that she has a history of atrial fibrillation in the past, and for short while was on a beta-blocker, but she is not taking a beta blocker at the present time.  Her cardiologist, Dr. Chang, thinks that her initial episode of AFib was related to dehydration secondary to GI losses secondary to an intestinal virus.  She has had a few episodes over the past month or 2 of rapid heartbeat and palpitations but they has been very brief and inconsequential.  She does admit to some slight rhinorrhea and congestion and few days ago but otherwise feels fine.  She does complain of some left arm and shoulder pain after her fall.       Review of patient's allergies indicates:   Allergen Reactions    Oxycodone-acetaminophen Anaphylaxis     Facial flushing and throat burning . Patient states she can take Lortab    Adhesive Hives and Itching     Steri-Strip    Neosporin [benzalkonium chloride] Hives     Cefazolin Rash     Other reaction(s): red skin    Zofran [ondansetron hcl] Nausea And Vomiting     Past Medical History:   Diagnosis Date    Allergy     Asthma     childhood    BRCA2 positive     Depression     Diabetes mellitus     Genetic testing 06/20/2018    BRCA2 positive, reported by [facility] from outside provider    GERD (gastroesophageal reflux disease)     Mitral valve prolapse     PONV (postoperative nausea and vomiting)     Suspected sleep apnea      Past Surgical History:   Procedure Laterality Date    APPLICATION OF WOUND VACUUM-ASSISTED CLOSURE DEVICE N/A 10/30/2020    Procedure: APPLICATION, WOUND VAC;  Surgeon: Ron Carrasco MD;  Location: [facility];  Service: General;  Laterality: N/A;    BILATERAL MASTECTOMY Bilateral 10/5/2020    Procedure: MASTECTOMY, BILATERAL;  Surgeon: Paulina Cantu MD;  Location: [facility] OR;  Service: General;  Laterality: Bilateral;    CHOLECYSTECTOMY      COLONOSCOPY      CYSTOSCOPY N/A 3/11/2019    Procedure: CYSTOSCOPY;  Surgeon: Dagoberto Smith MD;  Location: [facility] OR;  Service: OB/GYN;  Laterality: N/A;    DEBRIDEMENT OF WOUND OF ABDOMEN N/A 10/30/2020    Procedure: DEBRIDEMENT, WOUND, ABDOMEN;  Surgeon: Ron Carrasco MD;  Location: [facility];  Service: General;  Laterality: N/A;    DEBRIDEMENT OF WOUND OF ABDOMEN N/A 2/3/2021    Procedure: DEBRIDEMENT, WOUND, ABDOMEN - abdominal wound debridment with placement of wound vac;  Surgeon: Ron Carrasco MD;  Location: [facility];  Service: General;  Laterality: N/A;    HYSTERECTOMY      LAPAROSCOPIC SALPINGO-OOPHORECTOMY Bilateral 3/11/2019    Procedure: SALPINGO-OOPHORECTOMY, LAPAROSCOPIC;  Surgeon: Dagoberto Smith MD;  Location: [facility];  Service: OB/GYN;  Laterality: Bilateral;    LAPAROSCOPIC TOTAL HYSTERECTOMY N/A 3/11/2019    Procedure: HYSTERECTOMY, TOTAL, LAPAROSCOPIC;  Surgeon: Dagoberto Smith MD;  Location: [facility] OR;  Service: OB/GYN;  Laterality: N/A;    OOPHORECTOMY      RECONSTRUCTION OF BREAST WITH  DEEP INFERIOR EPIGASTRIC ARTERY  (CARMEN) FLAP Bilateral 10/5/2020    Procedure: RECONSTRUCTION, BREAST, USING CARMEN SKIN FLAP;  Surgeon: Ron Carrasco MD;  Location: [facility];  Service: General;  Laterality: Bilateral;    SKIN FULL THICKNESS GRAFT Left 10/30/2020    Procedure: APPLICATION, GRAFT, SKIN, FULL-THICKNESS;  Surgeon: Ron Carrasco MD;  Location: [facility];  Service: General;  Laterality: Left;    TOTAL REDUCTION MAMMOPLASTY      TUBAL LIGATION      open    WISDOM TOOTH EXTRACTION      WOUND DEBRIDEMENT Bilateral 10/30/2020    Procedure: DEBRIDEMENT, WOUND;  Surgeon: Ron Carrasco MD;  Location: [facility];  Service: General;  Laterality: Bilateral;     Family History   Problem Relation Name Age of Onset    Diabetes Mother      Heart disease Mother      Obesity Mother      Sleep apnea Mother      Colon polyps Mother      No Known Problems Father      No Known Problems Son      Heart disease Maternal Grandmother      Diabetes Maternal Grandmother      Hypertension Maternal Grandmother      Breast cancer Maternal Grandmother  49        bilateral, second at 59    Obesity Maternal Grandfather      Melanoma Maternal Grandfather  50    No Known Problems Son      No Known Problems Son      No Known Problems Son      Colon cancer Maternal Aunt Nan. 35    Breast cancer Maternal Cousin          mom's maternal 1st cousin    Breast cancer Maternal Cousin          mom's maternal 1st cousin    Sleep apnea Brother      No Known Problems Maternal Uncle      No Known Problems Paternal Aunt      No Known Problems Paternal Uncle      No Known Problems Paternal Grandmother      Heart attack Paternal Grandfather      Colon polyps Maternal Aunt Mar.     Ovarian cancer Neg Hx       Social History     Tobacco Use    Smoking status: Never    Smokeless tobacco: Never   Substance Use Topics    Alcohol use: Yes     Comment: Occasional    Drug use: No     Review of Systems   Constitutional:  Positive for chills and  fatigue. Negative for fever.   HENT:  Positive for congestion and rhinorrhea. Negative for sore throat.    Respiratory:  Negative for cough and shortness of breath.    Cardiovascular:  Positive for palpitations.   Gastrointestinal:  Negative for abdominal pain, blood in stool, constipation, diarrhea, nausea and vomiting.   Endocrine: Negative for polydipsia and polyuria.   Genitourinary:  Negative for decreased urine volume, dysuria, flank pain, frequency and hematuria.   Musculoskeletal:  Negative for back pain, myalgias, neck pain and neck stiffness.   Skin:  Negative for pallor and rash.   Neurological:  Positive for syncope and light-headedness. Negative for dizziness.       Physical Exam     Initial Vitals [01/25/25 1504]   BP Pulse Resp Temp SpO2   121/74 76 16 98.2 °F (36.8 °C) 100 %      MAP       --         Physical Exam    Nursing note and vitals reviewed.  Constitutional: She appears well-developed and well-nourished. No distress.   HENT:   Head: Normocephalic and atraumatic.   Nose: Nose normal. Mouth/Throat: Oropharynx is clear and moist. No oropharyngeal exudate.   Eyes: Conjunctivae and EOM are normal. Pupils are equal, round, and reactive to light. No scleral icterus.   Neck: Neck supple. No JVD present.   Normal range of motion.  Cardiovascular:  Normal rate, regular rhythm, normal heart sounds and intact distal pulses.           No murmur heard.  Pulmonary/Chest: Breath sounds normal. No stridor. No respiratory distress. She has no wheezes. She has no rhonchi. She has no rales.   Abdominal: Abdomen is soft. Bowel sounds are normal. She exhibits no distension. There is no abdominal tenderness.   Musculoskeletal:         General: Tenderness present. No edema. Normal range of motion.      Cervical back: Normal range of motion and neck supple.      Comments: Mild tenderness to palpation and range of motion of the left shoulder.  No bony deformity, no crepitus.  Mild bruising around the elbow, normal  range of motion, no crepitus, normal forearm, normal hand and wrist.     Neurological: She is alert and oriented to person, place, and time. She has normal strength. No cranial nerve deficit or sensory deficit. GCS score is 15. GCS eye subscore is 4. GCS verbal subscore is 5. GCS motor subscore is 6.   Skin: Skin is warm and dry. Capillary refill takes less than 2 seconds. No rash noted. No erythema.   Psychiatric: She has a normal mood and affect. Thought content normal.         ED Course   Procedures  Labs Reviewed   CBC W/ AUTO DIFFERENTIAL - Abnormal       Result Value    WBC 6.05      RBC 3.84 (*)     Hemoglobin 11.5 (*)     Hematocrit 34.9 (*)     MCV 91      MCH 29.9      MCHC 33.0      RDW 12.1      Platelets 283      MPV 10.2      Immature Granulocytes 0.3      Gran # (ANC) 3.2      Immature Grans (Abs) 0.02      Lymph # 2.1      Mono # 0.5      Eos # 0.2      Baso # 0.07      nRBC 0      Gran % 52.4      Lymph % 34.9      Mono % 8.4      Eosinophil % 2.8      Basophil % 1.2      Differential Method Automated      Narrative:     Recoll. [id number] by CNW at 01/25/2025 16:06, reason: PLT CLUMPS   COMPREHENSIVE METABOLIC PANEL - Abnormal    Sodium 142      Potassium 4.2      Chloride 110      CO2 21 (*)     Glucose 80      BUN 8      Creatinine 0.7      Calcium 9.5      Total Protein 6.8      Albumin 3.8      Total Bilirubin 0.3      Alkaline Phosphatase 76      AST 22      ALT 24      eGFR >60.0      Anion Gap 11     TROPONIN I    Troponin I <0.006            Imaging Results              X-Ray Shoulder 2 or More Views Left (Final result)  Result time 01/25/25 16:32:39      Final result by Holger Wahl MD (01/25/25 16:32:39)                   Impression:      No acute radiographic findings of the left shoulder.      Electronically signed by: Holger Wahl  Date:    01/25/2025  Time:    16:32               Narrative:    EXAMINATION:  XR SHOULDER COMPLETE 2 OR MORE VIEWS LEFT    CLINICAL HISTORY:  L  shoulder pain;    TECHNIQUE:  Two or three views of the left shoulder were performed.    COMPARISON:  None    FINDINGS:  No acute fracture or dislocation.  No significant soft tissue swelling.    Humeral head normally positioned with the glenoid cavity.  Glenohumeral joint space preserved.   AC joint is intact.    Visualized left lung is clear.                                       X-Ray Humerus 2 View Left (Final result)  Result time 01/25/25 16:33:12      Final result by Holger Wahl MD (01/25/25 16:33:12)                   Impression:      No acute radiographic findings of the humerus.      Electronically signed by: Holger Wahl  Date:    01/25/2025  Time:    16:33               Narrative:    EXAMINATION:  XR HUMERUS 2 VIEW LEFT    CLINICAL HISTORY:  Pain in left upper arm    TECHNIQUE:  AP and lateral views of the humerus.    COMPARISON:  Same day.    FINDINGS:  No acute fracture or dislocation.  No significant soft tissue swelling.    No radiopaque foreign body.                                       X-Ray Hand 3 view Left (Final result)  Result time 01/25/25 16:34:29   Procedure changed from X-Ray Hand 2 View Left     Final result by Holger Wahl MD (01/25/25 16:34:29)                   Impression:      No acute radiographic findings of the left hand.      Electronically signed by: Holger Wahl  Date:    01/25/2025  Time:    16:34               Narrative:    EXAMINATION:  XR HAND COMPLETE 3 VIEW LEFT    CLINICAL HISTORY:  fall;fall;. Pain in left hand    TECHNIQUE:  PA, lateral, and oblique views of the left hand were performed.    COMPARISON:  None    FINDINGS:  No acute fracture or dislocation.  No significant soft tissue swelling.    The joint spaces are preserved.  Carpal bones are normal in appearance.  Radiocarpal articulation is intact.  Ulnar styloid is intact.    No radiopaque foreign body.                                       X-Ray Elbow 2 Views Left (Final result)  Result time  01/25/25 16:33:54   Procedure changed from X-Ray Elbow Complete Left     Final result by Holger Wahl MD (01/25/25 16:33:54)                   Impression:      No acute radiographic findings of the left elbow.      Electronically signed by: Holger Wahl  Date:    01/25/2025  Time:    16:33               Narrative:    EXAMINATION:  XR ELBOW 2 VIEWS LEFT    CLINICAL HISTORY:  fall;fall; Pain in left elbow    TECHNIQUE:  AP, lateral, and oblique views of the left elbow were performed.    COMPARISON:  Same day.    FINDINGS:  No acute fracture or dislocation.  The joint spaces are preserved.  No significant joint effusion.    No significant surrounding soft tissue swelling.    No radiopaque foreign body.                                    X-Rays:   Independently Interpreted Readings:   Other Readings:  All x-rays personally reviewed by me.  X-rays of the entire left upper extremity were performed and they showed no significant abnormality.  No fractures, no dislocations.  Soft tissue injury not ruled out.    Medications   lactated ringers bolus 1,000 mL (0 mLs Intravenous Stopped 1/25/25 1651)     Medical Decision Making  39-year-old female with a past history of episodic atrial fibrillation, here today with a an episode of syncope with palpitations.  There has been no such palpitations or arrhythmias while she has been here.  Her labs are unremarkable.  H&H is slightly lower than recent labs.  She denies any GI bleeding, and she is postmenopausal.  I do not believe that this was the cause of syncopal episode however.  It sounds by her description that she had an elevated heart rate just prior to the syncopal episode.  I believe she is safe for discharge home at this time, with instructions to follow-up immediately with her cardiologist.  She will return here for any worsening signs or symptoms.    Amount and/or Complexity of Data Reviewed  Labs: ordered.  Radiology: ordered.                                       Clinical Impression:  Final diagnoses:  [R00.2] Palpitations (Primary)  [M79.642] Left hand pain  [M25.522] Left elbow pain  [M79.622] Left upper arm pain  [R55] Syncope, unspecified syncope type          ED Disposition Condition    Discharge Stable          ED Prescriptions    None       Follow-up Information       Follow up With Specialties Details Why Contact Info    Dr. Chang   Call 1st thing Monday morning     [facility] Emergency Dept Emergency Medicine  As needed, If symptoms worsen [address] [address] [phone]             Nash Davila MD  01/25/25 [id number]

## 2025-01-25 NOTE — ED NOTES
Patient A&OX4, vss, no other questions or concerns- patient ambulatory- patient given discharge paperwork and discharge paperwork discussed - no other questions or concerns- patient IV removed- ready for discharge

## 2025-01-25 NOTE — DISCHARGE INSTRUCTIONS
Your labs, x-rays, and EKGs today did not show any obvious abnormalities.  Follow-up with Dr. Chang 1st thing Monday morning, and also follow-up with your primary care provider.  Make sure you drink plenty of water and stay very well hydrated.  Return here as needed or if worse in any way.

## 2025-01-25 NOTE — ED NOTES
"Patient presents with c/o syncopal episode- reports that she was reaching for the back door handle at 0715 this morning and states that she felt weak in her L hand- states that she started to feel "not right" afterward and then started feeling palpitations- reports that she then passed out and woke up on the floor- states that she hit the back of her head- reports L elbow pain, L finger pain, L upper arm pain and reports that it radiates up to her L neck - reports that she went to her [employer] ball game after the event, states that she felt like she had a "fluttering" in her chest at the game and checked her heart rate and it was in the 120's - reports seeing a cardiologist in the past   "

## 2025-01-25 NOTE — ED NOTES
Orthostatic VS:   Laying: /65, HR 63  Sitting: BP: 134/85 HR 73  Standing: /86, HR 79  ER MD Notified

## 2025-01-27 ENCOUNTER — OFFICE VISIT (OUTPATIENT)
Dept: ORTHOPEDICS | Facility: CLINIC | Age: 40
End: 2025-01-27
Payer: COMMERCIAL

## 2025-01-27 VITALS — HEIGHT: 68 IN | BODY MASS INDEX: 21.7 KG/M2 | WEIGHT: 143.19 LBS

## 2025-01-27 DIAGNOSIS — S40.022A ARM CONTUSION, LEFT, INITIAL ENCOUNTER: Primary | ICD-10-CM

## 2025-01-27 LAB
OHS QRS DURATION: 76 MS
OHS QTC CALCULATION: 399 MS

## 2025-01-27 PROCEDURE — 99205 OFFICE O/P NEW HI 60 MIN: CPT | Mod: S$GLB,,, | Performed by: ORTHOPAEDIC SURGERY

## 2025-01-27 PROCEDURE — 99999 PR PBB SHADOW E&M-EST. PATIENT-LVL III: CPT | Mod: PBBFAC,,, | Performed by: ORTHOPAEDIC SURGERY

## 2025-01-27 NOTE — PROGRESS NOTES
Subjective:      Patient ID: Allyson Bahena is a 39 y.o. female.    Chief Complaint: Injury and Pain of the Left Shoulder (DOI 1/25/2025 - fall)    HPI  39-year-old female with a several day history of left shoulder and upper extremity pain.  She sustained accidental blunt trauma during a fall due to a syncopal episode she was treated seen treated and released from the emergency department and referred for further evaluation.  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  Denies any other complaints or prior problems with the left upper extremity describing pain with range of motion and heavy lifting.  ROS      Objective:    Ortho Exam     Constitutional:   Patient is alert  and oriented in no acute distress  HEENT:  normocephalic atraumatic; PERRL EOMI  Neck:  Supple without adenopathy  Cardiovascular:  Normal rate and rhythm  Pulmonary:  Normal respiratory effort normal chest wall expansion  Abdominal:  Nonprotuberant nondistended  Musculoskeletal:  Patient has some resolving ecchymosis over the left lateral upper arm  She has good elbow range of motion she has some difficulty with range of motion of the left shoulder  Intact median ulnar and radial nerve function normal distal neurologic and vascular examination  No erythema no instability of the elbow or shoulder although she is a bit guarded  Neurological:  No focal defect; cranial nerves 2-12 grossly intact  Psychiatric/behavioral:  Mood and behavior normal      X-Ray Hand 3 view Left  Narrative: EXAMINATION:  XR HAND COMPLETE 3 VIEW LEFT    CLINICAL HISTORY:  fall;fall;. Pain in left hand    TECHNIQUE:  PA, lateral, and oblique views of the left hand were performed.    COMPARISON:  None    FINDINGS:  No acute fracture or dislocation.  No significant soft tissue swelling.    The joint spaces are preserved.  Carpal bones are normal in appearance.  Radiocarpal articulation is intact.  Ulnar styloid is intact.    No radiopaque foreign body.  Impression:  No acute radiographic findings of the left hand.    Electronically signed by: Holger Wahl  Date:    01/25/2025  Time:    16:34  X-Ray Elbow 2 Views Left  Narrative: EXAMINATION:  XR ELBOW 2 VIEWS LEFT    CLINICAL HISTORY:  fall;fall; Pain in left elbow    TECHNIQUE:  AP, lateral, and oblique views of the left elbow were performed.    COMPARISON:  Same day.    FINDINGS:  No acute fracture or dislocation.  The joint spaces are preserved.  No significant joint effusion.    No significant surrounding soft tissue swelling.    No radiopaque foreign body.  Impression: No acute radiographic findings of the left elbow.    Electronically signed by: Holger Wahl  Date:    01/25/2025  Time:    16:33  X-Ray Humerus 2 View Left  Narrative: EXAMINATION:  XR HUMERUS 2 VIEW LEFT    CLINICAL HISTORY:  Pain in left upper arm    TECHNIQUE:  AP and lateral views of the humerus.    COMPARISON:  Same day.    FINDINGS:  No acute fracture or dislocation.  No significant soft tissue swelling.    No radiopaque foreign body.  Impression: No acute radiographic findings of the humerus.    Electronically signed by: Holger Wahl  Date:    01/25/2025  Time:    16:33  X-Ray Shoulder 2 or More Views Left  Narrative: EXAMINATION:  XR SHOULDER COMPLETE 2 OR MORE VIEWS LEFT    CLINICAL HISTORY:  L shoulder pain;    TECHNIQUE:  Two or three views of the left shoulder were performed.    COMPARISON:  None    FINDINGS:  No acute fracture or dislocation.  No significant soft tissue swelling.    Humeral head normally positioned with the glenoid cavity.  Glenohumeral joint space preserved.   AC joint is intact.    Visualized left lung is clear.  Impression: No acute radiographic findings of the left shoulder.    Electronically signed by: Holger Wahl  Date:    01/25/2025  Time:    16:32       My Radiographs Findings:    I have personally reviewed radiographs and concur with above findings    Assessment:       Encounter Diagnosis   Name Primary?    Arm  contusion, left, initial encounter Yes         Plan:       I have discussed medical condition treatment options with her at length.  We have provided some general rehab exercises for her.  She would likely benefit from some physical therapy in his she so chooses we can make that referral for her.  I have discussed use of NSAIDs generalized activity restrictions slowly advancing activities as tolerated.  We have advised evaluation in the ED urgent care or PCP if there was any residual shortness of breath chest pain dizziness lightheadedness etc..  Follow up with us in 3-4 weeks for any residual symptoms otherwise follow up can be as needed.        Past Medical History:   Diagnosis Date    Allergy     Asthma     childhood    BRCA2 positive     Depression     Diabetes mellitus     Genetic testing 06/20/2018    BRCA2 positive, reported by [facility] from outside provider    GERD (gastroesophageal reflux disease)     Mitral valve prolapse     PONV (postoperative nausea and vomiting)     Suspected sleep apnea      Past Surgical History:   Procedure Laterality Date    APPLICATION OF WOUND VACUUM-ASSISTED CLOSURE DEVICE N/A 10/30/2020    Procedure: APPLICATION, WOUND VAC;  Surgeon: Ron Carrasco MD;  Location: [facility] OR;  Service: General;  Laterality: N/A;    BILATERAL MASTECTOMY Bilateral 10/5/2020    Procedure: MASTECTOMY, BILATERAL;  Surgeon: Paulina Cantu MD;  Location: [facility] OR;  Service: General;  Laterality: Bilateral;    CHOLECYSTECTOMY      COLONOSCOPY      CYSTOSCOPY N/A 3/11/2019    Procedure: CYSTOSCOPY;  Surgeon: Dagoberto Smith MD;  Location: [facility] OR;  Service: OB/GYN;  Laterality: N/A;    DEBRIDEMENT OF WOUND OF ABDOMEN N/A 10/30/2020    Procedure: DEBRIDEMENT, WOUND, ABDOMEN;  Surgeon: Ron Carrasco MD;  Location: [facility] OR;  Service: General;  Laterality: N/A;    DEBRIDEMENT OF WOUND OF ABDOMEN N/A 2/3/2021    Procedure: DEBRIDEMENT, WOUND, ABDOMEN - abdominal wound debridment with placement of wound  "vac;  Surgeon: Ron Carrasco MD;  Location: [facility];  Service: General;  Laterality: N/A;    HYSTERECTOMY      LAPAROSCOPIC SALPINGO-OOPHORECTOMY Bilateral 3/11/2019    Procedure: SALPINGO-OOPHORECTOMY, LAPAROSCOPIC;  Surgeon: Dagoberto Smith MD;  Location: [facility] OR;  Service: OB/GYN;  Laterality: Bilateral;    LAPAROSCOPIC TOTAL HYSTERECTOMY N/A 3/11/2019    Procedure: HYSTERECTOMY, TOTAL, LAPAROSCOPIC;  Surgeon: Dagoberto Smith MD;  Location: [facility] OR;  Service: OB/GYN;  Laterality: N/A;    OOPHORECTOMY      RECONSTRUCTION OF BREAST WITH DEEP INFERIOR EPIGASTRIC ARTERY  (CARMEN) FLAP Bilateral 10/5/2020    Procedure: RECONSTRUCTION, BREAST, USING CARMEN SKIN FLAP;  Surgeon: Ron Carrasco MD;  Location: [facility];  Service: General;  Laterality: Bilateral;    SKIN FULL THICKNESS GRAFT Left 10/30/2020    Procedure: APPLICATION, GRAFT, SKIN, FULL-THICKNESS;  Surgeon: Ron Carrasco MD;  Location: [facility];  Service: General;  Laterality: Left;    TOTAL REDUCTION MAMMOPLASTY      TUBAL LIGATION      open    WISDOM TOOTH EXTRACTION      WOUND DEBRIDEMENT Bilateral 10/30/2020    Procedure: DEBRIDEMENT, WOUND;  Surgeon: Ron Carrasco MD;  Location: [facility];  Service: General;  Laterality: Bilateral;         Current Outpatient Medications:     albuterol (VENTOLIN HFA) 90 mcg/actuation inhaler, Inhale 2 puffs into the lungs every 6 (six) hours as needed for Wheezing. Rescue, Disp: 18 g, Rfl: 0    buPROPion (WELLBUTRIN XL) 300 MG 24 hr tablet, TAKE ONE TABLET BY MOUTH EVERY MORNING "happy new year", Disp: 30 tablet, Rfl: 10    cyanocobalamin 1,000 mcg/mL injection, Inject 1 mL (1,000 mcg total) into the skin every 14 (fourteen) days., Disp: 1 mL, Rfl: 11    LUIS ANTONIO 0.1 mg/24 hr PTSW, APPLY ONE PATCH TOPICALLY TWICE A WEEK "happy new year", Disp: 8 patch, Rfl: 11    ergocalciferol (ERGOCALCIFEROL) 50,000 unit Cap, Take 50,000 Units by mouth., Disp: , Rfl:     estradioL (IMVEXXY MAINTENANCE PACK) 10 mcg " "Inst, Place 10 mcg vaginally every evening., Disp: 30 each, Rfl: 11    fluticasone propionate (FLONASE) 50 mcg/actuation nasal spray, 1 spray (50 mcg total) by Each Nostril route once daily., Disp: 9.9 mL, Rfl: 0    pantoprazole (PROTONIX) 40 MG tablet, TAKE ONE TABLET BY MOUTH EVERY DAY "THANK YOU", Disp: , Rfl:     progesterone (PROMETRIUM) 200 MG capsule, Take 1 capsule (200 mg total) by mouth nightly., Disp: 30 capsule, Rfl: 11    hyoscyamine (LEVBID) 0.375 mg Tb12, Take 1 tablet (0.375 mg total) by mouth every 12 (twelve) hours. (Patient not taking: Reported on 1/20/2025), Disp: 60 tablet, Rfl: 3    methylPREDNISolone (MEDROL DOSEPACK) 4 mg tablet, Take as directed, Disp: 1 each, Rfl: 0    testosterone cypionate 200 mg/mL Kit, Inject 76 mg into the muscle every 28 days. (Patient not taking: Reported on 1/20/2025), Disp: , Rfl:     Review of patient's allergies indicates:   Allergen Reactions    Oxycodone-acetaminophen Anaphylaxis     Facial flushing and throat burning . Patient states she can take Lortab    Adhesive Hives and Itching     Steri-Strip    Neosporin [benzalkonium chloride] Hives    Cefazolin Rash     Other reaction(s): red skin    Zofran [ondansetron hcl] Nausea And Vomiting       Family History   Problem Relation Name Age of Onset    Diabetes Mother      Heart disease Mother      Obesity Mother      Sleep apnea Mother      Colon polyps Mother      No Known Problems Father      No Known Problems Son      Heart disease Maternal Grandmother      Diabetes Maternal Grandmother      Hypertension Maternal Grandmother      Breast cancer Maternal Grandmother  49        bilateral, second at 59    Obesity Maternal Grandfather      Melanoma Maternal Grandfather  50    No Known Problems Son      No Known Problems Son      No Known Problems Son      Colon cancer Maternal Aunt Nan. 35    Breast cancer Maternal Cousin          mom's maternal 1st cousin    Breast cancer Maternal Cousin          mom's maternal 1st " cousin    Sleep apnea Brother      No Known Problems Maternal Uncle      No Known Problems Paternal Aunt      No Known Problems Paternal Uncle      No Known Problems Paternal Grandmother      Heart attack Paternal Grandfather      Colon polyps Maternal Aunt Mar.     Ovarian cancer Neg Hx       Social History     Occupational History    Not on file   Tobacco Use    Smoking status: Never    Smokeless tobacco: Never   Substance and Sexual Activity    Alcohol use: Yes     Comment: Occasional    Drug use: No    Sexual activity: Yes     Partners: Male     Birth control/protection: None, See Surgical Hx

## 2025-02-12 NOTE — PROGRESS NOTES
Subjective:       Patient ID: Allyson Bahena is a 39 y.o. female Body mass index is 21.59 kg/m².    Chief Complaint: Anemia and Rectal Bleeding    This patient is new to me.  Referring Provider: Sue Sutherland for abdominal cramping and blood in stool.     Stomach hurts all the time - had weight loss surgery so that limits     Some blood with stools - at times its a large amount and then others its small amount    Hx of stomach issues       Review of Systems   Constitutional:  Negative for activity change, appetite change, fatigue, fever and unexpected weight change.   HENT:  Negative for sore throat and trouble swallowing.    Respiratory:  Negative for cough and shortness of breath.    Cardiovascular:  Negative for chest pain.   Gastrointestinal:  Positive for abdominal pain, blood in stool, constipation, diarrhea and nausea. Negative for abdominal distention, anal bleeding, rectal pain and vomiting.       Patient's last menstrual period was 02/11/2019 (exact date).  Past Medical History:   Diagnosis Date    Allergy     Asthma     childhood    BRCA2 positive     Depression     Diabetes mellitus     Genetic testing 06/20/2018    BRCA2 positive, reported by [facility] from outside provider    GERD (gastroesophageal reflux disease)     Mitral valve prolapse     PONV (postoperative nausea and vomiting)     Suspected sleep apnea      Past Surgical History:   Procedure Laterality Date    APPLICATION OF WOUND VACUUM-ASSISTED CLOSURE DEVICE N/A 10/30/2020    Procedure: APPLICATION, WOUND VAC;  Surgeon: Ron Carrasco MD;  Location: [facility] OR;  Service: General;  Laterality: N/A;    BILATERAL MASTECTOMY Bilateral 10/5/2020    Procedure: MASTECTOMY, BILATERAL;  Surgeon: Paulina Cantu MD;  Location: [facility] OR;  Service: General;  Laterality: Bilateral;    CHOLECYSTECTOMY      COLONOSCOPY      CYSTOSCOPY N/A 3/11/2019    Procedure: CYSTOSCOPY;  Surgeon: Dagoberto Smith MD;  Location: [facility] OR;  Service: OB/GYN;   Laterality: N/A;    DEBRIDEMENT OF WOUND OF ABDOMEN N/A 10/30/2020    Procedure: DEBRIDEMENT, WOUND, ABDOMEN;  Surgeon: Ron Carrasco MD;  Location: [facility];  Service: General;  Laterality: N/A;    DEBRIDEMENT OF WOUND OF ABDOMEN N/A 2/3/2021    Procedure: DEBRIDEMENT, WOUND, ABDOMEN - abdominal wound debridment with placement of wound vac;  Surgeon: Ron Carrasco MD;  Location: [facility];  Service: General;  Laterality: N/A;    HYSTERECTOMY      LAPAROSCOPIC SALPINGO-OOPHORECTOMY Bilateral 3/11/2019    Procedure: SALPINGO-OOPHORECTOMY, LAPAROSCOPIC;  Surgeon: Dagoberto Smith MD;  Location: [facility];  Service: OB/GYN;  Laterality: Bilateral;    LAPAROSCOPIC TOTAL HYSTERECTOMY N/A 3/11/2019    Procedure: HYSTERECTOMY, TOTAL, LAPAROSCOPIC;  Surgeon: Dagoberto Smith MD;  Location: [facility];  Service: OB/GYN;  Laterality: N/A;    OOPHORECTOMY      RECONSTRUCTION OF BREAST WITH DEEP INFERIOR EPIGASTRIC ARTERY  (CARMEN) FLAP Bilateral 10/5/2020    Procedure: RECONSTRUCTION, BREAST, USING CARMEN SKIN FLAP;  Surgeon: Ron Carrasco MD;  Location: [facility];  Service: General;  Laterality: Bilateral;    SKIN FULL THICKNESS GRAFT Left 10/30/2020    Procedure: APPLICATION, GRAFT, SKIN, FULL-THICKNESS;  Surgeon: Ron Carrasco MD;  Location: [facility];  Service: General;  Laterality: Left;    TOTAL REDUCTION MAMMOPLASTY      TUBAL LIGATION      open    WISDOM TOOTH EXTRACTION      WOUND DEBRIDEMENT Bilateral 10/30/2020    Procedure: DEBRIDEMENT, WOUND;  Surgeon: Ron Carrasco MD;  Location: [facility];  Service: General;  Laterality: Bilateral;     Family History   Problem Relation Name Age of Onset    Diabetes Mother      Heart disease Mother      Obesity Mother      Sleep apnea Mother      Colon polyps Mother      No Known Problems Father      No Known Problems Son      Heart disease Maternal Grandmother      Diabetes Maternal Grandmother      Hypertension Maternal Grandmother      Breast cancer Maternal  Grandmother  49        bilateral, second at 59    Obesity Maternal Grandfather      Melanoma Maternal Grandfather  50    No Known Problems Son      No Known Problems Son      No Known Problems Son      Colon cancer Maternal Aunt Nan. 35    Breast cancer Maternal Cousin          mom's maternal 1st cousin    Breast cancer Maternal Cousin          mom's maternal 1st cousin    Sleep apnea Brother      No Known Problems Maternal Uncle      No Known Problems Paternal Aunt      No Known Problems Paternal Uncle      No Known Problems Paternal Grandmother      Heart attack Paternal Grandfather      Colon polyps Maternal Aunt Mar.     Ovarian cancer Neg Hx       Social History     Tobacco Use    Smoking status: Never    Smokeless tobacco: Never   Substance Use Topics    Alcohol use: Yes     Comment: Occasional    Drug use: No     Wt Readings from Last 10 Encounters:   02/17/25 64.4 kg (141 lb 15.6 oz)   01/27/25 65 kg (143 lb 3.2 oz)   01/25/25 62.6 kg (138 lb)   01/20/25 64.4 kg (142 lb)   01/09/25 63.9 kg (140 lb 14 oz)   10/19/24 67.4 kg (148 lb 7.7 oz)   10/03/24 68.5 kg (151 lb)   09/18/24 68 kg (150 lb)   09/06/24 68 kg (150 lb)   08/21/24 65.8 kg (145 lb)     Lab Results   Component Value Date    WBC 6.05 01/25/2025    HGB 11.5 (L) 01/25/2025    HCT 34.9 (L) 01/25/2025    MCV 91 01/25/2025     01/25/2025     CMP  Sodium   Date Value Ref Range Status   01/25/2025 142 136 - 145 mmol/L Final     Potassium   Date Value Ref Range Status   01/25/2025 4.2 3.5 - 5.1 mmol/L Final     Chloride   Date Value Ref Range Status   01/25/2025 110 95 - 110 mmol/L Final     CO2   Date Value Ref Range Status   01/25/2025 21 (L) 23 - 29 mmol/L Final     Glucose   Date Value Ref Range Status   01/25/2025 80 70 - 110 mg/dL Final     BUN   Date Value Ref Range Status   01/25/2025 8 6 - 20 mg/dL Final     Creatinine   Date Value Ref Range Status   01/25/2025 0.7 0.5 - 1.4 mg/dL Final     Calcium   Date Value Ref Range Status  "  01/25/2025 9.5 8.7 - 10.5 mg/dL Final     Total Protein   Date Value Ref Range Status   01/25/2025 6.8 6.0 - 8.4 g/dL Final     Albumin   Date Value Ref Range Status   01/25/2025 3.8 3.5 - 5.2 g/dL Final     Total Bilirubin   Date Value Ref Range Status   01/25/2025 0.3 0.1 - 1.0 mg/dL Final     Comment:     For infants and newborns, interpretation of results should be based  on gestational age, weight and in agreement with clinical  observations.    Premature Infant recommended reference ranges:  Up to 24 hours.............<8.0 mg/dL  Up to 48 hours............<12.0 mg/dL  3-5 days..................<15.0 mg/dL  6-29 days.................<15.0 mg/dL       Alkaline Phosphatase   Date Value Ref Range Status   01/25/2025 76 40 - 150 U/L Final     AST   Date Value Ref Range Status   01/25/2025 22 10 - 40 U/L Final     ALT   Date Value Ref Range Status   01/25/2025 24 10 - 44 U/L Final     Anion Gap   Date Value Ref Range Status   01/25/2025 11 8 - 16 mmol/L Final     eGFR if    Date Value Ref Range Status   06/09/2021 >60.0 >60 mL/min/1.73 m^2 Final     eGFR if non    Date Value Ref Range Status   08/19/2021 90 >59 mL/min/1.73 Final     Lab Results   Component Value Date    AMYLASE 63 08/09/2024     Lab Results   Component Value Date    LIPASE 19 08/09/2024     No results found for: "LIPASERES"  Lab Results   Component Value Date    TSH 2.980 08/09/2024       Reviewed prior medical records including radiology report of ER visit 1/2025, labs 1/2025 & endoscopy history (see surgical history).    Objective:      Physical Exam  Vitals and nursing note reviewed.   Constitutional:       General: She is not in acute distress.     Appearance: She is not ill-appearing.   HENT:      Head: Normocephalic and atraumatic.      Mouth/Throat:      Mouth: Mucous membranes are moist.      Pharynx: Oropharynx is clear.   Eyes:      Conjunctiva/sclera: Conjunctivae normal.   Cardiovascular:      Rate and " Rhythm: Normal rate and regular rhythm.      Pulses: Normal pulses.   Pulmonary:      Effort: Pulmonary effort is normal. No respiratory distress.   Abdominal:      General: Abdomen is flat. Bowel sounds are normal. There is no distension.      Palpations: Abdomen is soft.      Tenderness: There is abdominal tenderness.   Skin:     General: Skin is warm and dry.      Capillary Refill: Capillary refill takes 2 to 3 seconds.   Neurological:      Mental Status: She is alert and oriented to person, place, and time.         Assessment:       1. Anemia, unspecified type    2. Abdominal cramping        Plan:       Anemia, unspecified type  - discussed with patient the different ways that anemia occurs: blood loss (such as from the gi tract), the body is not making enough, or the body is breaking down the rbcs too quickly; recommend EGD and colonoscopy to further evaluate gi tract for possible blood loss and pending results of endoscopies, possible UGI with Small Bowel Follow Through/video capsule study  -follow-up with PCP and/or hematology for continued evaluation and management     Check iron levels since none done recently in chart  Start Omeprazole 40 mg daily and pepcid 40 mg nightly.  Carafate 1g 4 x a day for 10 days  - schedule EGD, discussed procedure with patient, including risks and benefits, patient verbalized understanding  - schedule Colonoscopy, discussed procedure with the patient, including risks and benefits, patient verbalized understanding    -     Iron and TIBC; Future; Expected date: 02/17/2025  -     Ferritin; Future; Expected date: 02/17/2025  -     omeprazole (PRILOSEC) 40 MG capsule; Take 1 capsule (40 mg total) by mouth once daily.  Dispense: 90 capsule; Refill: 3  -     famotidine (PEPCID) 40 MG tablet; Take 1 tablet (40 mg total) by mouth nightly as needed for Heartburn.  Dispense: 30 tablet; Refill: 11  -     sucralfate (CARAFATE) 1 gram tablet; Take 1 tablet (1 g total) by mouth 4 (four)  times daily before meals and nightly. for 10 days  Dispense: 40 tablet; Refill: 0  -     X-Ray Abdomen AP 1 View; Future; Expected date: 02/17/2025    Abdominal cramping  X-ray to assess for increased stool burden causing change in stool burden    Can consider bentyl trial if x-ray is normal  - schedule Colonoscopy, discussed procedure with the patient, including risks and benefits, patient verbalized understanding    -     X-Ray Abdomen AP 1 View; Future; Expected date: 02/17/2025      Follow up if symptoms worsen or fail to improve.      If no improvement in symptoms or symptoms worsen, call/follow-up at clinic or go to ER.       SAMSON Castillo, SHYLA-C    Encounter includes face to face time and non-face to face time preparing to see the patient (eg, review of tests), obtaining and/or reviewing separately obtained history, documenting clinical information in the electronic or other health record, independently interpreting results (not separately reported) and communicating results to the patient/family/caregiver, or care coordination (not separately reported).     A dictation software program was used for this note. Please expect some simple typographical errors in this note.

## 2025-02-12 NOTE — H&P (VIEW-ONLY)
Subjective:       Patient ID: Allyson Bahena is a 39 y.o. female Body mass index is 21.59 kg/m².    Chief Complaint: Anemia and Rectal Bleeding    This patient is new to me.  Referring Provider: Sue Sutherland for abdominal cramping and blood in stool.     Stomach hurts all the time - had weight loss surgery so that limits     Some blood with stools - at times its a large amount and then others its small amount    Hx of stomach issues       Review of Systems   Constitutional:  Negative for activity change, appetite change, fatigue, fever and unexpected weight change.   HENT:  Negative for sore throat and trouble swallowing.    Respiratory:  Negative for cough and shortness of breath.    Cardiovascular:  Negative for chest pain.   Gastrointestinal:  Positive for abdominal pain, blood in stool, constipation, diarrhea and nausea. Negative for abdominal distention, anal bleeding, rectal pain and vomiting.       Patient's last menstrual period was 02/11/2019 (exact date).  Past Medical History:   Diagnosis Date    Allergy     Asthma     childhood    BRCA2 positive     Depression     Diabetes mellitus     Genetic testing 06/20/2018    BRCA2 positive, reported by [facility] from outside provider    GERD (gastroesophageal reflux disease)     Mitral valve prolapse     PONV (postoperative nausea and vomiting)     Suspected sleep apnea      Past Surgical History:   Procedure Laterality Date    APPLICATION OF WOUND VACUUM-ASSISTED CLOSURE DEVICE N/A 10/30/2020    Procedure: APPLICATION, WOUND VAC;  Surgeon: Ron Carrasco MD;  Location: [facility] OR;  Service: General;  Laterality: N/A;    BILATERAL MASTECTOMY Bilateral 10/5/2020    Procedure: MASTECTOMY, BILATERAL;  Surgeon: Paulina Cantu MD;  Location: [facility] OR;  Service: General;  Laterality: Bilateral;    CHOLECYSTECTOMY      COLONOSCOPY      CYSTOSCOPY N/A 3/11/2019    Procedure: CYSTOSCOPY;  Surgeon: Dagoberto Smith MD;  Location: [facility] OR;  Service: OB/GYN;   Laterality: N/A;    DEBRIDEMENT OF WOUND OF ABDOMEN N/A 10/30/2020    Procedure: DEBRIDEMENT, WOUND, ABDOMEN;  Surgeon: Ron Carrasco MD;  Location: [facility];  Service: General;  Laterality: N/A;    DEBRIDEMENT OF WOUND OF ABDOMEN N/A 2/3/2021    Procedure: DEBRIDEMENT, WOUND, ABDOMEN - abdominal wound debridment with placement of wound vac;  Surgeon: Ron Carrasco MD;  Location: [facility];  Service: General;  Laterality: N/A;    HYSTERECTOMY      LAPAROSCOPIC SALPINGO-OOPHORECTOMY Bilateral 3/11/2019    Procedure: SALPINGO-OOPHORECTOMY, LAPAROSCOPIC;  Surgeon: Dagoberto Smith MD;  Location: [facility];  Service: OB/GYN;  Laterality: Bilateral;    LAPAROSCOPIC TOTAL HYSTERECTOMY N/A 3/11/2019    Procedure: HYSTERECTOMY, TOTAL, LAPAROSCOPIC;  Surgeon: Dagoberto Smith MD;  Location: [facility];  Service: OB/GYN;  Laterality: N/A;    OOPHORECTOMY      RECONSTRUCTION OF BREAST WITH DEEP INFERIOR EPIGASTRIC ARTERY  (CARMEN) FLAP Bilateral 10/5/2020    Procedure: RECONSTRUCTION, BREAST, USING CARMEN SKIN FLAP;  Surgeon: Ron Carrasco MD;  Location: [facility];  Service: General;  Laterality: Bilateral;    SKIN FULL THICKNESS GRAFT Left 10/30/2020    Procedure: APPLICATION, GRAFT, SKIN, FULL-THICKNESS;  Surgeon: Ron Carrasco MD;  Location: [facility];  Service: General;  Laterality: Left;    TOTAL REDUCTION MAMMOPLASTY      TUBAL LIGATION      open    WISDOM TOOTH EXTRACTION      WOUND DEBRIDEMENT Bilateral 10/30/2020    Procedure: DEBRIDEMENT, WOUND;  Surgeon: Ron Carrasco MD;  Location: [facility];  Service: General;  Laterality: Bilateral;     Family History   Problem Relation Name Age of Onset    Diabetes Mother      Heart disease Mother      Obesity Mother      Sleep apnea Mother      Colon polyps Mother      No Known Problems Father      No Known Problems Son      Heart disease Maternal Grandmother      Diabetes Maternal Grandmother      Hypertension Maternal Grandmother      Breast cancer Maternal  Grandmother  49        bilateral, second at 59    Obesity Maternal Grandfather      Melanoma Maternal Grandfather  50    No Known Problems Son      No Known Problems Son      No Known Problems Son      Colon cancer Maternal Aunt Nan. 35    Breast cancer Maternal Cousin          mom's maternal 1st cousin    Breast cancer Maternal Cousin          mom's maternal 1st cousin    Sleep apnea Brother      No Known Problems Maternal Uncle      No Known Problems Paternal Aunt      No Known Problems Paternal Uncle      No Known Problems Paternal Grandmother      Heart attack Paternal Grandfather      Colon polyps Maternal Aunt Mar.     Ovarian cancer Neg Hx       Social History     Tobacco Use    Smoking status: Never    Smokeless tobacco: Never   Substance Use Topics    Alcohol use: Yes     Comment: Occasional    Drug use: No     Wt Readings from Last 10 Encounters:   02/17/25 64.4 kg (141 lb 15.6 oz)   01/27/25 65 kg (143 lb 3.2 oz)   01/25/25 62.6 kg (138 lb)   01/20/25 64.4 kg (142 lb)   01/09/25 63.9 kg (140 lb 14 oz)   10/19/24 67.4 kg (148 lb 7.7 oz)   10/03/24 68.5 kg (151 lb)   09/18/24 68 kg (150 lb)   09/06/24 68 kg (150 lb)   08/21/24 65.8 kg (145 lb)     Lab Results   Component Value Date    WBC 6.05 01/25/2025    HGB 11.5 (L) 01/25/2025    HCT 34.9 (L) 01/25/2025    MCV 91 01/25/2025     01/25/2025     CMP  Sodium   Date Value Ref Range Status   01/25/2025 142 136 - 145 mmol/L Final     Potassium   Date Value Ref Range Status   01/25/2025 4.2 3.5 - 5.1 mmol/L Final     Chloride   Date Value Ref Range Status   01/25/2025 110 95 - 110 mmol/L Final     CO2   Date Value Ref Range Status   01/25/2025 21 (L) 23 - 29 mmol/L Final     Glucose   Date Value Ref Range Status   01/25/2025 80 70 - 110 mg/dL Final     BUN   Date Value Ref Range Status   01/25/2025 8 6 - 20 mg/dL Final     Creatinine   Date Value Ref Range Status   01/25/2025 0.7 0.5 - 1.4 mg/dL Final     Calcium   Date Value Ref Range Status  "  01/25/2025 9.5 8.7 - 10.5 mg/dL Final     Total Protein   Date Value Ref Range Status   01/25/2025 6.8 6.0 - 8.4 g/dL Final     Albumin   Date Value Ref Range Status   01/25/2025 3.8 3.5 - 5.2 g/dL Final     Total Bilirubin   Date Value Ref Range Status   01/25/2025 0.3 0.1 - 1.0 mg/dL Final     Comment:     For infants and newborns, interpretation of results should be based  on gestational age, weight and in agreement with clinical  observations.    Premature Infant recommended reference ranges:  Up to 24 hours.............<8.0 mg/dL  Up to 48 hours............<12.0 mg/dL  3-5 days..................<15.0 mg/dL  6-29 days.................<15.0 mg/dL       Alkaline Phosphatase   Date Value Ref Range Status   01/25/2025 76 40 - 150 U/L Final     AST   Date Value Ref Range Status   01/25/2025 22 10 - 40 U/L Final     ALT   Date Value Ref Range Status   01/25/2025 24 10 - 44 U/L Final     Anion Gap   Date Value Ref Range Status   01/25/2025 11 8 - 16 mmol/L Final     eGFR if    Date Value Ref Range Status   06/09/2021 >60.0 >60 mL/min/1.73 m^2 Final     eGFR if non    Date Value Ref Range Status   08/19/2021 90 >59 mL/min/1.73 Final     Lab Results   Component Value Date    AMYLASE 63 08/09/2024     Lab Results   Component Value Date    LIPASE 19 08/09/2024     No results found for: "LIPASERES"  Lab Results   Component Value Date    TSH 2.980 08/09/2024       Reviewed prior medical records including radiology report of ER visit 1/2025, labs 1/2025 & endoscopy history (see surgical history).    Objective:      Physical Exam  Vitals and nursing note reviewed.   Constitutional:       General: She is not in acute distress.     Appearance: She is not ill-appearing.   HENT:      Head: Normocephalic and atraumatic.      Mouth/Throat:      Mouth: Mucous membranes are moist.      Pharynx: Oropharynx is clear.   Eyes:      Conjunctiva/sclera: Conjunctivae normal.   Cardiovascular:      Rate and " Rhythm: Normal rate and regular rhythm.      Pulses: Normal pulses.   Pulmonary:      Effort: Pulmonary effort is normal. No respiratory distress.   Abdominal:      General: Abdomen is flat. Bowel sounds are normal. There is no distension.      Palpations: Abdomen is soft.      Tenderness: There is abdominal tenderness.   Skin:     General: Skin is warm and dry.      Capillary Refill: Capillary refill takes 2 to 3 seconds.   Neurological:      Mental Status: She is alert and oriented to person, place, and time.         Assessment:       1. Anemia, unspecified type    2. Abdominal cramping        Plan:       Anemia, unspecified type  - discussed with patient the different ways that anemia occurs: blood loss (such as from the gi tract), the body is not making enough, or the body is breaking down the rbcs too quickly; recommend EGD and colonoscopy to further evaluate gi tract for possible blood loss and pending results of endoscopies, possible UGI with Small Bowel Follow Through/video capsule study  -follow-up with PCP and/or hematology for continued evaluation and management     Check iron levels since none done recently in chart  Start Omeprazole 40 mg daily and pepcid 40 mg nightly.  Carafate 1g 4 x a day for 10 days  - schedule EGD, discussed procedure with patient, including risks and benefits, patient verbalized understanding  - schedule Colonoscopy, discussed procedure with the patient, including risks and benefits, patient verbalized understanding    -     Iron and TIBC; Future; Expected date: 02/17/2025  -     Ferritin; Future; Expected date: 02/17/2025  -     omeprazole (PRILOSEC) 40 MG capsule; Take 1 capsule (40 mg total) by mouth once daily.  Dispense: 90 capsule; Refill: 3  -     famotidine (PEPCID) 40 MG tablet; Take 1 tablet (40 mg total) by mouth nightly as needed for Heartburn.  Dispense: 30 tablet; Refill: 11  -     sucralfate (CARAFATE) 1 gram tablet; Take 1 tablet (1 g total) by mouth 4 (four)  times daily before meals and nightly. for 10 days  Dispense: 40 tablet; Refill: 0  -     X-Ray Abdomen AP 1 View; Future; Expected date: 02/17/2025    Abdominal cramping  X-ray to assess for increased stool burden causing change in stool burden    Can consider bentyl trial if x-ray is normal  - schedule Colonoscopy, discussed procedure with the patient, including risks and benefits, patient verbalized understanding    -     X-Ray Abdomen AP 1 View; Future; Expected date: 02/17/2025      Follow up if symptoms worsen or fail to improve.      If no improvement in symptoms or symptoms worsen, call/follow-up at clinic or go to ER.       SAMSON Castillo, SHYLA-C    Encounter includes face to face time and non-face to face time preparing to see the patient (eg, review of tests), obtaining and/or reviewing separately obtained history, documenting clinical information in the electronic or other health record, independently interpreting results (not separately reported) and communicating results to the patient/family/caregiver, or care coordination (not separately reported).     A dictation software program was used for this note. Please expect some simple typographical errors in this note.

## 2025-02-17 ENCOUNTER — RESULTS FOLLOW-UP (OUTPATIENT)
Dept: GASTROENTEROLOGY | Facility: CLINIC | Age: 40
End: 2025-02-17

## 2025-02-17 ENCOUNTER — OFFICE VISIT (OUTPATIENT)
Dept: GASTROENTEROLOGY | Facility: CLINIC | Age: 40
End: 2025-02-17
Payer: COMMERCIAL

## 2025-02-17 ENCOUNTER — HOSPITAL ENCOUNTER (OUTPATIENT)
Dept: RADIOLOGY | Facility: HOSPITAL | Age: 40
Discharge: HOME | End: 2025-02-17
Payer: COMMERCIAL

## 2025-02-17 VITALS — WEIGHT: 142 LBS | HEIGHT: 68 IN | BODY MASS INDEX: 21.52 KG/M2

## 2025-02-17 DIAGNOSIS — D64.9 ANEMIA, UNSPECIFIED TYPE: Primary | ICD-10-CM

## 2025-02-17 DIAGNOSIS — R10.9 ABDOMINAL PAIN, UNSPECIFIED ABDOMINAL LOCATION: ICD-10-CM

## 2025-02-17 DIAGNOSIS — R10.9 ABDOMINAL CRAMPING: ICD-10-CM

## 2025-02-17 DIAGNOSIS — D64.9 ANEMIA, UNSPECIFIED TYPE: ICD-10-CM

## 2025-02-17 PROCEDURE — 74018 RADEX ABDOMEN 1 VIEW: CPT | Mod: TC

## 2025-02-17 RX ORDER — OMEPRAZOLE 40 MG/1
40 CAPSULE, DELAYED RELEASE ORAL DAILY
Qty: 90 CAPSULE | Refills: 3 | Status: SHIPPED | OUTPATIENT
Start: 2025-02-17 | End: 2026-02-17

## 2025-02-17 RX ORDER — SUCRALFATE 1 G/1
1 TABLET ORAL
Qty: 40 TABLET | Refills: 0 | Status: SHIPPED | OUTPATIENT
Start: 2025-02-17 | End: 2025-02-27

## 2025-02-17 RX ORDER — FAMOTIDINE 40 MG/1
40 TABLET, FILM COATED ORAL NIGHTLY PRN
Qty: 30 TABLET | Refills: 11 | Status: SHIPPED | OUTPATIENT
Start: 2025-02-17 | End: 2026-02-17

## 2025-02-18 ENCOUNTER — PATIENT MESSAGE (OUTPATIENT)
Dept: SURGERY | Facility: CLINIC | Age: 40
End: 2025-02-18
Payer: COMMERCIAL

## 2025-02-27 ENCOUNTER — ANESTHESIA (OUTPATIENT)
Dept: ENDOSCOPY | Facility: HOSPITAL | Age: 40
End: 2025-02-27
Payer: COMMERCIAL

## 2025-02-27 ENCOUNTER — HOSPITAL ENCOUNTER (OUTPATIENT)
Facility: HOSPITAL | Age: 40
Discharge: HOME | End: 2025-02-27
Attending: INTERNAL MEDICINE | Admitting: INTERNAL MEDICINE
Payer: COMMERCIAL

## 2025-02-27 ENCOUNTER — ANESTHESIA EVENT (OUTPATIENT)
Dept: ENDOSCOPY | Facility: HOSPITAL | Age: 40
End: 2025-02-27
Payer: COMMERCIAL

## 2025-02-27 DIAGNOSIS — R11.0 NAUSEA: Primary | ICD-10-CM

## 2025-02-27 DIAGNOSIS — D50.9 FE DEFICIENCY ANEMIA: ICD-10-CM

## 2025-02-27 PROCEDURE — 43239 EGD BIOPSY SINGLE/MULTIPLE: CPT | Mod: 59,,, | Performed by: INTERNAL MEDICINE

## 2025-02-27 PROCEDURE — 63600175 PHARM REV CODE 636 W HCPCS: Performed by: NURSE ANESTHETIST, CERTIFIED REGISTERED

## 2025-02-27 PROCEDURE — 25000003 PHARM REV CODE 250: Performed by: INTERNAL MEDICINE

## 2025-02-27 PROCEDURE — 43239 EGD BIOPSY SINGLE/MULTIPLE: CPT | Performed by: INTERNAL MEDICINE

## 2025-02-27 PROCEDURE — 27201012 HC FORCEPS, HOT/COLD, DISP: Performed by: INTERNAL MEDICINE

## 2025-02-27 PROCEDURE — 45380 COLONOSCOPY AND BIOPSY: CPT | Performed by: INTERNAL MEDICINE

## 2025-02-27 PROCEDURE — 37000009 HC ANESTHESIA EA ADD 15 MINS: Performed by: INTERNAL MEDICINE

## 2025-02-27 PROCEDURE — 45380 COLONOSCOPY AND BIOPSY: CPT | Mod: ,,, | Performed by: INTERNAL MEDICINE

## 2025-02-27 PROCEDURE — 37000008 HC ANESTHESIA 1ST 15 MINUTES: Performed by: INTERNAL MEDICINE

## 2025-02-27 RX ORDER — PROPOFOL 10 MG/ML
VIAL (ML) INTRAVENOUS
Status: DISCONTINUED | OUTPATIENT
Start: 2025-02-27 | End: 2025-02-27

## 2025-02-27 RX ORDER — SODIUM CHLORIDE 9 MG/ML
INJECTION, SOLUTION INTRAVENOUS CONTINUOUS
Status: DISCONTINUED | OUTPATIENT
Start: 2025-02-27 | End: 2025-02-27 | Stop reason: HOSPADM

## 2025-02-27 RX ORDER — LIDOCAINE HYDROCHLORIDE 20 MG/ML
INJECTION INTRAVENOUS
Status: DISCONTINUED | OUTPATIENT
Start: 2025-02-27 | End: 2025-02-27

## 2025-02-27 RX ADMIN — PROPOFOL 50 MG: 10 INJECTION, EMULSION INTRAVENOUS at 12:02

## 2025-02-27 RX ADMIN — SODIUM CHLORIDE: 9 INJECTION, SOLUTION INTRAVENOUS at 12:02

## 2025-02-27 RX ADMIN — SODIUM CHLORIDE: 9 INJECTION, SOLUTION INTRAVENOUS at 10:02

## 2025-02-27 RX ADMIN — PROPOFOL 100 MG: 10 INJECTION, EMULSION INTRAVENOUS at 12:02

## 2025-02-27 RX ADMIN — LIDOCAINE HYDROCHLORIDE 100 MG: 20 INJECTION, SOLUTION INTRAVENOUS at 12:02

## 2025-02-27 NOTE — PLAN OF CARE
Vss, sanya po fluids, denies pain, ambulates easily. IV removed, catheter intact. Discharge instructions provided and states understanding. States ready to go home.  Discharged from facility with family per wheelchair.

## 2025-02-27 NOTE — TRANSFER OF CARE
Anesthesia Transfer of Care Note    Patient: Allyson Bahena    Procedure(s) Performed: Procedure(s) (LRB):  EGD (ESOPHAGOGASTRODUODENOSCOPY) (N/A)  COLONOSCOPY (N/A)    Patient location: GI    Anesthesia Type: general    Transport from OR: Transported from OR on 2-3 L/min O2 by NC with adequate spontaneous ventilation    Post pain: adequate analgesia    Post assessment: no apparent anesthetic complications and tolerated procedure well    Post vital signs: stable    Level of consciousness: sedated and responds to stimulation    Nausea/Vomiting: no nausea/vomiting    Complications: none    Transfer of care protocol was followed    Last vitals: Visit Vitals  /76   Pulse 70   Temp 36.7 °C (98.1 °F) (Skin)   Resp 18   Wt 64 kg (141 lb)   LMP 02/11/2019 (Exact Date)   SpO2 100%   Breastfeeding No   BMI 21.44 kg/m²

## 2025-02-27 NOTE — ANESTHESIA PREPROCEDURE EVALUATION
02/27/2025  Allyson Bahena is a 39 y.o., female.      Pre-op Assessment    I have reviewed the Patient Summary Reports.     I have reviewed the Nursing Notes. I have reviewed the NPO Status.   I have reviewed the Medications.     Review of Systems  Anesthesia Hx:  No problems with previous Anesthesia  PONV                Social:  Non-Smoker       Cardiovascular:     Hypertension, well controlled Valvular problems/Murmurs, MVP                                         Pulmonary:    Asthma asymptomatic and mild                   Renal/:  Renal/ Normal                 Hepatic/GI:     GERD                Neurological:  Neurology Normal                                      Endocrine:  Diabetes, well controlled, type 2         Obesity / BMI > 30  Psych:  Psychiatric History  depression              Physical Exam  General: Well nourished, Cooperative, Alert and Oriented    Airway:  Mallampati: II   Mouth Opening: Normal  TM Distance: Normal  Neck ROM: Normal ROM    Anesthesia Plan  Type of Anesthesia, risks & benefits discussed:    Anesthesia Type: Gen ETT, Gen Supraglottic Airway, Gen Natural Airway, MAC  Intra-op Monitoring Plan: Standard ASA Monitors  Post Op Pain Control Plan: multimodal analgesia  Induction:  IV  Airway Plan: Direct, Video and Fiberoptic, Post-Induction  Informed Consent: Informed consent signed with the Patient and all parties understand the risks and agree with anesthesia plan.  All questions answered.   ASA Score: 3    Ready For Surgery From Anesthesia Perspective.   .

## 2025-02-27 NOTE — [employer]ATION PATIENT INSTRUCTIONS
Discharge Summary/Instructions after an Endoscopic Procedure  Patient Name: Allyson Bahena  Patient MRN: [id number]  Patient YOB: 1985 Thursday, February 27, 2025  Margaux Moffett MD  Dear patient,  As a result of recent federal legislation (The Federal Cures Act), you may   receive lab or pathology results from your procedure in your [facility]   account before your physician is able to contact you. Your physician or   their representative will relay the results to you with their   recommendations at their soonest availability.  Thank you,  RESTRICTIONS:  During your procedure today, you received medications for sedation.  These   medications may affect your judgment, balance and coordination.  Therefore,   for 24 hours, you have the following restrictions:   - DO NOT drive a car, operate machinery, make legal/financial decisions,   sign important papers or drink alcohol.    ACTIVITY:  Today: no heavy lifting, straining or running due to procedural   sedation/anesthesia.  The following day: return to full activity including work.  DIET:  Eat and drink normally unless instructed otherwise.     TREATMENT FOR COMMON SIDE EFFECTS:  - Mild abdominal pain, nausea, belching, bloating or excessive gas:  rest,   eat lightly and use a heating pad.  - Sore Throat: treat with throat lozenges and/or gargle with warm salt   water.  - Because air was used during the procedure, expelling large amounts of air   from your rectum or belching is normal.  - If a bowel prep was taken, you may not have a bowel movement for 1-3 days.    This is normal.  SYMPTOMS TO WATCH FOR AND REPORT TO YOUR PHYSICIAN:  1. Abdominal pain or bloating, other than gas cramps.  2. Chest pain.  3. Back pain.  4. Signs of infection such as: chills or fever occurring within 24 hours   after the procedure.  5. Rectal bleeding, which would show as bright red, maroon, or black stools.   (A tablespoon of blood from the rectum is not  serious, especially if   hemorrhoids are present.)  6. Vomiting.  7. Weakness or dizziness.  GO DIRECTLY TO THE NEAREST EMERGENCY ROOM IF YOU HAVE ANY OF THE FOLLOWING:      Difficulty breathing              Chills and/or fever over 101 F   Persistent vomiting and/or vomiting blood   Severe abdominal pain   Severe chest pain   Black, tarry stools   Bleeding- more than one tablespoon   Any other symptom or condition that you feel may need urgent attention  Your doctor recommends these additional instructions:  If any biopsies were taken, your doctors clinic will contact you in 1 to 2   weeks with any results.  - Await pathology results.   - Discharge patient to home (with escort).   - Patient has a contact number available for emergencies.  The signs and   symptoms of potential delayed complications were discussed with the   patient.  Return to normal activities tomorrow.  Written discharge   instructions were provided to the patient.   - Resume previous diet.   - Continue present medications.  For questions, problems or results please call your physician - Margaux Moffett MD at Work:  [phone].  [facility], EMERGENCY ROOM PHONE NUMBER: [phone]  IF A COMPLICATION OR EMERGENCY SITUATION ARISES AND YOU ARE UNABLE TO REACH   YOUR PHYSICIAN - GO DIRECTLY TO THE EMERGENCY ROOM.  Margaux Moffett MD  2/27/2025 12:22:43 PM  This report has been verified and signed electronically.  Dear patient,  As a result of recent federal legislation (The Federal Cures Act), you may   receive lab or pathology results from your procedure in your [facility]   account before your physician is able to contact you. Your physician or   their representative will relay the results to you with their   recommendations at their soonest availability.  Thank you,  PROVATION

## 2025-02-27 NOTE — PROVATION PATIENT INSTRUCTIONS
Discharge Summary/Instructions after an Endoscopic Procedure  Patient Name: Allyson Bahena  Patient MRN: [id number]  Patient YOB: 1985 Thursday, February 27, 2025  Margaux Moffett MD  Dear patient,  As a result of recent federal legislation (The Federal Cures Act), you may   receive lab or pathology results from your procedure in your [facility]   account before your physician is able to contact you. Your physician or   their representative will relay the results to you with their   recommendations at their soonest availability.  Thank you,  RESTRICTIONS:  During your procedure today, you received medications for sedation.  These   medications may affect your judgment, balance and coordination.  Therefore,   for 24 hours, you have the following restrictions:   - DO NOT drive a car, operate machinery, make legal/financial decisions,   sign important papers or drink alcohol.    ACTIVITY:  Today: no heavy lifting, straining or running due to procedural   sedation/anesthesia.  The following day: return to full activity including work.  DIET:  Eat and drink normally unless instructed otherwise.     TREATMENT FOR COMMON SIDE EFFECTS:  - Mild abdominal pain, nausea, belching, bloating or excessive gas:  rest,   eat lightly and use a heating pad.  - Sore Throat: treat with throat lozenges and/or gargle with warm salt   water.  - Because air was used during the procedure, expelling large amounts of air   from your rectum or belching is normal.  - If a bowel prep was taken, you may not have a bowel movement for 1-3 days.    This is normal.  SYMPTOMS TO WATCH FOR AND REPORT TO YOUR PHYSICIAN:  1. Abdominal pain or bloating, other than gas cramps.  2. Chest pain.  3. Back pain.  4. Signs of infection such as: chills or fever occurring within 24 hours   after the procedure.  5. Rectal bleeding, which would show as bright red, maroon, or black stools.   (A tablespoon of blood from the rectum is not  serious, especially if   hemorrhoids are present.)  6. Vomiting.  7. Weakness or dizziness.  GO DIRECTLY TO THE NEAREST EMERGENCY ROOM IF YOU HAVE ANY OF THE FOLLOWING:      Difficulty breathing              Chills and/or fever over 101 F   Persistent vomiting and/or vomiting blood   Severe abdominal pain   Severe chest pain   Black, tarry stools   Bleeding- more than one tablespoon   Any other symptom or condition that you feel may need urgent attention  Your doctor recommends these additional instructions:  If any biopsies were taken, your doctors clinic will contact you in 1 to 2   weeks with any results.  - Discharge patient to home (with escort).   - Patient has a contact number available for emergencies.  The signs and   symptoms of potential delayed complications were discussed with the   patient.  Return to normal activities tomorrow.  Written discharge   instructions were provided to the patient.   - Resume previous diet.   - Continue present medications.   - Await pathology results.   - Repeat colonoscopy at age 47 for screening purposes.   - Return to my office PRN.  For questions, problems or results please call your physician - Margaux Moffett MD at Work:  [phone].  [facility], EMERGENCY ROOM PHONE NUMBER: [phone]  IF A COMPLICATION OR EMERGENCY SITUATION ARISES AND YOU ARE UNABLE TO REACH   YOUR PHYSICIAN - GO DIRECTLY TO THE EMERGENCY ROOM.  Margaux Moffett MD  2/27/2025 12:44:26 PM  This report has been verified and signed electronically.  Dear patient,  As a result of recent federal legislation (The Federal Cures Act), you may   receive lab or pathology results from your procedure in your [facility]   account before your physician is able to contact you. Your physician or   their representative will relay the results to you with their   recommendations at their soonest availability.  Thank you,  PROVATION

## 2025-02-27 NOTE — ANESTHESIA POSTPROCEDURE EVALUATION
Anesthesia Post Evaluation    Patient: Allyson Bahena    Procedure(s) Performed: Procedure(s) (LRB):  EGD (ESOPHAGOGASTRODUODENOSCOPY) (N/A)  COLONOSCOPY (N/A)    Final Anesthesia Type: general      Patient location during evaluation: PACU  Patient participation: Yes- Able to Participate  Level of consciousness: awake and alert and oriented  Post-procedure vital signs: reviewed and stable  Pain management: adequate  Airway patency: patent    PONV status at discharge: No PONV  Anesthetic complications: no      Cardiovascular status: blood pressure returned to baseline and stable  Respiratory status: unassisted and spontaneous ventilation  Hydration status: euvolemic  Follow-up not needed.              Vitals Value Taken Time   /72 02/27/25 12:50   Temp   02/27/25 12:54   Pulse 110 02/27/25 12:53   Resp 17 02/27/25 12:53   SpO2 98 % 02/27/25 12:53   Vitals shown include unfiled device data.      No case tracking events are documented in the log.      Pain/Yefri Score: No data recorded

## 2025-02-28 VITALS
BODY MASS INDEX: 21.44 KG/M2 | RESPIRATION RATE: 23 BRPM | DIASTOLIC BLOOD PRESSURE: 72 MMHG | HEART RATE: 78 BPM | OXYGEN SATURATION: 100 % | WEIGHT: 141 LBS | SYSTOLIC BLOOD PRESSURE: 105 MMHG | TEMPERATURE: 98 F

## 2025-03-07 ENCOUNTER — RESULTS FOLLOW-UP (OUTPATIENT)
Dept: GASTROENTEROLOGY | Facility: HOSPITAL | Age: 40
End: 2025-03-07

## 2025-03-21 ENCOUNTER — LAB VISIT (OUTPATIENT)
Dept: LAB | Facility: HOSPITAL | Age: 40
End: 2025-03-21
Attending: NURSE PRACTITIONER
Payer: COMMERCIAL

## 2025-03-21 DIAGNOSIS — R61 NIGHT SWEATS: ICD-10-CM

## 2025-03-21 DIAGNOSIS — R55 SYNCOPE, UNSPECIFIED SYNCOPE TYPE: ICD-10-CM

## 2025-03-21 DIAGNOSIS — R00.0 TACHYCARDIA: ICD-10-CM

## 2025-03-21 DIAGNOSIS — R23.2 FACIAL FLUSHING: ICD-10-CM

## 2025-03-21 DIAGNOSIS — E16.2 HYPOGLYCEMIA: ICD-10-CM

## 2025-03-21 LAB
CORTIS SERPL-MCNC: 7.3 UG/DL (ref 4.3–22.4)
INSULIN COLLECTION INTERVAL: 1
INSULIN SERPL-ACNC: 4.7 UU/ML
TSH SERPL DL<=0.005 MIU/L-ACNC: 2.69 UIU/ML (ref 0.4–4)

## 2025-03-21 PROCEDURE — 84443 ASSAY THYROID STIM HORMONE: CPT | Performed by: NURSE PRACTITIONER

## 2025-03-21 PROCEDURE — 82533 TOTAL CORTISOL: CPT | Performed by: NURSE PRACTITIONER

## 2025-03-21 PROCEDURE — 36415 COLL VENOUS BLD VENIPUNCTURE: CPT | Performed by: NURSE PRACTITIONER

## 2025-03-21 PROCEDURE — 83525 ASSAY OF INSULIN: CPT | Performed by: NURSE PRACTITIONER

## 2025-03-21 PROCEDURE — 84260 ASSAY OF SEROTONIN: CPT | Performed by: NURSE PRACTITIONER

## 2025-03-21 PROCEDURE — 83519 RIA NONANTIBODY: CPT | Performed by: NURSE PRACTITIONER

## 2025-03-21 PROCEDURE — 83497 ASSAY OF 5-HIAA: CPT | Performed by: NURSE PRACTITIONER

## 2025-03-26 LAB — SEROTONIN: 355 NG/ML

## 2025-03-27 LAB — 5OH-INDOLEACETATE SERPL-MCNC: 11 NG/ML

## 2025-03-29 LAB — PANCREASTATIN SERPL-MCNC: 77 PG/ML (ref 10–135)

## 2025-04-08 ENCOUNTER — PROCEDURE VISIT (OUTPATIENT)
Dept: SURGERY | Facility: CLINIC | Age: 40
End: 2025-04-08
Payer: COMMERCIAL

## 2025-04-08 DIAGNOSIS — Z90.13 S/P MASTECTOMY, BILATERAL: ICD-10-CM

## 2025-04-08 DIAGNOSIS — Z15.01 BRCA2 GENE MUTATION POSITIVE: ICD-10-CM

## 2025-04-08 DIAGNOSIS — Z15.09 BRCA2 GENE MUTATION POSITIVE: ICD-10-CM

## 2025-04-08 DIAGNOSIS — L81.8 TATTOOS: Primary | ICD-10-CM

## 2025-04-08 DIAGNOSIS — Z85.3 PERSONAL HISTORY OF BREAST CANCER: ICD-10-CM

## 2025-04-08 PROCEDURE — 11920 CORRECT SKIN COLOR 6.0 CM/<: CPT | Mod: S$GLB,,, | Performed by: PHYSICIAN ASSISTANT

## 2025-04-08 PROCEDURE — 99499 UNLISTED E&M SERVICE: CPT | Mod: S$GLB,,, | Performed by: PHYSICIAN ASSISTANT

## 2025-04-08 NOTE — PROCEDURES
".[facility]  Department of Surgery     Nipple Tattoo Touch Up Procedure      Subjective:      Allyson Bahena is a 39 y.o.  female who presents to the [facility] on 4/8/2025 for follow up visit status post bilateral 3D nipple areola tattoo on 5/29/24. Denies fever, chills, nausea, vomiting, or other systemic signs of infection. She is very pleased with the aesthetic outcome of her breast reconstruction and nipple areola tattoo(s).    Review of patient's allergies indicates:   Allergen Reactions    Oxycodone-acetaminophen Anaphylaxis     Facial flushing and throat burning . Patient states she can take Lortab    Adhesive Hives and Itching     Steri-Strip    Neosporin [benzalkonium chloride] Hives    Cefazolin Rash     Other reaction(s): red skin    Zofran [ondansetron hcl] Nausea And Vomiting       Current Outpatient Medications on File Prior to Visit   Medication Sig Dispense Refill    albuterol (VENTOLIN HFA) 90 mcg/actuation inhaler Inhale 2 puffs into the lungs every 6 (six) hours as needed for Wheezing. Rescue 18 g 0    blood-glucose sensor (FREESTYLE TRESA 3 SENSOR) Xenia 1 each by Misc.(Non-Drug; Combo Route) route every 14 (fourteen) days. 2 each 11    buPROPion (WELLBUTRIN XL) 300 MG 24 hr tablet TAKE ONE TABLET BY MOUTH EVERY MORNING "happy new year" 30 tablet 10    cyanocobalamin 1,000 mcg/mL injection Inject 1 mL (1,000 mcg total) into the skin every 14 (fourteen) days. 1 mL 11    ergocalciferol (ERGOCALCIFEROL) 50,000 unit Cap Take 50,000 Units by mouth.      estradioL (LUIS ANTONIO) 0.1 mg/24 hr PTSW Place 1 patch onto the skin twice a week. 8 patch 11    estradioL (IMVEXXY MAINTENANCE PACK) 10 mcg Inst Place 10 mcg vaginally every evening. 30 each 11    famotidine (PEPCID) 40 MG tablet Take 1 tablet (40 mg total) by mouth nightly as needed for Heartburn. 30 tablet 11    fluticasone propionate (FLONASE) 50 mcg/actuation nasal spray 1 spray (50 mcg total) by Each Nostril route " once daily. (Patient taking differently: 1 spray by Each Nostril route daily as needed.) 9.9 mL 0    metoprolol succinate (TOPROL-XL) 25 MG 24 hr tablet 12.5 mg.      midodrine (PROAMATINE) 2.5 MG Tab 2.5 mg.      omeprazole (PRILOSEC) 40 MG capsule Take 1 capsule (40 mg total) by mouth once daily. 90 capsule 3    progesterone (PROMETRIUM) 200 MG capsule Take 1 capsule (200 mg total) by mouth nightly. 30 capsule 11     No current facility-administered medications on file prior to visit.       [unfilled]    Social History     Socioeconomic History    Marital status:    Tobacco Use    Smoking status: Never    Smokeless tobacco: Never   Substance and Sexual Activity    Alcohol use: Yes     Comment: Occasional    Drug use: No    Sexual activity: Yes     Partners: Male     Birth control/protection: None, See Surgical Hx   Social History Narrative    Lives at home with 4 kids and     She buys and cooks all the food     Social Drivers of Health     Financial Resource Strain: Low Risk  (5/17/2024)    Overall Financial Resource Strain (CARDIA)     Difficulty of Paying Living Expenses: Not hard at all   Food Insecurity: Patient Declined (5/17/2024)    Hunger Vital Sign     Worried About Running Out of Food in the Last Year: Patient declined     Ran Out of Food in the Last Year: Patient declined   Transportation Needs: No Transportation Needs (1/31/2021)    PRAPARE - Transportation     Lack of Transportation (Medical): No     Lack of Transportation (Non-Medical): No   Physical Activity: Inactive (5/17/2024)    Exercise Vital Sign     Days of Exercise per Week: 0 days     Minutes of Exercise per Session: 0 min   Stress: No Stress Concern Present (5/17/2024)    [employer] [place] [employer] - Occupational Stress Questionnaire     Feeling of Stress : Not at all   Housing Stability: Unknown (5/17/2024)    Housing Stability Vital Sign     Unable to Pay for Housing in the Last Year: No         Review of Systems:  Denies any cough, chest pain or shortness of breath.  Denies any fever or chills.  See HPI/ Interval History for other systems reviewed.        Objective:     Physical Exam:  There were no vitals filed for this visit.      WD WN NAD  VSS  Normal resp effort  R breast - incisions healed, nipple areola tattoo well healed with some pigment loss, no erythema/drainage  L breast - incisions healed, nipple areola tattoo well healed with some pigment loss, no erythema/drainage        Assessment:       S/p bilateral  3D areola tattoos for breast reconstruction.     Plan:   39 y.o. female status post bilateral 3D nipple areola tattoo  - Doing well, no issues. Pleased with outcome.   - both nipple areola tattoo(s) well healed with good saturation although she does have some pigment loss. Will  need second stage tattoo procedure at this time.   - Additional touch up performed today.       All questions were answered. The patient was advised to call the clinic with any questions or concerns prior to their next visit.

## 2025-04-14 ENCOUNTER — HOSPITAL ENCOUNTER (OUTPATIENT)
Dept: RADIOLOGY | Facility: HOSPITAL | Age: 40
Discharge: HOME | End: 2025-04-14
Attending: PHYSICIAN ASSISTANT
Payer: COMMERCIAL

## 2025-04-14 ENCOUNTER — HOSPITAL ENCOUNTER (OUTPATIENT)
Dept: CARDIOLOGY | Facility: HOSPITAL | Age: 40
Discharge: HOME | End: 2025-04-14
Attending: PHYSICIAN ASSISTANT
Payer: COMMERCIAL

## 2025-04-14 DIAGNOSIS — M79.622 LEFT UPPER ARM PAIN: ICD-10-CM

## 2025-04-14 DIAGNOSIS — R07.81 PLEURITIC CHEST PAIN: ICD-10-CM

## 2025-04-14 DIAGNOSIS — R07.89 LEFT-SIDED CHEST WALL PAIN: ICD-10-CM

## 2025-04-14 LAB
OHS QRS DURATION: 84 MS
OHS QTC CALCULATION: 388 MS

## 2025-04-14 PROCEDURE — 93010 ELECTROCARDIOGRAM REPORT: CPT | Mod: ,,, | Performed by: INTERNAL MEDICINE

## 2025-04-14 PROCEDURE — 93005 ELECTROCARDIOGRAM TRACING: CPT

## 2025-05-12 ENCOUNTER — TELEPHONE (OUTPATIENT)
Dept: SURGERY | Facility: CLINIC | Age: 40
End: 2025-05-12
Payer: COMMERCIAL

## 2025-05-12 ENCOUNTER — PATIENT MESSAGE (OUTPATIENT)
Dept: GASTROENTEROLOGY | Facility: CLINIC | Age: 40
End: 2025-05-12
Payer: COMMERCIAL

## 2025-05-12 ENCOUNTER — HOSPITAL ENCOUNTER (OUTPATIENT)
Dept: RADIOLOGY | Facility: HOSPITAL | Age: 40
Discharge: HOME | End: 2025-05-12
Attending: NURSE PRACTITIONER
Payer: COMMERCIAL

## 2025-05-12 DIAGNOSIS — M25.511 RIGHT SHOULDER PAIN, UNSPECIFIED CHRONICITY: ICD-10-CM

## 2025-05-12 DIAGNOSIS — M54.9 DORSALGIA, UNSPECIFIED: ICD-10-CM

## 2025-05-12 DIAGNOSIS — K62.5 RECTAL BLEEDING: Primary | ICD-10-CM

## 2025-05-12 PROCEDURE — 72148 MRI LUMBAR SPINE W/O DYE: CPT | Mod: TC,PO

## 2025-05-12 PROCEDURE — 73221 MRI JOINT UPR EXTREM W/O DYE: CPT | Mod: 26,LT,, | Performed by: RADIOLOGY

## 2025-05-12 PROCEDURE — 73221 MRI JOINT UPR EXTREM W/O DYE: CPT | Mod: TC,PO,LT

## 2025-05-12 PROCEDURE — 72148 MRI LUMBAR SPINE W/O DYE: CPT | Mod: 26,,, | Performed by: RADIOLOGY

## 2025-05-12 NOTE — TELEPHONE ENCOUNTER
----- Message from DAREK Woo sent at 5/12/2025  1:33 PM CDT -----  Regarding: FW: Sooner appt  Contact: pt [phone]  Did you call her?  ----- Message -----  From: Amaya Fay  Sent: 5/12/2025   1:25 PM CDT  To: [facility] Clinical Suppo#  Subject: Sooner appt                                      Type:  Sooner Apoointment RequestCaller is requesting a sooner appointment.  Caller declined first available appointment listed below.  Caller will not accept being placed on the waitlist and is requesting a message be sent to doctor.Name of Caller:Allyson called in regards o getting a soon appt pt has a referral in her chart When is the first available appointment?No appt available in Epic Symptoms: Rectal bleedingWould the patient rather a call back or a response via [facility]? Call [facility] Call Back Number:pt [phone] Additional Information:

## 2025-05-12 NOTE — TELEPHONE ENCOUNTER
"Spoke with patient, reports losing lots of blood, abdominal pain, bloating and reports feeling "weird" after  large bloody bowel movement. Very little pain with bowel movements. Patient also reported having a nose bleed today and she has never had one in the past. Patient denies taking any blood thinners. Encouraged patient to go to ED. Patient is from [place] and will send message to [facility] staff to see about future appt scheduling.   "

## 2025-05-13 ENCOUNTER — TELEPHONE (OUTPATIENT)
Dept: SURGERY | Facility: CLINIC | Age: 40
End: 2025-05-13
Payer: COMMERCIAL

## 2025-05-13 NOTE — TELEPHONE ENCOUNTER
----- Message from DAREK Guerra sent at 5/12/2025  4:47 PM CDT -----    ----- Message -----  From: Julio Gresham RN  Sent: 5/12/2025   2:04 PM CDT  To: Rachel Patel RN; [facility] Staff    I spoke with this patient, she reports a lot of blood loss rectally. I encouraged her to go to the ED, but I figured she may benefit from a future appointment being scheduled as well. I offered her the 3p on Wednesday with Dr Connelly that was available at that time, but the patient declined stating she did not have anyone to  her kids. Just wanted to reach out in case you could get her scheduled within the next week or so. Thanks!

## 2025-05-14 ENCOUNTER — OFFICE VISIT (OUTPATIENT)
Dept: SURGERY | Facility: CLINIC | Age: 40
End: 2025-05-14
Payer: COMMERCIAL

## 2025-05-14 ENCOUNTER — LAB VISIT (OUTPATIENT)
Dept: LAB | Facility: HOSPITAL | Age: 40
End: 2025-05-14
Attending: INTERNAL MEDICINE
Payer: COMMERCIAL

## 2025-05-14 ENCOUNTER — PATIENT MESSAGE (OUTPATIENT)
Dept: SURGERY | Facility: CLINIC | Age: 40
End: 2025-05-14

## 2025-05-14 VITALS
BODY MASS INDEX: 22.42 KG/M2 | OXYGEN SATURATION: 97 % | HEART RATE: 66 BPM | RESPIRATION RATE: 17 BRPM | DIASTOLIC BLOOD PRESSURE: 77 MMHG | TEMPERATURE: 98 F | SYSTOLIC BLOOD PRESSURE: 116 MMHG | WEIGHT: 147.94 LBS | HEIGHT: 68 IN

## 2025-05-14 DIAGNOSIS — K62.5 RECTAL BLEEDING: ICD-10-CM

## 2025-05-14 DIAGNOSIS — K60.2 ANAL FISSURE: Primary | ICD-10-CM

## 2025-05-14 LAB
ABSOLUTE EOSINOPHIL (OHS): 0 K/UL
ABSOLUTE MONOCYTE (OHS): 0.39 K/UL (ref 0.3–1)
ABSOLUTE NEUTROPHIL COUNT (OHS): 3.41 K/UL (ref 1.8–7.7)
BASOPHILS # BLD AUTO: 0.04 K/UL
BASOPHILS NFR BLD AUTO: 0.7 %
ERYTHROCYTE [DISTWIDTH] IN BLOOD BY AUTOMATED COUNT: 12.1 % (ref 11.5–14.5)
HCT VFR BLD AUTO: 36.9 % (ref 37–48.5)
HGB BLD-MCNC: 12.8 GM/DL (ref 12–16)
IMM GRANULOCYTES # BLD AUTO: 0.01 K/UL (ref 0–0.04)
IMM GRANULOCYTES NFR BLD AUTO: 0.2 % (ref 0–0.5)
LYMPHOCYTES # BLD AUTO: 1.72 K/UL (ref 1–4.8)
MCH RBC QN AUTO: 30.7 PG (ref 27–31)
MCHC RBC AUTO-ENTMCNC: 34.7 G/DL (ref 32–36)
MCV RBC AUTO: 89 FL (ref 82–98)
NUCLEATED RBC (/100WBC) (OHS): 0 /100 WBC
PLATELET # BLD AUTO: 301 K/UL (ref 150–450)
PMV BLD AUTO: 10.5 FL (ref 9.2–12.9)
RBC # BLD AUTO: 4.17 M/UL (ref 4–5.4)
RELATIVE EOSINOPHIL (OHS): 0 %
RELATIVE LYMPHOCYTE (OHS): 30.9 % (ref 18–48)
RELATIVE MONOCYTE (OHS): 7 % (ref 4–15)
RELATIVE NEUTROPHIL (OHS): 61.2 % (ref 38–73)
WBC # BLD AUTO: 5.57 K/UL (ref 3.9–12.7)

## 2025-05-14 PROCEDURE — 85025 COMPLETE CBC W/AUTO DIFF WBC: CPT | Mod: PN

## 2025-05-14 PROCEDURE — 99203 OFFICE O/P NEW LOW 30 MIN: CPT | Mod: S$GLB,,,

## 2025-05-14 PROCEDURE — 36415 COLL VENOUS BLD VENIPUNCTURE: CPT | Mod: PN

## 2025-05-14 PROCEDURE — 99999 PR PBB SHADOW E&M-EST. PATIENT-LVL V: CPT | Mod: PBBFAC,,,

## 2025-05-14 NOTE — PROGRESS NOTES
Subjective    Referring provider: Lyndsey Calderón     Chief complaint:  Rectal bleeding     History of present illness:   Allyson Bahena is a 40 y.o. female with complaint of rectal bleeding. Reports history of intermittent rectal bleeding noted on toilet paper for which she has attributed to hemorrhoids.  Monday, she experienced significant rectal bleeding, clots and blood in the toilet bowl following episode of constipation. Bleeding has since decreased significantly. She continues to have rectal pain with and following bowel movements. Bowel movements are predominately formed.     BRCA2 positive s/p risk reducing [place]/BSO, mastectomy.   Following with neuroendocrine specialist for possible malignant carcinoid syndrome.     Last Colonoscopy completed on 2/27/2025 (Dr. Moffett).   Findings:       Normal mucosa was found in the entire colon. Biopsies for histology were taken with a cold forceps from the right colon and left colon for evaluation of microscopic colitis.        Internal hemorrhoids were found during retroflexion. The hemorrhoids were small.        The exam was otherwise without abnormality on direct and retroflexion views.   Impression:               - Normal mucosa in the entire examined colon. Biopsied.    - Internal hemorrhoids.    - The examination was otherwise normal on direct and retroflexion views.     Review of Systems   Constitutional:  Negative for activity change, appetite change, chills, fatigue, fever and unexpected weight change.   Respiratory:  Negative for cough and shortness of breath.    Cardiovascular:  Negative for chest pain and leg swelling.   Gastrointestinal:  Positive for anal bleeding and rectal pain. Negative for abdominal distention, abdominal pain, blood in stool, constipation, diarrhea, nausea and vomiting.   Genitourinary:  Negative for difficulty urinating, flank pain and hematuria.   Musculoskeletal: Negative.  Negative for gait problem.   Skin: Negative.   "Negative for rash.   Neurological: Negative.  Negative for weakness.   Hematological:  Negative for adenopathy. Does not bruise/bleed easily.   Psychiatric/Behavioral: Negative.        Review of patient's allergies indicates:   Allergen Reactions    Oxycodone-acetaminophen Anaphylaxis     Facial flushing and throat burning . Patient states she can take Lortab    Adhesive Hives and Itching     Steri-Strip    Neosporin [benzalkonium chloride] Hives    Cefazolin Rash     Other reaction(s): red skin    Zofran [ondansetron hcl] Nausea And Vomiting      Current Medications[1]     Past Medical History[2]     Past Surgical History[3]     Social History[4]     Family History   Problem Relation Name Age of Onset    Diabetes Mother      Heart disease Mother      Obesity Mother      Sleep apnea Mother      Colon polyps Mother      No Known Problems Father      Sleep apnea Brother      No Known Problems Son      No Known Problems Son      No Known Problems Son      No Known Problems Son      Colon cancer Maternal Aunt Nan. 35    Cancer Maternal Aunt Mar.         colon    Colon polyps Maternal Aunt Mar.     No Known Problems Maternal Uncle      No Known Problems Paternal Aunt      No Known Problems Paternal Uncle      Heart disease Maternal Grandmother      Diabetes Maternal Grandmother      Hypertension Maternal Grandmother      Breast cancer Maternal Grandmother  49        bilateral, second at 59    Obesity Maternal Grandfather      Melanoma Maternal Grandfather  50    No Known Problems Paternal Grandmother      Heart attack Paternal Grandfather      Breast cancer Maternal Cousin          mom's maternal 1st cousin    Breast cancer Maternal Cousin          mom's maternal 1st cousin    Ovarian cancer Neg Hx             Objective    /77 (BP Location: Left arm, Patient Position: Sitting)   Pulse 66   Temp 98 °F (36.7 °C) (Temporal)   Resp 17   Ht 5' 8" (1.727 m)   Wt 67.1 kg (147 lb 14.9 oz)   LMP 02/11/2019 (Exact " Date)   SpO2 97%   BMI 22.49 kg/m²     Physical Exam  Vitals reviewed. Exam conducted with a chaperone present.   Constitutional:       General: She is not in acute distress.     Appearance: Normal appearance. She is not ill-appearing.   HENT:      Head: Normocephalic.   Eyes:      General: No scleral icterus.     Pupils: Pupils are equal, round, and reactive to light.   Cardiovascular:      Rate and Rhythm: Normal rate.      Pulses: Normal pulses.   Pulmonary:      Effort: No respiratory distress.   Abdominal:      General: There is no distension.      Palpations: Abdomen is soft.      Tenderness: There is no abdominal tenderness.   Musculoskeletal:         General: No swelling or signs of injury.   Skin:     General: Skin is warm and dry.      Coloration: Skin is not jaundiced.   Neurological:      Mental Status: She is alert and oriented to person, place, and time. Mental status is at baseline.      Motor: No weakness.      Gait: Gait normal.   Psychiatric:         Mood and Affect: Mood normal.         Behavior: Behavior normal.        Rectal Inspection:  Posterior anal fissure noted. Acute pain with exam.   No acute edema or thrombosis.     ERICK deferred due to acute pain      Assessment & Plan  Rectal bleeding  Reports bleeding is improving.   Will check CBC today.   Recent colonoscopy with negative biopsies.   Anoscopic exam deferred due to acute pain.   Anal fissure  Posterior midline fissure noted on exam. Acute pain on exam today.   Instructions:  SOAK TID in tub warm water only (or handheld shower to anus)  Diltiazem cream TID  High fiber diet - 25 grams per day.  Emphasis on whole grain breads such as double fiber wheat bread and high fiber cereals and bars.    Drink 8 glasses of water per day 64 - 96 oz per day  Fiber supplements such as KONSYL, METAMUCIL can be taken 1-2 x per day  Regular exercise - 30 min brisk walking 5 days per week  Begin miralax daily prn        Orders Placed This Encounter     "CBC Auto Differential    diltiazem HCl (DILTIAZEM 2% CREAM)        Follow up in about 6 weeks (around 6/25/2025).     SHYLA Morris          [1]   Current Outpatient Medications   Medication Sig Dispense Refill    blood-glucose sensor (FREESTYLE TRESA 3 SENSOR) Xenia 1 each by Misc.(Non-Drug; Combo Route) route every 14 (fourteen) days. 2 each 11    buPROPion (WELLBUTRIN XL) 300 MG 24 hr tablet TAKE ONE TABLET BY MOUTH EVERY MORNING "happy new year" 30 tablet 10    cyanocobalamin 1,000 mcg/mL injection Inject 1 mL (1,000 mcg total) into the skin every 14 (fourteen) days. 1 mL 11    ergocalciferol (ERGOCALCIFEROL) 50,000 unit Cap Take 50,000 Units by mouth.      estradioL (LUIS ANTONIO) 0.1 mg/24 hr PTSW Place 1 patch onto the skin twice a week. 8 patch 11    estradioL (IMVEXXY MAINTENANCE PACK) 10 mcg Inst Place 10 mcg vaginally every evening. 30 each 11    estradioL (IMVEXXY STARTER PACK) 10 mcg InPk Place 10 mcg vaginally nightly. For 2 weeks then biweekly 18 each 0    famotidine (PEPCID) 40 MG tablet Take 1 tablet (40 mg total) by mouth nightly as needed for Heartburn. 30 tablet 11    metoprolol succinate (TOPROL-XL) 25 MG 24 hr tablet 12.5 mg.      midodrine (PROAMATINE) 2.5 MG Tab 2.5 mg.      octreotide acetate (SANDOSTATIN) 100 mcg/mL (1 mL) Syrg       omeprazole (PRILOSEC) 40 MG capsule Take 1 capsule (40 mg total) by mouth once daily. 90 capsule 3    progesterone (PROMETRIUM) 200 MG capsule Take 1 capsule (200 mg total) by mouth nightly. 30 capsule 11    diltiazem HCl (DILTIAZEM 2% CREAM) Apply topically 3 (three) times daily. Apply topically to anal area. 30 g 1     No current facility-administered medications for this visit.   [2]   Past Medical History:  Diagnosis Date    Allergy     Asthma     childhood    BRCA2 positive     Depression     Genetic testing 06/20/2018    BRCA2 positive, reported by [facility] from outside provider    GERD (gastroesophageal reflux disease)     Mitral valve prolapse     PONV " (postoperative nausea and vomiting)     Suspected sleep apnea    [3]   Past Surgical History:  Procedure Laterality Date    APPLICATION OF WOUND VACUUM-ASSISTED CLOSURE DEVICE N/A 10/30/2020    Procedure: APPLICATION, WOUND VAC;  Surgeon: Ron Carrasco MD;  [facility]: [facility];  Service: General;  Laterality: N/A;    BILATERAL MASTECTOMY Bilateral 10/5/2020    Procedure: MASTECTOMY, BILATERAL;  Surgeon: Paulina Cantu MD;  Location: [facility];  Service: General;  Laterality: Bilateral;    CHOLECYSTECTOMY      COLONOSCOPY      COLONOSCOPY N/A 2/27/2025    Procedure: COLONOSCOPY;  Surgeon: Margaux Moffett MD;  Location: [facility];  Service: Endoscopy;  Laterality: N/A;    CYSTOSCOPY N/A 3/11/2019    Procedure: CYSTOSCOPY;  Surgeon: Dagoberto Smith MD;  Location: [facility] OR;  Service: OB/GYN;  Laterality: N/A;    DEBRIDEMENT OF WOUND OF ABDOMEN N/A 10/30/2020    Procedure: DEBRIDEMENT, WOUND, ABDOMEN;  Surgeon: Ron Carrasco MD;  Location: [facility];  Service: General;  Laterality: N/A;    DEBRIDEMENT OF WOUND OF ABDOMEN N/A 2/3/2021    Procedure: DEBRIDEMENT, WOUND, ABDOMEN - abdominal wound debridment with placement of wound vac;  Surgeon: Ron Carrasco MD;  Location: [facility];  Service: General;  Laterality: N/A;    ESOPHAGOGASTRODUODENOSCOPY N/A 2/27/2025    Procedure: EGD (ESOPHAGOGASTRODUODENOSCOPY);  Surgeon: Margaux Moffett MD;  Location: [facility];  Service: Endoscopy;  Laterality: N/A;    HYSTERECTOMY      LAPAROSCOPIC SALPINGO-OOPHORECTOMY Bilateral 3/11/2019    Procedure: SALPINGO-OOPHORECTOMY, LAPAROSCOPIC;  Surgeon: Dagoberto Smith MD;  Location: [facility] OR;  Service: OB/GYN;  Laterality: Bilateral;    LAPAROSCOPIC TOTAL HYSTERECTOMY N/A 3/11/2019    Procedure: HYSTERECTOMY, TOTAL, LAPAROSCOPIC;  Surgeon: Dagoberto Smith MD;  Location: [facility] OR;  Service: OB/GYN;  Laterality: N/A;    OOPHORECTOMY      RECONSTRUCTION OF BREAST WITH DEEP INFERIOR EPIGASTRIC ARTERY  (CARMEN) FLAP  Bilateral 10/5/2020    Procedure: RECONSTRUCTION, BREAST, USING CARMEN SKIN FLAP;  Surgeon: Ron Carrasco MD;  Location: [facility];  Service: General;  Laterality: Bilateral;    SKIN FULL THICKNESS GRAFT Left 10/30/2020    Procedure: APPLICATION, GRAFT, SKIN, FULL-THICKNESS;  Surgeon: Ron Carrasco MD;  Location: [facility];  Service: General;  Laterality: Left;    TOTAL REDUCTION MAMMOPLASTY      TUBAL LIGATION      open    WISDOM TOOTH EXTRACTION      WOUND DEBRIDEMENT Bilateral 10/30/2020    Procedure: DEBRIDEMENT, WOUND;  Surgeon: Ron Carrasco MD;  Location: [facility];  Service: General;  Laterality: Bilateral;   [4]   Social History  Tobacco Use    Smoking status: Never    Smokeless tobacco: Never   Substance Use Topics    Alcohol use: Yes     Comment: Occasional    Drug use: No

## 2025-05-20 ENCOUNTER — TELEPHONE (OUTPATIENT)
Dept: GASTROENTEROLOGY | Facility: CLINIC | Age: 40
End: 2025-05-20
Payer: COMMERCIAL

## 2025-05-20 NOTE — TELEPHONE ENCOUNTER
Spoke with patient. Patient decline to go to the ER. Stated she preferred to be seen in office to discuss her abdominal pain and alternative recommendations. Scheduled appointment tomorrow 5/21 with Lyndsey Calderón at 10am. Patient aware.

## 2025-05-21 ENCOUNTER — HOSPITAL ENCOUNTER (OUTPATIENT)
Dept: RADIOLOGY | Facility: HOSPITAL | Age: 40
Discharge: HOME | End: 2025-05-21
Payer: COMMERCIAL

## 2025-05-21 ENCOUNTER — TELEPHONE (OUTPATIENT)
Dept: GASTROENTEROLOGY | Facility: CLINIC | Age: 40
End: 2025-05-21

## 2025-05-21 ENCOUNTER — OFFICE VISIT (OUTPATIENT)
Dept: GASTROENTEROLOGY | Facility: CLINIC | Age: 40
End: 2025-05-21
Payer: COMMERCIAL

## 2025-05-21 ENCOUNTER — RESULTS FOLLOW-UP (OUTPATIENT)
Dept: GASTROENTEROLOGY | Facility: CLINIC | Age: 40
End: 2025-05-21

## 2025-05-21 VITALS
HEIGHT: 68 IN | WEIGHT: 147.06 LBS | SYSTOLIC BLOOD PRESSURE: 112 MMHG | HEART RATE: 56 BPM | BODY MASS INDEX: 22.29 KG/M2 | DIASTOLIC BLOOD PRESSURE: 58 MMHG

## 2025-05-21 DIAGNOSIS — R10.31 RIGHT LOWER QUADRANT ABDOMINAL PAIN: ICD-10-CM

## 2025-05-21 DIAGNOSIS — K62.5 RECTAL BLEEDING: ICD-10-CM

## 2025-05-21 DIAGNOSIS — R11.2 NAUSEA AND VOMITING, UNSPECIFIED VOMITING TYPE: Primary | ICD-10-CM

## 2025-05-21 DIAGNOSIS — R11.2 NAUSEA AND VOMITING, UNSPECIFIED VOMITING TYPE: ICD-10-CM

## 2025-05-21 PROCEDURE — 99214 OFFICE O/P EST MOD 30 MIN: CPT | Mod: S$GLB,,,

## 2025-05-21 PROCEDURE — 74177 CT ABD & PELVIS W/CONTRAST: CPT | Mod: TC

## 2025-05-21 PROCEDURE — 25500020 PHARM REV CODE 255

## 2025-05-21 PROCEDURE — A9698 NON-RAD CONTRAST MATERIALNOC: HCPCS

## 2025-05-21 PROCEDURE — 74177 CT ABD & PELVIS W/CONTRAST: CPT | Mod: 26,,, | Performed by: RADIOLOGY

## 2025-05-21 PROCEDURE — 99999 PR PBB SHADOW E&M-EST. PATIENT-LVL IV: CPT | Mod: PBBFAC,,,

## 2025-05-21 RX ADMIN — IOHEXOL 800 ML: 9 SOLUTION ORAL at 12:05

## 2025-05-21 RX ADMIN — IOHEXOL 75 ML: 350 INJECTION, SOLUTION INTRAVENOUS at 12:05

## 2025-05-21 NOTE — TELEPHONE ENCOUNTER
"Spoke with patient about test results and recommendation to go to ER for finding of partial small bowel obstruction.     Pt upset that this provider cannot directly admit her to the hospital and that she must go to the ER which is costly for her. Patient reports again she has been having these symptoms for over a week and she doesn't understand why we cannot admit her. She also is upset about the bleeding being blamed on hemorrhoids which she states she was told "everyone has these."    Pt stated she would see how she felt before going to the ER.   "

## 2025-05-21 NOTE — PROGRESS NOTES
"Subjective:       Patient ID: Allyson Bahena is a 40 y.o. female Body mass index is 22.36 kg/m².    Chief Complaint: Abdominal Pain    Referring Provider: No ref. provider found for abdopm.  Established patient of Dr. Moffett and myself.     Saw blood again yesterday - felt weak. "Its like period blood" - last Monday episode.  R sided abdominal pain and around to back.   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    2/27/25 Colonoscopy and EGD with Dr Moffett  Findings:       Normal mucosa was found in the entire colon. Biopsies for histology        were taken with a cold forceps from the right colon and left colon        for evaluation of microscopic colitis.        Internal hemorrhoids were found during retroflexion. The hemorrhoids        were small.        The exam was otherwise without abnormality on direct and        retroflexion views.   Findings:       The esophagus was normal.        A small hiatal hernia was present.        Evidence of a sleeve gastrectomy was found in the gastric body. This        was characterized by healthy appearing mucosa.        Patchy moderate inflammation characterized by congestion (edema),        erythema and granularity was found in the gastric antrum. Biopsies        were taken with a cold forceps for histology.        The examined duodenum was normal. Biopsies for histology were taken        with a cold forceps for evaluation of celiac disease.       Review of Systems   Constitutional:  Positive for activity change and fatigue. Negative for appetite change, fever and unexpected weight change.   HENT:  Negative for sore throat and trouble swallowing.    Respiratory:  Negative for cough and shortness of breath.    Cardiovascular:  Negative for chest pain.   Gastrointestinal:  Positive for abdominal pain, anal bleeding, blood in stool, constipation, diarrhea and rectal pain. Negative for abdominal distention, nausea and vomiting.   Neurological:  Positive for " weakness.       Patient's last menstrual period was 02/11/2019 (exact date).  Past Medical History:   Diagnosis Date    Allergy     Asthma     childhood    BRCA2 positive     Depression     Genetic testing 06/20/2018    BRCA2 positive, reported by [facility] from outside provider    GERD (gastroesophageal reflux disease)     Mitral valve prolapse     PONV (postoperative nausea and vomiting)     Suspected sleep apnea      Past Surgical History:   Procedure Laterality Date    APPLICATION OF WOUND VACUUM-ASSISTED CLOSURE DEVICE N/A 10/30/2020    Procedure: APPLICATION, WOUND VAC;  Surgeon: Ron Carrasco MD;  Location: [facility] OR;  Service: General;  Laterality: N/A;    BILATERAL MASTECTOMY Bilateral 10/5/2020    Procedure: MASTECTOMY, BILATERAL;  Surgeon: Paulina Cantu MD;  Location: [facility] OR;  Service: General;  Laterality: Bilateral;    CHOLECYSTECTOMY      COLONOSCOPY      COLONOSCOPY N/A 2/27/2025    Procedure: COLONOSCOPY;  Surgeon: Margaux Moffett MD;  Location: [facility];  Service: Endoscopy;  Laterality: N/A;    CYSTOSCOPY N/A 3/11/2019    Procedure: CYSTOSCOPY;  Surgeon: Dagoberto Smith MD;  Location: [facility] OR;  Service: OB/GYN;  Laterality: N/A;    DEBRIDEMENT OF WOUND OF ABDOMEN N/A 10/30/2020    Procedure: DEBRIDEMENT, WOUND, ABDOMEN;  Surgeon: Ron Carrasco MD;  Location: [facility] OR;  Service: General;  Laterality: N/A;    DEBRIDEMENT OF WOUND OF ABDOMEN N/A 2/3/2021    Procedure: DEBRIDEMENT, WOUND, ABDOMEN - abdominal wound debridment with placement of wound vac;  Surgeon: Ron Carrasco MD;  Location: [facility] OR;  Service: General;  Laterality: N/A;    ESOPHAGOGASTRODUODENOSCOPY N/A 2/27/2025    Procedure: EGD (ESOPHAGOGASTRODUODENOSCOPY);  Surgeon: Margaux Moffett MD;  Location: [facility];  Service: Endoscopy;  Laterality: N/A;    HYSTERECTOMY      LAPAROSCOPIC SALPINGO-OOPHORECTOMY Bilateral 3/11/2019    Procedure: SALPINGO-OOPHORECTOMY, LAPAROSCOPIC;  Surgeon: Dagoberto Smith MD;   Location: [facility] OR;  Service: OB/GYN;  Laterality: Bilateral;    LAPAROSCOPIC TOTAL HYSTERECTOMY N/A 3/11/2019    Procedure: HYSTERECTOMY, TOTAL, LAPAROSCOPIC;  Surgeon: Dagoberto Smith MD;  Location: [facility] OR;  Service: OB/GYN;  Laterality: N/A;    OOPHORECTOMY      RECONSTRUCTION OF BREAST WITH DEEP INFERIOR EPIGASTRIC ARTERY  (CARMEN) FLAP Bilateral 10/5/2020    Procedure: RECONSTRUCTION, BREAST, USING CARMEN SKIN FLAP;  Surgeon: Ron Carrasco MD;  Location: [facility];  Service: General;  Laterality: Bilateral;    SKIN FULL THICKNESS GRAFT Left 10/30/2020    Procedure: APPLICATION, GRAFT, SKIN, FULL-THICKNESS;  Surgeon: Ron Carrasco MD;  Location: [facility];  Service: General;  Laterality: Left;    TOTAL REDUCTION MAMMOPLASTY      TUBAL LIGATION      open    WISDOM TOOTH EXTRACTION      WOUND DEBRIDEMENT Bilateral 10/30/2020    Procedure: DEBRIDEMENT, WOUND;  Surgeon: Ron Carrasco MD;  Location: [facility];  Service: General;  Laterality: Bilateral;     Family History   Problem Relation Name Age of Onset    Diabetes Mother      Heart disease Mother      Obesity Mother      Sleep apnea Mother      Colon polyps Mother      No Known Problems Father      Sleep apnea Brother      No Known Problems Son      No Known Problems Son      No Known Problems Son      No Known Problems Son      Colon cancer Maternal Aunt Nan. 35    Cancer Maternal Aunt Mar.         colon    Colon polyps Maternal Aunt Mar.     No Known Problems Maternal Uncle      No Known Problems Paternal Aunt      No Known Problems Paternal Uncle      Heart disease Maternal Grandmother      Diabetes Maternal Grandmother      Hypertension Maternal Grandmother      Breast cancer Maternal Grandmother  49        bilateral, second at 59    Obesity Maternal Grandfather      Melanoma Maternal Grandfather  50    No Known Problems Paternal Grandmother      Heart attack Paternal Grandfather      Breast cancer Maternal Cousin          mom's maternal 1st  cousin    Breast cancer Maternal Cousin          mom's maternal 1st cousin    Ovarian cancer Neg Hx       Social History[1]  Wt Readings from Last 10 Encounters:   05/21/25 66.7 kg (147 lb 0.8 oz)   05/14/25 67.1 kg (147 lb 14.9 oz)   05/14/25 67.1 kg (148 lb)   04/14/25 67.4 kg (148 lb 8 oz)   03/20/25 67.1 kg (148 lb)   02/27/25 64 kg (141 lb)   02/17/25 64.4 kg (141 lb 15.6 oz)   01/27/25 65 kg (143 lb 3.2 oz)   01/25/25 62.6 kg (138 lb)   01/20/25 64.4 kg (142 lb)     Lab Results   Component Value Date    WBC 5.57 05/14/2025    HGB 12.8 05/14/2025    HCT 36.9 (L) 05/14/2025    MCV 89 05/14/2025     05/14/2025     CMP  Sodium   Date Value Ref Range Status   05/09/2025 141 135 - 146 mmol/L Final   02/27/2025 140 136 - 145 mmol/L Final     Potassium   Date Value Ref Range Status   05/09/2025 4.4 3.6 - 5.2 mmol/L Final   02/27/2025 4.0 3.5 - 5.1 mmol/L Final     Chloride   Date Value Ref Range Status   02/27/2025 102 95 - 110 mmol/L Final     CO2   Date Value Ref Range Status   02/27/2025 25 23 - 29 mmol/L Final     Carbon Dioxide   Date Value Ref Range Status   05/09/2025 29 24 - 32 mmol/L Final     Glucose   Date Value Ref Range Status   02/27/2025 89 70 - 110 mg/dL Final     BUN   Date Value Ref Range Status   05/09/2025 9.0 7.0 - 25.0 mg/dL Final   02/27/2025 8 6 - 20 mg/dL Final     Creatinine   Date Value Ref Range Status   05/09/2025 0.76 0.50 - 1.10 mg/dL Final   02/27/2025 0.9 0.5 - 1.4 mg/dL Final     Calcium   Date Value Ref Range Status   05/09/2025 9.6 8.4 - 10.3 mg/dL Final   02/27/2025 10.1 8.7 - 10.5 mg/dL Final     Total Protein   Date Value Ref Range Status   02/27/2025 8.5 (H) 6.0 - 8.4 g/dL Final     Albumin   Date Value Ref Range Status   05/09/2025 4.7 3.4 - 5.0 g/dL Final   02/27/2025 4.4 3.5 - 5.2 g/dL Final     Total Bilirubin   Date Value Ref Range Status   05/09/2025 0.7 <1.3 mg/dL Final   02/27/2025 0.6 0.1 - 1.0 mg/dL Final     Comment:     For infants and newborns, interpretation  "of results should be based  on gestational age, weight and in agreement with clinical  observations.    Premature Infant recommended reference ranges:  Up to 24 hours.............<8.0 mg/dL  Up to 48 hours............<12.0 mg/dL  3-5 days..................<15.0 mg/dL  6-29 days.................<15.0 mg/dL       Alkaline Phosphatase   Date Value Ref Range Status   02/27/2025 85 40 - 150 U/L Final     AST   Date Value Ref Range Status   05/09/2025 26 <45 U/L Final   02/27/2025 20 10 - 40 U/L Final     ALT   Date Value Ref Range Status   05/09/2025 17 <46 U/L Final   02/27/2025 11 10 - 44 U/L Final     Anion Gap   Date Value Ref Range Status   05/09/2025 8 8 - 16 Final     Comment:     Calculation does not include K+   02/27/2025 13 8 - 16 mmol/L Final     eGFR if    Date Value Ref Range Status   06/09/2021 >60.0 >60 mL/min/1.73 m^2 Final     eGFR if non    Date Value Ref Range Status   08/19/2021 90 >59 mL/min/1.73 Final     Lab Results   Component Value Date    AMYLASE 63 08/09/2024     Lab Results   Component Value Date    LIPASE 19 08/09/2024     No results found for: "LIPASERES"  Lab Results   Component Value Date    TSH 2.686 03/21/2025       Reviewed prior medical records including radiology report of KUB 2/2025 & endoscopy history (see surgical history).    Objective:      Physical Exam  Vitals and nursing note reviewed.   Constitutional:       General: She is not in acute distress.     Appearance: She is not ill-appearing.   HENT:      Head: Normocephalic and atraumatic.      Mouth/Throat:      Mouth: Mucous membranes are moist.      Pharynx: Oropharynx is clear.   Eyes:      Conjunctiva/sclera: Conjunctivae normal.   Cardiovascular:      Rate and Rhythm: Normal rate and regular rhythm.      Pulses: Normal pulses.   Pulmonary:      Effort: Pulmonary effort is normal. No respiratory distress.   Abdominal:      General: Abdomen is flat. There is no distension.      Palpations: " "Abdomen is soft.      Tenderness: There is abdominal tenderness. There is guarding.   Skin:     General: Skin is warm and dry.      Capillary Refill: Capillary refill takes 2 to 3 seconds.   Neurological:      Mental Status: She is alert and oriented to person, place, and time.   Psychiatric:         Mood and Affect: Mood is anxious.       Assessment:       1. Nausea and vomiting, unspecified vomiting type    2. Right lower quadrant abdominal pain    3. Rectal bleeding        Plan:       Nausea and vomiting, unspecified vomiting type & Right lower quadrant abdominal pain  Reports pain in right side and right lower back. "Feels like stool is going around something"    CT to further evaluate for any mass or abnormality  X-ray to assess for constipation as sometimes stool is better seen on x-ray than CT.    -     CT Abdomen Pelvis With IV Contrast Routine Oral Contrast; Future; Expected date: 05/21/2025  -     X-Ray Abdomen AP 1 View; Future; Expected date: 05/21/2025  Rectal bleeding  - Consider schedule repeat Colonoscopy, discussed procedure with the patient, including risks and benefits, patient verbalized understanding  - discussed about different etiologies that can cause rectal bleeding, such as but not limited to diverticulosis, polyps, colon mass, colon inflammation or infection, anal fissure or hemorrhoids.   - You may resume normal activity as long as you feel well.  - Avoid/minimize NSAIDs such as ibuprofen (Advil, Motrin) and naproxen (Aleve and Naprosyn).  - Avoid alcohol.     If iron deficency found will consider repeat sooner   -     CBC Auto Differential; Future; Expected date: 05/21/2025  -     IRON AND TIBC; Future; Expected date: 05/21/2025  -     Ferritin; Future; Expected date: 05/21/2025      Follow up if symptoms worsen or fail to improve.      If no improvement in symptoms or symptoms worsen, call/follow-up at clinic or go to ER.       SAMSON Castillo, [id number]    Encounter includes face to " face time and non-face to face time preparing to see the patient (eg, review of tests), obtaining and/or reviewing separately obtained history, documenting clinical information in the electronic or other health record, independently interpreting results (not separately reported) and communicating results to the patient/family/caregiver, or care coordination (not separately reported).     A dictation software program was used for this note. Please expect some simple typographical errors in this note.         [1]   Social History  Tobacco Use    Smoking status: Never    Smokeless tobacco: Never   Substance Use Topics    Alcohol use: Yes     Comment: Occasional    Drug use: No

## 2025-05-28 ENCOUNTER — HOSPITAL ENCOUNTER (OUTPATIENT)
Dept: RADIOLOGY | Facility: HOSPITAL | Age: 40
Discharge: HOME | End: 2025-05-28
Attending: SURGERY
Payer: COMMERCIAL

## 2025-05-28 DIAGNOSIS — K56.600 PARTIAL SMALL BOWEL OBSTRUCTION: ICD-10-CM

## 2025-05-28 DIAGNOSIS — R10.9 GASTRIC PAIN: Primary | ICD-10-CM

## 2025-05-28 DIAGNOSIS — R10.9 GASTRIC PAIN: ICD-10-CM

## 2025-05-28 DIAGNOSIS — R10.9 ABDOMINAL PAIN: Primary | ICD-10-CM

## 2025-05-28 DIAGNOSIS — R11.0 NAUSEA: ICD-10-CM

## 2025-05-28 PROCEDURE — 74177 CT ABD & PELVIS W/CONTRAST: CPT | Mod: 26,,, | Performed by: RADIOLOGY

## 2025-05-28 PROCEDURE — 25500020 PHARM REV CODE 255

## 2025-05-28 PROCEDURE — A9698 NON-RAD CONTRAST MATERIALNOC: HCPCS

## 2025-05-28 PROCEDURE — 74177 CT ABD & PELVIS W/CONTRAST: CPT | Mod: TC

## 2025-05-28 RX ADMIN — IOHEXOL 500 ML: 9 SOLUTION ORAL at 01:05

## 2025-05-28 RX ADMIN — IOHEXOL 75 ML: 350 INJECTION, SOLUTION INTRAVENOUS at 01:05

## 2025-07-07 ENCOUNTER — OFFICE VISIT (OUTPATIENT)
Dept: URGENT CARE | Facility: CLINIC | Age: 40
End: 2025-07-07
Payer: COMMERCIAL

## 2025-07-07 VITALS
WEIGHT: 140 LBS | DIASTOLIC BLOOD PRESSURE: 73 MMHG | HEIGHT: 68 IN | TEMPERATURE: 98 F | BODY MASS INDEX: 21.22 KG/M2 | RESPIRATION RATE: 18 BRPM | HEART RATE: 72 BPM | OXYGEN SATURATION: 99 % | SYSTOLIC BLOOD PRESSURE: 125 MMHG

## 2025-07-07 DIAGNOSIS — B97.89 VIRAL RESPIRATORY ILLNESS: Primary | ICD-10-CM

## 2025-07-07 DIAGNOSIS — R05.9 COUGH, UNSPECIFIED TYPE: ICD-10-CM

## 2025-07-07 DIAGNOSIS — R09.81 NASAL CONGESTION: ICD-10-CM

## 2025-07-07 DIAGNOSIS — J98.8 VIRAL RESPIRATORY ILLNESS: Primary | ICD-10-CM

## 2025-07-07 DIAGNOSIS — J02.9 SORE THROAT: ICD-10-CM

## 2025-07-07 PROCEDURE — 99213 OFFICE O/P EST LOW 20 MIN: CPT | Mod: S$GLB,,,

## 2025-07-07 RX ORDER — IPRATROPIUM BROMIDE 21 UG/1
2 SPRAY, METERED NASAL 2 TIMES DAILY
Qty: 30 ML | Refills: 0 | Status: SHIPPED | OUTPATIENT
Start: 2025-07-07

## 2025-07-07 RX ORDER — OCTREOTIDE ACETATE 200 UG/ML
200 INJECTION INTRAVENOUS 3 TIMES DAILY PRN
COMMUNITY
Start: 2025-05-14 | End: 2026-05-14

## 2025-07-07 RX ORDER — ERGOCALCIFEROL 1.25 MG/1
50000 CAPSULE ORAL
COMMUNITY

## 2025-07-07 RX ORDER — PROMETHAZINE HYDROCHLORIDE AND DEXTROMETHORPHAN HYDROBROMIDE 6.25; 15 MG/5ML; MG/5ML
5 SYRUP ORAL EVERY 8 HOURS PRN
Qty: 120 ML | Refills: 0 | Status: SHIPPED | OUTPATIENT
Start: 2025-07-07

## 2025-07-07 NOTE — PROGRESS NOTES
"Subjective:      Patient ID: Allyson Bahena is a 40 y.o. female.    Vitals:  height is 5' 8" (1.727 m) and weight is 63.5 kg (140 lb). Her oral temperature is 98.1 °F (36.7 °C). Her blood pressure is 125/73 and her pulse is 72. Her respiration is 18 and oxygen saturation is 99%.     Chief Complaint: Cough    This is a 40 y.o. female who presents today with a chief complaint of cough x 2 days. Patient reports it is a dry cough that is worse at night. Patient states her throat has been bothering her x 2 days. Patient also reports ear pain, headache, and burning in chest.  Pt states that she has not had any known exposure to illness. Pt states that she has not checked her temp. Pt states that she is also having body aches. Declines testing          Cough  This is a new problem. The current episode started in the past 7 days. The problem has been waxing and waning. The cough is Non-productive. Associated symptoms include ear pain, headaches, myalgias, postnasal drip and a sore throat. Pertinent negatives include no fever or nasal congestion. The symptoms are aggravated by lying down. Treatments tried: OTC Sinus medication. The treatment provided no relief.       Constitution: Positive for fatigue. Negative for fever.   HENT:  Positive for ear pain, congestion, postnasal drip, sinus pain, sinus pressure and sore throat.    Neck: neck negative.   Cardiovascular: Negative.    Eyes: Negative.    Respiratory:  Positive for cough.    Gastrointestinal:  Positive for nausea.   Endocrine: negative.   Genitourinary: Negative.    Musculoskeletal:  Positive for muscle ache.   Skin: Negative.    Allergic/Immunologic: Negative.    Neurological:  Positive for headaches.   Hematologic/Lymphatic: Negative.    Psychiatric/Behavioral: Negative.        Objective:     Physical Exam   Constitutional: She is oriented to person, place, and time. She is cooperative. She does not appear ill. No distress.   HENT:   Head: Normocephalic " and atraumatic.   Ears:   Right Ear: Tympanic membrane, external ear and ear canal normal.   Left Ear: Tympanic membrane, external ear and ear canal normal.   Nose: Congestion present. Right sinus exhibits maxillary sinus tenderness and frontal sinus tenderness. Left sinus exhibits maxillary sinus tenderness and frontal sinus tenderness.   Mouth/Throat: Mucous membranes are moist. Posterior oropharyngeal erythema present.   Eyes: Conjunctivae are normal. Pupils are equal, round, and reactive to light. Extraocular movement intact   Neck: Neck supple. No neck rigidity present.   Cardiovascular: Normal rate, regular rhythm, normal heart sounds and normal pulses.   Pulmonary/Chest: Effort normal and breath sounds normal. No respiratory distress. She has no wheezes.   Abdominal: Normal appearance.   Musculoskeletal: Normal range of motion.         General: Normal range of motion.      Cervical back: She exhibits no tenderness.   Neurological: no focal deficit. She is alert, oriented to person, place, and time and at baseline.   Skin: Skin is warm and dry.   Psychiatric: Her behavior is normal. Mood, judgment and thought content normal.   Nursing note and vitals reviewed.      Assessment:     1. Viral respiratory illness    2. Cough, unspecified type    3. Nasal congestion    4. Sore throat        Plan:       Viral respiratory illness    Cough, unspecified type    Nasal congestion    Sore throat    Other orders  -     ipratropium (ATROVENT) 21 mcg (0.03 %) nasal spray; 2 sprays by Each Nostril route 2 (two) times daily.  Dispense: 30 mL; Refill: 0  -     promethazine-dextromethorphan (PROMETHAZINE-DM) 6.25-15 mg/5 mL Syrp; Take 5 mLs by mouth every 8 (eight) hours as needed (cough).  Dispense: 120 mL; Refill: 0

## 2025-07-30 ENCOUNTER — LAB VISIT (OUTPATIENT)
Dept: LAB | Facility: HOSPITAL | Age: 40
End: 2025-07-30
Attending: SURGERY
Payer: COMMERCIAL

## 2025-07-30 DIAGNOSIS — E34.00 CARCINOID SYNDROME: Primary | ICD-10-CM

## 2025-07-30 LAB
ALBUMIN SERPL BCP-MCNC: 4.4 G/DL (ref 3.5–5.2)
ALP SERPL-CCNC: 81 UNIT/L (ref 40–150)
ALT SERPL W/O P-5'-P-CCNC: 11 UNIT/L (ref 10–44)
ANION GAP (OHS): 11 MMOL/L (ref 8–16)
AST SERPL-CCNC: 21 UNIT/L (ref 11–45)
BILIRUB SERPL-MCNC: 0.7 MG/DL (ref 0.1–1)
BUN SERPL-MCNC: 10 MG/DL (ref 6–20)
CALCIUM SERPL-MCNC: 9.8 MG/DL (ref 8.7–10.5)
CHLORIDE SERPL-SCNC: 102 MMOL/L (ref 95–110)
CO2 SERPL-SCNC: 27 MMOL/L (ref 23–29)
CREAT SERPL-MCNC: 0.8 MG/DL (ref 0.5–1.4)
GFR SERPLBLD CREATININE-BSD FMLA CKD-EPI: >60 ML/MIN/1.73/M2
GLUCOSE SERPL-MCNC: 101 MG/DL (ref 70–110)
POTASSIUM SERPL-SCNC: 4.9 MMOL/L (ref 3.5–5.1)
PROT SERPL-MCNC: 7.6 GM/DL (ref 6–8.4)
SODIUM SERPL-SCNC: 140 MMOL/L (ref 136–145)

## 2025-07-30 PROCEDURE — 80053 COMPREHEN METABOLIC PANEL: CPT

## 2025-07-30 PROCEDURE — 84260 ASSAY OF SEROTONIN: CPT

## 2025-07-30 PROCEDURE — 36415 COLL VENOUS BLD VENIPUNCTURE: CPT

## 2025-07-31 ENCOUNTER — PATIENT MESSAGE (OUTPATIENT)
Dept: GASTROENTEROLOGY | Facility: CLINIC | Age: 40
End: 2025-07-31
Payer: COMMERCIAL

## 2025-07-31 DIAGNOSIS — K59.00 CONSTIPATION, UNSPECIFIED CONSTIPATION TYPE: Primary | ICD-10-CM

## 2025-07-31 RX ORDER — LUBIPROSTONE 0.02 MG/1
24 CAPSULE ORAL 2 TIMES DAILY WITH MEALS
Qty: 60 CAPSULE | Refills: 11 | Status: SHIPPED | OUTPATIENT
Start: 2025-07-31

## 2025-08-04 PROBLEM — R10.9 RIGHT FLANK PAIN: Status: RESOLVED | Noted: 2024-10-03 | Resolved: 2025-08-04

## 2025-08-04 LAB — SEROTONIN SER-MCNC: 232 NG/ML

## 2025-08-06 ENCOUNTER — PATIENT MESSAGE (OUTPATIENT)
Dept: SURGERY | Facility: CLINIC | Age: 40
End: 2025-08-06
Payer: COMMERCIAL

## 2025-08-06 DIAGNOSIS — Z85.3 PERSONAL HISTORY OF BREAST CANCER: Primary | ICD-10-CM

## 2025-08-06 DIAGNOSIS — Z15.09 BRCA2 GENE MUTATION POSITIVE: ICD-10-CM

## 2025-08-06 DIAGNOSIS — Z15.01 BRCA2 GENE MUTATION POSITIVE: ICD-10-CM

## 2025-08-11 ENCOUNTER — PATIENT MESSAGE (OUTPATIENT)
Dept: NEUROSURGERY | Facility: CLINIC | Age: 40
End: 2025-08-11
Payer: COMMERCIAL

## 2025-08-15 ENCOUNTER — PATIENT MESSAGE (OUTPATIENT)
Dept: GASTROENTEROLOGY | Facility: CLINIC | Age: 40
End: 2025-08-15
Payer: COMMERCIAL

## 2025-08-20 ENCOUNTER — HOSPITAL ENCOUNTER (OUTPATIENT)
Dept: RADIOLOGY | Facility: HOSPITAL | Age: 40
Discharge: HOME | End: 2025-08-20
Attending: PHYSICIAN ASSISTANT
Payer: COMMERCIAL

## 2025-08-20 ENCOUNTER — PATIENT MESSAGE (OUTPATIENT)
Dept: SURGERY | Facility: CLINIC | Age: 40
End: 2025-08-20
Payer: COMMERCIAL

## 2025-08-20 DIAGNOSIS — Z85.3 PERSONAL HISTORY OF BREAST CANCER: ICD-10-CM

## 2025-08-20 DIAGNOSIS — Z15.09 BRCA2 GENE MUTATION POSITIVE: ICD-10-CM

## 2025-08-20 DIAGNOSIS — Z15.01 BRCA2 GENE MUTATION POSITIVE: ICD-10-CM

## 2025-08-20 PROCEDURE — A9577 INJ MULTIHANCE: HCPCS | Performed by: PHYSICIAN ASSISTANT

## 2025-08-20 PROCEDURE — 77049 MRI BREAST C-+ W/CAD BI: CPT | Mod: TC

## 2025-08-20 PROCEDURE — 25500020 PHARM REV CODE 255: Performed by: PHYSICIAN ASSISTANT

## 2025-08-20 RX ADMIN — GADOBENATE DIMEGLUMINE 14 ML: 529 INJECTION, SOLUTION INTRAVENOUS at 10:08

## 2025-08-21 ENCOUNTER — TELEPHONE (OUTPATIENT)
Dept: NEUROSURGERY | Facility: CLINIC | Age: 40
End: 2025-08-21
Payer: COMMERCIAL

## 2025-08-22 ENCOUNTER — TELEPHONE (OUTPATIENT)
Dept: NEUROSURGERY | Facility: CLINIC | Age: 40
End: 2025-08-22
Payer: COMMERCIAL

## 2025-08-26 ENCOUNTER — HOSPITAL ENCOUNTER (OUTPATIENT)
Dept: RADIOLOGY | Facility: HOSPITAL | Age: 40
Discharge: HOME | End: 2025-08-26
Attending: NEUROLOGICAL SURGERY
Payer: COMMERCIAL

## 2025-08-26 ENCOUNTER — OFFICE VISIT (OUTPATIENT)
Dept: NEUROSURGERY | Facility: CLINIC | Age: 40
End: 2025-08-26
Payer: COMMERCIAL

## 2025-08-26 ENCOUNTER — HOSPITAL ENCOUNTER (OUTPATIENT)
Dept: RADIOLOGY | Facility: HOSPITAL | Age: 40
Discharge: HOME | End: 2025-08-26
Attending: SURGERY
Payer: COMMERCIAL

## 2025-08-26 DIAGNOSIS — M54.50 LOW BACK PAIN, UNSPECIFIED BACK PAIN LATERALITY, UNSPECIFIED CHRONICITY, UNSPECIFIED WHETHER SCIATICA PRESENT: ICD-10-CM

## 2025-08-26 DIAGNOSIS — M54.16 LUMBAR RADICULOPATHY: Primary | ICD-10-CM

## 2025-08-26 DIAGNOSIS — M54.50 LOW BACK PAIN, UNSPECIFIED BACK PAIN LATERALITY, UNSPECIFIED CHRONICITY, UNSPECIFIED WHETHER SCIATICA PRESENT: Primary | ICD-10-CM

## 2025-08-26 DIAGNOSIS — R10.12 LUQ PAIN: ICD-10-CM

## 2025-08-26 DIAGNOSIS — R10.32 ABDOMINAL PAIN, LEFT LOWER QUADRANT: ICD-10-CM

## 2025-08-26 PROCEDURE — 72110 X-RAY EXAM L-2 SPINE 4/>VWS: CPT | Mod: 26,,, | Performed by: RADIOLOGY

## 2025-08-26 PROCEDURE — A9698 NON-RAD CONTRAST MATERIALNOC: HCPCS

## 2025-08-26 PROCEDURE — 99204 OFFICE O/P NEW MOD 45 MIN: CPT | Mod: S$GLB,,, | Performed by: NEUROLOGICAL SURGERY

## 2025-08-26 PROCEDURE — 72110 X-RAY EXAM L-2 SPINE 4/>VWS: CPT | Mod: TC

## 2025-08-26 PROCEDURE — 25500020 PHARM REV CODE 255

## 2025-08-26 PROCEDURE — 99999 PR PBB SHADOW E&M-EST. PATIENT-LVL III: CPT | Mod: PBBFAC,,, | Performed by: NEUROLOGICAL SURGERY

## 2025-08-26 RX ADMIN — IOHEXOL 75 ML: 350 INJECTION, SOLUTION INTRAVENOUS at 05:08

## 2025-08-26 RX ADMIN — IOHEXOL 1000 ML: 9 SOLUTION ORAL at 05:08

## 2025-08-29 ENCOUNTER — HOSPITAL ENCOUNTER (OUTPATIENT)
Dept: RADIOLOGY | Facility: HOSPITAL | Age: 40
Discharge: HOME | End: 2025-08-29
Attending: NURSE PRACTITIONER
Payer: COMMERCIAL

## (undated) DEVICE — BRIEF MESH LARGE

## (undated) DEVICE — MARKER SKIN STND TIP BLUE BARR

## (undated) DEVICE — SUT CTD VICRYL 2-0 VIL BR C

## (undated) DEVICE — LAVAGE WOUND BACTISURE 1L BAG

## (undated) DEVICE — DRESSING GRANUFOAM LARGE VAC

## (undated) DEVICE — CLIP LIGATING MEDIUM

## (undated) DEVICE — CANISTER INFOV.A.C WOUND 500ML

## (undated) DEVICE — SUT STRATAFIX PGAPCL 3 FS-1

## (undated) DEVICE — SEE MEDLINE ITEM 146345

## (undated) DEVICE — GEL AQUASONIC 100 STERILE20GM

## (undated) DEVICE — SYR 20ML BLUE LUER LOCK

## (undated) DEVICE — ELECTRODE BLADE INSULATED 1 IN

## (undated) DEVICE — ADHESIVE MASTISOL VIAL 48/BX

## (undated) DEVICE — SUT STRATAFIX PGAPCL 3 FS-2

## (undated) DEVICE — POSITIONER HEEL FOAM CONVOLTD

## (undated) DEVICE — WAVEGUIDE BRITEFIELD DISP

## (undated) DEVICE — STOCKINETTE DBL PLY ST 4X

## (undated) DEVICE — SUT 0 VICRYL / UR6 (J603)

## (undated) DEVICE — Device

## (undated) DEVICE — GLOVE BIOGEL PI ORTHO PRO SZ7

## (undated) DEVICE — DRESSING XEROFORM GAUZE 5X9

## (undated) DEVICE — SYR 10CC LUER LOCK

## (undated) DEVICE — BRA CLASSIC COMFORT 46 BEIGE

## (undated) DEVICE — SEE MEDLINE ITEM 154981

## (undated) DEVICE — SEE MEDLINE ITEM 157110

## (undated) DEVICE — PAD SANITARY OB STERILE

## (undated) DEVICE — GIRDLE ZIPPERED XXXXXL BLACK

## (undated) DEVICE — SUT PDS II 0 CT VIL MONO 36

## (undated) DEVICE — APPLICATOR CHLORAPREP ORN 26ML

## (undated) DEVICE — RETRACTOR RADIALUX LIGHTED

## (undated) DEVICE — SHEET DRAPE FAN-FOLDED 3/4

## (undated) DEVICE — DRESSING GZ SURGICOUNT 4X8

## (undated) DEVICE — IRRIGATOR SUCTION W/TIP

## (undated) DEVICE — SUT MCRYL PLUS 4-0 PS2 27IN

## (undated) DEVICE — SUT CTD VICRYL 0 VIL BR/CT

## (undated) DEVICE — SOL 9P NACL IRR PIC IL

## (undated) DEVICE — CAUTERY TIP POLISHER

## (undated) DEVICE — DRAPE SPLIT STERILE

## (undated) DEVICE — COUPLER MICROVAS ANSTMS 2.5MM
Type: IMPLANTABLE DEVICE | Site: BREAST | Status: NON-FUNCTIONAL
Removed: 2020-10-05

## (undated) DEVICE — CLAMP SINGLE MICRO.

## (undated) DEVICE — CLIP DOUBLE MICRO.

## (undated) DEVICE — BLADE SURG STAINLESS STEEL #15

## (undated) DEVICE — SUT VICRYL PLUS 4-0 PS2 27

## (undated) DEVICE — SEE MEDLINE ITEM 157216

## (undated) DEVICE — CANISTER SUCTION 3000CC

## (undated) DEVICE — SYR B-D DISP CONTROL 10CC100/C

## (undated) DEVICE — DRESSING XEROFORM 5X9IN

## (undated) DEVICE — CORD BIPOLAR 12 FOOT

## (undated) DEVICE — ELECTRODE REM PLYHSV RETURN 9

## (undated) DEVICE — PAD ABD 8X10 STERILE

## (undated) DEVICE — ADHESIVE DERMABOND ADVANCED

## (undated) DEVICE — SEALER LIGASURE LAP 37CM 5MM

## (undated) DEVICE — ELECTRODE BLADE TEFLON 6

## (undated) DEVICE — TUBING HEATED INSUFFLATOR

## (undated) DEVICE — CLIP LIGATING HEMOCLP SMALL

## (undated) DEVICE — SEE MEDLINE ITEM 146231

## (undated) DEVICE — SOL NACL IRR 1000ML BTL

## (undated) DEVICE — SEE MEDLINE ITEM 156930

## (undated) DEVICE — SCRUB 10% POVIDONE IODINE 4OZ

## (undated) DEVICE — SOL CLEARIFY VISUALIZATION LAP

## (undated) DEVICE — SOL NACL IRR 3000ML

## (undated) DEVICE — TROCAR ENDO Z THREAD KII 5X100

## (undated) DEVICE — SUT 2/0 30IN SILK BLK BRAI

## (undated) DEVICE — SEE MEDLINE ITEM 157131

## (undated) DEVICE — DRESSING XEROFORM FOIL PK 1X8

## (undated) DEVICE — SUT 9/0 5IN ETHILON BLK MON

## (undated) DEVICE — STAPLER SKIN ROTATING HEAD

## (undated) DEVICE — SEE MEDLINE ITEM 152530

## (undated) DEVICE — TOURNIQUET SB QC DP 18X4IN

## (undated) DEVICE — BANDAGE ELASTIC 6 X 5 YD

## (undated) DEVICE — STOCKINETTE TUBULAR 2PL 6 X 4

## (undated) DEVICE — WARMER DRAPE STERILE LF

## (undated) DEVICE — SYR BULB IRRIG ST 60 LF

## (undated) DEVICE — SYS PRINEO SKIN CLOSURE

## (undated) DEVICE — SEE MEDLINE ITEM 146298

## (undated) DEVICE — SLEEVE SCD EXPRESS CALF MEDIUM

## (undated) DEVICE — SUT CTD VICRYL 1 VIL BR CT

## (undated) DEVICE — EVACUATOR WOUND BULB 100CC

## (undated) DEVICE — SPONGE COTTON TRAY 4X4IN

## (undated) DEVICE — SYR 50ML CATH TIP

## (undated) DEVICE — NDL HYPO REG 25G X 1 1/2

## (undated) DEVICE — PULSAVAC ZIMMER

## (undated) DEVICE — SUT STRATAFIX PDS 1 CTX 18IN

## (undated) DEVICE — SUT PROLENE 4-0 MONO 18IN

## (undated) DEVICE — ELECTRODE SPATULA 5MMX 32CM

## (undated) DEVICE — GLOVE BIOGEL SKINSENSE PI 7.5

## (undated) DEVICE — CLIPPER BLADE MOD 4406 (CAREF)

## (undated) DEVICE — ELECTRODE BLD EXT INSUL 1

## (undated) DEVICE — GAUZE SPONGE BULKEE 6X6.75IN

## (undated) DEVICE — GLOVE SURGEONS ULTRA TOUCH 6.5

## (undated) DEVICE — SUT MONOCRYL 3-0 PS-2 UND

## (undated) DEVICE — SEE MEDLINE ITEM 156964

## (undated) DEVICE — SYR ONLY LUER LOCK 20CC

## (undated) DEVICE — NDL SPINAL 20GX3.5 HUB

## (undated) DEVICE — STAPLER SKIN PROXIMATE WIDE

## (undated) DEVICE — UNDERGLOVE BIOGEL PI SZ 6.5 LF

## (undated) DEVICE — SEE MEDLINE ITEM 146308

## (undated) DEVICE — CONTAINER SPECIMEN STRL 4OZ

## (undated) DEVICE — SPONGE GAUZE 16PLY 4X4

## (undated) DEVICE — BLANKET HYPER ADULT 24X60IN

## (undated) DEVICE — SEE MEDLINE ITEM 157181

## (undated) DEVICE — SPONGE LAP 18X18 PREWASHED

## (undated) DEVICE — SEE MEDLINE ITEM 146417

## (undated) DEVICE — NDL ECLIPSE SAFETY 18GX1-1/2IN

## (undated) DEVICE — TROCAR KII FIOS ZTHREAD 12X100

## (undated) DEVICE — POSITIONER HEAD DONUT 9IN FOAM

## (undated) DEVICE — KIT PREVENA PLUS

## (undated) DEVICE — SPATULA CURVED 33CM HC BLACK

## (undated) DEVICE — DRAPE VS3 IRIDIUM MICROSCOPE

## (undated) DEVICE — SCISSOR TIP ENDOCUT DISPOSABLE

## (undated) DEVICE — SEE MEDLINE ITEM 152622

## (undated) DEVICE — SCRUB HIBICLENS 4% CHG 4OZ

## (undated) DEVICE — GLOVE SURG ULTRA TOUCH 7

## (undated) DEVICE — PAD UNDERPAD 30X30

## (undated) DEVICE — SEE MEDLINE ITEM 152529

## (undated) DEVICE — SYS CLSR DERMABOND PRINEO 22CM

## (undated) DEVICE — DRAPE LAMIN UND BUTTOCKS

## (undated) DEVICE — BLADE PEAK PLASMA

## (undated) DEVICE — TRAY FOLEY 16FR LUBRISEAL IC

## (undated) DEVICE — NDL INSUF ULTRA VERESS 120MM

## (undated) DEVICE — DRAPE ABDOMINAL TIBURON 14X11

## (undated) DEVICE — SOL IRR NACL .9% 3000ML

## (undated) DEVICE — PACK DRAPE PERI/GYN TIBURON

## (undated) DEVICE — DRAIN CHANNEL ROUND 15FR

## (undated) DEVICE — CANNULA LAP SEAL Z THRD 5X100

## (undated) DEVICE — GOWN SMART IMP BREATHABLE XXLG

## (undated) DEVICE — SUT STRATAFIX PDO 2-0 MH

## (undated) DEVICE — FILTER LAPAROSCOPIC SMOKE EVAC

## (undated) DEVICE — SEE MEDLINE ITEM 157116

## (undated) DEVICE — SUT VICRYL 2-0 36 CT-1

## (undated) DEVICE — TRAY DRY SKIN SCRUB PREP

## (undated) DEVICE — GLOVE PI ULTRA TOUCH G SURGEON

## (undated) DEVICE — COVER HANDLE UNIVERSAL 6X14